# Patient Record
Sex: MALE | Race: WHITE | NOT HISPANIC OR LATINO | Employment: OTHER | URBAN - METROPOLITAN AREA
[De-identification: names, ages, dates, MRNs, and addresses within clinical notes are randomized per-mention and may not be internally consistent; named-entity substitution may affect disease eponyms.]

---

## 2017-12-20 ENCOUNTER — APPOINTMENT (OUTPATIENT)
Dept: PREADMISSION TESTING | Facility: HOSPITAL | Age: 77
End: 2017-12-20

## 2017-12-20 ENCOUNTER — HOSPITAL ENCOUNTER (OUTPATIENT)
Dept: GENERAL RADIOLOGY | Facility: HOSPITAL | Age: 77
Discharge: HOME | End: 2017-12-20
Admitting: ORTHOPAEDIC SURGERY

## 2017-12-20 VITALS
HEIGHT: 75 IN | DIASTOLIC BLOOD PRESSURE: 76 MMHG | BODY MASS INDEX: 32.3 KG/M2 | SYSTOLIC BLOOD PRESSURE: 131 MMHG | OXYGEN SATURATION: 98 % | HEART RATE: 65 BPM | TEMPERATURE: 97.5 F | RESPIRATION RATE: 20 BRPM | WEIGHT: 259.8 LBS

## 2017-12-20 LAB
25(OH)D3 SERPL-MCNC: 26.4 NG/ML (ref 30–100)
ABO GROUP BLD: NORMAL
ALBUMIN SERPL-MCNC: 4.2 G/DL (ref 3.5–5.2)
ALBUMIN/GLOB SERPL: 1.3 G/DL
ALP SERPL-CCNC: 74 U/L (ref 39–117)
ALT SERPL W P-5'-P-CCNC: 30 U/L (ref 1–41)
ANION GAP SERPL CALCULATED.3IONS-SCNC: 10.1 MMOL/L
APTT PPP: 31.2 SECONDS (ref 22.7–35.4)
AST SERPL-CCNC: 27 U/L (ref 1–40)
BASOPHILS # BLD AUTO: 0.03 10*3/MM3 (ref 0–0.2)
BASOPHILS NFR BLD AUTO: 0.4 % (ref 0–1.5)
BILIRUB SERPL-MCNC: 0.6 MG/DL (ref 0.1–1.2)
BILIRUB UR QL STRIP: NEGATIVE
BLD GP AB SCN SERPL QL: NEGATIVE
BUN BLD-MCNC: 17 MG/DL (ref 8–23)
BUN/CREAT SERPL: 12.1 (ref 7–25)
CALCIUM SPEC-SCNC: 9 MG/DL (ref 8.6–10.5)
CHLORIDE SERPL-SCNC: 103 MMOL/L (ref 98–107)
CLARITY UR: CLEAR
CO2 SERPL-SCNC: 27.9 MMOL/L (ref 22–29)
COLOR UR: YELLOW
CREAT BLD-MCNC: 1.41 MG/DL (ref 0.76–1.27)
DEPRECATED RDW RBC AUTO: 43.5 FL (ref 37–54)
EOSINOPHIL # BLD AUTO: 0.34 10*3/MM3 (ref 0–0.7)
EOSINOPHIL NFR BLD AUTO: 4.4 % (ref 0.3–6.2)
ERYTHROCYTE [DISTWIDTH] IN BLOOD BY AUTOMATED COUNT: 13.2 % (ref 11.5–14.5)
GFR SERPL CREATININE-BSD FRML MDRD: 49 ML/MIN/1.73
GLOBULIN UR ELPH-MCNC: 3.2 GM/DL
GLUCOSE BLD-MCNC: 128 MG/DL (ref 65–99)
GLUCOSE UR STRIP-MCNC: NEGATIVE MG/DL
HCT VFR BLD AUTO: 49.4 % (ref 40.4–52.2)
HGB BLD-MCNC: 16.2 G/DL (ref 13.7–17.6)
HGB UR QL STRIP.AUTO: NEGATIVE
IMM GRANULOCYTES # BLD: 0.02 10*3/MM3 (ref 0–0.03)
IMM GRANULOCYTES NFR BLD: 0.3 % (ref 0–0.5)
INR PPP: 1.1 (ref 0.9–1.1)
KETONES UR QL STRIP: NEGATIVE
LEUKOCYTE ESTERASE UR QL STRIP.AUTO: NEGATIVE
LYMPHOCYTES # BLD AUTO: 1.86 10*3/MM3 (ref 0.9–4.8)
LYMPHOCYTES NFR BLD AUTO: 24.2 % (ref 19.6–45.3)
MCH RBC QN AUTO: 29.8 PG (ref 27–32.7)
MCHC RBC AUTO-ENTMCNC: 32.8 G/DL (ref 32.6–36.4)
MCV RBC AUTO: 91 FL (ref 79.8–96.2)
MONOCYTES # BLD AUTO: 0.66 10*3/MM3 (ref 0.2–1.2)
MONOCYTES NFR BLD AUTO: 8.6 % (ref 5–12)
NEUTROPHILS # BLD AUTO: 4.77 10*3/MM3 (ref 1.9–8.1)
NEUTROPHILS NFR BLD AUTO: 62.1 % (ref 42.7–76)
NITRITE UR QL STRIP: NEGATIVE
PH UR STRIP.AUTO: 6 [PH] (ref 5–8)
PLATELET # BLD AUTO: 173 10*3/MM3 (ref 140–500)
PMV BLD AUTO: 10.2 FL (ref 6–12)
POTASSIUM BLD-SCNC: 4.4 MMOL/L (ref 3.5–5.2)
PROT SERPL-MCNC: 7.4 G/DL (ref 6–8.5)
PROT UR QL STRIP: NEGATIVE
PROTHROMBIN TIME: 13.8 SECONDS (ref 11.7–14.2)
RBC # BLD AUTO: 5.43 10*6/MM3 (ref 4.6–6)
RH BLD: POSITIVE
SODIUM BLD-SCNC: 141 MMOL/L (ref 136–145)
SP GR UR STRIP: 1.02 (ref 1–1.03)
UROBILINOGEN UR QL STRIP: NORMAL
WBC NRBC COR # BLD: 7.68 10*3/MM3 (ref 4.5–10.7)

## 2017-12-20 PROCEDURE — 81003 URINALYSIS AUTO W/O SCOPE: CPT | Performed by: ORTHOPAEDIC SURGERY

## 2017-12-20 PROCEDURE — 85025 COMPLETE CBC W/AUTO DIFF WBC: CPT | Performed by: ORTHOPAEDIC SURGERY

## 2017-12-20 PROCEDURE — 80053 COMPREHEN METABOLIC PANEL: CPT | Performed by: ORTHOPAEDIC SURGERY

## 2017-12-20 PROCEDURE — 86850 RBC ANTIBODY SCREEN: CPT | Performed by: ORTHOPAEDIC SURGERY

## 2017-12-20 PROCEDURE — 93010 ELECTROCARDIOGRAM REPORT: CPT | Performed by: INTERNAL MEDICINE

## 2017-12-20 PROCEDURE — 93005 ELECTROCARDIOGRAM TRACING: CPT

## 2017-12-20 PROCEDURE — 85610 PROTHROMBIN TIME: CPT | Performed by: ORTHOPAEDIC SURGERY

## 2017-12-20 PROCEDURE — 86900 BLOOD TYPING SEROLOGIC ABO: CPT | Performed by: ORTHOPAEDIC SURGERY

## 2017-12-20 PROCEDURE — 71020 HC CHEST PA AND LATERAL: CPT

## 2017-12-20 PROCEDURE — 86901 BLOOD TYPING SEROLOGIC RH(D): CPT | Performed by: ORTHOPAEDIC SURGERY

## 2017-12-20 PROCEDURE — 82306 VITAMIN D 25 HYDROXY: CPT | Performed by: ORTHOPAEDIC SURGERY

## 2017-12-20 PROCEDURE — 36415 COLL VENOUS BLD VENIPUNCTURE: CPT

## 2017-12-20 PROCEDURE — 85730 THROMBOPLASTIN TIME PARTIAL: CPT | Performed by: ORTHOPAEDIC SURGERY

## 2017-12-20 RX ORDER — ASPIRIN 81 MG/1
81 TABLET ORAL DAILY
COMMUNITY

## 2017-12-20 RX ORDER — INSULIN GLARGINE 100 [IU]/ML
34 INJECTION, SOLUTION SUBCUTANEOUS EVERY MORNING
COMMUNITY
End: 2022-03-23 | Stop reason: HOSPADM

## 2017-12-20 RX ORDER — DOCUSATE SODIUM 250 MG
250 CAPSULE ORAL 2 TIMES DAILY PRN
COMMUNITY
End: 2022-03-23 | Stop reason: HOSPADM

## 2017-12-20 RX ORDER — INSULIN GLARGINE 100 [IU]/ML
25 INJECTION, SOLUTION SUBCUTANEOUS NIGHTLY
COMMUNITY
End: 2018-01-04 | Stop reason: HOSPADM

## 2017-12-20 RX ORDER — ROPINIROLE 4 MG/1
4 TABLET, FILM COATED ORAL NIGHTLY
COMMUNITY

## 2017-12-20 RX ORDER — GABAPENTIN 300 MG/1
600 CAPSULE ORAL 3 TIMES DAILY
COMMUNITY

## 2017-12-20 RX ORDER — FUROSEMIDE 20 MG/1
20 TABLET ORAL 2 TIMES DAILY
Status: ON HOLD | COMMUNITY
End: 2019-08-17 | Stop reason: SDUPTHER

## 2017-12-20 RX ORDER — ATORVASTATIN CALCIUM 80 MG/1
80 TABLET, FILM COATED ORAL DAILY
COMMUNITY

## 2017-12-20 RX ORDER — HYDROCODONE BITARTRATE AND ACETAMINOPHEN 5; 325 MG/1; MG/1
1 TABLET ORAL NIGHTLY PRN
Status: ON HOLD | COMMUNITY
End: 2018-01-04

## 2017-12-20 RX ORDER — LISINOPRIL 5 MG/1
5 TABLET ORAL DAILY
COMMUNITY
End: 2019-08-17 | Stop reason: HOSPADM

## 2017-12-20 RX ORDER — UBIDECARENONE 75 MG
100 CAPSULE ORAL DAILY
COMMUNITY
End: 2019-06-10

## 2017-12-20 NOTE — DISCHARGE INSTRUCTIONS
Take the following medications the morning of surgery with a small sip of water: METOPROLOL.    BRING IN YOUR LISINOPRIL BOTTLE IN TO THE SURGERY NURSE AND SHE WILL TAKE YOUR BLOOD PRESSURE AGAIN AND DECIDE IF YOU NEED IT THE MORNING OF SURGERY.  STOP PREOPERATIVELY ANY ANTIINFLAMMATORY, ASPIRIN, DICLOFENAC/VOLTAREN GEL  ARRIVE TO THE MAIN SURGERY DESK THE DAY OF YOUR SURGERY BY 9AM OR AS DIRECTED  CALL YOU FAMILY MD TO INQUIRE ABOUT INSULIN DOSING****      General Instructions:  • Do not eat solid food after midnight the night before surgery.  • You may drink clear liquids day of surgery but must stop at least one hour before your hospital arrival time. - LAST SIP BY 8AM IF ARRIVAL BY 9  • It is beneficial for you to have a clear drink that contains carbohydrates the day of surgery.  We suggest a 12 to 20 ounce bottle of Gatorade or Powerade for non-diabetic patients or a 12 to 20 ounce bottle of G2 or Powerade Zero for diabetic patients. (Pediatric patients, are not advised to drink a 12 to 20 ounce carbohydrate drink)    Clear liquids are liquids you can see through.  Nothing red in color.     Plain water                               Sports drinks  Sodas                                   Gelatin (Jell-O)  Fruit juices without pulp such as white grape juice and apple juice  Popsicles that contain no fruit or yogurt  Tea or coffee (no cream or milk added)  Gatorade / Powerade  G2 / Powerade Zero    • Infants may have breast milk up to four hours before surgery.  • Infants drinking formula may drink formula up to six hours before surgery.   • Patients who avoid smoking, chewing tobacco and alcohol for 4 weeks prior to surgery have a reduced risk of post-operative complications.  Quit smoking as many days before surgery as you can.  • Do not smoke, use chewing tobacco or drink alcohol the day of surgery.   • If applicable bring your C-PAP/ BI-PAP machine.  • Bring any papers given to you in the doctor’s  office.  • Wear clean comfortable clothes and socks.  • Do not wear contact lenses or make-up.  Bring a case for your glasses.   • Bring crutches or walker if applicable.  • Remove all piercings.  Leave jewelry and any other valuables at home.  • The Pre-Admission Testing nurse will instruct you to bring medications if unable to obtain an accurate list in Pre-Admission Testing.        If you were given a blood bank ID arm band remember to bring it with you the day of surgery.    Preventing a Surgical Site Infection:  • For 2 to 3 days before surgery, avoid shaving with a razor because the razor can irritate skin and make it easier to develop an infection.  • The night prior to surgery sleep in a clean bed with clean clothing.  Do not allow pets to sleep with you.  • Shower on the morning of surgery using a fresh bar of anti-bacterial soap (such as Dial) and clean washcloth.  Dry with a clean towel and dress in clean clothing.  • Ask your surgeon if you will be receiving antibiotics prior to surgery.  • Make sure you, your family, and all healthcare providers clean their hands with soap and water or an alcohol based hand  before caring for you or your wound.    Day of surgery:  Upon arrival, a Pre-op nurse and Anesthesiologist will review your health history, obtain vital signs, and answer questions you may have.  The only belongings needed at this time will be your home medications and if applicable your C-PAP/BI-PAP machine.  If you are staying overnight your family can leave the rest of your belongings in the car and bring them to your room later.  A Pre-op nurse will start an IV and you may receive medication in preparation for surgery, including something to help you relax.  Your family will be able to see you in the Pre-op area.  While you are in surgery your family should notify the waiting room  if they leave the waiting room area and provide a contact phone number.    Please be aware  that surgery does come with discomfort.  We want to make every effort to control your discomfort so please discuss any uncontrolled symptoms with your nurse.   Your doctor will most likely have prescribed pain medications.      If you are going home after surgery you will receive individualized written care instructions before being discharged.  A responsible adult must drive you to and from the hospital on the day of your surgery and stay with you for 24 hours.    If you are staying overnight following surgery, you will be transported to your hospital room following the recovery period.  [facility] has all private rooms.    If you have any questions please call Pre-Admission Testing at [phone].  Deductibles and co-payments are collected on the day of service. Please be prepared to pay the required co-pay, deductible or deposit on the day of service as defined by your plan.

## 2018-01-03 ENCOUNTER — APPOINTMENT (OUTPATIENT)
Dept: GENERAL RADIOLOGY | Facility: HOSPITAL | Age: 78
End: 2018-01-03

## 2018-01-03 ENCOUNTER — ANESTHESIA (OUTPATIENT)
Dept: PERIOP | Facility: HOSPITAL | Age: 78
End: 2018-01-03

## 2018-01-03 ENCOUNTER — HOSPITAL ENCOUNTER (OUTPATIENT)
Facility: HOSPITAL | Age: 78
Discharge: HOME | End: 2018-01-04
Attending: ORTHOPAEDIC SURGERY | Admitting: ORTHOPAEDIC SURGERY

## 2018-01-03 ENCOUNTER — ANESTHESIA EVENT (OUTPATIENT)
Dept: PERIOP | Facility: HOSPITAL | Age: 78
End: 2018-01-03

## 2018-01-03 PROBLEM — M48.02 CERVICAL SPINAL STENOSIS: Status: ACTIVE | Noted: 2018-01-03

## 2018-01-03 LAB
GLUCOSE BLDC GLUCOMTR-MCNC: 181 MG/DL (ref 70–130)
GLUCOSE BLDC GLUCOMTR-MCNC: 198 MG/DL (ref 70–130)
GLUCOSE BLDC GLUCOMTR-MCNC: 204 MG/DL (ref 70–130)
GLUCOSE BLDC GLUCOMTR-MCNC: 256 MG/DL (ref 70–130)

## 2018-01-03 PROCEDURE — A9270 NON-COVERED ITEM OR SERVICE: HCPCS | Performed by: ORTHOPAEDIC SURGERY

## 2018-01-03 PROCEDURE — 63710000001 FUROSEMIDE 20 MG TABLET: Performed by: ORTHOPAEDIC SURGERY

## 2018-01-03 PROCEDURE — G0378 HOSPITAL OBSERVATION PER HR: HCPCS

## 2018-01-03 PROCEDURE — 25010000002 FENTANYL CITRATE (PF) 100 MCG/2ML SOLUTION: Performed by: ANESTHESIOLOGY

## 2018-01-03 PROCEDURE — 63710000001 METFORMIN 500 MG TABLET: Performed by: ORTHOPAEDIC SURGERY

## 2018-01-03 PROCEDURE — 25010000002 DEXAMETHASONE PER 1 MG: Performed by: ANESTHESIOLOGY

## 2018-01-03 PROCEDURE — 63710000001 ATORVASTATIN 80 MG TABLET: Performed by: ORTHOPAEDIC SURGERY

## 2018-01-03 PROCEDURE — 25010000002 FENTANYL CITRATE (PF) 100 MCG/2ML SOLUTION

## 2018-01-03 PROCEDURE — 72040 X-RAY EXAM NECK SPINE 2-3 VW: CPT

## 2018-01-03 PROCEDURE — 25010000002 MIDAZOLAM PER 1 MG: Performed by: ANESTHESIOLOGY

## 2018-01-03 PROCEDURE — 25010000002 PROPOFOL 10 MG/ML EMULSION: Performed by: ANESTHESIOLOGY

## 2018-01-03 PROCEDURE — 63710000001 GABAPENTIN 300 MG CAPSULE: Performed by: ORTHOPAEDIC SURGERY

## 2018-01-03 PROCEDURE — 25010000002 PHENYLEPHRINE PER 1 ML: Performed by: ANESTHESIOLOGY

## 2018-01-03 PROCEDURE — 63710000001 HYDROCODONE-ACETAMINOPHEN 5-325 MG TABLET: Performed by: ORTHOPAEDIC SURGERY

## 2018-01-03 PROCEDURE — 63710000001 ROPINIROLE 2 MG TABLET: Performed by: ORTHOPAEDIC SURGERY

## 2018-01-03 PROCEDURE — 25010000003 CEFAZOLIN IN DEXTROSE 2-4 GM/100ML-% SOLUTION: Performed by: ORTHOPAEDIC SURGERY

## 2018-01-03 PROCEDURE — 82962 GLUCOSE BLOOD TEST: CPT

## 2018-01-03 PROCEDURE — 25010000002 SUCCINYLCHOLINE PER 20 MG: Performed by: ANESTHESIOLOGY

## 2018-01-03 PROCEDURE — 76000 FLUOROSCOPY <1 HR PHYS/QHP: CPT

## 2018-01-03 PROCEDURE — 25010000002 HYDROMORPHONE PER 4 MG: Performed by: ANESTHESIOLOGY

## 2018-01-03 PROCEDURE — 63710000001 METOPROLOL TARTRATE 25 MG TABLET: Performed by: ORTHOPAEDIC SURGERY

## 2018-01-03 PROCEDURE — C1713 ANCHOR/SCREW BN/BN,TIS/BN: HCPCS | Performed by: ORTHOPAEDIC SURGERY

## 2018-01-03 PROCEDURE — 63710000001 LISINOPRIL 2.5 MG TABLET: Performed by: ORTHOPAEDIC SURGERY

## 2018-01-03 DEVICE — ANTERIOR CERVICAL PLATE ASSEMBLY, 2-LEVEL, 032MM
Type: IMPLANTABLE DEVICE | Site: SPINE CERVICAL | Status: FUNCTIONAL
Brand: TRESTLE LUXE

## 2018-01-03 DEVICE — NOVEL CERVICAL SPACER, 7 MM LARGE, 7DEG LORDOTIC, PEEK
Type: IMPLANTABLE DEVICE | Site: SPINE CERVICAL | Status: FUNCTIONAL
Brand: NOVEL SPINAL SPACER SYSTEM

## 2018-01-03 DEVICE — 4.0MM VARIABLE ANGLE, SELF-DRILLING HEXALOBE SCREW, 14MM
Type: IMPLANTABLE DEVICE | Site: SPINE CERVICAL | Status: FUNCTIONAL
Brand: TRESTLE LUXE

## 2018-01-03 DEVICE — ALLOGRFT BONE VIVIGEN CELLUAR MATRX FZ 1CC: Type: IMPLANTABLE DEVICE | Site: SPINE CERVICAL | Status: FUNCTIONAL

## 2018-01-03 RX ORDER — HYDRALAZINE HYDROCHLORIDE 20 MG/ML
5 INJECTION INTRAMUSCULAR; INTRAVENOUS
Status: DISCONTINUED | OUTPATIENT
Start: 2018-01-03 | End: 2018-01-03 | Stop reason: HOSPADM

## 2018-01-03 RX ORDER — ATORVASTATIN CALCIUM 80 MG/1
80 TABLET, FILM COATED ORAL DAILY
Status: DISCONTINUED | OUTPATIENT
Start: 2018-01-03 | End: 2018-01-04 | Stop reason: HOSPADM

## 2018-01-03 RX ORDER — SODIUM CHLORIDE 0.9 % (FLUSH) 0.9 %
1-10 SYRINGE (ML) INJECTION AS NEEDED
Status: DISCONTINUED | OUTPATIENT
Start: 2018-01-03 | End: 2018-01-03 | Stop reason: HOSPADM

## 2018-01-03 RX ORDER — SODIUM CHLORIDE, SODIUM LACTATE, POTASSIUM CHLORIDE, CALCIUM CHLORIDE 600; 310; 30; 20 MG/100ML; MG/100ML; MG/100ML; MG/100ML
100 INJECTION, SOLUTION INTRAVENOUS CONTINUOUS
Status: DISCONTINUED | OUTPATIENT
Start: 2018-01-03 | End: 2018-01-03

## 2018-01-03 RX ORDER — ROPINIROLE 2 MG/1
4 TABLET, FILM COATED ORAL NIGHTLY
Status: DISCONTINUED | OUTPATIENT
Start: 2018-01-03 | End: 2018-01-04 | Stop reason: HOSPADM

## 2018-01-03 RX ORDER — FENTANYL CITRATE 50 UG/ML
INJECTION, SOLUTION INTRAMUSCULAR; INTRAVENOUS
Status: COMPLETED
Start: 2018-01-03 | End: 2018-01-03

## 2018-01-03 RX ORDER — GABAPENTIN 300 MG/1
600 CAPSULE ORAL 3 TIMES DAILY
Status: DISCONTINUED | OUTPATIENT
Start: 2018-01-03 | End: 2018-01-04 | Stop reason: HOSPADM

## 2018-01-03 RX ORDER — BISACODYL 10 MG
10 SUPPOSITORY, RECTAL RECTAL DAILY PRN
Status: DISCONTINUED | OUTPATIENT
Start: 2018-01-03 | End: 2018-01-04 | Stop reason: HOSPADM

## 2018-01-03 RX ORDER — HYDROMORPHONE HCL 110MG/55ML
PATIENT CONTROLLED ANALGESIA SYRINGE INTRAVENOUS AS NEEDED
Status: DISCONTINUED | OUTPATIENT
Start: 2018-01-03 | End: 2018-01-03 | Stop reason: SURG

## 2018-01-03 RX ORDER — EPHEDRINE SULFATE 50 MG/ML
INJECTION, SOLUTION INTRAVENOUS AS NEEDED
Status: DISCONTINUED | OUTPATIENT
Start: 2018-01-03 | End: 2018-01-03 | Stop reason: SURG

## 2018-01-03 RX ORDER — DIPHENHYDRAMINE HYDROCHLORIDE 50 MG/ML
25 INJECTION INTRAMUSCULAR; INTRAVENOUS EVERY 6 HOURS PRN
Status: DISCONTINUED | OUTPATIENT
Start: 2018-01-03 | End: 2018-01-04 | Stop reason: HOSPADM

## 2018-01-03 RX ORDER — SODIUM CHLORIDE, SODIUM LACTATE, POTASSIUM CHLORIDE, CALCIUM CHLORIDE 600; 310; 30; 20 MG/100ML; MG/100ML; MG/100ML; MG/100ML
9 INJECTION, SOLUTION INTRAVENOUS CONTINUOUS
Status: DISCONTINUED | OUTPATIENT
Start: 2018-01-03 | End: 2018-01-03

## 2018-01-03 RX ORDER — PROMETHAZINE HYDROCHLORIDE 25 MG/ML
12.5 INJECTION, SOLUTION INTRAMUSCULAR; INTRAVENOUS ONCE AS NEEDED
Status: DISCONTINUED | OUTPATIENT
Start: 2018-01-03 | End: 2018-01-03 | Stop reason: HOSPADM

## 2018-01-03 RX ORDER — FUROSEMIDE 20 MG/1
20 TABLET ORAL DAILY
Status: DISCONTINUED | OUTPATIENT
Start: 2018-01-03 | End: 2018-01-04 | Stop reason: HOSPADM

## 2018-01-03 RX ORDER — SUCCINYLCHOLINE CHLORIDE 20 MG/ML
INJECTION INTRAMUSCULAR; INTRAVENOUS AS NEEDED
Status: DISCONTINUED | OUTPATIENT
Start: 2018-01-03 | End: 2018-01-03 | Stop reason: SURG

## 2018-01-03 RX ORDER — NALOXONE HCL 0.4 MG/ML
0.2 VIAL (ML) INJECTION AS NEEDED
Status: DISCONTINUED | OUTPATIENT
Start: 2018-01-03 | End: 2018-01-03 | Stop reason: HOSPADM

## 2018-01-03 RX ORDER — PROPOFOL 10 MG/ML
VIAL (ML) INTRAVENOUS AS NEEDED
Status: DISCONTINUED | OUTPATIENT
Start: 2018-01-03 | End: 2018-01-03 | Stop reason: SURG

## 2018-01-03 RX ORDER — HYDROCODONE BITARTRATE AND ACETAMINOPHEN 7.5; 325 MG/1; MG/1
1 TABLET ORAL ONCE AS NEEDED
Status: DISCONTINUED | OUTPATIENT
Start: 2018-01-03 | End: 2018-01-03 | Stop reason: HOSPADM

## 2018-01-03 RX ORDER — NICOTINE POLACRILEX 4 MG
15 LOZENGE BUCCAL
Status: DISCONTINUED | OUTPATIENT
Start: 2018-01-03 | End: 2018-01-04 | Stop reason: HOSPADM

## 2018-01-03 RX ORDER — ONDANSETRON 2 MG/ML
4 INJECTION INTRAMUSCULAR; INTRAVENOUS EVERY 6 HOURS PRN
Status: DISCONTINUED | OUTPATIENT
Start: 2018-01-03 | End: 2018-01-04 | Stop reason: HOSPADM

## 2018-01-03 RX ORDER — CEFAZOLIN SODIUM 2 G/100ML
2 INJECTION, SOLUTION INTRAVENOUS EVERY 8 HOURS
Status: DISCONTINUED | OUTPATIENT
Start: 2018-01-03 | End: 2018-01-04

## 2018-01-03 RX ORDER — OXYCODONE AND ACETAMINOPHEN 7.5; 325 MG/1; MG/1
1 TABLET ORAL ONCE AS NEEDED
Status: DISCONTINUED | OUTPATIENT
Start: 2018-01-03 | End: 2018-01-03 | Stop reason: HOSPADM

## 2018-01-03 RX ORDER — ROCURONIUM BROMIDE 10 MG/ML
INJECTION, SOLUTION INTRAVENOUS AS NEEDED
Status: DISCONTINUED | OUTPATIENT
Start: 2018-01-03 | End: 2018-01-03 | Stop reason: SURG

## 2018-01-03 RX ORDER — FENTANYL CITRATE 50 UG/ML
50 INJECTION, SOLUTION INTRAMUSCULAR; INTRAVENOUS
Status: DISCONTINUED | OUTPATIENT
Start: 2018-01-03 | End: 2018-01-03 | Stop reason: HOSPADM

## 2018-01-03 RX ORDER — MAGNESIUM HYDROXIDE 1200 MG/15ML
LIQUID ORAL AS NEEDED
Status: DISCONTINUED | OUTPATIENT
Start: 2018-01-03 | End: 2018-01-03 | Stop reason: HOSPADM

## 2018-01-03 RX ORDER — DEXTROSE MONOHYDRATE 25 G/50ML
25 INJECTION, SOLUTION INTRAVENOUS
Status: DISCONTINUED | OUTPATIENT
Start: 2018-01-03 | End: 2018-01-04 | Stop reason: HOSPADM

## 2018-01-03 RX ORDER — MIDAZOLAM HYDROCHLORIDE 1 MG/ML
2 INJECTION INTRAMUSCULAR; INTRAVENOUS
Status: DISCONTINUED | OUTPATIENT
Start: 2018-01-03 | End: 2018-01-03 | Stop reason: HOSPADM

## 2018-01-03 RX ORDER — ONDANSETRON 4 MG/1
4 TABLET, ORALLY DISINTEGRATING ORAL EVERY 6 HOURS PRN
Status: DISCONTINUED | OUTPATIENT
Start: 2018-01-03 | End: 2018-01-04 | Stop reason: HOSPADM

## 2018-01-03 RX ORDER — NALOXONE HCL 0.4 MG/ML
0.1 VIAL (ML) INJECTION
Status: DISCONTINUED | OUTPATIENT
Start: 2018-01-03 | End: 2018-01-04 | Stop reason: HOSPADM

## 2018-01-03 RX ORDER — BISACODYL 5 MG/1
5 TABLET, DELAYED RELEASE ORAL DAILY PRN
Status: DISCONTINUED | OUTPATIENT
Start: 2018-01-03 | End: 2018-01-04 | Stop reason: HOSPADM

## 2018-01-03 RX ORDER — PROMETHAZINE HYDROCHLORIDE 25 MG/1
12.5 TABLET ORAL ONCE AS NEEDED
Status: DISCONTINUED | OUTPATIENT
Start: 2018-01-03 | End: 2018-01-03 | Stop reason: HOSPADM

## 2018-01-03 RX ORDER — SODIUM CHLORIDE 450 MG/100ML
100 INJECTION, SOLUTION INTRAVENOUS CONTINUOUS
Status: DISCONTINUED | OUTPATIENT
Start: 2018-01-03 | End: 2018-01-04 | Stop reason: HOSPADM

## 2018-01-03 RX ORDER — HYDROMORPHONE HCL IN 0.9% NACL 10 MG/50ML
PATIENT CONTROLLED ANALGESIA SYRINGE INTRAVENOUS CONTINUOUS
Status: DISCONTINUED | OUTPATIENT
Start: 2018-01-03 | End: 2018-01-04

## 2018-01-03 RX ORDER — SENNA AND DOCUSATE SODIUM 50; 8.6 MG/1; MG/1
1 TABLET, FILM COATED ORAL NIGHTLY PRN
Status: DISCONTINUED | OUTPATIENT
Start: 2018-01-03 | End: 2018-01-04 | Stop reason: HOSPADM

## 2018-01-03 RX ORDER — ONDANSETRON 2 MG/ML
4 INJECTION INTRAMUSCULAR; INTRAVENOUS ONCE AS NEEDED
Status: DISCONTINUED | OUTPATIENT
Start: 2018-01-03 | End: 2018-01-03 | Stop reason: HOSPADM

## 2018-01-03 RX ORDER — FAMOTIDINE 10 MG/ML
20 INJECTION, SOLUTION INTRAVENOUS ONCE
Status: COMPLETED | OUTPATIENT
Start: 2018-01-03 | End: 2018-01-03

## 2018-01-03 RX ORDER — HYDROCODONE BITARTRATE AND ACETAMINOPHEN 5; 325 MG/1; MG/1
2 TABLET ORAL EVERY 4 HOURS PRN
Status: DISCONTINUED | OUTPATIENT
Start: 2018-01-03 | End: 2018-01-04 | Stop reason: HOSPADM

## 2018-01-03 RX ORDER — DEXAMETHASONE SODIUM PHOSPHATE 10 MG/ML
INJECTION INTRAMUSCULAR; INTRAVENOUS AS NEEDED
Status: DISCONTINUED | OUTPATIENT
Start: 2018-01-03 | End: 2018-01-03 | Stop reason: SURG

## 2018-01-03 RX ORDER — SODIUM CHLORIDE 0.9 % (FLUSH) 0.9 %
1-10 SYRINGE (ML) INJECTION AS NEEDED
Status: DISCONTINUED | OUTPATIENT
Start: 2018-01-03 | End: 2018-01-04 | Stop reason: HOSPADM

## 2018-01-03 RX ORDER — FLUMAZENIL 0.1 MG/ML
0.2 INJECTION INTRAVENOUS AS NEEDED
Status: DISCONTINUED | OUTPATIENT
Start: 2018-01-03 | End: 2018-01-03 | Stop reason: HOSPADM

## 2018-01-03 RX ORDER — LISINOPRIL 2.5 MG/1
2.5 TABLET ORAL DAILY
Status: DISCONTINUED | OUTPATIENT
Start: 2018-01-03 | End: 2018-01-04 | Stop reason: HOSPADM

## 2018-01-03 RX ORDER — HYDROMORPHONE HYDROCHLORIDE 1 MG/ML
0.5 INJECTION, SOLUTION INTRAMUSCULAR; INTRAVENOUS; SUBCUTANEOUS
Status: DISCONTINUED | OUTPATIENT
Start: 2018-01-03 | End: 2018-01-03 | Stop reason: HOSPADM

## 2018-01-03 RX ORDER — PROMETHAZINE HYDROCHLORIDE 25 MG/1
25 TABLET ORAL ONCE AS NEEDED
Status: DISCONTINUED | OUTPATIENT
Start: 2018-01-03 | End: 2018-01-03 | Stop reason: HOSPADM

## 2018-01-03 RX ORDER — PROMETHAZINE HYDROCHLORIDE 25 MG/1
12.5 SUPPOSITORY RECTAL EVERY 6 HOURS PRN
Status: DISCONTINUED | OUTPATIENT
Start: 2018-01-03 | End: 2018-01-04 | Stop reason: HOSPADM

## 2018-01-03 RX ORDER — PROMETHAZINE HYDROCHLORIDE 25 MG/ML
12.5 INJECTION, SOLUTION INTRAMUSCULAR; INTRAVENOUS EVERY 6 HOURS PRN
Status: DISCONTINUED | OUTPATIENT
Start: 2018-01-03 | End: 2018-01-04 | Stop reason: HOSPADM

## 2018-01-03 RX ORDER — EPHEDRINE SULFATE 50 MG/ML
5 INJECTION, SOLUTION INTRAVENOUS ONCE AS NEEDED
Status: DISCONTINUED | OUTPATIENT
Start: 2018-01-03 | End: 2018-01-03 | Stop reason: HOSPADM

## 2018-01-03 RX ORDER — DOCUSATE SODIUM 100 MG/1
100 CAPSULE, LIQUID FILLED ORAL 2 TIMES DAILY PRN
Status: DISCONTINUED | OUTPATIENT
Start: 2018-01-03 | End: 2018-01-04 | Stop reason: HOSPADM

## 2018-01-03 RX ORDER — DIPHENHYDRAMINE HYDROCHLORIDE 50 MG/ML
12.5 INJECTION INTRAMUSCULAR; INTRAVENOUS
Status: DISCONTINUED | OUTPATIENT
Start: 2018-01-03 | End: 2018-01-03 | Stop reason: HOSPADM

## 2018-01-03 RX ORDER — PROMETHAZINE HYDROCHLORIDE 12.5 MG/1
12.5 TABLET ORAL EVERY 6 HOURS PRN
Status: DISCONTINUED | OUTPATIENT
Start: 2018-01-03 | End: 2018-01-04 | Stop reason: HOSPADM

## 2018-01-03 RX ORDER — ONDANSETRON 4 MG/1
4 TABLET, FILM COATED ORAL EVERY 6 HOURS PRN
Status: DISCONTINUED | OUTPATIENT
Start: 2018-01-03 | End: 2018-01-04 | Stop reason: HOSPADM

## 2018-01-03 RX ORDER — LABETALOL HYDROCHLORIDE 5 MG/ML
5 INJECTION, SOLUTION INTRAVENOUS
Status: DISCONTINUED | OUTPATIENT
Start: 2018-01-03 | End: 2018-01-03 | Stop reason: HOSPADM

## 2018-01-03 RX ORDER — CEFAZOLIN SODIUM 2 G/100ML
2 INJECTION, SOLUTION INTRAVENOUS ONCE
Status: COMPLETED | OUTPATIENT
Start: 2018-01-03 | End: 2018-01-03

## 2018-01-03 RX ORDER — PROMETHAZINE HYDROCHLORIDE 25 MG/1
25 SUPPOSITORY RECTAL ONCE AS NEEDED
Status: DISCONTINUED | OUTPATIENT
Start: 2018-01-03 | End: 2018-01-03 | Stop reason: HOSPADM

## 2018-01-03 RX ORDER — MIDAZOLAM HYDROCHLORIDE 1 MG/ML
1 INJECTION INTRAMUSCULAR; INTRAVENOUS
Status: DISCONTINUED | OUTPATIENT
Start: 2018-01-03 | End: 2018-01-03 | Stop reason: HOSPADM

## 2018-01-03 RX ORDER — CYCLOBENZAPRINE HCL 10 MG
10 TABLET ORAL 3 TIMES DAILY PRN
Status: DISCONTINUED | OUTPATIENT
Start: 2018-01-03 | End: 2018-01-04 | Stop reason: HOSPADM

## 2018-01-03 RX ADMIN — FENTANYL CITRATE 50 MCG: 50 INJECTION, SOLUTION INTRAMUSCULAR; INTRAVENOUS at 16:25

## 2018-01-03 RX ADMIN — PHENYLEPHRINE HYDROCHLORIDE 60 MCG: 10 INJECTION INTRAVENOUS at 12:25

## 2018-01-03 RX ADMIN — GABAPENTIN 600 MG: 300 CAPSULE ORAL at 20:20

## 2018-01-03 RX ADMIN — SODIUM CHLORIDE, POTASSIUM CHLORIDE, SODIUM LACTATE AND CALCIUM CHLORIDE 9 ML/HR: 600; 310; 30; 20 INJECTION, SOLUTION INTRAVENOUS at 15:38

## 2018-01-03 RX ADMIN — SUCCINYLCHOLINE CHLORIDE 100 MG: 20 INJECTION, SOLUTION INTRAMUSCULAR; INTRAVENOUS; PARENTERAL at 12:25

## 2018-01-03 RX ADMIN — EPHEDRINE SULFATE 25 MG: 50 INJECTION INTRAMUSCULAR; INTRAVENOUS; SUBCUTANEOUS at 13:35

## 2018-01-03 RX ADMIN — FENTANYL CITRATE 50 MCG: 50 INJECTION, SOLUTION INTRAMUSCULAR; INTRAVENOUS at 16:06

## 2018-01-03 RX ADMIN — FENTANYL CITRATE 50 MCG: 50 INJECTION INTRAMUSCULAR; INTRAVENOUS at 13:56

## 2018-01-03 RX ADMIN — CEFAZOLIN SODIUM 2 G: 2 INJECTION, SOLUTION INTRAVENOUS at 18:45

## 2018-01-03 RX ADMIN — SODIUM CHLORIDE 100 ML/HR: 4.5 INJECTION, SOLUTION INTRAVENOUS at 17:35

## 2018-01-03 RX ADMIN — METFORMIN HYDROCHLORIDE 250 MG: 500 TABLET ORAL at 20:20

## 2018-01-03 RX ADMIN — METOPROLOL TARTRATE 12.5 MG: 25 TABLET ORAL at 20:20

## 2018-01-03 RX ADMIN — FENTANYL CITRATE 50 MCG: 50 INJECTION INTRAMUSCULAR; INTRAVENOUS at 13:05

## 2018-01-03 RX ADMIN — CEFAZOLIN SODIUM 2 G: 2 INJECTION, SOLUTION INTRAVENOUS at 12:17

## 2018-01-03 RX ADMIN — FUROSEMIDE 20 MG: 20 TABLET ORAL at 20:20

## 2018-01-03 RX ADMIN — SODIUM CHLORIDE, POTASSIUM CHLORIDE, SODIUM LACTATE AND CALCIUM CHLORIDE 9 ML/HR: 600; 310; 30; 20 INJECTION, SOLUTION INTRAVENOUS at 08:52

## 2018-01-03 RX ADMIN — ATORVASTATIN CALCIUM 80 MG: 80 TABLET, FILM COATED ORAL at 22:19

## 2018-01-03 RX ADMIN — DEXAMETHASONE SODIUM PHOSPHATE 10 MG: 10 INJECTION INTRAMUSCULAR; INTRAVENOUS at 12:50

## 2018-01-03 RX ADMIN — ROPINIROLE 4 MG: 2 TABLET, FILM COATED ORAL at 20:20

## 2018-01-03 RX ADMIN — Medication 1 MG: at 09:52

## 2018-01-03 RX ADMIN — PROPOFOL 200 MG: 10 INJECTION, EMULSION INTRAVENOUS at 12:25

## 2018-01-03 RX ADMIN — HYDROCODONE BITARTRATE AND ACETAMINOPHEN 2 TABLET: 5; 325 TABLET ORAL at 20:19

## 2018-01-03 RX ADMIN — FAMOTIDINE 20 MG: 10 INJECTION, SOLUTION INTRAVENOUS at 09:52

## 2018-01-03 RX ADMIN — Medication: at 16:11

## 2018-01-03 RX ADMIN — ROCURONIUM BROMIDE 5 MG: 10 INJECTION INTRAVENOUS at 12:25

## 2018-01-03 RX ADMIN — SODIUM CHLORIDE, POTASSIUM CHLORIDE, SODIUM LACTATE AND CALCIUM CHLORIDE: 600; 310; 30; 20 INJECTION, SOLUTION INTRAVENOUS at 12:15

## 2018-01-03 RX ADMIN — LISINOPRIL 2.5 MG: 2.5 TABLET ORAL at 20:21

## 2018-01-03 RX ADMIN — HYDROMORPHONE HYDROCHLORIDE 0.25 MG: 2 INJECTION INTRAMUSCULAR; INTRAVENOUS; SUBCUTANEOUS at 14:00

## 2018-01-03 NOTE — H&P
Patient Care Team:  Demetrio Ko DO as PCP - General (Family Medicine)  Jacky Cristina MD as Consulting Physician (Internal Medicine)    Chief complaint neck and arm pain    Subjective     Patient is a 77 y.o. male presents with neck pain with radiation into the left arm. Onset of symptoms was many years ago.  Symptoms are associated with weakness and balance disturbance.     Review of Systems   Pertinent items are noted in HPI    History  Past Medical History:   Diagnosis Date   • Arthritis     HANDS KNEES   • Cervical pain (neck)     LEFT ARM PARESTHESIA   • Coronary artery disease    • Depression    • Diabetes mellitus     TYPE 2   • Diverticular disease     HX, S/P RESECTION   • Hyperlipemia    • Hypertension    • PTSD (post-traumatic stress disorder)    • RLS (restless legs syndrome)    • Sleep apnea     DIDNT TOLERATE MASK, CLAUSTROPHOBIC   • Structural abnormality of kidney     SAW NEPHROLOGIST FOR SMALLER KIDNEY - 3 YEARS AGO.   • Thyroid cyst     PER WIFE     Past Surgical History:   Procedure Laterality Date   • APPENDECTOMY     • CATARACT EXTRACTION W/ INTRAOCULAR LENS  IMPLANT, BILATERAL     • COLON RESECTION      COLOSTOMY X 6 MOS THEN REVERSED.   • CORONARY ANGIOPLASTY WITH STENT PLACEMENT  02/10/2010    [email or web address]     Family History   Problem Relation Age of Onset   • Malig Hyperthermia Neg Hx      Social History   Substance Use Topics   • Smoking status: Former Smoker     Years: 5.00     Types: Cigars   • Smokeless tobacco: Never Used   • Alcohol use No     Prescriptions Prior to Admission   Medication Sig Dispense Refill Last Dose   • atorvastatin (LIPITOR) 80 MG tablet Take 80 mg by mouth Daily.   1/2/2018 at 0700   • Cholecalciferol 2000 units capsule Take 2,000 Units by mouth Daily.   1/2/2018 at 0700   • furosemide (LASIX) 20 MG tablet Take 20 mg by mouth 2 (Two) Times a Day.   1/2/2018 at 1900   • gabapentin (NEURONTIN) 300 MG capsule Take 600 mg by mouth 3 (Three)  Times a Day.   1/2/2018 at 1900   • insulin glargine (LANTUS) 100 UNIT/ML injection Inject 30 Units under the skin Every Morning.   1/2/2018 at 0700   • insulin glargine (LANTUS) 100 UNIT/ML injection Inject 25 Units under the skin Every Night.   1/2/2018 at 1900   • metoprolol tartrate (LOPRESSOR) 25 MG tablet Take 12.5 mg by mouth Every 12 (Twelve) Hours.   1/3/2018 at 0950   • rOPINIRole (REQUIP) 4 MG tablet Take 4 mg by mouth Every Night.   1/2/2018 at 1900   • vitamin B-12 (CYANOCOBALAMIN) 100 MCG tablet Take 100 mcg by mouth Daily.   1/2/2018 at 0700   • ARTIFICIAL TEAR OP Apply 1 drop to eye As Needed. Both eyes   12/27/2017   • aspirin 81 MG EC tablet Take 81 mg by mouth Daily. STOPPED PREOP, LAST DOSE 12/19/17 12/27/2017   • Chlorhexidine Gluconate 2 % pads Apply 1 application topically Take As Directed. PM before OR and AM of OR   1/3/2018 at 0700   • diclofenac (VOLTAREN) 1 % gel gel Apply 4 g topically 4 (Four) Times a Day As Needed. STOPPED PREOP, LAST DOSE 10/2017   12/3/2017   • docusate sodium (COLACE) 250 MG capsule Take 250 mg by mouth 2 (Two) Times a Day As Needed for Constipation.   1/2/2018 at 0700   • HYDROcodone-acetaminophen (NORCO) 5-325 MG per tablet Take 1 tablet by mouth At Night As Needed.   1/1/2018   • lisinopril (PRINIVIL,ZESTRIL) 5 MG tablet Take 2.5 mg by mouth Daily.   1/2/2018 at 0700   • metFORMIN (GLUCOPHAGE) 500 MG tablet Take 250 mg by mouth 2 (Two) Times a Day With Meals.   1/2/2018 at 1900     Allergies:  Contrast dye    Objective     Vital Signs  Temp:  [97.4 °F (36.3 °C)] 97.4 °F (36.3 °C)  Heart Rate:  [61-70] 62  Resp:  [20] 20  BP: (159)/(87) 159/87    Physical Exam:    General Appearance alert, appears stated age and cooperative  Head normocephalic, without obvious abnormality and atraumatic  Neck suppple and trachea midline  Extremities moves extremities well, no edema, no cyanosis and no redness  Pulses Pulses palpable and equal bilaterally  Skin no bleeding,  bruising or rash  Neurologic Mental Status orientated to person, place, time and situation, Sensory intact, Motor strength is equal in upper and lower extremitits and except for decreased strength with testing of left bicep, tricep and  strength which are 4/5                                                            Results Review:    None    Assessment/Plan     Active Problems:    Cervical spinal stenosis      ACDF C4-C6    I discussed the patients findings and my recommendations with patient and nursing staff.     Vincenzo Soliman DO  01/03/18  11:10 AM

## 2018-01-03 NOTE — PLAN OF CARE
Problem: Patient Care Overview (Adult)  Goal: Plan of Care Review  Outcome: Ongoing (interventions implemented as appropriate)   01/03/18 1840   Coping/Psychosocial Response Interventions   Plan Of Care Reviewed With patient;spouse   Patient Care Overview   Progress progress toward functional goals as expected   Outcome Evaluation   Outcome Summary/Follow up Plan Patient drowsy easily aroused. Family at the bedside. OLGA drain in place. Oriented to room.        Problem: Perioperative Period (Adult)  Goal: Signs and Symptoms of Listed Potential Problems Will be Absent or Manageable (Perioperative Period)  Outcome: Ongoing (interventions implemented as appropriate)   01/03/18 1840   Perioperative Period   Problems Assessed (Perioperative Period) all   Problems Present (Perioperative Period) pain

## 2018-01-03 NOTE — OP NOTE
Pre-op Diagnosis:   1. Cervical stenosis C4 through C6  2.  Left cervical radiculopathy    Post-op Diagnosis:     1. Cervical stenosis C4 through 6  2.  Left cervical radiculopathy    Procedure(s):  1.  Anterior cervical arthrodesis including removal of the disc for decompression of the spinal cord and spinal nerve roots C4-C5  3.  Anterior cervical arthrodesis including removal of the disc for decompression of the spinal cord and spinal nerve roots C5-C6  4.  Anterior cervical instrumentation C4, C5, C6  5.  Application of intervertebral biomechanical device C4-C5  6.  Application of intervertebral biomechanical device C5-C6  7.  Use of local autograft bone   Surgeon(s):  Vincenzo Soliman DO  Assistant: Maureen Soto PA-C Please note I utilized the services of an assistant, specifically, Maureen Soto PA-C.  Maureen participated in crucial portions of the operation.  The use of Maureen greatly reduced overall operative time thereby reducing overall morbidity for the patient.    Anesthesia: General  Estimated Blood Loss: 100 mL  Specimens: * No orders in the log *  Findings: Severe stenosis particularly at C5-C6  Complications: None apparent  Implants: Alphatec spine    Preoperative indications: Patient is a 77 y.o. year-old who is been having left arm pain and weakness due to severe cervical stenosis from C4 C6..  After failure of the usual conservative treatment I recommended ACDF surgery.  The risk of ACDF surgery were described to the patient including but not limited to; bleeding, infection, blood clots, pulmonary embolus, stroke, heart attack, nerve damage or spinal cord injury causing complete or partial paralysis, dural tear causing spinal headache, recurrent laryngeal nerve palsy causing voice disturbance, risk of dysphasia either temporary or permanent, hardware complications, nonunion, lack of guarantee, continued pain, continued numbness, and continued weakness.  He had many questions about these risks which  was answered to the best of my ability in a language the patient could understand.  Informed consent obtained.  Patient presents today for ACDF C4 through 6.    Procedure in detail: Patient was identified in the preoperative holding area.  All labs and consent following order.  SHWETA hose and SCDs were applied for thromboembolic prophylaxis.  Patient's neck was marked with an indelible marker.  Kedignazol was hung for antibiotic prophylaxis.  The patient  was transferred to the operative suite and given the benefit of general anesthesia.  Arms were positioned to the side and the shoulders were taped for optimum viewing of the spine.  The neck was then prepped and draped in the usual fashion.  Timeout was performed and Lin-Alegre incision was performed in the anterior neck.  Bovie dissection was performed down to the platysma which was dissected in a vertical fashion.  Blunt and scissor dissection was performed through the anterior cervical fascia down to the prevertebral fascia.  Cloward retractor was used to retract the trachea esophagus and carotid sheath.  I then incised the prevertebral fascia and placed a marking device.  X-ray confirmed that this was in C4 vertebral body.  I then left the marker in place and dissected the longus coli muscle for placement of the self-retaining retractor.  I then checked my count of vertebra and removed the marker.  I then performed a discectomy at C4-C5 with Kerrisons, curettes, and pituitaries.  I brought the operative microscope for microsurgical technique and dissection.  The high-speed drill was used to thin out the overgrown osteophytes from the uncovertebral joint.  The posterior annulus was reached.  An angled curette was used to develop a plane between the posterior ligament and the vertebral body at C4 and 5.  I then removed additional osteophytes with a Kerrison punch.  I then removed the posterior longitudinal ligament with a Kerrison punch.  Decompression was  performed out to the lateral recess and the exiting foraminal zone for the bilateral C5 nerve roots.  The nerves were checked for freedom with a probe.  Next I irrigated and achieved hemostasis and adjusted my retractor to perform the same procedure at C5-C6.  Anterior osteophytes removed the disc in vertebral osteophytes were removed as well as posterior longitudinal ligament.  Patient has severe lateral recess and central stenosis at this level.  Thorough decompression was performed out the lateral recess and for the exiting C6 nerve roots.  I then trialed and placed spacers at both levels.  The spacers were filled with autograft bone.  Finally a 32 mm Alphatec trestle lux plate was affixed to the vertebra at C4-C5 and C6 with 6 cortical screws.  X-rays were taken showing good appearance of the hardware.  The wound was then irrigated and hemostasis was obtained.  The retractor was removed and layered closure was performed.  The patient was then awakened from general anesthesia transferred to the hospital bed taken recovery room stable condition.    Disposition: The patient will be admitted to a hospital bed.  He will be given pain medicine, antibiotic prophylaxis, DVT prophylaxis.  Anticipate a 1-2 night hospital stay followed by discharge to home.

## 2018-01-03 NOTE — BRIEF OP NOTE
CERVICAL DISCECTOMY ANTERIOR FUSION WITH INSTRUMENTATION  Progress Note    Chester Gama  1/3/2018    Pre-op Diagnosis:   Cervical stenosis C4-6       Post-Op Diagnosis Codes:   same    Procedure/CPT® Codes:      Procedure(s):  ANTERIOR CERVICAL DECOMPRESSION & FUSION C4 6    Surgeon(s):  Vincenzo Soliman DO    Anesthesia: General    Staff:   Circulator: Paola Martins RN; Belkis Ruiz RN  Radiology Technologist: Chary Vargas  Scrub Person: Naomi Lin  Vendor Representative: Lavon Dixon  Assistant: JUDE Freeman  Orientee: Cherelle Dean RN    Estimated Blood Loss: 100 mL    Urine Voided: * No values recorded between 1/3/2018 12:11 PM and 1/3/2018  2:31 PM *    Specimens:                None      Drains:   Drain/Device Site 01/03/18 1400 Right anterior neck collapsible closed device (Active)           Findings: Severe spinal stenosis      Complications: None apparent        Vincenzo Soliman DO     Date: 1/3/2018  Time: 2:33 PM

## 2018-01-03 NOTE — PLAN OF CARE
Problem: Patient Care Overview (Adult)  Goal: Plan of Care Review  Outcome: Ongoing (interventions implemented as appropriate)   01/03/18 0847   Coping/Psychosocial Response Interventions   Plan Of Care Reviewed With patient   Patient Care Overview   Progress no change     Goal: Adult Individualization and Mutuality  Outcome: Ongoing (interventions implemented as appropriate)   01/03/18 0847   Individualization   Patient Specific Preferences Prefers to be called Lester     Goal: Discharge Needs Assessment  Outcome: Ongoing (interventions implemented as appropriate)   01/03/18 0847   Discharge Needs Assessment   Concerns To Be Addressed no discharge needs identified       Problem: Perioperative Period (Adult)  Goal: Signs and Symptoms of Listed Potential Problems Will be Absent or Manageable (Perioperative Period)  Outcome: Ongoing (interventions implemented as appropriate)   01/03/18 0847   Perioperative Period   Problems Assessed (Perioperative Period) all   Problems Present (Perioperative Period) pain;physiologic stress response

## 2018-01-03 NOTE — ANESTHESIA PROCEDURE NOTES
Airway  Urgency: elective    Date/Time: 1/3/2018 12:37 PM  End Time:1/3/2018 12:37 PM    General Information and Staff    Patient location during procedure: OR  Anesthesiologist: CLARICE BRITT    Indications and Patient Condition  Indications for airway management: airway protection    Preoxygenated: yes  MILS maintained throughout  Mask difficulty assessment: 1 - vent by mask    Final Airway Details  Final airway type: endotracheal airway      Successful airway: NIM tube  Cuffed: yes   Successful intubation technique: direct laryngoscopy  Facilitating devices/methods: intubating stylet  Endotracheal tube insertion site: oral  Blade: Egan  Blade size: #2  Cormack-Lehane Classification: grade I - full view of glottis  Placement verified by: chest auscultation and capnometry   Measured from: lips  ETT to lips (cm): 21  Number of attempts at approach: 1

## 2018-01-03 NOTE — ANESTHESIA PREPROCEDURE EVALUATION
Anesthesia Evaluation            Airway   Mallampati: III  Neck ROM: limited  no difficulty expected  Dental    (+) lower dentures and upper dentures    Pulmonary    (+) a smoker Former,   Cardiovascular     ECG reviewed        Neuro/Psych  GI/Hepatic/Renal/Endo    (+)  diabetes mellitus type 2 using insulin,     Musculoskeletal     Abdominal    Substance History      OB/GYN          Other                                                Anesthesia Plan    ASA 3     general   (Cardiac stent placed about 7 years ago per patient with recent cardiac f/u without  current signs or symptoms of coronary disease requiring pre op intervention )  intravenous induction   Anesthetic plan and risks discussed with patient.

## 2018-01-03 NOTE — PERIOPERATIVE NURSING NOTE
"Measured for soft cervical collar.  Neck circumference 18\" around, chin to chest 5\", Procare size XL, reference # [id number].  "

## 2018-01-03 NOTE — ANESTHESIA POSTPROCEDURE EVALUATION
Patient: Chester Gama    Procedure Summary     Date Anesthesia Start Anesthesia Stop Room / Location    01/03/18 [id number]  [facility] / [facility]       Procedure Diagnosis Surgeon Provider    ANTERIOR CERVICAL DECOMPRESSION & FUSION C4 6 (N/A Spine Cervical) No diagnosis on file. DO Jorge Smallwood MD          Anesthesia Type: general  Last vitals  BP   145/81 (01/03/18 1635)   Temp   37.1 °C (98.7 °F) (01/03/18 1622)   Pulse   72 (01/03/18 1635)   Resp   16 (01/03/18 1635)     SpO2   96 % (01/03/18 1635)     Post Anesthesia Care and Evaluation    Patient location during evaluation: PACU  Patient participation: complete - patient participated  Level of consciousness: awake  Pain score: 8  Pain management: adequate  Airway patency: patent  Anesthetic complications: No anesthetic complications  PONV Status: none  Cardiovascular status: acceptable  Respiratory status: acceptable  Hydration status: acceptable

## 2018-01-04 VITALS
BODY MASS INDEX: 32.09 KG/M2 | OXYGEN SATURATION: 91 % | DIASTOLIC BLOOD PRESSURE: 72 MMHG | TEMPERATURE: 97.7 F | HEART RATE: 65 BPM | SYSTOLIC BLOOD PRESSURE: 126 MMHG | WEIGHT: 258.1 LBS | RESPIRATION RATE: 16 BRPM | HEIGHT: 75 IN

## 2018-01-04 PROBLEM — M48.02 CERVICAL SPINAL STENOSIS: Status: RESOLVED | Noted: 2018-01-03 | Resolved: 2018-01-04

## 2018-01-04 LAB
ANION GAP SERPL CALCULATED.3IONS-SCNC: 10.3 MMOL/L
BUN BLD-MCNC: 15 MG/DL (ref 8–23)
BUN/CREAT SERPL: 12.3 (ref 7–25)
CALCIUM SPEC-SCNC: 8.4 MG/DL (ref 8.6–10.5)
CHLORIDE SERPL-SCNC: 100 MMOL/L (ref 98–107)
CO2 SERPL-SCNC: 25.7 MMOL/L (ref 22–29)
CREAT BLD-MCNC: 1.22 MG/DL (ref 0.76–1.27)
GFR SERPL CREATININE-BSD FRML MDRD: 58 ML/MIN/1.73
GLUCOSE BLD-MCNC: 220 MG/DL (ref 65–99)
GLUCOSE BLDC GLUCOMTR-MCNC: 189 MG/DL (ref 70–130)
GLUCOSE BLDC GLUCOMTR-MCNC: 239 MG/DL (ref 70–130)
HCT VFR BLD AUTO: 45.3 % (ref 40.4–52.2)
HGB BLD-MCNC: 14.5 G/DL (ref 13.7–17.6)
POTASSIUM BLD-SCNC: 4.1 MMOL/L (ref 3.5–5.2)
SODIUM BLD-SCNC: 136 MMOL/L (ref 136–145)

## 2018-01-04 PROCEDURE — A9270 NON-COVERED ITEM OR SERVICE: HCPCS | Performed by: ORTHOPAEDIC SURGERY

## 2018-01-04 PROCEDURE — 97110 THERAPEUTIC EXERCISES: CPT

## 2018-01-04 PROCEDURE — 63710000001 HYDROCODONE-ACETAMINOPHEN 5-325 MG TABLET: Performed by: ORTHOPAEDIC SURGERY

## 2018-01-04 PROCEDURE — 63710000001 LISINOPRIL 2.5 MG TABLET: Performed by: ORTHOPAEDIC SURGERY

## 2018-01-04 PROCEDURE — G8979 MOBILITY GOAL STATUS: HCPCS

## 2018-01-04 PROCEDURE — 63710000001 FUROSEMIDE 20 MG TABLET: Performed by: ORTHOPAEDIC SURGERY

## 2018-01-04 PROCEDURE — 25010000003 CEFAZOLIN IN DEXTROSE 2-4 GM/100ML-% SOLUTION: Performed by: ORTHOPAEDIC SURGERY

## 2018-01-04 PROCEDURE — 94799 UNLISTED PULMONARY SVC/PX: CPT

## 2018-01-04 PROCEDURE — 85014 HEMATOCRIT: CPT | Performed by: ORTHOPAEDIC SURGERY

## 2018-01-04 PROCEDURE — 85018 HEMOGLOBIN: CPT | Performed by: ORTHOPAEDIC SURGERY

## 2018-01-04 PROCEDURE — G8980 MOBILITY D/C STATUS: HCPCS

## 2018-01-04 PROCEDURE — 82962 GLUCOSE BLOOD TEST: CPT

## 2018-01-04 PROCEDURE — 97161 PT EVAL LOW COMPLEX 20 MIN: CPT

## 2018-01-04 PROCEDURE — 63710000001 GABAPENTIN 300 MG CAPSULE: Performed by: ORTHOPAEDIC SURGERY

## 2018-01-04 PROCEDURE — G8978 MOBILITY CURRENT STATUS: HCPCS

## 2018-01-04 PROCEDURE — 80048 BASIC METABOLIC PNL TOTAL CA: CPT | Performed by: ORTHOPAEDIC SURGERY

## 2018-01-04 PROCEDURE — 63710000001 INSULIN ASPART PER 5 UNITS: Performed by: ORTHOPAEDIC SURGERY

## 2018-01-04 PROCEDURE — 63710000001 ATORVASTATIN 80 MG TABLET: Performed by: ORTHOPAEDIC SURGERY

## 2018-01-04 PROCEDURE — 63710000001 METFORMIN 500 MG TABLET: Performed by: ORTHOPAEDIC SURGERY

## 2018-01-04 PROCEDURE — 63710000001 METOPROLOL TARTRATE 25 MG TABLET: Performed by: ORTHOPAEDIC SURGERY

## 2018-01-04 RX ORDER — NICOTINE POLACRILEX 4 MG
15 LOZENGE BUCCAL
Status: DISCONTINUED | OUTPATIENT
Start: 2018-01-04 | End: 2018-01-04 | Stop reason: HOSPADM

## 2018-01-04 RX ORDER — INSULIN GLARGINE 100 [IU]/ML
28 INJECTION, SOLUTION SUBCUTANEOUS NIGHTLY
Qty: 1 UNITS | Refills: 0
Start: 2018-01-04 | End: 2022-03-23 | Stop reason: HOSPADM

## 2018-01-04 RX ORDER — INSULIN GLARGINE 100 [IU]/ML
28 INJECTION, SOLUTION SUBCUTANEOUS NIGHTLY
Status: DISCONTINUED | OUTPATIENT
Start: 2018-01-04 | End: 2018-01-04 | Stop reason: HOSPADM

## 2018-01-04 RX ORDER — HYDROCODONE BITARTRATE AND ACETAMINOPHEN 5; 325 MG/1; MG/1
1 TABLET ORAL EVERY 4 HOURS PRN
Qty: 70 TABLET | Refills: 0 | Status: SHIPPED | OUTPATIENT
Start: 2018-01-04 | End: 2018-01-18

## 2018-01-04 RX ORDER — DEXTROSE MONOHYDRATE 25 G/50ML
25 INJECTION, SOLUTION INTRAVENOUS
Status: DISCONTINUED | OUTPATIENT
Start: 2018-01-04 | End: 2018-01-04 | Stop reason: HOSPADM

## 2018-01-04 RX ADMIN — LISINOPRIL 2.5 MG: 2.5 TABLET ORAL at 09:39

## 2018-01-04 RX ADMIN — METOPROLOL TARTRATE 12.5 MG: 25 TABLET ORAL at 09:39

## 2018-01-04 RX ADMIN — GABAPENTIN 600 MG: 300 CAPSULE ORAL at 09:38

## 2018-01-04 RX ADMIN — CEFAZOLIN SODIUM 2 G: 2 INJECTION, SOLUTION INTRAVENOUS at 01:37

## 2018-01-04 RX ADMIN — ATORVASTATIN CALCIUM 80 MG: 80 TABLET, FILM COATED ORAL at 09:39

## 2018-01-04 RX ADMIN — FUROSEMIDE 20 MG: 20 TABLET ORAL at 09:39

## 2018-01-04 RX ADMIN — METFORMIN HYDROCHLORIDE 250 MG: 500 TABLET ORAL at 09:38

## 2018-01-04 RX ADMIN — HYDROCODONE BITARTRATE AND ACETAMINOPHEN 2 TABLET: 5; 325 TABLET ORAL at 13:38

## 2018-01-04 RX ADMIN — HYDROCODONE BITARTRATE AND ACETAMINOPHEN 2 TABLET: 5; 325 TABLET ORAL at 06:08

## 2018-01-04 RX ADMIN — INSULIN ASPART 2 UNITS: 100 INJECTION, SOLUTION INTRAVENOUS; SUBCUTANEOUS at 09:40

## 2018-01-04 RX ADMIN — INSULIN ASPART 4 UNITS: 100 INJECTION, SOLUTION INTRAVENOUS; SUBCUTANEOUS at 13:39

## 2018-01-04 NOTE — THERAPY DISCHARGE NOTE
Acute Care - Physical Therapy Initial Eval/Discharge  [facility]     Patient Name: Chester Gama  : 1940  MRN: [id number]  Today's Date: 2018   Onset of Illness/Injury or Date of Surgery Date:  (POD1 Cervical decompression and C4/6 fusion )     Referring Physician: Janny      Admit Date: 1/3/2018    Visit Dx:  No diagnosis found.  Patient Active Problem List   Diagnosis   (none) - all problems resolved or deleted     Past Medical History:   Diagnosis Date   • Arthritis     HANDS KNEES   • Cervical pain (neck)     LEFT ARM PARESTHESIA   • Coronary artery disease    • Depression    • Diabetes mellitus     TYPE 2   • Diverticular disease     HX, S/P RESECTION   • Hyperlipemia    • Hypertension    • PTSD (post-traumatic stress disorder)    • RLS (restless legs syndrome)    • Sleep apnea     DIDNT TOLERATE MASK, CLAUSTROPHOBIC   • Structural abnormality of kidney     SAW NEPHROLOGIST FOR SMALLER KIDNEY - 3 YEARS AGO.   • Thyroid cyst     PER WIFE     Past Surgical History:   Procedure Laterality Date   • ANTERIOR CERVICAL DISCECTOMY W/ FUSION N/A 1/3/2018    Procedure: ANTERIOR CERVICAL DECOMPRESSION & FUSION C4 6;  Surgeon: Vincenzo Soliman DO;  Location: [facility];  Service:    • APPENDECTOMY     • CATARACT EXTRACTION W/ INTRAOCULAR LENS  IMPLANT, BILATERAL     • COLON RESECTION      COLOSTOMY X 6 MOS THEN REVERSED.   • CORONARY ANGIOPLASTY WITH STENT PLACEMENT  02/10/2010    [email or web address]          PT ASSESSMENT (last 72 hours)      PT Evaluation       18 0910 18 0411    Rehab Evaluation    Document Type discharge evaluation/summary  -MG     Subjective Information agree to therapy;no complaints  -MG     Patient Effort, Rehab Treatment good  -MG     Symptoms Noted During/After Treatment none  -MG     General Information    Patient Profile Review yes  -MG     Onset of Illness/Injury or Date of Surgery Date --   POD1 Cervical decompression and C4/6 fusion   -MG     Referring  Physician Janny  -MG     General Observations supine in bed, no distress, no alarm   -MG     Precautions/Limitations fall precautions   Per RN, no collar needed  -MG     Prior Level of Function independent:  -MG     Equipment Currently Used at Home none  -MG     Plans/Goals Discussed With patient;spouse/S.O.  -MG     Barriers to Rehab none identified  -MG     Living Environment    Living Environment Comment 1 step to enter, wife able to assist   -MG     Clinical Impression    Patient/Family Goals Statement return home   -MG     Criteria for Skilled Therapeutic Interventions Met no;no problems identified which require skilled intervention  -MG     Vital Signs    Pre SpO2 (%) 96  -MG     O2 Delivery Pre Treatment supplemental O2  -MG     Post SpO2 (%) 93  -MG     O2 Delivery Post Treatment room air   RN fernanda'd leaving O2 off at end of session   -MG     Pain Assessment    Pain Assessment No/denies pain  -MG     Cognitive Assessment/Intervention    Current Cognitive/Communication Assessment functional  -MG     Orientation Status oriented x 4  -MG     Follows Commands/Answers Questions 100% of the time  -MG     Personal Safety WNL/WFL  -MG     Personal Safety Interventions fall prevention program maintained;gait belt  -MG     ROM (Range of Motion)    General ROM Detail WFL  -MG     MMT (Manual Muscle Testing)    General MMT Assessment Detail WFL  -MG     Muscle Tone Assessment    Muscle Tone Assessment  Bilateral Upper Extremities  -JG    Bilateral Upper Extremities Muscle Tone Assessment  mildly decreased tone  -JG    Bed Mobility, Assessment/Treatment    Bed Mob, Supine to Sit, [place] supervision required  -MG     Bed Mob, Sit to Supine, [place] not tested  -MG     Bed Mobility, Comment educated on log roll technique   -MG     Transfer Assessment/Treatment    Transfers, Sit-Stand [place] supervision required  -MG     Transfers, Stand-Sit [place] supervision required  -MG     Transfers,  Sit-Stand-Sit, Assist Device --   no AD  -MG     Transfer, Comment no impairments/safety concerns   -MG     Gait Assessment/Treatment    Gait, Eddy Level supervision required  -MG     Gait, Assistive Device --   no AD  -MG     Gait, Distance (Feet) 150  -MG     Gait, Gait Pattern Analysis swing-through gait  -MG     Gait, Gait Deviations zach decreased  -MG     Stairs Assessment/Treatment    Stairs, Comment discussed safest way to complete 1 step into home   -MG     Positioning and Restraints    Pre-Treatment Position in bed  -MG     Post Treatment Position chair  -MG     In Chair reclined;call light within reach;encouraged to call for assist;with family/caregiver  -MG       01/04/18 0016 01/03/18 2019    Muscle Tone Assessment    Muscle Tone Assessment Bilateral Upper Extremities  -JG Bilateral Upper Extremities  -JG    Bilateral Upper Extremities Muscle Tone Assessment mildly decreased tone  -JG mildly decreased tone  -JG      01/03/18 1726 01/03/18 0902    General Information    Equipment Currently Used at Home cane, straight;glucometer  -PH glucometer;cane, straight  -MS    Living Environment    Lives With  spouse  -MS    Living Arrangements  house  -MS    Home Accessibility  stairs to enter home;stairs within home;bed and bath on same level  -MS    Number of Stairs to Enter Home  1  -MS    Transportation Available  car;family or friend will provide  -MS      User Key  (r) = Recorded By, (t) = Taken By, (c) = Cosigned By    Initials Name Provider Type    MS Alexandria Wahl, RN Registered Nurse     Rivka Quigley RN Registered Nurse    OLGA LIDIA Redmond, RN Registered Nurse    MG Megan Gosselin, PT Physical Therapist              PT Recommendation and Plan  Anticipated Discharge Disposition: home with assist  PT Frequency: evaluation only  Plan of Care Review  Outcome Summary/Follow up Plan: Pt POD1 cervical decompression and C4 6 fusion. He was previously independent. Upon eval, pt able to complete  all mobility at a supervision level (assist with IV pole). No need for skilled acute care PT. Will d/c from therapy at this time. Anticipate d/c home when medically able. Pt , wife and RN in agreement with this plan.               Outcome Measures       01/04/18 0900          How much help from another person do you currently need...    Turning from your back to your side while in flat bed without using bedrails? 4  -MG      Moving from lying on back to sitting on the side of a flat bed without bedrails? 4  -MG      Moving to and from a bed to a chair (including a wheelchair)? 4  -MG      Standing up from a chair using your arms (e.g., wheelchair, bedside chair)? 4  -MG      Climbing 3-5 steps with a railing? 3  -MG      To walk in hospital room? 4  -MG      AM-PAC 6 Clicks Score 23  -MG      Functional Assessment    Outcome Measure Options AM-PAC 6 Clicks Basic Mobility (PT)  -MG        User Key  (r) = Recorded By, (t) = Taken By, (c) = Cosigned By    Initials Name Provider Type    MG Megan Gosselin, PT Physical Therapist           Time Calculation:         PT Charges       01/04/18 0951          Time Calculation    Start Time 0910  -MG      Stop Time 0921  -MG      Time Calculation (min) 11 min  -MG      PT Received On 01/04/18  -MG        User Key  (r) = Recorded By, (t) = Taken By, (c) = Cosigned By    Initials Name Provider Type    MG Megan Gosselin, PT Physical Therapist          Therapy Charges for Today     Code Description Service Date Service Provider Modifiers Qty    00318548519 HC PT MOBILITY CURRENT 1/4/2018 Megan Gosselin, PT GP, CI 1    14733081044 HC PT MOBILITY PROJECTED 1/4/2018 Megan Gosselin, PT GP, CI 1    06423915971 HC PT MOBILITY DISCHARGE 1/4/2018 Megan Gosselin, PT GP, CI 1    40884741181 HC PT EVAL LOW COMPLEXITY 1 1/4/2018 Megan Gosselin, PT GP 1    46871126749 HC PT THER PROC EA 15 MIN 1/4/2018 Megan Gosselin, PT GP 1          PT G-Codes  PT Professional Judgement Used?: Yes  Outcome  Measure Options: AM-PAC 6 Clicks Basic Mobility (PT)  Score: 23  Functional Limitation: Mobility: Walking and moving around  Mobility: Walking and Moving Around Current Status (): At least 1 percent but less than 20 percent impaired, limited or restricted  Mobility: Walking and Moving Around Goal Status (): At least 1 percent but less than 20 percent impaired, limited or restricted  Mobility: Walking and Moving Around Discharge Status (): At least 1 percent but less than 20 percent impaired, limited or restricted    PT Discharge Summary  Anticipated Discharge Disposition: home with assist  Reason for Discharge: Independent    Megan Gosselin, PT  1/4/2018

## 2018-01-04 NOTE — PLAN OF CARE
Problem: Patient Care Overview (Adult)  Goal: Plan of Care Review  Outcome: Ongoing (interventions implemented as appropriate)   01/03/18 1840 01/03/18 2019 01/04/18 0323   Coping/Psychosocial Response Interventions   Plan Of Care Reviewed With --  patient;spouse --    Patient Care Overview   Progress progress toward functional goals as expected --  --    Outcome Evaluation   Outcome Summary/Follow up Plan --  --  Pt. remains A&Ox4 throughout shift. OLGA drain in place. Pain controlled with PCA and PRN pain pills. PT and LHA to see in AM. VSS, will continue to monitor.     Goal: Adult Individualization and Mutuality  Outcome: Ongoing (interventions implemented as appropriate)    Goal: Discharge Needs Assessment  Outcome: Ongoing (interventions implemented as appropriate)      Problem: Perioperative Period (Adult)  Goal: Signs and Symptoms of Listed Potential Problems Will be Absent or Manageable (Perioperative Period)  Outcome: Ongoing (interventions implemented as appropriate)

## 2018-01-04 NOTE — PROGRESS NOTES
Orthopedic Spine Progress Note    Subjective     Post-Operative Day: 1 post-spine procedure ANTERIOR CERVICAL DECOMPRESSION & FUSION C4 6  Systemic or Specific Complaints: left arm improved.     Objective     Vital signs in last 24 hours:  Temp:  [97.4 °F (36.3 °C)-98.9 °F (37.2 °C)] 97.5 °F (36.4 °C)  Heart Rate:  [61-97] 62  Resp:  [16-20] 16  BP: (123-160)/(67-87) 123/72    General: alert, appears stated age and cooperative   Neurovascular: stable   Wound: Wound clean and dry no evidence of infection.   Range of Motion: limited   DVT Exam: No evidence of DVT seen on physical exam.     Data Review  CBC:  Results from last 7 days  Lab Units 01/04/18  0519   HEMOGLOBIN g/dL 14.5   HEMATOCRIT % 45.3       Assessment/Plan     Status post-spine procedure: Doing well postoperatively.     Pain Relief: some relief    Discharge today, Return to Clinic: 2 weeks    Activity: out of bed and ambulate    Weight Bearing: FWB     LOS: 0 days     Vincenzo Soliman DO    Date: 1/4/2018

## 2018-01-04 NOTE — PLAN OF CARE
Problem: Patient Care Overview (Adult)  Goal: Plan of Care Review   01/04/18 0949   Outcome Evaluation   Outcome Summary/Follow up Plan Pt POD1 cervical decompression and C4 6 fusion. He was previously independent. Upon eval, pt able to complete all mobility at a supervision level (assist with IV pole). No need for skilled acute care PT. Will d/c from therapy at this time. Anticipate d/c home when medically able. Pt , wife and RN in agreement with this plan.

## 2019-02-18 ENCOUNTER — TRANSCRIBE ORDERS (OUTPATIENT)
Dept: ADMINISTRATIVE | Facility: HOSPITAL | Age: 79
End: 2019-02-18

## 2019-02-18 DIAGNOSIS — M43.22 FUSION OF SPINE OF CERVICAL REGION: Primary | ICD-10-CM

## 2019-02-18 DIAGNOSIS — M54.2 NECK PAIN: ICD-10-CM

## 2019-02-21 ENCOUNTER — APPOINTMENT (OUTPATIENT)
Dept: CT IMAGING | Facility: HOSPITAL | Age: 79
End: 2019-02-21

## 2019-05-08 ENCOUNTER — HOSPITAL ENCOUNTER (OUTPATIENT)
Dept: CT IMAGING | Facility: HOSPITAL | Age: 79
Discharge: HOME | End: 2019-05-08
Admitting: ORTHOPAEDIC SURGERY

## 2019-05-08 DIAGNOSIS — M54.2 NECK PAIN: ICD-10-CM

## 2019-05-08 DIAGNOSIS — M43.22 FUSION OF SPINE OF CERVICAL REGION: ICD-10-CM

## 2019-05-08 PROCEDURE — 72125 CT NECK SPINE W/O DYE: CPT

## 2019-06-10 ENCOUNTER — HOSPITAL ENCOUNTER (OUTPATIENT)
Dept: GENERAL RADIOLOGY | Facility: HOSPITAL | Age: 79
Discharge: HOME | End: 2019-06-10
Admitting: ORTHOPAEDIC SURGERY

## 2019-06-10 ENCOUNTER — APPOINTMENT (OUTPATIENT)
Dept: PREADMISSION TESTING | Facility: HOSPITAL | Age: 79
End: 2019-06-10

## 2019-06-10 VITALS
TEMPERATURE: 96.9 F | WEIGHT: 254 LBS | BODY MASS INDEX: 31.58 KG/M2 | RESPIRATION RATE: 18 BRPM | OXYGEN SATURATION: 96 % | HEIGHT: 75 IN | HEART RATE: 69 BPM | DIASTOLIC BLOOD PRESSURE: 65 MMHG | SYSTOLIC BLOOD PRESSURE: 138 MMHG

## 2019-06-10 LAB
25(OH)D3 SERPL-MCNC: 32.8 NG/ML (ref 30–100)
ABO GROUP BLD: NORMAL
ALBUMIN SERPL-MCNC: 4 G/DL (ref 3.5–5.2)
ALBUMIN/GLOB SERPL: 1.3 G/DL
ALP SERPL-CCNC: 80 U/L (ref 39–117)
ALT SERPL W P-5'-P-CCNC: 31 U/L (ref 1–41)
ANION GAP SERPL CALCULATED.3IONS-SCNC: 12.6 MMOL/L
APTT PPP: 30 SECONDS (ref 22.7–35.4)
AST SERPL-CCNC: 24 U/L (ref 1–40)
BASOPHILS # BLD AUTO: 0.06 10*3/MM3 (ref 0–0.2)
BASOPHILS NFR BLD AUTO: 0.7 % (ref 0–1.5)
BILIRUB SERPL-MCNC: 0.3 MG/DL (ref 0.2–1.2)
BILIRUB UR QL STRIP: NEGATIVE
BLD GP AB SCN SERPL QL: NEGATIVE
BUN BLD-MCNC: 17 MG/DL (ref 8–23)
BUN/CREAT SERPL: 12.4 (ref 7–25)
CALCIUM SPEC-SCNC: 9.4 MG/DL (ref 8.6–10.5)
CHLORIDE SERPL-SCNC: 102 MMOL/L (ref 98–107)
CLARITY UR: CLEAR
CO2 SERPL-SCNC: 26.4 MMOL/L (ref 22–29)
COLOR UR: YELLOW
CREAT BLD-MCNC: 1.37 MG/DL (ref 0.76–1.27)
DEPRECATED RDW RBC AUTO: 42.7 FL (ref 37–54)
EOSINOPHIL # BLD AUTO: 0.39 10*3/MM3 (ref 0–0.4)
EOSINOPHIL NFR BLD AUTO: 4.7 % (ref 0.3–6.2)
ERYTHROCYTE [DISTWIDTH] IN BLOOD BY AUTOMATED COUNT: 13.1 % (ref 12.3–15.4)
GFR SERPL CREATININE-BSD FRML MDRD: 50 ML/MIN/1.73
GLOBULIN UR ELPH-MCNC: 3.1 GM/DL
GLUCOSE BLD-MCNC: 157 MG/DL (ref 65–99)
GLUCOSE UR STRIP-MCNC: ABNORMAL MG/DL
HCT VFR BLD AUTO: 46 % (ref 37.5–51)
HGB BLD-MCNC: 14.8 G/DL (ref 13–17.7)
HGB UR QL STRIP.AUTO: NEGATIVE
IMM GRANULOCYTES # BLD AUTO: 0.02 10*3/MM3 (ref 0–0.05)
IMM GRANULOCYTES NFR BLD AUTO: 0.2 % (ref 0–0.5)
INR PPP: 1.05 (ref 0.9–1.1)
KETONES UR QL STRIP: NEGATIVE
LEUKOCYTE ESTERASE UR QL STRIP.AUTO: NEGATIVE
LYMPHOCYTES # BLD AUTO: 1.8 10*3/MM3 (ref 0.7–3.1)
LYMPHOCYTES NFR BLD AUTO: 21.7 % (ref 19.6–45.3)
MCH RBC QN AUTO: 28.8 PG (ref 26.6–33)
MCHC RBC AUTO-ENTMCNC: 32.2 G/DL (ref 31.5–35.7)
MCV RBC AUTO: 89.5 FL (ref 79–97)
MONOCYTES # BLD AUTO: 0.55 10*3/MM3 (ref 0.1–0.9)
MONOCYTES NFR BLD AUTO: 6.6 % (ref 5–12)
NEUTROPHILS # BLD AUTO: 5.47 10*3/MM3 (ref 1.7–7)
NEUTROPHILS NFR BLD AUTO: 66.1 % (ref 42.7–76)
NITRITE UR QL STRIP: NEGATIVE
NRBC BLD AUTO-RTO: 0 /100 WBC (ref 0–0.2)
PH UR STRIP.AUTO: <=5 [PH] (ref 5–8)
PLATELET # BLD AUTO: 199 10*3/MM3 (ref 140–450)
PMV BLD AUTO: 9.8 FL (ref 6–12)
POTASSIUM BLD-SCNC: 4.5 MMOL/L (ref 3.5–5.2)
PROT SERPL-MCNC: 7.1 G/DL (ref 6–8.5)
PROT UR QL STRIP: NEGATIVE
PROTHROMBIN TIME: 13.4 SECONDS (ref 11.7–14.2)
RBC # BLD AUTO: 5.14 10*6/MM3 (ref 4.14–5.8)
RH BLD: POSITIVE
SODIUM BLD-SCNC: 141 MMOL/L (ref 136–145)
SP GR UR STRIP: 1.02 (ref 1–1.03)
T&S EXPIRATION DATE: NORMAL
UROBILINOGEN UR QL STRIP: ABNORMAL
WBC NRBC COR # BLD: 8.29 10*3/MM3 (ref 3.4–10.8)

## 2019-06-10 PROCEDURE — 86850 RBC ANTIBODY SCREEN: CPT | Performed by: ORTHOPAEDIC SURGERY

## 2019-06-10 PROCEDURE — 85610 PROTHROMBIN TIME: CPT | Performed by: ORTHOPAEDIC SURGERY

## 2019-06-10 PROCEDURE — 85730 THROMBOPLASTIN TIME PARTIAL: CPT | Performed by: ORTHOPAEDIC SURGERY

## 2019-06-10 PROCEDURE — 36415 COLL VENOUS BLD VENIPUNCTURE: CPT

## 2019-06-10 PROCEDURE — 82306 VITAMIN D 25 HYDROXY: CPT | Performed by: ORTHOPAEDIC SURGERY

## 2019-06-10 PROCEDURE — 93005 ELECTROCARDIOGRAM TRACING: CPT

## 2019-06-10 PROCEDURE — 71046 X-RAY EXAM CHEST 2 VIEWS: CPT

## 2019-06-10 PROCEDURE — 86900 BLOOD TYPING SEROLOGIC ABO: CPT | Performed by: ORTHOPAEDIC SURGERY

## 2019-06-10 PROCEDURE — 86901 BLOOD TYPING SEROLOGIC RH(D): CPT | Performed by: ORTHOPAEDIC SURGERY

## 2019-06-10 PROCEDURE — 80053 COMPREHEN METABOLIC PANEL: CPT | Performed by: ORTHOPAEDIC SURGERY

## 2019-06-10 PROCEDURE — 85025 COMPLETE CBC W/AUTO DIFF WBC: CPT | Performed by: ORTHOPAEDIC SURGERY

## 2019-06-10 PROCEDURE — 81003 URINALYSIS AUTO W/O SCOPE: CPT | Performed by: ORTHOPAEDIC SURGERY

## 2019-06-10 PROCEDURE — 93010 ELECTROCARDIOGRAM REPORT: CPT | Performed by: INTERNAL MEDICINE

## 2019-06-10 RX ORDER — UBIDECARENONE 75 MG
100 CAPSULE ORAL DAILY
COMMUNITY

## 2019-06-10 NOTE — DISCHARGE INSTRUCTIONS
Take the following medications the morning of surgery with a small sip of water: METOPROLOL, GABAPENTIN AM OF SX    PLEASE ARRIVE AT THE  HOSPITAL AT: 0530AM     General Instructions:  • Do not eat solid food after midnight the night before surgery.  • You may drink clear liquids day of surgery but must stop at least one hour before your hospital arrival time.  • It is beneficial for you to have a clear drink that contains carbohydrates the day of surgery.  We suggest a 12 to 20 ounce bottle of Gatorade or Powerade for non-diabetic patients or a 12 to 20 ounce bottle of G2 or Powerade Zero for diabetic patients. (Pediatric patients, are not advised to drink a 12 to 20 ounce carbohydrate drink)    Clear liquids are liquids you can see through.  Nothing red in color.     Plain water                               Sports drinks  Sodas                                   Gelatin (Jell-O)  Fruit juices without pulp such as white grape juice and apple juice  Popsicles that contain no fruit or yogurt  Tea or coffee (no cream or milk added)  Gatorade / Powerade  G2 / Powerade Zero    • Infants may have breast milk up to four hours before surgery.  • Infants drinking formula may drink formula up to six hours before surgery.   • Patients who avoid smoking, chewing tobacco and alcohol for 4 weeks prior to surgery have a reduced risk of post-operative complications.  Quit smoking as many days before surgery as you can.  • Do not smoke, use chewing tobacco or drink alcohol the day of surgery.   • If applicable bring your C-PAP/ BI-PAP machine.  • Bring any papers given to you in the doctor’s office.  • Wear clean comfortable clothes and socks.  • Do not wear contact lenses, false eyelashes or make-up.  Bring a case for your glasses.   • Bring crutches or walker if applicable.  • Remove all piercings.  Leave jewelry and any other valuables at home.  • Hair extensions with metal clips must be removed prior to surgery.  • The  Pre-Admission Testing nurse will instruct you to bring medications if unable to obtain an accurate list in Pre-Admission Testing.        If you were given a blood bank ID arm band remember to bring it with you the day of surgery.    Preventing a Surgical Site Infection:  • For 2 to 3 days before surgery, avoid shaving with a razor because the razor can irritate skin and make it easier to develop an infection.    • Any areas of open skin can increase the risk of a post-operative wound infection by allowing bacteria to enter and travel throughout the body.  Notify your surgeon if you have any skin wounds / rashes even if it is not near the expected surgical site.  The area will need assessed to determine if surgery should be delayed until it is healed.  • The night prior to surgery sleep in a clean bed with clean clothing.  Do not allow pets to sleep with you.  • Shower on the morning of surgery using a fresh bar of anti-bacterial soap (such as Dial) and clean washcloth.  Dry with a clean towel and dress in clean clothing.  • Ask your surgeon if you will be receiving antibiotics prior to surgery.  • Make sure you, your family, and all healthcare providers clean their hands with soap and water or an alcohol based hand  before caring for you or your wound.    Day of surgery:  Upon arrival, a Pre-op nurse and Anesthesiologist will review your health history, obtain vital signs, and answer questions you may have.  The only belongings needed at this time will be a list of your home medications and if applicable your C-PAP/BI-PAP machine.  If you are staying overnight your family can leave the rest of your belongings in the car and bring them to your room later.  A Pre-op nurse will start an IV and you may receive medication in preparation for surgery, including something to help you relax.  Your family will be able to see you in the Pre-op area.  While you are in surgery your family should notify the waiting room   if they leave the waiting room area and provide a contact phone number.    Please be aware that surgery does come with discomfort.  We want to make every effort to control your discomfort so please discuss any uncontrolled symptoms with your nurse.   Your doctor will most likely have prescribed pain medications.      If you are going home after surgery you will receive individualized written care instructions before being discharged.  A responsible adult must drive you to and from the hospital on the day of your surgery and stay with you for 24 hours.    If you are staying overnight following surgery, you will be transported to your hospital room following the recovery period.  [facility] has all private rooms.    You have received a list of surgical assistants for your reference.  If you have any questions please call Pre-Admission Testing at [phone].  Deductibles and co-payments are collected on the day of service. Please be prepared to pay the required co-pay, deductible or deposit on the day of service as defined by your plan.

## 2019-06-14 ENCOUNTER — APPOINTMENT (OUTPATIENT)
Dept: GENERAL RADIOLOGY | Facility: HOSPITAL | Age: 79
End: 2019-06-14

## 2019-06-14 ENCOUNTER — ANESTHESIA (OUTPATIENT)
Dept: PERIOP | Facility: HOSPITAL | Age: 79
End: 2019-06-14

## 2019-06-14 ENCOUNTER — HOSPITAL ENCOUNTER (INPATIENT)
Facility: HOSPITAL | Age: 79
LOS: 4 days | Discharge: HOME | End: 2019-06-18
Attending: ORTHOPAEDIC SURGERY | Admitting: ORTHOPAEDIC SURGERY

## 2019-06-14 ENCOUNTER — ANESTHESIA EVENT (OUTPATIENT)
Dept: PERIOP | Facility: HOSPITAL | Age: 79
End: 2019-06-14

## 2019-06-14 DIAGNOSIS — M48.02 CERVICAL SPINAL STENOSIS: Primary | ICD-10-CM

## 2019-06-14 PROBLEM — I10 HYPERTENSION: Status: ACTIVE | Noted: 2019-06-14

## 2019-06-14 PROBLEM — G47.30 SLEEP APNEA: Status: ACTIVE | Noted: 2019-06-14

## 2019-06-14 PROBLEM — G25.81 RLS (RESTLESS LEGS SYNDROME): Status: ACTIVE | Noted: 2019-06-14

## 2019-06-14 PROBLEM — I25.10 CORONARY ARTERY DISEASE: Status: ACTIVE | Noted: 2019-06-14

## 2019-06-14 PROBLEM — E11.9 DIABETES MELLITUS (HCC): Status: ACTIVE | Noted: 2019-06-14

## 2019-06-14 LAB
GLUCOSE BLDC GLUCOMTR-MCNC: 125 MG/DL (ref 70–130)
GLUCOSE BLDC GLUCOMTR-MCNC: 185 MG/DL (ref 70–130)
GLUCOSE BLDC GLUCOMTR-MCNC: 203 MG/DL (ref 70–130)
GLUCOSE BLDC GLUCOMTR-MCNC: 238 MG/DL (ref 70–130)

## 2019-06-14 PROCEDURE — 0RG2071 FUSION OF 2 OR MORE CERVICAL VERTEBRAL JOINTS WITH AUTOLOGOUS TISSUE SUBSTITUTE, POSTERIOR APPROACH, POSTERIOR COLUMN, OPEN APPROACH: ICD-10-PCS | Performed by: ORTHOPAEDIC SURGERY

## 2019-06-14 PROCEDURE — 25010000002 ONDANSETRON PER 1 MG: Performed by: NURSE ANESTHETIST, CERTIFIED REGISTERED

## 2019-06-14 PROCEDURE — 25010000003 CEFAZOLIN IN DEXTROSE 2-4 GM/100ML-% SOLUTION: Performed by: ORTHOPAEDIC SURGERY

## 2019-06-14 PROCEDURE — 76000 FLUOROSCOPY <1 HR PHYS/QHP: CPT

## 2019-06-14 PROCEDURE — 25010000002 VANCOMYCIN 1 G RECONSTITUTED SOLUTION: Performed by: ORTHOPAEDIC SURGERY

## 2019-06-14 PROCEDURE — 25010000002 PROPOFOL 10 MG/ML EMULSION: Performed by: NURSE ANESTHETIST, CERTIFIED REGISTERED

## 2019-06-14 PROCEDURE — 63710000001 INSULIN LISPRO (HUMAN) PER 5 UNITS: Performed by: INTERNAL MEDICINE

## 2019-06-14 PROCEDURE — C1713 ANCHOR/SCREW BN/BN,TIS/BN: HCPCS | Performed by: ORTHOPAEDIC SURGERY

## 2019-06-14 PROCEDURE — C9290 INJ, BUPIVACAINE LIPOSOME: HCPCS | Performed by: ORTHOPAEDIC SURGERY

## 2019-06-14 PROCEDURE — 0RG4071 FUSION OF CERVICOTHORACIC VERTEBRAL JOINT WITH AUTOLOGOUS TISSUE SUBSTITUTE, POSTERIOR APPROACH, POSTERIOR COLUMN, OPEN APPROACH: ICD-10-PCS | Performed by: ORTHOPAEDIC SURGERY

## 2019-06-14 PROCEDURE — 82962 GLUCOSE BLOOD TEST: CPT

## 2019-06-14 PROCEDURE — P9041 ALBUMIN (HUMAN),5%, 50ML: HCPCS | Performed by: NURSE ANESTHETIST, CERTIFIED REGISTERED

## 2019-06-14 PROCEDURE — 25010000002 DEXAMETHASONE PER 1 MG: Performed by: NURSE ANESTHETIST, CERTIFIED REGISTERED

## 2019-06-14 PROCEDURE — 25010000002 SUCCINYLCHOLINE PER 20 MG: Performed by: NURSE ANESTHETIST, CERTIFIED REGISTERED

## 2019-06-14 PROCEDURE — 72070 X-RAY EXAM THORAC SPINE 2VWS: CPT

## 2019-06-14 PROCEDURE — 25010000003 BUPIVACAINE LIPOSOME 1.3 % SUSPENSION 20 ML VIAL: Performed by: ORTHOPAEDIC SURGERY

## 2019-06-14 PROCEDURE — 3E0U0GB INTRODUCTION OF RECOMBINANT BONE MORPHOGENETIC PROTEIN INTO JOINTS, OPEN APPROACH: ICD-10-PCS | Performed by: ORTHOPAEDIC SURGERY

## 2019-06-14 PROCEDURE — 25010000002 VANCOMYCIN 1 G RECONSTITUTED SOLUTION 1 EACH VIAL: Performed by: ORTHOPAEDIC SURGERY

## 2019-06-14 PROCEDURE — 25010000002 ALBUMIN HUMAN 5% PER 50 ML: Performed by: NURSE ANESTHETIST, CERTIFIED REGISTERED

## 2019-06-14 PROCEDURE — 0RG6071 FUSION OF THORACIC VERTEBRAL JOINT WITH AUTOLOGOUS TISSUE SUBSTITUTE, POSTERIOR APPROACH, POSTERIOR COLUMN, OPEN APPROACH: ICD-10-PCS | Performed by: ORTHOPAEDIC SURGERY

## 2019-06-14 PROCEDURE — 63710000001 INSULIN LISPRO (HUMAN) PER 5 UNITS: Performed by: ORTHOPAEDIC SURGERY

## 2019-06-14 PROCEDURE — 25010000002 PHENYLEPHRINE PER 1 ML: Performed by: NURSE ANESTHETIST, CERTIFIED REGISTERED

## 2019-06-14 PROCEDURE — 25010000002 MAGNESIUM SULFATE PER 500 MG OF MAGNESIUM: Performed by: NURSE ANESTHETIST, CERTIFIED REGISTERED

## 2019-06-14 PROCEDURE — 25010000002 HEPARIN (PORCINE) PER 1000 UNITS: Performed by: ORTHOPAEDIC SURGERY

## 2019-06-14 PROCEDURE — 01N10ZZ RELEASE CERVICAL NERVE, OPEN APPROACH: ICD-10-PCS | Performed by: ORTHOPAEDIC SURGERY

## 2019-06-14 PROCEDURE — 25010000002 FENTANYL CITRATE (PF) 100 MCG/2ML SOLUTION: Performed by: NURSE ANESTHETIST, CERTIFIED REGISTERED

## 2019-06-14 DEVICE — BONE CCCS 20/80 15CC 1TO3MM: Type: IMPLANTABLE DEVICE | Site: SPINE CERVICAL | Status: FUNCTIONAL

## 2019-06-14 DEVICE — POLYAXIAL POSTERIOR THORACIC SCREW, Ø3.5MM X 12MM
Type: IMPLANTABLE DEVICE | Site: SPINE CERVICAL | Status: FUNCTIONAL
Brand: SOLANAS

## 2019-06-14 DEVICE — POLYAXIAL POSTERIOR THORACIC SCREW, Ø4.0MM X 22MM
Type: IMPLANTABLE DEVICE | Site: SPINE CERVICAL | Status: FUNCTIONAL
Brand: SOLANAS

## 2019-06-14 DEVICE — HEXALOBE POSTERIOR THORACIC HEXALOBE SET SCREW
Type: IMPLANTABLE DEVICE | Site: SPINE CERVICAL | Status: FUNCTIONAL
Brand: SOLANAS

## 2019-06-14 DEVICE — POSTERIOR CERVICAL ROD, 3.3MM X 120MM
Type: IMPLANTABLE DEVICE | Site: SPINE CERVICAL | Status: FUNCTIONAL
Brand: SOLANAS

## 2019-06-14 DEVICE — WAX BONE HEMO NAT 2.5G: Type: IMPLANTABLE DEVICE | Site: SPINE CERVICAL | Status: FUNCTIONAL

## 2019-06-14 DEVICE — BONE GRAFT KIT 7510400 INFUSE MEDIUM
Type: IMPLANTABLE DEVICE | Site: SPINE CERVICAL | Status: FUNCTIONAL
Brand: INFUSE® BONE GRAFT

## 2019-06-14 RX ORDER — FENTANYL CITRATE 50 UG/ML
50 INJECTION, SOLUTION INTRAMUSCULAR; INTRAVENOUS
Status: DISCONTINUED | OUTPATIENT
Start: 2019-06-14 | End: 2019-06-14 | Stop reason: HOSPADM

## 2019-06-14 RX ORDER — KETAMINE HYDROCHLORIDE 10 MG/ML
INJECTION INTRAMUSCULAR; INTRAVENOUS AS NEEDED
Status: DISCONTINUED | OUTPATIENT
Start: 2019-06-14 | End: 2019-06-14 | Stop reason: SURG

## 2019-06-14 RX ORDER — GLYCOPYRROLATE 0.2 MG/ML
INJECTION INTRAMUSCULAR; INTRAVENOUS AS NEEDED
Status: DISCONTINUED | OUTPATIENT
Start: 2019-06-14 | End: 2019-06-14 | Stop reason: SURG

## 2019-06-14 RX ORDER — DIPHENHYDRAMINE HYDROCHLORIDE 50 MG/ML
25 INJECTION INTRAMUSCULAR; INTRAVENOUS EVERY 6 HOURS PRN
Status: DISCONTINUED | OUTPATIENT
Start: 2019-06-14 | End: 2019-06-18 | Stop reason: HOSPADM

## 2019-06-14 RX ORDER — CYCLOBENZAPRINE HCL 10 MG
10 TABLET ORAL 3 TIMES DAILY PRN
Status: DISCONTINUED | OUTPATIENT
Start: 2019-06-14 | End: 2019-06-18 | Stop reason: HOSPADM

## 2019-06-14 RX ORDER — CEFAZOLIN SODIUM 2 G/100ML
2 INJECTION, SOLUTION INTRAVENOUS EVERY 8 HOURS
Status: COMPLETED | OUTPATIENT
Start: 2019-06-14 | End: 2019-06-15

## 2019-06-14 RX ORDER — OXYCODONE AND ACETAMINOPHEN 7.5; 325 MG/1; MG/1
1 TABLET ORAL ONCE AS NEEDED
Status: DISCONTINUED | OUTPATIENT
Start: 2019-06-14 | End: 2019-06-14 | Stop reason: HOSPADM

## 2019-06-14 RX ORDER — CYCLOBENZAPRINE HCL 10 MG
TABLET ORAL
Status: COMPLETED
Start: 2019-06-14 | End: 2019-06-14

## 2019-06-14 RX ORDER — PROPOFOL 10 MG/ML
VIAL (ML) INTRAVENOUS AS NEEDED
Status: DISCONTINUED | OUTPATIENT
Start: 2019-06-14 | End: 2019-06-14 | Stop reason: SURG

## 2019-06-14 RX ORDER — MAGNESIUM HYDROXIDE 1200 MG/15ML
LIQUID ORAL AS NEEDED
Status: DISCONTINUED | OUTPATIENT
Start: 2019-06-14 | End: 2019-06-14 | Stop reason: HOSPADM

## 2019-06-14 RX ORDER — LABETALOL HYDROCHLORIDE 5 MG/ML
5 INJECTION, SOLUTION INTRAVENOUS
Status: DISCONTINUED | OUTPATIENT
Start: 2019-06-14 | End: 2019-06-14 | Stop reason: HOSPADM

## 2019-06-14 RX ORDER — FENTANYL CITRATE 50 UG/ML
INJECTION, SOLUTION INTRAMUSCULAR; INTRAVENOUS AS NEEDED
Status: DISCONTINUED | OUTPATIENT
Start: 2019-06-14 | End: 2019-06-14 | Stop reason: SURG

## 2019-06-14 RX ORDER — ALBUMIN, HUMAN INJ 5% 5 %
SOLUTION INTRAVENOUS CONTINUOUS PRN
Status: DISCONTINUED | OUTPATIENT
Start: 2019-06-14 | End: 2019-06-14 | Stop reason: SURG

## 2019-06-14 RX ORDER — PROMETHAZINE HYDROCHLORIDE 25 MG/ML
12.5 INJECTION, SOLUTION INTRAMUSCULAR; INTRAVENOUS EVERY 6 HOURS PRN
Status: DISCONTINUED | OUTPATIENT
Start: 2019-06-14 | End: 2019-06-18 | Stop reason: HOSPADM

## 2019-06-14 RX ORDER — NICOTINE POLACRILEX 4 MG
15 LOZENGE BUCCAL
Status: DISCONTINUED | OUTPATIENT
Start: 2019-06-14 | End: 2019-06-18 | Stop reason: HOSPADM

## 2019-06-14 RX ORDER — MAGNESIUM SULFATE HEPTAHYDRATE 500 MG/ML
INJECTION, SOLUTION INTRAMUSCULAR; INTRAVENOUS AS NEEDED
Status: DISCONTINUED | OUTPATIENT
Start: 2019-06-14 | End: 2019-06-14 | Stop reason: SURG

## 2019-06-14 RX ORDER — PROMETHAZINE HYDROCHLORIDE 25 MG/1
25 TABLET ORAL ONCE AS NEEDED
Status: DISCONTINUED | OUTPATIENT
Start: 2019-06-14 | End: 2019-06-14 | Stop reason: HOSPADM

## 2019-06-14 RX ORDER — SENNA AND DOCUSATE SODIUM 50; 8.6 MG/1; MG/1
1 TABLET, FILM COATED ORAL NIGHTLY PRN
Status: DISCONTINUED | OUTPATIENT
Start: 2019-06-14 | End: 2019-06-18 | Stop reason: HOSPADM

## 2019-06-14 RX ORDER — SODIUM CHLORIDE 0.9 % (FLUSH) 0.9 %
3-10 SYRINGE (ML) INJECTION AS NEEDED
Status: DISCONTINUED | OUTPATIENT
Start: 2019-06-14 | End: 2019-06-14 | Stop reason: HOSPADM

## 2019-06-14 RX ORDER — OXYCODONE HCL 10 MG/1
10 TABLET, FILM COATED, EXTENDED RELEASE ORAL ONCE
Status: COMPLETED | OUTPATIENT
Start: 2019-06-14 | End: 2019-06-14

## 2019-06-14 RX ORDER — HYDROCODONE BITARTRATE AND ACETAMINOPHEN 5; 325 MG/1; MG/1
2 TABLET ORAL EVERY 4 HOURS PRN
Status: DISCONTINUED | OUTPATIENT
Start: 2019-06-14 | End: 2019-06-18 | Stop reason: HOSPADM

## 2019-06-14 RX ORDER — INSULIN GLARGINE 100 [IU]/ML
28 INJECTION, SOLUTION SUBCUTANEOUS NIGHTLY
Status: DISCONTINUED | OUTPATIENT
Start: 2019-06-14 | End: 2019-06-14

## 2019-06-14 RX ORDER — ONDANSETRON 2 MG/ML
INJECTION INTRAMUSCULAR; INTRAVENOUS AS NEEDED
Status: DISCONTINUED | OUTPATIENT
Start: 2019-06-14 | End: 2019-06-14 | Stop reason: SURG

## 2019-06-14 RX ORDER — SODIUM CHLORIDE 0.9 % (FLUSH) 0.9 %
1-10 SYRINGE (ML) INJECTION AS NEEDED
Status: DISCONTINUED | OUTPATIENT
Start: 2019-06-14 | End: 2019-06-14 | Stop reason: HOSPADM

## 2019-06-14 RX ORDER — SODIUM CHLORIDE 0.9 % (FLUSH) 0.9 %
3 SYRINGE (ML) INJECTION EVERY 12 HOURS SCHEDULED
Status: DISCONTINUED | OUTPATIENT
Start: 2019-06-14 | End: 2019-06-14 | Stop reason: HOSPADM

## 2019-06-14 RX ORDER — DOCUSATE SODIUM 100 MG/1
100 CAPSULE, LIQUID FILLED ORAL 2 TIMES DAILY PRN
Status: DISCONTINUED | OUTPATIENT
Start: 2019-06-14 | End: 2019-06-18 | Stop reason: HOSPADM

## 2019-06-14 RX ORDER — FLUMAZENIL 0.1 MG/ML
0.2 INJECTION INTRAVENOUS AS NEEDED
Status: DISCONTINUED | OUTPATIENT
Start: 2019-06-14 | End: 2019-06-14 | Stop reason: HOSPADM

## 2019-06-14 RX ORDER — EPHEDRINE SULFATE 50 MG/ML
INJECTION, SOLUTION INTRAVENOUS AS NEEDED
Status: DISCONTINUED | OUTPATIENT
Start: 2019-06-14 | End: 2019-06-14 | Stop reason: SURG

## 2019-06-14 RX ORDER — MIDAZOLAM HYDROCHLORIDE 1 MG/ML
1 INJECTION INTRAMUSCULAR; INTRAVENOUS
Status: DISCONTINUED | OUTPATIENT
Start: 2019-06-14 | End: 2019-06-14 | Stop reason: HOSPADM

## 2019-06-14 RX ORDER — LIDOCAINE HYDROCHLORIDE 20 MG/ML
INJECTION, SOLUTION INFILTRATION; PERINEURAL AS NEEDED
Status: DISCONTINUED | OUTPATIENT
Start: 2019-06-14 | End: 2019-06-14 | Stop reason: SURG

## 2019-06-14 RX ORDER — PROMETHAZINE HYDROCHLORIDE 12.5 MG/1
12.5 TABLET ORAL EVERY 6 HOURS PRN
Status: DISCONTINUED | OUTPATIENT
Start: 2019-06-14 | End: 2019-06-18 | Stop reason: HOSPADM

## 2019-06-14 RX ORDER — EPHEDRINE SULFATE 50 MG/ML
5 INJECTION, SOLUTION INTRAVENOUS ONCE AS NEEDED
Status: DISCONTINUED | OUTPATIENT
Start: 2019-06-14 | End: 2019-06-14 | Stop reason: HOSPADM

## 2019-06-14 RX ORDER — SUCCINYLCHOLINE CHLORIDE 20 MG/ML
INJECTION INTRAMUSCULAR; INTRAVENOUS AS NEEDED
Status: DISCONTINUED | OUTPATIENT
Start: 2019-06-14 | End: 2019-06-14 | Stop reason: SURG

## 2019-06-14 RX ORDER — SODIUM CHLORIDE 450 MG/100ML
100 INJECTION, SOLUTION INTRAVENOUS CONTINUOUS
Status: DISCONTINUED | OUTPATIENT
Start: 2019-06-14 | End: 2019-06-18 | Stop reason: HOSPADM

## 2019-06-14 RX ORDER — DEXTROSE MONOHYDRATE 25 G/50ML
25 INJECTION, SOLUTION INTRAVENOUS
Status: DISCONTINUED | OUTPATIENT
Start: 2019-06-14 | End: 2019-06-18 | Stop reason: HOSPADM

## 2019-06-14 RX ORDER — MIDAZOLAM HYDROCHLORIDE 1 MG/ML
2 INJECTION INTRAMUSCULAR; INTRAVENOUS
Status: DISCONTINUED | OUTPATIENT
Start: 2019-06-14 | End: 2019-06-14 | Stop reason: HOSPADM

## 2019-06-14 RX ORDER — SODIUM CHLORIDE, SODIUM LACTATE, POTASSIUM CHLORIDE, CALCIUM CHLORIDE 600; 310; 30; 20 MG/100ML; MG/100ML; MG/100ML; MG/100ML
9 INJECTION, SOLUTION INTRAVENOUS CONTINUOUS
Status: DISCONTINUED | OUTPATIENT
Start: 2019-06-14 | End: 2019-06-14

## 2019-06-14 RX ORDER — PROMETHAZINE HYDROCHLORIDE 25 MG/ML
12.5 INJECTION, SOLUTION INTRAMUSCULAR; INTRAVENOUS ONCE AS NEEDED
Status: DISCONTINUED | OUTPATIENT
Start: 2019-06-14 | End: 2019-06-14 | Stop reason: HOSPADM

## 2019-06-14 RX ORDER — BISACODYL 10 MG
10 SUPPOSITORY, RECTAL RECTAL DAILY PRN
Status: DISCONTINUED | OUTPATIENT
Start: 2019-06-14 | End: 2019-06-18 | Stop reason: HOSPADM

## 2019-06-14 RX ORDER — NALOXONE HCL 0.4 MG/ML
0.2 VIAL (ML) INJECTION AS NEEDED
Status: DISCONTINUED | OUTPATIENT
Start: 2019-06-14 | End: 2019-06-14 | Stop reason: HOSPADM

## 2019-06-14 RX ORDER — DEXAMETHASONE SODIUM PHOSPHATE 10 MG/ML
INJECTION INTRAMUSCULAR; INTRAVENOUS AS NEEDED
Status: DISCONTINUED | OUTPATIENT
Start: 2019-06-14 | End: 2019-06-14 | Stop reason: SURG

## 2019-06-14 RX ORDER — FAMOTIDINE 10 MG/ML
20 INJECTION, SOLUTION INTRAVENOUS ONCE
Status: COMPLETED | OUTPATIENT
Start: 2019-06-14 | End: 2019-06-14

## 2019-06-14 RX ORDER — HYDRALAZINE HYDROCHLORIDE 20 MG/ML
5 INJECTION INTRAMUSCULAR; INTRAVENOUS
Status: DISCONTINUED | OUTPATIENT
Start: 2019-06-14 | End: 2019-06-14 | Stop reason: HOSPADM

## 2019-06-14 RX ORDER — ATORVASTATIN CALCIUM 80 MG/1
80 TABLET, FILM COATED ORAL DAILY
Status: DISCONTINUED | OUTPATIENT
Start: 2019-06-14 | End: 2019-06-18 | Stop reason: HOSPADM

## 2019-06-14 RX ORDER — SODIUM CHLORIDE 0.9 % (FLUSH) 0.9 %
3 SYRINGE (ML) INJECTION EVERY 12 HOURS SCHEDULED
Status: DISCONTINUED | OUTPATIENT
Start: 2019-06-14 | End: 2019-06-18 | Stop reason: HOSPADM

## 2019-06-14 RX ORDER — CEFAZOLIN SODIUM 2 G/100ML
2 INJECTION, SOLUTION INTRAVENOUS ONCE
Status: COMPLETED | OUTPATIENT
Start: 2019-06-14 | End: 2019-06-14

## 2019-06-14 RX ORDER — PROMETHAZINE HYDROCHLORIDE 25 MG/ML
6.25 INJECTION, SOLUTION INTRAMUSCULAR; INTRAVENOUS
Status: DISCONTINUED | OUTPATIENT
Start: 2019-06-14 | End: 2019-06-14 | Stop reason: HOSPADM

## 2019-06-14 RX ORDER — PROMETHAZINE HYDROCHLORIDE 25 MG/1
12.5 SUPPOSITORY RECTAL EVERY 6 HOURS PRN
Status: DISCONTINUED | OUTPATIENT
Start: 2019-06-14 | End: 2019-06-18 | Stop reason: HOSPADM

## 2019-06-14 RX ORDER — VANCOMYCIN HYDROCHLORIDE 1 G/20ML
INJECTION, POWDER, LYOPHILIZED, FOR SOLUTION INTRAVENOUS AS NEEDED
Status: DISCONTINUED | OUTPATIENT
Start: 2019-06-14 | End: 2019-06-14 | Stop reason: HOSPADM

## 2019-06-14 RX ORDER — INSULIN GLARGINE 100 [IU]/ML
28 INJECTION, SOLUTION SUBCUTANEOUS NIGHTLY
Status: DISCONTINUED | OUTPATIENT
Start: 2019-06-14 | End: 2019-06-16

## 2019-06-14 RX ORDER — INSULIN GLARGINE 100 [IU]/ML
30 INJECTION, SOLUTION SUBCUTANEOUS EVERY MORNING
Status: DISCONTINUED | OUTPATIENT
Start: 2019-06-15 | End: 2019-06-16

## 2019-06-14 RX ORDER — HYDROMORPHONE HCL IN 0.9% NACL 10 MG/50ML
PATIENT CONTROLLED ANALGESIA SYRINGE INTRAVENOUS CONTINUOUS
Status: ACTIVE | OUTPATIENT
Start: 2019-06-14 | End: 2019-06-15

## 2019-06-14 RX ORDER — ONDANSETRON 2 MG/ML
4 INJECTION INTRAMUSCULAR; INTRAVENOUS ONCE AS NEEDED
Status: DISCONTINUED | OUTPATIENT
Start: 2019-06-14 | End: 2019-06-14 | Stop reason: HOSPADM

## 2019-06-14 RX ORDER — ONDANSETRON 2 MG/ML
4 INJECTION INTRAMUSCULAR; INTRAVENOUS EVERY 6 HOURS PRN
Status: DISCONTINUED | OUTPATIENT
Start: 2019-06-14 | End: 2019-06-18 | Stop reason: HOSPADM

## 2019-06-14 RX ORDER — GABAPENTIN 300 MG/1
600 CAPSULE ORAL ONCE
Status: DISCONTINUED | OUTPATIENT
Start: 2019-06-14 | End: 2019-06-14 | Stop reason: HOSPADM

## 2019-06-14 RX ORDER — SODIUM CHLORIDE 9 MG/ML
INJECTION, SOLUTION INTRAVENOUS AS NEEDED
Status: DISCONTINUED | OUTPATIENT
Start: 2019-06-14 | End: 2019-06-14 | Stop reason: HOSPADM

## 2019-06-14 RX ORDER — UBIDECARENONE 75 MG
100 CAPSULE ORAL DAILY
Status: DISCONTINUED | OUTPATIENT
Start: 2019-06-14 | End: 2019-06-18 | Stop reason: HOSPADM

## 2019-06-14 RX ORDER — BISACODYL 5 MG/1
5 TABLET, DELAYED RELEASE ORAL DAILY PRN
Status: DISCONTINUED | OUTPATIENT
Start: 2019-06-14 | End: 2019-06-18 | Stop reason: HOSPADM

## 2019-06-14 RX ORDER — HYDROMORPHONE HYDROCHLORIDE 1 MG/ML
0.5 INJECTION, SOLUTION INTRAMUSCULAR; INTRAVENOUS; SUBCUTANEOUS
Status: DISCONTINUED | OUTPATIENT
Start: 2019-06-14 | End: 2019-06-14 | Stop reason: HOSPADM

## 2019-06-14 RX ORDER — ROPINIROLE 2 MG/1
4 TABLET, FILM COATED ORAL NIGHTLY
Status: DISCONTINUED | OUTPATIENT
Start: 2019-06-14 | End: 2019-06-18 | Stop reason: HOSPADM

## 2019-06-14 RX ORDER — SODIUM CHLORIDE 0.9 % (FLUSH) 0.9 %
3-10 SYRINGE (ML) INJECTION AS NEEDED
Status: DISCONTINUED | OUTPATIENT
Start: 2019-06-14 | End: 2019-06-18 | Stop reason: HOSPADM

## 2019-06-14 RX ORDER — SODIUM CHLORIDE 9 MG/ML
INJECTION, SOLUTION INTRAVENOUS CONTINUOUS PRN
Status: DISCONTINUED | OUTPATIENT
Start: 2019-06-14 | End: 2019-06-14 | Stop reason: SURG

## 2019-06-14 RX ORDER — HYDROCODONE BITARTRATE AND ACETAMINOPHEN 7.5; 325 MG/1; MG/1
1 TABLET ORAL ONCE AS NEEDED
Status: COMPLETED | OUTPATIENT
Start: 2019-06-14 | End: 2019-06-14

## 2019-06-14 RX ORDER — LISINOPRIL 5 MG/1
5 TABLET ORAL DAILY
Status: DISCONTINUED | OUTPATIENT
Start: 2019-06-14 | End: 2019-06-18 | Stop reason: HOSPADM

## 2019-06-14 RX ORDER — ONDANSETRON 4 MG/1
4 TABLET, FILM COATED ORAL EVERY 6 HOURS PRN
Status: DISCONTINUED | OUTPATIENT
Start: 2019-06-14 | End: 2019-06-18 | Stop reason: HOSPADM

## 2019-06-14 RX ORDER — GABAPENTIN 300 MG/1
600 CAPSULE ORAL 3 TIMES DAILY
Status: DISCONTINUED | OUTPATIENT
Start: 2019-06-14 | End: 2019-06-18 | Stop reason: HOSPADM

## 2019-06-14 RX ORDER — NALOXONE HCL 0.4 MG/ML
0.1 VIAL (ML) INJECTION
Status: DISCONTINUED | OUTPATIENT
Start: 2019-06-14 | End: 2019-06-18 | Stop reason: HOSPADM

## 2019-06-14 RX ORDER — PROMETHAZINE HYDROCHLORIDE 25 MG/1
25 SUPPOSITORY RECTAL ONCE AS NEEDED
Status: DISCONTINUED | OUTPATIENT
Start: 2019-06-14 | End: 2019-06-14 | Stop reason: HOSPADM

## 2019-06-14 RX ORDER — FUROSEMIDE 20 MG/1
20 TABLET ORAL
Status: DISCONTINUED | OUTPATIENT
Start: 2019-06-14 | End: 2019-06-18 | Stop reason: HOSPADM

## 2019-06-14 RX ORDER — ACETAMINOPHEN 325 MG/1
650 TABLET ORAL ONCE AS NEEDED
Status: DISCONTINUED | OUTPATIENT
Start: 2019-06-14 | End: 2019-06-14 | Stop reason: HOSPADM

## 2019-06-14 RX ORDER — LIDOCAINE HYDROCHLORIDE 10 MG/ML
0.5 INJECTION, SOLUTION EPIDURAL; INFILTRATION; INTRACAUDAL; PERINEURAL ONCE AS NEEDED
Status: DISCONTINUED | OUTPATIENT
Start: 2019-06-14 | End: 2019-06-14 | Stop reason: HOSPADM

## 2019-06-14 RX ADMIN — OXYCODONE HYDROCHLORIDE 10 MG: 10 TABLET, FILM COATED, EXTENDED RELEASE ORAL at 06:15

## 2019-06-14 RX ADMIN — INSULIN GLARGINE 28 UNITS: 100 INJECTION, SOLUTION SUBCUTANEOUS at 21:21

## 2019-06-14 RX ADMIN — SODIUM CHLORIDE, POTASSIUM CHLORIDE, SODIUM LACTATE AND CALCIUM CHLORIDE: 600; 310; 30; 20 INJECTION, SOLUTION INTRAVENOUS at 07:03

## 2019-06-14 RX ADMIN — SUCCINYLCHOLINE CHLORIDE 140 MG: 20 INJECTION, SOLUTION INTRAMUSCULAR; INTRAVENOUS; PARENTERAL at 07:11

## 2019-06-14 RX ADMIN — CEFAZOLIN SODIUM 2 G: 2 INJECTION, SOLUTION INTRAVENOUS at 07:16

## 2019-06-14 RX ADMIN — FENTANYL CITRATE 100 MCG: 50 INJECTION INTRAMUSCULAR; INTRAVENOUS at 07:35

## 2019-06-14 RX ADMIN — KETAMINE HYDROCHLORIDE 10 MG: 10 INJECTION INTRAMUSCULAR; INTRAVENOUS at 10:28

## 2019-06-14 RX ADMIN — SODIUM CHLORIDE: 9 INJECTION, SOLUTION INTRAVENOUS at 07:45

## 2019-06-14 RX ADMIN — HYDROCODONE BITARTRATE AND ACETAMINOPHEN 2 TABLET: 5; 325 TABLET ORAL at 17:50

## 2019-06-14 RX ADMIN — PHENYLEPHRINE HYDROCHLORIDE 50 MCG: 10 INJECTION INTRAVENOUS at 11:14

## 2019-06-14 RX ADMIN — SODIUM CHLORIDE, POTASSIUM CHLORIDE, SODIUM LACTATE AND CALCIUM CHLORIDE 9 ML/HR: 600; 310; 30; 20 INJECTION, SOLUTION INTRAVENOUS at 06:24

## 2019-06-14 RX ADMIN — PHENYLEPHRINE HYDROCHLORIDE 50 MCG: 10 INJECTION INTRAVENOUS at 10:38

## 2019-06-14 RX ADMIN — KETAMINE HYDROCHLORIDE 10 MG: 10 INJECTION INTRAMUSCULAR; INTRAVENOUS at 11:22

## 2019-06-14 RX ADMIN — ROPINIROLE 4 MG: 2 TABLET, FILM COATED ORAL at 21:06

## 2019-06-14 RX ADMIN — METFORMIN HYDROCHLORIDE 500 MG: 500 TABLET ORAL at 17:43

## 2019-06-14 RX ADMIN — METOPROLOL TARTRATE 25 MG: 25 TABLET ORAL at 21:06

## 2019-06-14 RX ADMIN — FENTANYL CITRATE 50 MCG: 50 INJECTION INTRAMUSCULAR; INTRAVENOUS at 07:06

## 2019-06-14 RX ADMIN — MAGNESIUM SULFATE HEPTAHYDRATE 1 G: 500 INJECTION, SOLUTION INTRAMUSCULAR; INTRAVENOUS at 08:03

## 2019-06-14 RX ADMIN — SODIUM CHLORIDE: 9 INJECTION, SOLUTION INTRAVENOUS at 10:35

## 2019-06-14 RX ADMIN — SODIUM CHLORIDE, PRESERVATIVE FREE 3 ML: 5 INJECTION INTRAVENOUS at 16:05

## 2019-06-14 RX ADMIN — PROPOFOL 150 MG: 10 INJECTION, EMULSION INTRAVENOUS at 07:10

## 2019-06-14 RX ADMIN — CYCLOBENZAPRINE 10 MG: 10 TABLET, FILM COATED ORAL at 14:46

## 2019-06-14 RX ADMIN — PROPOFOL 250 MG: 10 INJECTION, EMULSION INTRAVENOUS at 07:35

## 2019-06-14 RX ADMIN — FAMOTIDINE 20 MG: 10 INJECTION INTRAVENOUS at 06:24

## 2019-06-14 RX ADMIN — ATORVASTATIN CALCIUM 80 MG: 80 TABLET, FILM COATED ORAL at 21:06

## 2019-06-14 RX ADMIN — CEFAZOLIN SODIUM 2 G: 2 INJECTION, SOLUTION INTRAVENOUS at 17:39

## 2019-06-14 RX ADMIN — SODIUM CHLORIDE, POTASSIUM CHLORIDE, SODIUM LACTATE AND CALCIUM CHLORIDE: 600; 310; 30; 20 INJECTION, SOLUTION INTRAVENOUS at 09:05

## 2019-06-14 RX ADMIN — ONDANSETRON 4 MG: 2 INJECTION INTRAMUSCULAR; INTRAVENOUS at 10:54

## 2019-06-14 RX ADMIN — PHENYLEPHRINE HYDROCHLORIDE 50 MCG: 10 INJECTION INTRAVENOUS at 10:17

## 2019-06-14 RX ADMIN — ALBUMIN (HUMAN): 0.05 SOLUTION INTRAVENOUS at 08:41

## 2019-06-14 RX ADMIN — PHENYLEPHRINE HYDROCHLORIDE 50 MCG: 10 INJECTION INTRAVENOUS at 11:01

## 2019-06-14 RX ADMIN — EPHEDRINE SULFATE 10 MG: 50 INJECTION INTRAMUSCULAR; INTRAVENOUS; SUBCUTANEOUS at 07:58

## 2019-06-14 RX ADMIN — PHENYLEPHRINE HYDROCHLORIDE 50 MCG: 10 INJECTION INTRAVENOUS at 10:50

## 2019-06-14 RX ADMIN — KETAMINE HYDROCHLORIDE 10 MG: 10 INJECTION INTRAMUSCULAR; INTRAVENOUS at 09:29

## 2019-06-14 RX ADMIN — LIDOCAINE HYDROCHLORIDE 100 MG: 20 INJECTION, SOLUTION INFILTRATION; PERINEURAL at 07:10

## 2019-06-14 RX ADMIN — EPHEDRINE SULFATE 5 MG: 50 INJECTION INTRAMUSCULAR; INTRAVENOUS; SUBCUTANEOUS at 08:39

## 2019-06-14 RX ADMIN — KETAMINE HYDROCHLORIDE 40 MG: 10 INJECTION INTRAMUSCULAR; INTRAVENOUS at 07:30

## 2019-06-14 RX ADMIN — DEXAMETHASONE SODIUM PHOSPHATE 8 MG: 10 INJECTION INTRAMUSCULAR; INTRAVENOUS at 07:18

## 2019-06-14 RX ADMIN — KETAMINE HYDROCHLORIDE 10 MG: 10 INJECTION INTRAMUSCULAR; INTRAVENOUS at 08:29

## 2019-06-14 RX ADMIN — SODIUM CHLORIDE 100 ML/HR: 4.5 INJECTION, SOLUTION INTRAVENOUS at 21:07

## 2019-06-14 RX ADMIN — FENTANYL CITRATE 50 MCG: 50 INJECTION INTRAMUSCULAR; INTRAVENOUS at 07:13

## 2019-06-14 RX ADMIN — EPHEDRINE SULFATE 10 MG: 50 INJECTION INTRAMUSCULAR; INTRAVENOUS; SUBCUTANEOUS at 07:24

## 2019-06-14 RX ADMIN — GABAPENTIN 600 MG: 300 CAPSULE ORAL at 21:11

## 2019-06-14 RX ADMIN — INSULIN LISPRO 4 UNITS: 100 INJECTION, SOLUTION INTRAVENOUS; SUBCUTANEOUS at 21:20

## 2019-06-14 RX ADMIN — INSULIN LISPRO 3 UNITS: 100 INJECTION, SOLUTION INTRAVENOUS; SUBCUTANEOUS at 17:49

## 2019-06-14 RX ADMIN — SODIUM CHLORIDE 100 ML/HR: 4.5 INJECTION, SOLUTION INTRAVENOUS at 19:51

## 2019-06-14 RX ADMIN — ALBUMIN (HUMAN): 0.05 SOLUTION INTRAVENOUS at 10:15

## 2019-06-14 RX ADMIN — SODIUM CHLORIDE 1 MCG/KG/HR: 900 INJECTION, SOLUTION INTRAVENOUS at 07:16

## 2019-06-14 RX ADMIN — PHENYLEPHRINE HYDROCHLORIDE 50 MCG: 10 INJECTION INTRAVENOUS at 11:20

## 2019-06-14 RX ADMIN — EPHEDRINE SULFATE 5 MG: 50 INJECTION INTRAMUSCULAR; INTRAVENOUS; SUBCUTANEOUS at 08:22

## 2019-06-14 RX ADMIN — HYDROCODONE BITARTRATE AND ACETAMINOPHEN 1 TABLET: 7.5; 325 TABLET ORAL at 13:43

## 2019-06-14 RX ADMIN — MAGNESIUM SULFATE HEPTAHYDRATE 1 G: 500 INJECTION, SOLUTION INTRAMUSCULAR; INTRAVENOUS at 07:29

## 2019-06-14 RX ADMIN — HYDROMORPHONE HYDROCHLORIDE: 10 INJECTION, SOLUTION INTRAMUSCULAR; INTRAVENOUS; SUBCUTANEOUS at 12:48

## 2019-06-14 RX ADMIN — PHENYLEPHRINE HYDROCHLORIDE 50 MCG: 10 INJECTION INTRAVENOUS at 09:46

## 2019-06-14 RX ADMIN — FUROSEMIDE 20 MG: 20 TABLET ORAL at 17:43

## 2019-06-14 RX ADMIN — PHENYLEPHRINE HYDROCHLORIDE 50 MCG: 10 INJECTION INTRAVENOUS at 08:39

## 2019-06-14 RX ADMIN — GLYCOPYRROLATE 0.2 MG: 0.2 INJECTION INTRAMUSCULAR; INTRAVENOUS at 07:04

## 2019-06-14 NOTE — CONSULTS
CONSULT NOTE    INTERNAL MEDICINE   [facility]       Patient Identification:  Name: Chester Gama  Age: 79 y.o.  Sex: male  :  1940  MRN: [id number]             Date of Consultation: 2019      Primary Care Physician: Demetrio Ko DO                               Requesting Physician:   Reason for Consultation: Management of medical issues    Chief Complaint: Brought in today for posterior fusion from C4-T2 after successful anterior cervical disc fusion 18 months ago.    History of Present Illness:   Patient is a 76-year-old male with past medical history as noted below has been dealing with cervical spine stenosis and radiculopathy for quite some time.  He had anterior cervical disc fusion 18 months ago with C4-C5 non-reunion and eventually was brought in today for elective posterior fusion and instrumentation.  Except for ongoing issues with his neck pain and pain going into his left arm and shoulder he did not have any physical symptoms of chest pain shortness of breath nausea vomiting or diarrhea.  Since surgery he is otherwise doing okay.  Patient does have history of diabetes and hypertension and claims that his blood sugar has been running reasonably well.      Past Medical History:  Past Medical History:   Diagnosis Date   • Arthritis     HANDS KNEES   • Cervical pain (neck)     LEFT ARM PARESTHESIA   • Coronary artery disease    • Depression    • Diabetes mellitus (CMS/HCC)     TYPE 2   • Diverticular disease     HX, S/P RESECTION   • Hx of heart artery stent    • Hyperlipemia    • Hypertension    • PTSD (post-traumatic stress disorder)    • RLS (restless legs syndrome)    • Sleep apnea     DIDNT TOLERATE MASK, CLAUSTROPHOBIC   • Structural abnormality of kidney     SAW NEPHROLOGIST FOR SMALLER KIDNEY - 3 YEARS AGO.   • Thyroid cyst     PER WIFE     Past Surgical History:  Past Surgical History:   Procedure Laterality Date   • ANTERIOR CERVICAL DISCECTOMY W/  FUSION N/A 1/3/2018    Procedure: ANTERIOR CERVICAL DECOMPRESSION & FUSION C4 6;  Surgeon: Vincenzo Soliman DO;  Location: [facility] OR;  Service:    • APPENDECTOMY     • CATARACT EXTRACTION W/ INTRAOCULAR LENS  IMPLANT, BILATERAL     • COLON RESECTION      COLOSTOMY X 6 MOS THEN REVERSED.   • CORONARY ANGIOPLASTY WITH STENT PLACEMENT  02/10/2010    [email or web address] Meds:  Medications Prior to Admission   Medication Sig Dispense Refill Last Dose   • ARTIFICIAL TEAR OP Apply 1 drop to eye As Needed. Both eyes   Past Month at Unknown time   • atorvastatin (LIPITOR) 80 MG tablet Take 80 mg by mouth Daily.   6/13/2019 at 0800   • docusate sodium (COLACE) 250 MG capsule Take 250 mg by mouth 2 (Two) Times a Day As Needed for Constipation.   6/12/2019 at 0800   • furosemide (LASIX) 20 MG tablet Take 20 mg by mouth 2 (Two) Times a Day.   6/13/2019 at 2100   • gabapentin (NEURONTIN) 300 MG capsule Take 600 mg by mouth 3 (Three) Times a Day.   6/14/2019 at 0400   • insulin glargine (LANTUS) 100 UNIT/ML injection Inject 30 Units under the skin Every Morning.   6/13/2019 at 21894   • insulin glargine (LANTUS) 100 UNIT/ML injection Inject 28 Units under the skin Every Night. (Patient taking differently: Inject 30 Units under the skin into the appropriate area as directed Every Night.) 1 Units 0 6/13/2019 at 2000   • lisinopril (PRINIVIL,ZESTRIL) 5 MG tablet Take 5 mg by mouth Daily. 1/2 TAB EACH DOSE   6/13/2019 at 0800   • metFORMIN (GLUCOPHAGE) 500 MG tablet Take 500 mg by mouth 2 (Two) Times a Day With Meals.   6/13/2019 at 1800   • metoprolol tartrate (LOPRESSOR) 25 MG tablet Take 25 mg by mouth Every 12 (Twelve) Hours. 1/2 TAB EACH DOSE   6/14/2019 at 0400   • rOPINIRole (REQUIP) 4 MG tablet Take 4 mg by mouth Every Night.   6/13/2019 at 2100   • vitamin B-12 (CYANOCOBALAMIN) 100 MCG tablet Take 100 mcg by mouth Daily.   6/13/2019 at 0800   • aspirin 81 MG EC tablet Take 81 mg by mouth Daily. STOPPED  6/7/19 6/7/2019   • diclofenac (VOLTAREN) 1 % gel gel Apply 4 g topically to the appropriate area as directed 4 (Four) Times a Day As Needed (ON HOLS FOR SX). STOPPED PREOP, LAST DOSE 10/2017    12/3/2017     Current Meds:     Current Facility-Administered Medications:   •  atorvastatin (LIPITOR) tablet 80 mg, 80 mg, Oral, Daily, Janny, Vincenzo, DO  •  bisacodyl (DULCOLAX) EC tablet 5 mg, 5 mg, Oral, Daily PRN, Janny, Vicnenzo, DO  •  bisacodyl (DULCOLAX) suppository 10 mg, 10 mg, Rectal, Daily PRN, Janny, Vincenzo, DO  •  ceFAZolin in dextrose (ANCEF) IVPB solution 2 g, 2 g, Intravenous, Q8H, Janny, Vincenzo, DO, 2 g at 06/14/19 1739  •  cyclobenzaprine (FLEXERIL) tablet 10 mg, 10 mg, Oral, TID PRN, Janny, Vincenzo, DO, 10 mg at 06/14/19 1446  •  dextrose (D50W) 25 g/ 50mL Intravenous Solution 25 g, 25 g, Intravenous, Q15 Min PRN, Janny, Vincenzo, DO  •  dextrose (GLUTOSE) oral gel 15 g, 15 g, Oral, Q15 Min PRN, Janny, Vincenzo, DO  •  diphenhydrAMINE (BENADRYL) injection 25 mg, 25 mg, Intravenous, Q6H PRN, Janny, Vincenzo, DO  •  docusate sodium (COLACE) capsule 100 mg, 100 mg, Oral, BID PRN, Janny, Vincenzo, DO  •  furosemide (LASIX) tablet 20 mg, 20 mg, Oral, BID, Janny, Vincenzo, DO, 20 mg at 06/14/19 1743  •  gabapentin (NEURONTIN) capsule 600 mg, 600 mg, Oral, TID, Janny, Vincenzo, DO  •  glucagon (human recombinant) (GLUCAGEN DIAGNOSTIC) injection 1 mg, 1 mg, Subcutaneous, PRN, Janny, Vincenzo, DO  •  HYDROcodone-acetaminophen (NORCO) 5-325 MG per tablet 2 tablet, 2 tablet, Oral, Q4H PRN, Janny, Vincenzo, DO, 2 tablet at 06/14/19 1750  •  HYDROmorphone (DILAUDID) PCA 0.2 mg/ml 50 mL syringe, , Intravenous, Continuous, Vincenzo Soliman, DO  •  insulin glargine (LANTUS) injection 28 Units, 28 Units, Subcutaneous, Nightly, Vincenzo Soliman, DO  •  [START ON 6/15/2019] insulin glargine (LANTUS) injection 30 Units, 30 Units, Subcutaneous, QAM, Vincenzo Soliman, DO  •  insulin lispro (humaLOG) injection 0-14 Units, 0-14 Units, Subcutaneous, 4x Daily With Meals  & Nightly, Janny, Vincenzo, DO, 3 Units at 19 1749  •  lisinopril (PRINIVIL,ZESTRIL) tablet 5 mg, 5 mg, Oral, Daily, Janny, Vincenzo, DO  •  magnesium hydroxide (MILK OF MAGNESIA) suspension 2400 mg/10mL 10 mL, 10 mL, Oral, Daily PRN, Janny, Vincenzo, DO  •  metFORMIN (GLUCOPHAGE) tablet 500 mg, 500 mg, Oral, BID With Meals, Janny, Vincenzo, DO, 500 mg at 19 1743  •  metoprolol tartrate (LOPRESSOR) tablet 25 mg, 25 mg, Oral, Q12H, Janny, Vincenzo, DO  •  naloxone (NARCAN) injection 0.1 mg, 0.1 mg, Intravenous, Q5 Min PRN, Janny, Vincenzo, DO  •  ondansetron (ZOFRAN) tablet 4 mg, 4 mg, Oral, Q6H PRN **OR** ondansetron (ZOFRAN) injection 4 mg, 4 mg, Intravenous, Q6H PRN, Janny, Vincenzo, DO  •  polyethylene glycol 3350 powder (packet), 17 g, Oral, Daily PRN, Janny, Vincenzo, DO  •  promethazine (PHENERGAN) tablet 12.5 mg, 12.5 mg, Oral, Q6H PRN **OR** promethazine (PHENERGAN) injection 12.5 mg, 12.5 mg, Intramuscular, Q6H PRN **OR** promethazine (PHENERGAN) suppository 12.5 mg, 12.5 mg, Rectal, Q6H PRN, Janny, Vincenzo, DO  •  rOPINIRole (REQUIP) tablet 4 mg, 4 mg, Oral, Nightly, Janny, Vincenzo, DO  •  sennosides-docusate sodium (SENOKOT-S) 8.6-50 MG tablet 1 tablet, 1 tablet, Oral, Nightly PRN, Janny, Vincenzo, DO  •  sodium chloride 0.45 % infusion, 100 mL/hr, Intravenous, Continuous, Janny, Vincenzo, DO  •  sodium chloride 0.9 % flush 3 mL, 3 mL, Intravenous, Q12H, Janny, Vincenzo, DO, 3 mL at 19 1605  •  sodium chloride 0.9 % flush 3-10 mL, 3-10 mL, Intravenous, PRN, Janny, Vincenzo, DO  •  vitamin B-12 (CYANOCOBALAMIN) tablet 100 mcg, 100 mcg, Oral, Daily, Janny, Vincenzo, DO  Allergies:  Allergies   Allergen Reactions   • Contrast Dye Hives     IVP DYE     Social History:   Social History     Tobacco Use   • Smoking status: Former Smoker     Years: 5.00     Types: Cigars     Last attempt to quit: 6/10/1966     Years since quittin.0   • Smokeless tobacco: Never Used   Substance Use Topics   • Alcohol use: No      Family  "History:  Family History   Problem Relation Age of Onset   • Malig Hyperthermia Neg Hx           Review of Systems  See history of present illness and past medical history.   Constitutional: Remarkable for no fever or chills  Cardiovascular: Remarkable for no chest pain or shortness of breath  GI: Remarkable for no nausea vomiting or diarrhea  : Remarkable for urinary retention requiring catheter  Musculoskeletal: Remarkable for neck pain and shoulder pain is described in history of presenting illness  Neurological: Remarkable for no loss of consciousness or continence.      Vitals:   /64   Pulse 87   Temp 97.9 °F (36.6 °C) (Oral)   Resp 12   Ht 190.5 cm (75\")   Wt 113 kg (249 lb 12.5 oz)   SpO2 94%   BMI 31.22 kg/m²   I/O:     Intake/Output Summary (Last 24 hours) at 6/14/2019 1839  Last data filed at 6/14/2019 1750  Gross per 24 hour   Intake 4465.2 ml   Output 1780 ml   Net 2685.2 ml     Exam:  General Appearance:   Awake cooperative male who appears to be in discomfort because of neck pain.   Head:    Normocephalic, without obvious abnormality, atraumatic   Eyes:    PERRL, conjunctiva/corneas clear, EOM's intact, both eyes   Ears:    Normal external ear canals, both ears   Nose:   Nares normal, septum midline, mucosa normal, no drainage    or sinus tenderness   Throat:   Lips, tongue, gums normal; oral mucosa pink and moist   Neck:  Surgical site dressed   Back:     Symmetric, no curvature, ROM normal, no CVA tenderness   Lungs:     Clear to auscultation bilaterally, respirations unlabored   Chest Wall:    No tenderness or deformity    Heart:    Regular rate and rhythm, S1 and S2 normal, no murmur, rub   or gallop   Abdomen:     Soft, non-tender, bowel sounds active all four quadrants,     no masses, no hepatomegaly, no splenomegaly   Extremities:   Extremities normal, atraumatic, no cyanosis or edema   Pulses:   Pulses palpable in all extremities; symmetric all extremities   Skin:   Skin color " normal, Skin is warm and dry,  no rashes or palpable lesions   Neurologic:  Grossly nonfocal       Data Review:      I reviewed the patient's new clinical results.  Results from last 7 days   Lab Units 06/10/19  1320   WBC 10*3/mm3 8.29   HEMOGLOBIN g/dL 14.8   PLATELETS 10*3/mm3 199     Results from last 7 days   Lab Units 06/10/19  1319   SODIUM mmol/L 141   POTASSIUM mmol/L 4.5   CHLORIDE mmol/L 102   CO2 mmol/L 26.4   BUN mg/dL 17   CREATININE mg/dL 1.37*   CALCIUM mg/dL 9.4   GLUCOSE mg/dL 157*       Assessment:  Active Hospital Problems    Diagnosis POA   • **Cervical spinal stenosis [M48.02] Yes   • Hypertension [I10] Unknown   • Sleep apnea [G47.30] Unknown     DIDNT TOLERATE MASK, CLAUSTROPHOBIC     • Diabetes mellitus (CMS/HCC) [E11.9] Unknown     TYPE 2     • Coronary artery disease [I25.10] Unknown   • RLS (restless legs syndrome) [G25.81] Unknown       Medical decision making:  Cervical spine stenosis-patient is status post posterior fusion and instrumentation-management of pain, activity guidelines DVT prophylaxis and management of urinary retention and discharge disposition per primary orthopedic/spine surgery service.  Hypertension-continue his antihypertensive regimen.  Diabetes mellitus-continue his home insulin regimen and provide him with Accu-Cheks and sliding scale coverage and check hemoglobin A1c.  Chronic kidney disease-monitor his creatinine avoid nephrotoxic agent and hypotensive episode.  History of sleep apnea patient does not tolerate mask and since currently on pain medication needs to be monitored closely with continuous pulse ox and reassessment of his mental status and low threshold to check ABG to look for hypercapnic state if he become somnolent and drowsy.      Marcos Khan MD   6/14/2019  6:39 PM  Much of this encounter note is an electronic transcription/translation of spoken language to printed text. The electronic translation of spoken language may permit erroneous, or at  times, nonsensical words or phrases to be inadvertently transcribed; Although I have reviewed the note for such errors, some may still exist

## 2019-06-14 NOTE — ANESTHESIA PROCEDURE NOTES
Airway  Urgency: elective    Airway not difficult    General Information and Staff    Patient location during procedure: OR  Anesthesiologist: Eduardo Riggins MD  CRNA: Sindi Miranda CRNA    Indications and Patient Condition  Indications for airway management: airway protection    Preoxygenated: yes  Mask difficulty assessment: 1 - vent by mask    Final Airway Details  Final airway type: endotracheal airway      Successful airway: ETT  Cuffed: yes   Successful intubation technique: direct laryngoscopy  Facilitating devices/methods: intubating stylet  Endotracheal tube insertion site: oral  Blade: Tommy  Blade size: 4  ETT size (mm): 7.5  Cormack-Lehane Classification: grade I - full view of glottis  Placement verified by: chest auscultation and capnometry   Cuff volume (mL): 5  Measured from: teeth  ETT to teeth (cm): 21  Number of attempts at approach: 1    Additional Comments  Atraumatic.

## 2019-06-14 NOTE — ANESTHESIA PROCEDURE NOTES
Arterial Line    Pre-sedation assessment completed: 6/14/2019 6:40 AM    Patient reassessed immediately prior to procedure    Patient location during procedure: holding area  Start time: 6/14/2019 6:48 AM  Stop Time:6/14/2019 6:48 AM       Line placed for hemodynamic monitoring.  Performed By   Anesthesiologist: Eduardo Riggins MD  Preanesthetic Checklist  Completed: patient identified, site marked, surgical consent, pre-op evaluation, timeout performed, IV checked, risks and benefits discussed and monitors and equipment checked  Arterial Line Prep   Sterile Tech: gloves and sterile barriers  Prep: ChloraPrep  Patient monitoring: blood pressure monitoring, continuous pulse oximetry and EKG  Arterial Line Procedure   Laterality:left  Location:  radial artery  Catheter size: 20 G   Guidance: ultrasound guided  PROCEDURE NOTE/ULTRASOUND INTERPRETATION.  Using ultrasound guidance the potential vascular sites for insertion of the catheter were visualized to determine the patency of the vessel to be used for vascular access.  After selecting the appropriate site for insertion, the needle was visualized under ultrasound being inserted into the radial artery, followed by ultrasound confirmation of wire and catheter placement. There were no abnormalities seen on ultrasound; an image was taken; and the patient tolerated the procedure with no complications.   Number of attempts: 1  Successful placement: yes          Post Assessment   Dressing Type: occlusive dressing applied, secured with tape and wrist guard applied.   Complications no  Circ/Move/Sens Assessment: unchanged.   Patient Tolerance: patient tolerated the procedure well with no apparent complications

## 2019-06-14 NOTE — BRIEF OP NOTE
CERVICAL DISCECTOMY POSTERIOR FUSION WITH INSTRUMENTATION 1-2 LEVELS  Progress Note    Chester Gama  6/14/2019    Pre-op Diagnosis:   Cervical nonunion C5-C6  Cervical stenosis C4-T2.         Post-Op Diagnosis Codes:  Same    Procedure/CPT® Codes:      Procedure(s):  C4-T2 POSTERIOR DECOMPRESSION AND FUSION BONE MORPHOGENIC PROTEIN    Surgeon(s):  Vincenzo Soliman DO    Anesthesia: General    Staff:   Cell Saver : Alexandria Carroll; Duc Hernandez  Circulator: Breanne Teran RN; Paola Martins RN  Radiology Technologist: Shital Huynh RRT  Scrub Person: Naomi Lin  Assistant: Maureen Soto PA    Estimated Blood Loss: 800 mL    Urine Voided: 800 mL    Specimens:                None          Drains:   Closed/Suction Drain 1 Posterior Neck Accordion 10 Fr. (Active)   Site Description Bleeding 6/14/2019  3:09 PM   Dressing Status Clean;Dry;Intact 6/14/2019  3:09 PM   Drainage Appearance Bloody 6/14/2019  3:09 PM   Status To bulb suction 6/14/2019  3:09 PM   Output (mL) 100 mL 6/14/2019  2:58 PM       Urethral Catheter Silicone 16 Fr. (Active)   Daily Indications < 24 hr post op 6/14/2019  3:09 PM   Site Assessment Clean;Skin intact 6/14/2019  3:09 PM   Collection Container Standard drainage bag 6/14/2019  3:09 PM   Securement Method Securing device 6/14/2019  3:09 PM       Findings: Severe stenosis    Complications: None apparent      Vincenzo Soliman DO     Date: 6/14/2019  Time: 4:44 PM

## 2019-06-14 NOTE — ANESTHESIA PREPROCEDURE EVALUATION
Anesthesia Evaluation     Patient summary reviewed and Nursing notes reviewed   NPO Solid Status: > 8 hours  NPO Liquid Status: > 2 hours           Airway   Mallampati: III  Neck ROM: limited  no difficulty expected  Dental    (+) lower dentures and upper dentures    Pulmonary    (+) a smoker Former, sleep apnea,   Cardiovascular     ECG reviewed    (+) hypertension, CAD, hyperlipidemia,       Neuro/Psych  (+) psychiatric history Depression,     GI/Hepatic/Renal/Endo    (+)   diabetes mellitus type 2 using insulin,     Musculoskeletal     (+) neck pain, radiculopathy  Abdominal    Substance History      OB/GYN          Other   (+) arthritis                       Anesthesia Plan    ASA 3     general   (Cardiac stent placed about 7 years ago per patient with recent cardiac f/u without  current signs or symptoms of coronary disease requiring pre op intervention )  Anesthetic plan, all risks, benefits, and alternatives have been provided, discussed and informed consent has been obtained with: patient.

## 2019-06-14 NOTE — ANESTHESIA POSTPROCEDURE EVALUATION
Patient: Chester Gama    Procedure Summary     Date:  06/14/19 Room / Location:  [facility] OR [address] [place] [address] MAIN OR    Anesthesia Start:  0703 Anesthesia Stop:  1225    Procedure:  C4-T2 POSTERIOR DECOMPRESSION AND FUSION BONE MORPHOGENIC PROTEIN (N/A Spine Cervical) Diagnosis:      Surgeon:  Vincenzo Soliman DO Provider:  Eduardo Riggins MD    Anesthesia Type:  general ASA Status:  3          Anesthesia Type: general  Last vitals  BP   125/71 (06/14/19 1245)   Temp   36.9 °C (98.5 °F) (06/14/19 1222)   Pulse   75 (06/14/19 1245)   Resp   16 (06/14/19 1245)     SpO2   93 % (06/14/19 1245)     Post Anesthesia Care and Evaluation    Patient location during evaluation: bedside  Patient participation: complete - patient participated  Level of consciousness: awake and alert  Pain score: 1  Pain management: adequate  Airway patency: patent  Anesthetic complications: No anesthetic complications  PONV Status: none  Cardiovascular status: blood pressure returned to baseline, acceptable and hemodynamically stable  Respiratory status: acceptable and nasal cannula  Hydration status: acceptable  Post Neuraxial Block status: Motor and sensory function returned to baseline

## 2019-06-15 LAB
ANION GAP SERPL CALCULATED.3IONS-SCNC: 8.7 MMOL/L
BUN BLD-MCNC: 19 MG/DL (ref 8–23)
BUN/CREAT SERPL: 14.7 (ref 7–25)
CALCIUM SPEC-SCNC: 8 MG/DL (ref 8.6–10.5)
CHLORIDE SERPL-SCNC: 105 MMOL/L (ref 98–107)
CO2 SERPL-SCNC: 26.3 MMOL/L (ref 22–29)
CREAT BLD-MCNC: 1.29 MG/DL (ref 0.76–1.27)
GFR SERPL CREATININE-BSD FRML MDRD: 54 ML/MIN/1.73
GLUCOSE BLD-MCNC: 195 MG/DL (ref 65–99)
GLUCOSE BLDC GLUCOMTR-MCNC: 160 MG/DL (ref 70–130)
GLUCOSE BLDC GLUCOMTR-MCNC: 163 MG/DL (ref 70–130)
GLUCOSE BLDC GLUCOMTR-MCNC: 199 MG/DL (ref 70–130)
GLUCOSE BLDC GLUCOMTR-MCNC: 234 MG/DL (ref 70–130)
HCT VFR BLD AUTO: 37.2 % (ref 37.5–51)
HGB BLD-MCNC: 11.8 G/DL (ref 13–17.7)
POTASSIUM BLD-SCNC: 4.1 MMOL/L (ref 3.5–5.2)
SODIUM BLD-SCNC: 140 MMOL/L (ref 136–145)

## 2019-06-15 PROCEDURE — 25010000003 CEFAZOLIN IN DEXTROSE 2-4 GM/100ML-% SOLUTION: Performed by: ORTHOPAEDIC SURGERY

## 2019-06-15 PROCEDURE — 97110 THERAPEUTIC EXERCISES: CPT | Performed by: PHYSICAL THERAPIST

## 2019-06-15 PROCEDURE — 82962 GLUCOSE BLOOD TEST: CPT

## 2019-06-15 PROCEDURE — 97162 PT EVAL MOD COMPLEX 30 MIN: CPT | Performed by: PHYSICAL THERAPIST

## 2019-06-15 PROCEDURE — 85014 HEMATOCRIT: CPT | Performed by: ORTHOPAEDIC SURGERY

## 2019-06-15 PROCEDURE — 85018 HEMOGLOBIN: CPT | Performed by: ORTHOPAEDIC SURGERY

## 2019-06-15 PROCEDURE — 63710000001 INSULIN LISPRO (HUMAN) PER 5 UNITS: Performed by: INTERNAL MEDICINE

## 2019-06-15 PROCEDURE — 25010000003 CEFAZOLIN IN DEXTROSE 2-4 GM/100ML-% SOLUTION: Performed by: PHYSICIAN ASSISTANT

## 2019-06-15 PROCEDURE — 80048 BASIC METABOLIC PNL TOTAL CA: CPT | Performed by: ORTHOPAEDIC SURGERY

## 2019-06-15 RX ORDER — CEFAZOLIN SODIUM 2 G/100ML
2 INJECTION, SOLUTION INTRAVENOUS EVERY 8 HOURS
Status: COMPLETED | OUTPATIENT
Start: 2019-06-15 | End: 2019-06-16

## 2019-06-15 RX ADMIN — SODIUM CHLORIDE, PRESERVATIVE FREE 3 ML: 5 INJECTION INTRAVENOUS at 08:35

## 2019-06-15 RX ADMIN — CEFAZOLIN SODIUM 2 G: 2 INJECTION, SOLUTION INTRAVENOUS at 19:33

## 2019-06-15 RX ADMIN — METOPROLOL TARTRATE 25 MG: 25 TABLET ORAL at 23:09

## 2019-06-15 RX ADMIN — CYCLOBENZAPRINE 10 MG: 10 TABLET, FILM COATED ORAL at 19:36

## 2019-06-15 RX ADMIN — INSULIN GLARGINE 28 UNITS: 100 INJECTION, SOLUTION SUBCUTANEOUS at 23:16

## 2019-06-15 RX ADMIN — LISINOPRIL 5 MG: 5 TABLET ORAL at 08:33

## 2019-06-15 RX ADMIN — CEFAZOLIN SODIUM 2 G: 2 INJECTION, SOLUTION INTRAVENOUS at 01:18

## 2019-06-15 RX ADMIN — INSULIN LISPRO 2 UNITS: 100 INJECTION, SOLUTION INTRAVENOUS; SUBCUTANEOUS at 08:34

## 2019-06-15 RX ADMIN — CYCLOBENZAPRINE 10 MG: 10 TABLET, FILM COATED ORAL at 01:20

## 2019-06-15 RX ADMIN — METFORMIN HYDROCHLORIDE 500 MG: 500 TABLET ORAL at 18:55

## 2019-06-15 RX ADMIN — ATORVASTATIN CALCIUM 80 MG: 80 TABLET, FILM COATED ORAL at 08:34

## 2019-06-15 RX ADMIN — INSULIN GLARGINE 30 UNITS: 100 INJECTION, SOLUTION SUBCUTANEOUS at 08:45

## 2019-06-15 RX ADMIN — METOPROLOL TARTRATE 25 MG: 25 TABLET ORAL at 08:33

## 2019-06-15 RX ADMIN — Medication 100 MCG: at 08:33

## 2019-06-15 RX ADMIN — INSULIN LISPRO 4 UNITS: 100 INJECTION, SOLUTION INTRAVENOUS; SUBCUTANEOUS at 14:04

## 2019-06-15 RX ADMIN — HYDROCODONE BITARTRATE AND ACETAMINOPHEN 2 TABLET: 5; 325 TABLET ORAL at 19:36

## 2019-06-15 RX ADMIN — GABAPENTIN 600 MG: 300 CAPSULE ORAL at 08:34

## 2019-06-15 RX ADMIN — ROPINIROLE 4 MG: 2 TABLET, FILM COATED ORAL at 23:08

## 2019-06-15 RX ADMIN — HYDROCODONE BITARTRATE AND ACETAMINOPHEN 2 TABLET: 5; 325 TABLET ORAL at 01:21

## 2019-06-15 RX ADMIN — INSULIN LISPRO 2 UNITS: 100 INJECTION, SOLUTION INTRAVENOUS; SUBCUTANEOUS at 18:55

## 2019-06-15 RX ADMIN — INSULIN LISPRO 2 UNITS: 100 INJECTION, SOLUTION INTRAVENOUS; SUBCUTANEOUS at 23:11

## 2019-06-15 RX ADMIN — METFORMIN HYDROCHLORIDE 500 MG: 500 TABLET ORAL at 08:34

## 2019-06-15 RX ADMIN — CEFAZOLIN SODIUM 2 G: 2 INJECTION, SOLUTION INTRAVENOUS at 08:34

## 2019-06-15 RX ADMIN — GABAPENTIN 600 MG: 300 CAPSULE ORAL at 16:21

## 2019-06-15 RX ADMIN — FUROSEMIDE 20 MG: 20 TABLET ORAL at 18:55

## 2019-06-15 RX ADMIN — GABAPENTIN 600 MG: 300 CAPSULE ORAL at 23:08

## 2019-06-15 RX ADMIN — FUROSEMIDE 20 MG: 20 TABLET ORAL at 08:33

## 2019-06-15 RX ADMIN — SODIUM CHLORIDE 100 ML/HR: 4.5 INJECTION, SOLUTION INTRAVENOUS at 14:04

## 2019-06-15 NOTE — PROGRESS NOTES
Orthopedic Spine Progress Note    Subjective     Post-Operative Day: 1 post-spine procedure C4-T2 POSTERIOR DECOMPRESSION AND FUSION BONE MORPHOGENIC PROTEIN  Systemic or Specific Complaints: Pain Control. C/o expected parascapular pain. Some left upper arm pain as well which is improving.    Objective     Vital signs in last 24 hours:  Temp:  [97.9 °F (36.6 °C)-98.5 °F (36.9 °C)] 98.3 °F (36.8 °C)  Heart Rate:  [63-94] 63  Resp:  [12-16] 16  BP: (109-149)/(52-71) 130/63  Arterial Line BP: ()/(19-59) 108/52    General: alert, appears stated age and cooperative   Neurovascular: stable   Wound: Wound clean and dry no evidence of infection.   Range of Motion: limited   DVT Exam: No evidence of DVT seen on physical exam.     Data Review  CBC:  Results from last 7 days   Lab Units 06/15/19  0452 06/10/19  1320   WBC 10*3/mm3  --  8.29   RBC 10*6/mm3  --  5.14   HEMOGLOBIN g/dL 11.8* 14.8   HEMATOCRIT % 37.2* 46.0   PLATELETS 10*3/mm3  --  199     Lab Results   Component Value Date    GLUCOSE 195 (H) 06/15/2019    BUN 19 06/15/2019    CREATININE 1.29 (H) 06/15/2019    EGFRIFNONA 54 (L) 06/15/2019    BCR 14.7 06/15/2019    K 4.1 06/15/2019    CO2 26.3 06/15/2019    CALCIUM 8.0 (L) 06/15/2019    ALBUMIN 4.00 06/10/2019    AST 24 06/10/2019    ALT 31 06/10/2019     Drain output 430 since surgery    Assessment/Plan     Status post-spine procedure:   Pain Relief: some relief    Continues current post-op course  Up with PT  D/c lopez today  Continue drain and antibiotics  Does not need cervical collar except for comfort    Activity: out of bed and ambulate    Weight Bearing: FWB     LOS: 1 day     Vincenzo Soliman DO    Date: 6/15/2019

## 2019-06-15 NOTE — PLAN OF CARE
Problem: Patient Care Overview  Goal: Plan of Care Review   06/15/19 1108   OTHER   Outcome Summary Patient s/p fusion C4 to T2 and presents to PT with decreased functional mobility. He requires min/mod A x 2 for supine<->sit, min/CGA STS, and ambulated a total of ~18-20' in room min/CGA with B HHA. He will benefit from skilled PT in acute setting to improve functional mobility/gait prior to D/C from facility.

## 2019-06-15 NOTE — OP NOTE
Pre-op Diagnosis:   Cervical nonunion C5-6  Cervical stenosis C4-T2    Post-Op Diagnosis Codes:  Same    Procedure:  1.  Cervical arthrodesis posterior C4-C5 72292  2.  Cervical arthrodesis posterior C5-C6 28226  3.  Cervical arthrodesis posterior C6-C7 82089  4.  Cervical arthrodesis posterior C7-T1 89457  5.  Thoracic arthrodesis posterior T1-T2 13487  6.  Cervical laminectomy, partial facetectomy, foraminotomy for decompression of the left C5 nerve root 72748  7.  Cervical laminectomy, partial facetectomy, foraminotomy for decompression of the left C6 nerve root 90875  8.  Cervical laminectomy, partial facetectomy, foraminotomy for decompression of the left C7 nerve root 77035   9.  Cervical laminectomy, partial facetectomy, foraminotomy for decompression of the left C8 nerve root 15519  10.  Thoracic laminectomy, partial facetectomy, foraminotomy for decompression of the left T1 nerve root 58183  11.  Posterior cervical segmental instrumentation C4-T2 73504  12.  Use of local autograft bone 39752  Surgeon: Vincenzo Soliman DO    Assistant: Maureen Soto PA-C Please note I utilized the services of an assistant, specifically, Maureen Soto PA-C.  Maureen participated in crucial portions of the operation.  The use of Maureen greatly reduced overall operative time thereby reducing overall morbidity for the patient.  Specifically, she provided meticulous hemostasis for safe decompression of the spinal cord and cervical nerve roots.  She also assisted in the instrumentation of the spine for fusion.  Anesthesia:General  Estimated Blood Loss: 800 mL  Specimens:   Order Name Source Comment Collection Info Order Time   HEMOGLOBIN AND HEMATOCRIT, BLOOD   Collected By: Lotus Holcomb 6/15/2019 12:02 AM   BASIC METABOLIC PANEL   Collected By: Lotus Holcomb 6/15/2019 12:02 AM     Complications: None apparent  Implants:Alphatec spine, Solanas  Disposition: Patient to recovery room in stable condition.    Preoperative indications:  Patient is a 79-year-old who has a history of previous ACDF C5-7.    Procedure in detail:  The patient was identified in the preoperative holding area.  All labs and consent were found in order.  The operative site was marked with an indelible marker.  SCDs were applied for thromboembolic prophylaxis.  The patient was transferred to the operative suite.  Neuro monitoring leads were placed as well as Wilder catheter.  Mayers head gilliland was called and prone on the operative table with his head secured.  His neck was then prepped and draped in the usual sterile fashion.  A timeout was performed.  Standard midline incision was performed with the 10 blade followed by subperiosteal Bovie dissection to the lateral mass of the cervical spine.  A marking x-ray was taken for level determination.  Lateral mass screws were placed at C4, C5, and C6.  EMG monitoring revealed acceptable thresholds.  Pedicle screws were placed using fluoroscopy at T1 and T2.  Next I performed laminectomy and left-sided foraminotomies including partial facetectomy for the left C5, C6, C7, C8, and T1 nerve roots.  Rods were placed bilaterally with top loading set screws and final tightening.  Final x-rays of the instrumentation were obtained.  The wound was irrigated throughout the case with copious amounts of normal saline.  The bone was decorticated with a high-speed drill.  Exposed nerve roots were protected with Gelfoam.  Infuse sponges were placed over the decorticated bone followed by autograft bone which was harvested during the surgery followed by cancellus allograft bone chips.  Vancomycin powder was sprinkled over the wound.  A deep drain was placed followed by layer closure of the neck.  Sterile dressings were applied.  Patient was awakened from general anesthesia the Mayers was removed and he was transferred to the hospital bed and taken to recovery in stable condition..    Disposition: The patient will be admitted for overnight stay.   The patient will be given pain medicine, antibiotic prophylaxis, DVT prophylaxis.  I anticipate a 2-3 night hospital stay followed by discharge to home.

## 2019-06-15 NOTE — THERAPY EVALUATION
Acute Care - Physical Therapy Initial Evaluation  [facility]     Patient Name: Chester Gama  : 1940  MRN: [id number]  Today's Date: 6/15/2019      Date of Referral to PT: 19         Admit Date: 2019    Visit Dx: No diagnosis found.  Patient Active Problem List   Diagnosis   • Cervical spinal stenosis   • Hypertension   • Sleep apnea   • Diabetes mellitus (CMS/HCC)   • Coronary artery disease   • RLS (restless legs syndrome)     Past Medical History:   Diagnosis Date   • Arthritis     HANDS KNEES   • Cervical pain (neck)     LEFT ARM PARESTHESIA   • Coronary artery disease    • Depression    • Diabetes mellitus (CMS/HCC)     TYPE 2   • Diverticular disease     HX, S/P RESECTION   • Hx of heart artery stent    • Hyperlipemia    • Hypertension    • PTSD (post-traumatic stress disorder)    • RLS (restless legs syndrome)    • Sleep apnea     DIDNT TOLERATE MASK, CLAUSTROPHOBIC   • Structural abnormality of kidney     SAW NEPHROLOGIST FOR SMALLER KIDNEY - 3 YEARS AGO.   • Thyroid cyst     PER WIFE     Past Surgical History:   Procedure Laterality Date   • ANTERIOR CERVICAL DISCECTOMY W/ FUSION N/A 1/3/2018    Procedure: ANTERIOR CERVICAL DECOMPRESSION & FUSION C4 6;  Surgeon: Vincenzo Soliman DO;  [facility];  Service:    • APPENDECTOMY     • CATARACT EXTRACTION W/ INTRAOCULAR LENS  IMPLANT, BILATERAL     • COLON RESECTION      COLOSTOMY X 6 MOS THEN REVERSED.   • CORONARY ANGIOPLASTY WITH STENT PLACEMENT  02/10/2010    [email or web address]        PT ASSESSMENT (last 12 hours)      Physical Therapy Evaluation     Row Name 06/15/19 1100          PT Evaluation Time/Intention    Subjective Information  complains of;pain  -RA     Document Type  evaluation  -RA     Mode of Treatment  individual therapy;physical therapy  -RA     Patient Effort  good  -RA     Symptoms Noted During/After Treatment  increased pain  -RA     Row Name 06/15/19 1100          General Information    Patient  Profile Reviewed?  yes  -RA     Patient Observations  agree to therapy  -RA     Patient/Family Observations  spouse at bedside  -RA     General Observations of Patient  supine in bed, IV, FC, O2  -RA     Prior Level of Function  independent:  -RA     Equipment Currently Used at Home  cane, straight;wheelchair, motorized;walker, rolling;shower chair has but states no AD with community mobility  -RA     Pertinent History of Current Functional Problem  s/p C4 to T2 fusion  -RA     Row Name 06/15/19 1100          Relationship/Environment    Primary Source of Support/Comfort  spouse  -RA     Lives With  spouse  -RA     Family Caregiver if Needed  child(maryjane), adult;spouse  -RA     Primary Roles/Responsibilities  retired  -RA     Row Name 06/15/19 1100          Resource/Environmental Concerns    Current Living Arrangements  home/apartment/condo  -RA     Row Name 06/15/19 1100          Cognitive Assessment/Intervention- PT/OT    Orientation Status (Cognition)  oriented x 3  -RA     Follows Commands (Cognition)  WFL  -RA     Row Name 06/15/19 1100          Safety Issues, Functional Mobility    Impairments Affecting Function (Mobility)  pain  -RA     Row Name 06/15/19 1100          Bed Mobility Assessment/Treatment    Bed Mobility Assessment/Treatment  supine-sit-supine  -RA     Supine-Sit-Supine [place] (Bed Mobility)  moderate assist (50% patient effort);2 person assist  -RA     Bed Mobility, Safety Issues  decreased use of arms for pushing/pulling  -RA     Assistive Device (Bed Mobility)  bed rails  -RA     Row Name 06/15/19 1100          Transfer Assessment/Treatment    Transfer Assessment/Treatment  sit-stand transfer;stand-sit transfer  -RA     Sit-Stand [place] (Transfers)  minimum assist (75% patient effort);2 person assist elevated surface  -RA     Stand-Sit [place] (Transfers)  minimum assist (75% patient effort);2 person assist elevated surface  -RA     Row Name 06/15/19 1100          Sit-Stand  Transfer    Assistive Device (Sit-Stand Transfers)  -- HHA  -RA     Row Name 06/15/19 1100          Stand-Sit Transfer    Assistive Device (Stand-Sit Transfers)  -- A  -RA     Row Name 06/15/19 1100          Gait/Stairs Assessment/Training    [place] Level (Gait)  minimum assist (75% patient effort);contact guard;2 person assist  -RA     Assistive Device (Gait)  -- HHA x 2  -RA     Distance in Feet (Gait)  18-20' (in room 3-4' F/B x 5 ea).    -RA     Deviations/Abnormal Patterns (Gait)  gait speed decreased;zach decreased  -RA     Bilateral Gait Deviations  heel strike decreased  -RA     Row Name 06/15/19 1100          General ROM    GENERAL ROM COMMENTS  B UE/LE grossly WFL for age  -RA     Row Name 06/15/19 1100          MMT (Manual Muscle Testing)    General MMT Comments  B UE/LE functional strength grossly 4/5  -RA     Row Name 06/15/19 1100          Pain Assessment    Additional Documentation  Pain Scale: Numbers Pre/Post-Treatment (Group)  -RA     Row Name 06/15/19 1100          Pain Scale: Numbers Pre/Post-Treatment    Pain Scale: Numbers, Pretreatment  8/10  -RA     Pain Scale: Numbers, Post-Treatment  8/10  -RA     Pain Location - Orientation  posterior  -RA     Pain Location  neck  -RA     Pain Intervention(s)  Repositioned  -RA     Row Name             Wound 06/14/19 0830 midline;posterior neck surgical;incision    Wound - Properties Group Date first assessed: 06/14/19  -EP Time first assessed: 0830  -EP Orientation: midline;posterior  -EP Location: neck  -EP Type: surgical;incision  -EP    Row Name 06/15/19 1100          Physical Therapy Clinical Impression    Date of Referral to PT  06/14/19  -RA     PT Diagnosis (PT Clinical Impression)  impaired mobility/gait  -RA     Criteria for Skilled Interventions Met (PT Clinical Impression)  yes;treatment indicated  -RA     Pathology/Pathophysiology Noted (Describe Specifically for Each System)  musculoskeletal  -RA     Impairments Found (describe  specific impairments)  posture;ROM;gait, locomotion, and balance;ergonomics and body mechanics  -RA     Rehab Potential (PT Clinical Summary)  good, to achieve stated therapy goals  -RA     Row Name 06/15/19 1100          Physical Therapy Goals    Bed Mobility Goal Selection (PT)  bed mobility, PT goal 1  -RA     Transfer Goal Selection (PT)  transfer, PT goal 1  -RA     Gait Training Goal Selection (PT)  gait training, PT goal 1  -RA     Row Name 06/15/19 1100          Bed Mobility Goal 1 (PT)    Activity/Assistive Device (Bed Mobility Goal 1, PT)  sit to supine/supine to sit  -RA     [place] Level/Cues Needed (Bed Mobility Goal 1, PT)  contact guard assist;supervision required  -RA     Time Frame (Bed Mobility Goal 1, PT)  3 days  -RA     Row Name 06/15/19 1100          Transfer Goal 1 (PT)    Activity/Assistive Device (Transfer Goal 1, PT)  sit-to-stand/stand-to-sit;bed-to-chair/chair-to-bed  -RA     [place] Level/Cues Needed (Transfer Goal 1, PT)  contact guard assist;supervision required  -RA     Time Frame (Transfer Goal 1, PT)  3 days  -RA     Row Name 06/15/19 1100          Gait Training Goal 1 (PT)    Activity/Assistive Device (Gait Training Goal 1, PT)  gait (walking locomotion);increase endurance/gait distance  -RA     [place] Level (Gait Training Goal 1, PT)  standby assist;supervision required  -RA     Distance (Gait Goal 1, PT)  200'  -RA     Time Frame (Gait Training Goal 1, PT)  3 days  -RA     Row Name 06/15/19 1100          Patient Education Goal (PT)    Activity (Patient Education Goal, PT)  verbalize/demonstrate knowledge of pot op precautions/restrictions  -RA     [place]/Cues/Accuracy (Memory Goal 2, PT)  demonstrates adequately;independent;verbalizes understanding  -RA     Time Frame (Patient Education Goal, PT)  3 days  -RA     Row Name 06/15/19 1100          Positioning and Restraints    Pre-Treatment Position  in bed  -RA     Post Treatment Position  bed  -RA     In Bed   supine;call light within reach;encouraged to call for assist;with family/caregiver;SCD pump applied  -RA     Row Name 06/15/19 1100          Living Environment    Home Accessibility  stairs to enter home reports single step to enter  -RA       User Key  (r) = Recorded By, (t) = Taken By, (c) = Cosigned By    Initials Name Provider Type    Yessy Caruso, PT Physical Therapist    Constance Thapa RN Registered Nurse        Physical Therapy Education     Title: PT OT SLP Therapies (In Progress)     Topic: Physical Therapy (In Progress)     Point: Mobility training (Done)     Learning Progress Summary           Patient Acceptance, E,TB,D, VU by RA at 6/15/2019 11:15 AM   Significant Other Acceptance, E,TB,D, VU by RA at 6/15/2019 11:15 AM                   Point: Body mechanics (Done)     Learning Progress Summary           Patient Acceptance, E,TB,D, VU by RA at 6/15/2019 11:15 AM   Significant Other Acceptance, E,TB,D, VU by RA at 6/15/2019 11:15 AM                   Point: Precautions (Done)     Learning Progress Summary           Patient Acceptance, E,TB,D, VU by RA at 6/15/2019 11:15 AM   Significant Other Acceptance, E,TB,D, VU by RA at 6/15/2019 11:15 AM                               User Key     Initials Effective Dates Name Provider Type Discipline     04/06/17 -  Yessy Escalona, PT Physical Therapist PT              PT Recommendation and Plan  Anticipated Discharge Disposition (PT): home with assist  Therapy Frequency (PT Clinical Impression): daily  Outcome Summary/Treatment Plan (PT)  Anticipated Discharge Disposition (PT): home with assist  Outcome Summary: Patient s/p fusion C4 to T2 and presents to PT with decreased functional mobility.  He requires min/mod A x 2 for supine<->sit, min/CGA STS, and ambulated a total of ~18-20' in room min/CGA with B HHA.  He will benefit from skilled PT in acute setting to improve functional mobility/gait prior to D/C from facility.    Outcome Measures     Row  Name 06/15/19 1100             How much help from another person do you currently need...    Turning from your back to your side while in flat bed without using bedrails?  2  -RA      Moving from lying on back to sitting on the side of a flat bed without bedrails?  2  -RA      Moving to and from a bed to a chair (including a wheelchair)?  3  -RA      Standing up from a chair using your arms (e.g., wheelchair, bedside chair)?  3  -RA      Climbing 3-5 steps with a railing?  3  -RA      To walk in hospital room?  3  -RA      AM-PAC 6 Clicks Score  16  -RA         Functional Assessment    Outcome Measure Options  AM-PAC 6 Clicks Basic Mobility (PT)  -RA        User Key  (r) = Recorded By, (t) = Taken By, (c) = Cosigned By    Initials Name Provider Type    Yessy Caruso, PT Physical Therapist         Time Calculation:   PT Charges     Row Name 06/15/19 1113             Time Calculation    Start Time  1043  -RA      Stop Time  1107  -RA      Time Calculation (min)  24 min  -RA      PT Received On  06/15/19  -RA      PT - Next Appointment  06/16/19  -RA      PT Goal Re-Cert Due Date  06/30/19  -         Time Calculation- PT    Total Timed Code Minutes- PT  14 minute(s)  -RA        User Key  (r) = Recorded By, (t) = Taken By, (c) = Cosigned By    Initials Name Provider Type    Yessy Caruso PT Physical Therapist        Therapy Charges for Today     Code Description Service Date Service Provider Modifiers Qty    30559352932 HC PT EVAL MOD COMPLEXITY 1 6/15/2019 Yessy Escalona, PT GP 1    06975047506 HC PT THER PROC EA 15 MIN 6/15/2019 Yessy Escalona, PT GP 1    98537068597 HC PT THER SUPP EA 15 MIN 6/15/2019 Yessy Escalona, PT GP 1          PT G-Codes  Outcome Measure Options: AM-PAC 6 Clicks Basic Mobility (PT)  AM-PAC 6 Clicks Score: 16      Yessy Escalona PT  6/15/2019

## 2019-06-15 NOTE — PLAN OF CARE
Problem: Patient Care Overview  Goal: Plan of Care Review  Outcome: Ongoing (interventions implemented as appropriate)   06/15/19 4630   Coping/Psychosocial   Plan of Care Reviewed With patient   Plan of Care Review   Progress improving   OTHER   Outcome Summary VSS. A&O x4. CN II-XII grossly intact. Dressing with small amount strikethrough. Unable to fit patiet to soft collar. Patient maintained in bed until clarification obtained on need for collar. Denies NT. Pain managed with oral and IV analgesics. Wilder cath in place.

## 2019-06-16 LAB
D-LACTATE SERPL-SCNC: 2.8 MMOL/L (ref 0.5–2)
DEPRECATED RDW RBC AUTO: 46.5 FL (ref 37–54)
ERYTHROCYTE [DISTWIDTH] IN BLOOD BY AUTOMATED COUNT: 13.7 % (ref 12.3–15.4)
GLUCOSE BLDC GLUCOMTR-MCNC: 182 MG/DL (ref 70–130)
GLUCOSE BLDC GLUCOMTR-MCNC: 188 MG/DL (ref 70–130)
GLUCOSE BLDC GLUCOMTR-MCNC: 199 MG/DL (ref 70–130)
GLUCOSE BLDC GLUCOMTR-MCNC: 218 MG/DL (ref 70–130)
GLUCOSE BLDC GLUCOMTR-MCNC: 244 MG/DL (ref 70–130)
HCT VFR BLD AUTO: 38 % (ref 37.5–51)
HGB BLD-MCNC: 11.9 G/DL (ref 13–17.7)
MCH RBC QN AUTO: 29 PG (ref 26.6–33)
MCHC RBC AUTO-ENTMCNC: 31.3 G/DL (ref 31.5–35.7)
MCV RBC AUTO: 92.7 FL (ref 79–97)
PLATELET # BLD AUTO: 159 10*3/MM3 (ref 140–450)
PMV BLD AUTO: 9.9 FL (ref 6–12)
RBC # BLD AUTO: 4.1 10*6/MM3 (ref 4.14–5.8)
WBC NRBC COR # BLD: 12.1 10*3/MM3 (ref 3.4–10.8)

## 2019-06-16 PROCEDURE — 85027 COMPLETE CBC AUTOMATED: CPT | Performed by: ORTHOPAEDIC SURGERY

## 2019-06-16 PROCEDURE — 63710000001 INSULIN GLARGINE PER 5 UNITS: Performed by: HOSPITALIST

## 2019-06-16 PROCEDURE — 97110 THERAPEUTIC EXERCISES: CPT

## 2019-06-16 PROCEDURE — 63710000001 INSULIN LISPRO (HUMAN) PER 5 UNITS: Performed by: INTERNAL MEDICINE

## 2019-06-16 PROCEDURE — 82962 GLUCOSE BLOOD TEST: CPT

## 2019-06-16 PROCEDURE — 83605 ASSAY OF LACTIC ACID: CPT | Performed by: ORTHOPAEDIC SURGERY

## 2019-06-16 PROCEDURE — 25010000003 CEFAZOLIN IN DEXTROSE 2-4 GM/100ML-% SOLUTION: Performed by: PHYSICIAN ASSISTANT

## 2019-06-16 PROCEDURE — 25010000003 CEFAZOLIN IN DEXTROSE 2-4 GM/100ML-% SOLUTION: Performed by: ORTHOPAEDIC SURGERY

## 2019-06-16 PROCEDURE — 25010000002 LORAZEPAM PER 2 MG: Performed by: INTERNAL MEDICINE

## 2019-06-16 RX ORDER — LORAZEPAM 2 MG/ML
1 INJECTION INTRAMUSCULAR
Status: DISCONTINUED | OUTPATIENT
Start: 2019-06-16 | End: 2019-06-18 | Stop reason: HOSPADM

## 2019-06-16 RX ORDER — CEFAZOLIN SODIUM 2 G/100ML
2 INJECTION, SOLUTION INTRAVENOUS EVERY 8 HOURS
Status: COMPLETED | OUTPATIENT
Start: 2019-06-16 | End: 2019-06-17

## 2019-06-16 RX ORDER — INSULIN GLARGINE 100 [IU]/ML
32 INJECTION, SOLUTION SUBCUTANEOUS EVERY MORNING
Status: DISCONTINUED | OUTPATIENT
Start: 2019-06-17 | End: 2019-06-18 | Stop reason: HOSPADM

## 2019-06-16 RX ORDER — INSULIN GLARGINE 100 [IU]/ML
30 INJECTION, SOLUTION SUBCUTANEOUS NIGHTLY
Status: DISCONTINUED | OUTPATIENT
Start: 2019-06-16 | End: 2019-06-18 | Stop reason: HOSPADM

## 2019-06-16 RX ADMIN — CEFAZOLIN SODIUM 2 G: 2 INJECTION, SOLUTION INTRAVENOUS at 18:20

## 2019-06-16 RX ADMIN — INSULIN LISPRO 2 UNITS: 100 INJECTION, SOLUTION INTRAVENOUS; SUBCUTANEOUS at 22:06

## 2019-06-16 RX ADMIN — INSULIN LISPRO 4 UNITS: 100 INJECTION, SOLUTION INTRAVENOUS; SUBCUTANEOUS at 11:30

## 2019-06-16 RX ADMIN — METFORMIN HYDROCHLORIDE 500 MG: 500 TABLET ORAL at 08:42

## 2019-06-16 RX ADMIN — INSULIN GLARGINE 30 UNITS: 100 INJECTION, SOLUTION SUBCUTANEOUS at 08:43

## 2019-06-16 RX ADMIN — CEFAZOLIN SODIUM 2 G: 2 INJECTION, SOLUTION INTRAVENOUS at 11:30

## 2019-06-16 RX ADMIN — METFORMIN HYDROCHLORIDE 500 MG: 500 TABLET ORAL at 18:20

## 2019-06-16 RX ADMIN — ATORVASTATIN CALCIUM 80 MG: 80 TABLET, FILM COATED ORAL at 08:42

## 2019-06-16 RX ADMIN — SODIUM CHLORIDE, PRESERVATIVE FREE 3 ML: 5 INJECTION INTRAVENOUS at 08:57

## 2019-06-16 RX ADMIN — GABAPENTIN 600 MG: 300 CAPSULE ORAL at 08:40

## 2019-06-16 RX ADMIN — HYDROCODONE BITARTRATE AND ACETAMINOPHEN 2 TABLET: 5; 325 TABLET ORAL at 19:57

## 2019-06-16 RX ADMIN — CYCLOBENZAPRINE 10 MG: 10 TABLET, FILM COATED ORAL at 22:06

## 2019-06-16 RX ADMIN — METOPROLOL TARTRATE 25 MG: 25 TABLET ORAL at 08:41

## 2019-06-16 RX ADMIN — HYDROCODONE BITARTRATE AND ACETAMINOPHEN 2 TABLET: 5; 325 TABLET ORAL at 14:00

## 2019-06-16 RX ADMIN — ROPINIROLE 4 MG: 2 TABLET, FILM COATED ORAL at 20:52

## 2019-06-16 RX ADMIN — GABAPENTIN 600 MG: 300 CAPSULE ORAL at 16:28

## 2019-06-16 RX ADMIN — CEFAZOLIN SODIUM 2 G: 2 INJECTION, SOLUTION INTRAVENOUS at 08:40

## 2019-06-16 RX ADMIN — SODIUM CHLORIDE 100 ML/HR: 4.5 INJECTION, SOLUTION INTRAVENOUS at 20:52

## 2019-06-16 RX ADMIN — LISINOPRIL 5 MG: 5 TABLET ORAL at 08:41

## 2019-06-16 RX ADMIN — INSULIN GLARGINE 30 UNITS: 100 INJECTION, SOLUTION SUBCUTANEOUS at 22:07

## 2019-06-16 RX ADMIN — LORAZEPAM 1 MG: 2 INJECTION INTRAMUSCULAR; INTRAVENOUS at 19:57

## 2019-06-16 RX ADMIN — Medication 100 MCG: at 08:41

## 2019-06-16 RX ADMIN — FUROSEMIDE 20 MG: 20 TABLET ORAL at 08:41

## 2019-06-16 RX ADMIN — INSULIN LISPRO 2 UNITS: 100 INJECTION, SOLUTION INTRAVENOUS; SUBCUTANEOUS at 18:19

## 2019-06-16 RX ADMIN — SODIUM CHLORIDE 500 ML: 9 INJECTION, SOLUTION INTRAVENOUS at 21:15

## 2019-06-16 RX ADMIN — FUROSEMIDE 20 MG: 20 TABLET ORAL at 18:20

## 2019-06-16 RX ADMIN — METOPROLOL TARTRATE 25 MG: 25 TABLET ORAL at 20:52

## 2019-06-16 RX ADMIN — SODIUM CHLORIDE 100 ML/HR: 4.5 INJECTION, SOLUTION INTRAVENOUS at 00:48

## 2019-06-16 RX ADMIN — CEFAZOLIN SODIUM 2 G: 2 INJECTION, SOLUTION INTRAVENOUS at 00:44

## 2019-06-16 RX ADMIN — INSULIN LISPRO 2 UNITS: 100 INJECTION, SOLUTION INTRAVENOUS; SUBCUTANEOUS at 08:40

## 2019-06-16 RX ADMIN — GABAPENTIN 600 MG: 300 CAPSULE ORAL at 20:52

## 2019-06-16 NOTE — PLAN OF CARE
Problem: Patient Care Overview  Goal: Plan of Care Review  Outcome: Ongoing (interventions implemented as appropriate)   06/16/19 4860   Coping/Psychosocial   Plan of Care Reviewed With patient   Plan of Care Review   Progress improving   OTHER   Outcome Summary Pt tolerated treatment well this date. Increased gait distance to 100ft w/ Rw and CGA, extra person for safety and to manage equipment. Pt slightly unsteady initially, though improved. PT will continue to address functional mobility deficits as tolerated.

## 2019-06-16 NOTE — PROGRESS NOTES
Orthopedic Progress Note      Patient: Chester Gama    Date of Admission: 6/14/2019  5:19 AM    YOB: 1940    Medical Record Number: [id number]    Attending Physician: Vincenzo Soliman DO    Post Operative Day Number: 2    Status Post: C4-T2 POSTERIOR DECOMPRESSION AND FUSION BONE MORPHOGENIC PROTEIN  Subjective: Pain level adequately controlled but still having pain in the right scapular area.  He did sleep a little better last night      Current Medications:  Scheduled Meds:  atorvastatin 80 mg Oral Daily   ceFAZolin 2 g Intravenous Q8H   furosemide 20 mg Oral BID   gabapentin 600 mg Oral TID   insulin glargine 28 Units Subcutaneous Nightly   insulin glargine 30 Units Subcutaneous QAM   insulin lispro 0-9 Units Subcutaneous 4x Daily With Meals & Nightly   lisinopril 5 mg Oral Daily   metFORMIN 500 mg Oral BID With Meals   metoprolol tartrate 25 mg Oral Q12H   rOPINIRole 4 mg Oral Nightly   sodium chloride 3 mL Intravenous Q12H   vitamin B-12 100 mcg Oral Daily     Continuous Infusions:  sodium chloride 100 mL/hr Last Rate: 100 mL/hr (06/16/19 0048)     PRN Meds:.bisacodyl  •  bisacodyl  •  cyclobenzaprine  •  dextrose  •  dextrose  •  dextrose  •  dextrose  •  diphenhydrAMINE  •  docusate sodium  •  glucagon (human recombinant)  •  glucagon (human recombinant)  •  HYDROcodone-acetaminophen  •  magnesium hydroxide  •  naloxone  •  ondansetron **OR** ondansetron  •  polyethylene glycol  •  promethazine **OR** promethazine **OR** promethazine  •  sennosides-docusate sodium  •  sodium chloride      Physical Exam: 79 y.o. male  General Appearance:    Alert, cooperative, in no acute distress                 Vitals:    06/15/19 1716 06/15/19 2100 06/15/19 2308 06/16/19 0410   BP: 140/67 149/70 130/62 134/66   BP Location: Right arm Left arm  Right arm   Patient Position: Lying Lying  Lying   Pulse: 74 83 90 75   Resp: 18 17  18   Temp: 98.3 °F (36.8 °C) 98.3 °F (36.8 °C)     TempSrc: Oral Oral     SpO2:  98% 96%  96%   Weight:       Height:            Extremities:   Dressing Dry and Intact    Incision intact without signs or symptoms of infections   Pulses:   Pulses palpable and equal bilaterally   Skin:   No bleeding, bruising or rash       Diagnostic Tests:    Admission on 06/14/2019   Component Date Value Ref Range Status   • Glucose 06/14/2019 125  70 - 130 mg/dL Final   • Glucose 06/14/2019 203* 70 - 130 mg/dL Final   • Glucose 06/14/2019 185* 70 - 130 mg/dL Final   • Glucose 06/14/2019 238* 70 - 130 mg/dL Final   • Hemoglobin 06/15/2019 11.8* 13.0 - 17.7 g/dL Final   • Hematocrit 06/15/2019 37.2* 37.5 - 51.0 % Final   • Glucose 06/15/2019 195* 65 - 99 mg/dL Final   • BUN 06/15/2019 19  8 - 23 mg/dL Final   • Creatinine 06/15/2019 1.29* 0.76 - 1.27 mg/dL Final   • Sodium 06/15/2019 140  136 - 145 mmol/L Final   • Potassium 06/15/2019 4.1  3.5 - 5.2 mmol/L Final   • Chloride 06/15/2019 105  98 - 107 mmol/L Final   • CO2 06/15/2019 26.3  22.0 - 29.0 mmol/L Final   • Calcium 06/15/2019 8.0* 8.6 - 10.5 mg/dL Final   • eGFR Non African Amer 06/15/2019 54* >60 mL/min/1.73 Final   • BUN/Creatinine Ratio 06/15/2019 14.7  7.0 - 25.0 Final   • Anion Gap 06/15/2019 8.7  mmol/L Final   • Glucose 06/15/2019 163* 70 - 130 mg/dL Final   • Glucose 06/15/2019 234* 70 - 130 mg/dL Final   • Glucose 06/15/2019 199* 70 - 130 mg/dL Final   • Glucose 06/15/2019 160* 70 - 130 mg/dL Final   • Glucose 06/16/2019 182* 70 - 130 mg/dL Final       No results found.    Assessment:  Patient Active Problem List   Diagnosis   • Cervical spinal stenosis   • Hypertension   • Sleep apnea   • Diabetes mellitus (CMS/HCC)   • Coronary artery disease   • RLS (restless legs syndrome)           Plan:    Continue Pain management efforts  Continue mobilization efforts  Continue incisional care      Discharge Plan: Will follow patient for progress  For discharge home  Date: 6/16/2019    Romero Green MD

## 2019-06-16 NOTE — CODE DOCUMENTATION
RR called d/t pt having involuntary shaking of his whole body. Pt is alert and oriented. Vitals stable. CBC and lactic drawn. Will administer a dose of Ativan and pain medication. Pt is c/o neck pain, rating it a 8-9.

## 2019-06-16 NOTE — PLAN OF CARE
Problem: Patient Care Overview  Goal: Plan of Care Review  Outcome: Ongoing (interventions implemented as appropriate)   06/16/19 9958   Coping/Psychosocial   Plan of Care Reviewed With patient   Plan of Care Review   Progress improving   OTHER   Outcome Summary VSS. Neuros intact and stable. Dressing with small amount of stable strikethrough. PO medication managing pain. Ice pack effective for breakthrough pain.,

## 2019-06-16 NOTE — THERAPY TREATMENT NOTE
Acute Care - Physical Therapy Treatment Note  [facility]     Patient Name: Chester Gama  : 1940  MRN: [id number]  Today's Date: 2019     Date of Referral to PT: 19       Admit Date: 2019    Visit Dx:  No diagnosis found.  Patient Active Problem List   Diagnosis   • Cervical spinal stenosis   • Hypertension   • Sleep apnea   • Diabetes mellitus (CMS/HCC)   • Coronary artery disease   • RLS (restless legs syndrome)       Therapy Treatment    Rehabilitation Treatment Summary     Row Name 19 1345             Treatment Time/Intention    Discipline  physical therapy assistant  -SM      Document Type  therapy note (daily note)  -SM      Subjective Information  complains of;pain  -SM      Mode of Treatment  physical therapy  -SM      Patient Effort  good  -SM      Existing Precautions/Restrictions  fall cervical spine, though no collar  -SM      Recorded by [] Lizeth Egan PTA 19 1407      Row Name 19 1342             Cognitive Assessment/Intervention    Additional Documentation  Cognitive Assessment/Intervention (Group)  -SM      Recorded by [] Lizeth Egan PTA 19 1407      Row Name 19 1340             Cognitive Assessment/Intervention- PT/OT    Orientation Status (Cognition)  oriented x 4  -SM      Follows Commands (Cognition)  WNL  -SM      Personal Safety Interventions  fall prevention program maintained;gait belt;nonskid shoes/slippers when out of bed  -SM      Recorded by [SM] Lizeth Egan PTA 19 1407      Row Name 19 1345             Bed Mobility Assessment/Treatment    Bed Mobility Assessment/Treatment  supine-sit  -SM      Supine-Sit [place] (Bed Mobility)  minimum assist (75% patient effort)  -SM      Assistive Device (Bed Mobility)  bed rails  -SM      Comment (Bed Mobility)  log roll  -SM      Recorded by [SM] Lizeth Egan PTA 19 140      Row Name 19 1349             Transfer  Assessment/Treatment    Transfer Assessment/Treatment  sit-stand transfer;stand-sit transfer  -SM      Recorded by [SM] Lizeth Egan PTA 06/16/19 1407      Row Name 06/16/19 1344             Sit-Stand Transfer    Sit-Stand [place] (Transfers)  minimum assist (75% patient effort)  -      Assistive Device (Sit-Stand Transfers)  walker, front-wheeled  -SM      Recorded by [SM] Lizeth Egan PTA 06/16/19 1407      Row Name 06/16/19 1342             Stand-Sit Transfer    Stand-Sit [place] (Transfers)  contact guard  -      Assistive Device (Stand-Sit Transfers)  walker, front-wheeled  -SM      Recorded by [SM] Lizeth Egan PTA 06/16/19 1407      Row Name 06/16/19 134             Gait/Stairs Assessment/Training    [place] Level (Gait)  contact guard;1 person to manage equipment  -      Assistive Device (Gait)  walker, front-wheeled  -      Distance in Feet (Gait)  100  -SM      Pattern (Gait)  step-through  -      Deviations/Abnormal Patterns (Gait)  zach decreased;stride length decreased  -SM      Comment (Gait/Stairs)  slightly unsteady initially, though improved  -SM      Recorded by [SM] Lizeth Egan PTA 06/16/19 1407      Row Name 06/16/19 1344             Positioning and Restraints    Pre-Treatment Position  in bed  -SM      Post Treatment Position  bed  -SM      In Bed  sitting EOB;call light within reach;encouraged to call for assist;with family/caregiver;notified nsg  -SM      Recorded by [SM] Lizeth Egan PTA 06/16/19 1407      Row Name 06/16/19 1343             Pain Scale: Numbers Pre/Post-Treatment    Pain Scale: Numbers, Pretreatment  6/10  -SM      Pain Scale: Numbers, Post-Treatment  6/10  -SM      Pain Location - Orientation  posterior  -SM      Pain Location  neck  -      Pain Intervention(s)  Repositioned;Ambulation/increased activity;Rest  -SM      Recorded by [SM] Lizeth Egan PTA 06/16/19 1407      Row Name                 Wound 06/14/19 0830 midline;posterior neck surgical;incision    Wound - Properties Group Date first assessed: 06/14/19 [EP] Time first assessed: 0830 [EP] Orientation: midline;posterior [EP] Location: neck [EP] Type: surgical;incision [EP] Recorded by:  [EP] Constance Mendez RN 06/14/19 1306      User Key  (r) = Recorded By, (t) = Taken By, (c) = Cosigned By    Initials Name Effective Dates Discipline    EP Constance Mendez RN 06/16/16 -  Nurse    Lizeth Garcia PTA 03/07/18 -  PT          Wound 06/14/19 0830 midline;posterior neck surgical;incision (Active)   Dressing Appearance dry;intact 6/16/2019  8:30 AM   Closure LACY 6/16/2019  8:30 AM   Base dressing in place, unable to visualize 6/15/2019  8:10 PM   Drainage Amount scant 6/15/2019  8:10 PM           Physical Therapy Education     Title: PT OT SLP Therapies (Done)     Topic: Physical Therapy (Done)     Point: Mobility training (Done)     Learning Progress Summary           Patient Acceptance, E,TB,D, VU by  at 6/16/2019  2:07 PM    Acceptance, E,TB,D, VU by BASILIA at 6/15/2019 11:15 AM   Significant Other Acceptance, E,TB,D, VU by BASILIA at 6/15/2019 11:15 AM                   Point: Home exercise program (Done)     Learning Progress Summary           Patient Acceptance, E,TB,D, VU by  at 6/16/2019  2:07 PM                   Point: Body mechanics (Done)     Learning Progress Summary           Patient Acceptance, E,TB,D, VU by BHAVANI at 6/16/2019  2:07 PM    Acceptance, E,TB,D, VU by BASILIA at 6/15/2019 11:15 AM   Significant Other Acceptance, E,TB,D, VU by BASILIA at 6/15/2019 11:15 AM                   Point: Precautions (Done)     Learning Progress Summary           Patient Acceptance, E,TB,D, VU by  at 6/16/2019  2:07 PM    Acceptance, E,TB,D, VU by BASILIA at 6/15/2019 11:15 AM   Significant Other Acceptance, E,TB,D, VU by BASILIA at 6/15/2019 11:15 AM                               User Key     Initials Effective Dates Name Provider Type Discipline    BASILIA 04/06/17 -   Yessy Escalona, PT Physical Therapist PT     03/07/18 -  Lizeth Egan PTA Physical Therapy Assistant PT                PT Recommendation and Plan  Anticipated Discharge Disposition (PT): home with assist  Outcome Summary/Treatment Plan (PT)  Anticipated Discharge Disposition (PT): home with assist  Plan of Care Reviewed With: patient  Progress: improving  Outcome Summary: Pt tolerated treatment well this date. Increased gait distance to 100ft w/ Rw and CGA, extra person for safety and to manage equipment. Pt slightly unsteady initially, though improved. PT will continue to address functional mobility deficits as tolerated.  Outcome Measures     Row Name 06/16/19 1400 06/15/19 1100          How much help from another person do you currently need...    Turning from your back to your side while in flat bed without using bedrails?  3  -SM  2  -RA     Moving from lying on back to sitting on the side of a flat bed without bedrails?  3  -SM  2  -RA     Moving to and from a bed to a chair (including a wheelchair)?  3  -SM  3  -RA     Standing up from a chair using your arms (e.g., wheelchair, bedside chair)?  3  -SM  3  -RA     Climbing 3-5 steps with a railing?  2  -SM  3  -RA     To walk in hospital room?  3  -SM  3  -RA     AM-PAC 6 Clicks Score  17  -  16  -RA        Functional Assessment    Outcome Measure Options  AM-PAC 6 Clicks Basic Mobility (PT)  -  AM-PAC 6 Clicks Basic Mobility (PT)  -       User Key  (r) = Recorded By, (t) = Taken By, (c) = Cosigned By    Initials Name Provider Type    RA Yessy Escalona, PT Physical Therapist     Lizeth Egan PTA Physical Therapy Assistant         Time Calculation:   PT Charges     Row Name 06/16/19 1409             Time Calculation    Start Time  1345  -      Stop Time  1402  -      Time Calculation (min)  17 min  -      PT Received On  06/16/19  -      PT - Next Appointment  06/17/19  -        User Key  (r) = Recorded By, (t) = Taken  By, (c) = Cosigned By    Initials Name Provider Type    Lizeth Garcia, CHAGO Physical Therapy Assistant        Therapy Charges for Today     Code Description Service Date Service Provider Modifiers Qty    99850620086 HC PT THER PROC EA 15 MIN 6/16/2019 Lizeth Egan, CHAGO GP 1    50173446908 HC PT THER SUPP EA 15 MIN 6/16/2019 Lizeth Egan PTA GP 1          PT G-Codes  Outcome Measure Options: AM-PAC 6 Clicks Basic Mobility (PT)  AM-PAC 6 Clicks Score: 17    Lizeth Egan PTA  6/16/2019

## 2019-06-16 NOTE — PLAN OF CARE
Problem: Patient Care Overview  Goal: Plan of Care Review   06/16/19 1810   Coping/Psychosocial   Plan of Care Reviewed With patient;family   Plan of Care Review   Progress improving   OTHER   Outcome Summary VSS. Norco given once for pain. Up with PT and around room. BRP. RA.  Home med [place] located in front refrigirator. IVPB ABX continued. Drain still present. Family present all day.        Problem: Fall Risk (Adult)  Goal: Absence of Fall   06/16/19 1810   Fall Risk (Adult)   Absence of Fall making progress toward outcome

## 2019-06-16 NOTE — PROGRESS NOTES
LOS: 2 days     Name: Chester Gama  Age/Sex: 79 y.o. male  :  1940        PCP: Demetrio Ko DO  No chief complaint on file.     Subjective   Feeling ok denies symptoms of low or high blood sugars    General: No Fever or Chills, Cardiac: No Chest Pain or Palpitations, Resp: No Cough or SOA, GI: No Nausea, Vomiting, or Diarrhea and Other: No bleeding      atorvastatin 80 mg Oral Daily   ceFAZolin 2 g Intravenous Q8H   furosemide 20 mg Oral BID   gabapentin 600 mg Oral TID   insulin glargine 30 Units Subcutaneous Nightly   [START ON 2019] insulin glargine 32 Units Subcutaneous QAM   insulin lispro 0-9 Units Subcutaneous 4x Daily With Meals & Nightly   lisinopril 5 mg Oral Daily   metFORMIN 500 mg Oral BID With Meals   metoprolol tartrate 25 mg Oral Q12H   rOPINIRole 4 mg Oral Nightly   sodium chloride 3 mL Intravenous Q12H   vitamin B-12 100 mcg Oral Daily       sodium chloride 100 mL/hr Last Rate: 100 mL/hr (19 0048)       Objective   Vital Signs  Temp:  [97.8 °F (36.6 °C)-98.3 °F (36.8 °C)] 97.8 °F (36.6 °C)  Heart Rate:  [73-90] 73  Resp:  [16-18] 18  BP: (130-149)/(62-70) 146/70  Body mass index is 31.22 kg/m².    Intake/Output Summary (Last 24 hours) at 2019 1710  Last data filed at 2019 0700  Gross per 24 hour   Intake --   Output 2660 ml   Net -2660 ml       Physical Exam   Constitutional: He is oriented to person, place, and time. He appears well-developed and well-nourished.   HENT:   Head: Normocephalic and atraumatic.   Cardiovascular: Normal rate, regular rhythm and normal heart sounds.   Pulmonary/Chest: Effort normal and breath sounds normal.   Abdominal: Soft. Bowel sounds are normal. He exhibits no distension.   Neurological: He is alert and oriented to person, place, and time.   Skin: Skin is warm and dry.   Psychiatric: He has a normal mood and affect. His behavior is normal.   Nursing note and vitals reviewed.        Results Review:       I reviewed the  patient's new clinical results.  Results from last 7 days   Lab Units 06/15/19  0452 06/10/19  1320   WBC 10*3/mm3  --  8.29   HEMOGLOBIN g/dL 11.8* 14.8   PLATELETS 10*3/mm3  --  199     Results from last 7 days   Lab Units 06/15/19  0452 06/10/19  1319   SODIUM mmol/L 140 141   POTASSIUM mmol/L 4.1 4.5   CHLORIDE mmol/L 105 102   CO2 mmol/L 26.3 26.4   BUN mg/dL 19 17   CREATININE mg/dL 1.29* 1.37*   CALCIUM mg/dL 8.0* 9.4   Estimated Creatinine Clearance: 65 mL/min (A) (by C-G formula based on SCr of 1.29 mg/dL (H)).  No results found for: HGBA1C  Glucose   Date/Time Value Ref Range Status   06/16/2019 1120 244 (H) 70 - 130 mg/dL Final   06/16/2019 0729 182 (H) 70 - 130 mg/dL Final   06/15/2019 1949 160 (H) 70 - 130 mg/dL Final   06/15/2019 1717 199 (H) 70 - 130 mg/dL Final   06/15/2019 1130 234 (H) 70 - 130 mg/dL Final   06/15/2019 0745 163 (H) 70 - 130 mg/dL Final   06/14/2019 2113 238 (H) 70 - 130 mg/dL Final   06/14/2019 1602 185 (H) 70 - 130 mg/dL Final         Assessment/Plan     Cervical spinal stenosis    Hypertension    Sleep apnea    Diabetes mellitus (CMS/HCC)    Coronary artery disease    RLS (restless legs syndrome)      PLAN  -Blood sugars running high increase basal insulin today (high risk medication)  -Defer postoperative care to the primary team he is postop day 2 from a decompression and fusion of the cervical and thoracic spine.  -Hemoglobin down around 3 g since admission to the hospital consistent with postoperative acute blood loss anemia expected following the procedure.    Disposition  Per primary      Hardy Sandy MD  [facility]  06/16/19  5:10 PM

## 2019-06-17 LAB
ALBUMIN SERPL-MCNC: 3.2 G/DL (ref 3.5–5.2)
ANION GAP SERPL CALCULATED.3IONS-SCNC: 8.8 MMOL/L
BUN BLD-MCNC: 16 MG/DL (ref 8–23)
BUN/CREAT SERPL: 14.8 (ref 7–25)
CALCIUM SPEC-SCNC: 7.9 MG/DL (ref 8.6–10.5)
CHLORIDE SERPL-SCNC: 102 MMOL/L (ref 98–107)
CO2 SERPL-SCNC: 26.2 MMOL/L (ref 22–29)
CREAT BLD-MCNC: 1.08 MG/DL (ref 0.76–1.27)
D-LACTATE SERPL-SCNC: 0.9 MMOL/L (ref 0.5–2)
DEPRECATED RDW RBC AUTO: 45.6 FL (ref 37–54)
ERYTHROCYTE [DISTWIDTH] IN BLOOD BY AUTOMATED COUNT: 13.5 % (ref 12.3–15.4)
GFR SERPL CREATININE-BSD FRML MDRD: 66 ML/MIN/1.73
GLUCOSE BLD-MCNC: 181 MG/DL (ref 65–99)
GLUCOSE BLDC GLUCOMTR-MCNC: 141 MG/DL (ref 70–130)
GLUCOSE BLDC GLUCOMTR-MCNC: 167 MG/DL (ref 70–130)
GLUCOSE BLDC GLUCOMTR-MCNC: 176 MG/DL (ref 70–130)
GLUCOSE BLDC GLUCOMTR-MCNC: 185 MG/DL (ref 70–130)
HCT VFR BLD AUTO: 35.2 % (ref 37.5–51)
HGB BLD-MCNC: 11.1 G/DL (ref 13–17.7)
HOLD SPECIMEN: NORMAL
MAGNESIUM SERPL-MCNC: 2.3 MG/DL (ref 1.6–2.4)
MCH RBC QN AUTO: 28.8 PG (ref 26.6–33)
MCHC RBC AUTO-ENTMCNC: 31.5 G/DL (ref 31.5–35.7)
MCV RBC AUTO: 91.4 FL (ref 79–97)
PHOSPHATE SERPL-MCNC: 2.2 MG/DL (ref 2.5–4.5)
PLATELET # BLD AUTO: 144 10*3/MM3 (ref 140–450)
PMV BLD AUTO: 9.7 FL (ref 6–12)
POTASSIUM BLD-SCNC: 4 MMOL/L (ref 3.5–5.2)
RBC # BLD AUTO: 3.85 10*6/MM3 (ref 4.14–5.8)
SODIUM BLD-SCNC: 137 MMOL/L (ref 136–145)
WBC NRBC COR # BLD: 10.5 10*3/MM3 (ref 3.4–10.8)

## 2019-06-17 PROCEDURE — 25010000003 CEFAZOLIN IN DEXTROSE 2-4 GM/100ML-% SOLUTION: Performed by: ORTHOPAEDIC SURGERY

## 2019-06-17 PROCEDURE — 63710000001 INSULIN LISPRO (HUMAN) PER 5 UNITS: Performed by: INTERNAL MEDICINE

## 2019-06-17 PROCEDURE — 83605 ASSAY OF LACTIC ACID: CPT | Performed by: ORTHOPAEDIC SURGERY

## 2019-06-17 PROCEDURE — 63710000001 INSULIN GLARGINE PER 5 UNITS: Performed by: HOSPITALIST

## 2019-06-17 PROCEDURE — 83735 ASSAY OF MAGNESIUM: CPT | Performed by: HOSPITALIST

## 2019-06-17 PROCEDURE — 80069 RENAL FUNCTION PANEL: CPT | Performed by: HOSPITALIST

## 2019-06-17 PROCEDURE — 85027 COMPLETE CBC AUTOMATED: CPT | Performed by: HOSPITALIST

## 2019-06-17 PROCEDURE — 82962 GLUCOSE BLOOD TEST: CPT

## 2019-06-17 PROCEDURE — 99222 1ST HOSP IP/OBS MODERATE 55: CPT | Performed by: PSYCHIATRY & NEUROLOGY

## 2019-06-17 PROCEDURE — 97110 THERAPEUTIC EXERCISES: CPT

## 2019-06-17 RX ADMIN — CEFAZOLIN SODIUM 2 G: 2 INJECTION, SOLUTION INTRAVENOUS at 02:18

## 2019-06-17 RX ADMIN — ROPINIROLE 4 MG: 2 TABLET, FILM COATED ORAL at 20:11

## 2019-06-17 RX ADMIN — FUROSEMIDE 20 MG: 20 TABLET ORAL at 17:39

## 2019-06-17 RX ADMIN — METOPROLOL TARTRATE 25 MG: 25 TABLET ORAL at 20:11

## 2019-06-17 RX ADMIN — INSULIN LISPRO 2 UNITS: 100 INJECTION, SOLUTION INTRAVENOUS; SUBCUTANEOUS at 17:39

## 2019-06-17 RX ADMIN — METFORMIN HYDROCHLORIDE 500 MG: 500 TABLET ORAL at 17:39

## 2019-06-17 RX ADMIN — ATORVASTATIN CALCIUM 80 MG: 80 TABLET, FILM COATED ORAL at 08:10

## 2019-06-17 RX ADMIN — HYDROCODONE BITARTRATE AND ACETAMINOPHEN 2 TABLET: 5; 325 TABLET ORAL at 20:11

## 2019-06-17 RX ADMIN — INSULIN LISPRO 2 UNITS: 100 INJECTION, SOLUTION INTRAVENOUS; SUBCUTANEOUS at 22:27

## 2019-06-17 RX ADMIN — SODIUM CHLORIDE, PRESERVATIVE FREE 3 ML: 5 INJECTION INTRAVENOUS at 20:11

## 2019-06-17 RX ADMIN — INSULIN GLARGINE 32 UNITS: 100 INJECTION, SOLUTION SUBCUTANEOUS at 08:11

## 2019-06-17 RX ADMIN — HYDROCODONE BITARTRATE AND ACETAMINOPHEN 2 TABLET: 5; 325 TABLET ORAL at 03:58

## 2019-06-17 RX ADMIN — INSULIN LISPRO 2 UNITS: 100 INJECTION, SOLUTION INTRAVENOUS; SUBCUTANEOUS at 12:04

## 2019-06-17 RX ADMIN — FUROSEMIDE 20 MG: 20 TABLET ORAL at 08:10

## 2019-06-17 RX ADMIN — GABAPENTIN 600 MG: 300 CAPSULE ORAL at 08:09

## 2019-06-17 RX ADMIN — Medication 100 MCG: at 08:10

## 2019-06-17 RX ADMIN — SODIUM CHLORIDE, PRESERVATIVE FREE 3 ML: 5 INJECTION INTRAVENOUS at 08:10

## 2019-06-17 RX ADMIN — GABAPENTIN 600 MG: 300 CAPSULE ORAL at 20:11

## 2019-06-17 RX ADMIN — INSULIN GLARGINE 30 UNITS: 100 INJECTION, SOLUTION SUBCUTANEOUS at 22:27

## 2019-06-17 RX ADMIN — HYDROCODONE BITARTRATE AND ACETAMINOPHEN 2 TABLET: 5; 325 TABLET ORAL at 08:09

## 2019-06-17 RX ADMIN — GABAPENTIN 600 MG: 300 CAPSULE ORAL at 15:42

## 2019-06-17 RX ADMIN — METOPROLOL TARTRATE 25 MG: 25 TABLET ORAL at 08:10

## 2019-06-17 RX ADMIN — HYDROCODONE BITARTRATE AND ACETAMINOPHEN 2 TABLET: 5; 325 TABLET ORAL at 15:42

## 2019-06-17 RX ADMIN — METFORMIN HYDROCHLORIDE 500 MG: 500 TABLET ORAL at 08:10

## 2019-06-17 RX ADMIN — LISINOPRIL 5 MG: 5 TABLET ORAL at 08:10

## 2019-06-17 NOTE — PROGRESS NOTES
Continued Stay Note  [facility]     Patient Name: Chester Gama  MRN: [id number]  Today's Date: 6/17/2019    Admit Date: 6/14/2019    Discharge Plan     Row Name 06/17/19 1123       Plan    Plan Comments  Called Brad with [facility] and obtained clinic information: [facility] ([phone]).  Clinic will need to arrange for any DME through this office.  CCP awaiting DME order.      Row Name 06/17/19 1102       Plan    Plan  Home with spouse- pt needs walker for home use/ need script.            Expected Discharge Date and Time     Expected Discharge Date Expected Discharge Time    Jun 18, 2019             Missy Peterson RN

## 2019-06-17 NOTE — NURSING NOTE
At 1934 the Pt's wife alerted the nurse that the Pt was shaking.  Upon entering the room the Pt was shaking his BUE. The first episode lasted 5-minutes.  A rapid response was called.  Pt continued to have 2 more similar episodes that lasted 3-4 minutes each.  Dr. Smiley was notified and orders for Ativan and a Neuro consult was given.  Vitals are stable, BG# 218, A&Ox3.  1mg Ativan given and Pt is now stable.  Wife is present in room.

## 2019-06-17 NOTE — NURSING NOTE
Critical Lactic acid called from Ayad in lab.  Lactic 2.8.  Primary RN notified, who then paged MD on call

## 2019-06-17 NOTE — PROGRESS NOTES
LOS: 3 days     Name: Chester Gama  Age/Sex: 79 y.o. male  :  1940        PCP: Demetrio Ko DO  No chief complaint on file.     Subjective   Feeling ok denies symptoms of low or high blood sugars    General: No Fever or Chills, Cardiac: No Chest Pain or Palpitations, Resp: No Cough or SOA, GI: No Nausea, Vomiting, or Diarrhea and Other: No bleeding      atorvastatin 80 mg Oral Daily   furosemide 20 mg Oral BID   gabapentin 600 mg Oral TID   insulin glargine 30 Units Subcutaneous Nightly   insulin glargine 32 Units Subcutaneous QAM   insulin lispro 0-9 Units Subcutaneous 4x Daily With Meals & Nightly   lisinopril 5 mg Oral Daily   metFORMIN 500 mg Oral BID With Meals   metoprolol tartrate 25 mg Oral Q12H   rOPINIRole 4 mg Oral Nightly   sodium chloride 3 mL Intravenous Q12H   vitamin B-12 100 mcg Oral Daily       sodium chloride 100 mL/hr Last Rate: 100 mL/hr (19)       Objective   Vital Signs  Temp:  [97.4 °F (36.3 °C)-99.4 °F (37.4 °C)] 97.7 °F (36.5 °C)  Heart Rate:  [70-89] 80  Resp:  [16-20] 18  BP: (129-163)/(65-80) 146/72  Body mass index is 31.22 kg/m².    Intake/Output Summary (Last 24 hours) at 2019 1536  Last data filed at 2019 0600  Gross per 24 hour   Intake --   Output 940 ml   Net -940 ml       Physical Exam   Constitutional: He is oriented to person, place, and time. He appears well-developed and well-nourished.   HENT:   Head: Normocephalic and atraumatic.   Cardiovascular: Normal rate, regular rhythm and normal heart sounds.   Pulmonary/Chest: Effort normal and breath sounds normal.   Abdominal: Soft. Bowel sounds are normal. He exhibits no distension.   Neurological: He is alert and oriented to person, place, and time.   Skin: Skin is warm and dry.   Psychiatric: He has a normal mood and affect. His behavior is normal.   Nursing note and vitals reviewed.        Results Review:       I reviewed the patient's new clinical results.  Results from last 7 days    Lab Units 06/17/19  0507 06/16/19  1956 06/15/19  0452   WBC 10*3/mm3 10.50 12.10*  --    HEMOGLOBIN g/dL 11.1* 11.9* 11.8*   PLATELETS 10*3/mm3 144 159  --      Results from last 7 days   Lab Units 06/17/19  0507 06/15/19  0452   SODIUM mmol/L 137 140   POTASSIUM mmol/L 4.0 4.1   CHLORIDE mmol/L 102 105   CO2 mmol/L 26.2 26.3   BUN mg/dL 16 19   CREATININE mg/dL 1.08 1.29*   CALCIUM mg/dL 7.9* 8.0*   MAGNESIUM mg/dL 2.3  --    PHOSPHORUS mg/dL 2.2*  --    Estimated Creatinine Clearance: 77.7 mL/min (by C-G formula based on SCr of 1.08 mg/dL).  No results found for: HGBA1C  Glucose   Date/Time Value Ref Range Status   06/17/2019 1117 167 (H) 70 - 130 mg/dL Final   06/17/2019 0732 141 (H) 70 - 130 mg/dL Final   06/16/2019 2138 188 (H) 70 - 130 mg/dL Final   06/16/2019 1935 218 (H) 70 - 130 mg/dL Final   06/16/2019 1716 199 (H) 70 - 130 mg/dL Final   06/16/2019 1120 244 (H) 70 - 130 mg/dL Final   06/16/2019 0729 182 (H) 70 - 130 mg/dL Final   06/15/2019 1949 160 (H) 70 - 130 mg/dL Final         Assessment/Plan     Cervical spinal stenosis    Hypertension    Sleep apnea    Diabetes mellitus (CMS/HCC)    Coronary artery disease    RLS (restless legs syndrome)      PLAN  -Blood sugars improved with adjustment to his basal insulin  -Episode overnight not apparently related to hypo-or hyperglycemia based on Accu-Cheks, question related to metabolic issues from anesthesia continue to monitor neurology's been consulted  -Defer postoperative care to the primary team he is postop day 2 from a decompression and fusion of the cervical and thoracic spine.  -Hemoglobin down around 3 g since admission to the hospital consistent with postoperative acute blood loss anemia expected following the procedure hemoglobin has stabilized.    Disposition  Per primary      Hardy Sandy MD  [facility]  06/17/19  3:36 PM

## 2019-06-17 NOTE — PLAN OF CARE
Problem: Patient Care Overview  Goal: Plan of Care Review  Outcome: Ongoing (interventions implemented as appropriate)   06/17/19 9162   Coping/Psychosocial   Plan of Care Reviewed With patient   Plan of Care Review   Progress improving   OTHER   Outcome Summary Pt tolerated treatment well this date. increased gait distance to 140ft w/ Rw and CGA. Extra person for safety and to manage equipment. Instructed pt on performing LE exercises while up in chair. PT will continue to address functional mobility deficits as tolerated.

## 2019-06-17 NOTE — PLAN OF CARE
Problem: Patient Care Overview  Goal: Plan of Care Review  Outcome: Outcome(s) achieved Date Met: 06/17/19 06/17/19 9773   Coping/Psychosocial   Plan of Care Reviewed With patient   Plan of Care Review   Progress improving   OTHER   Outcome Summary VSS; Pt is alert and oriented; at beginning of shift RRT was called due to pt shaking in the upper half of his body; pt answered all questions; Latic was elevated 2.8; MD called and orders received; pt received po pain med x2 and flexeril x1; wife is at beside; will continue to monitor       Problem: Fall Risk (Adult)  Goal: Absence of Fall  Outcome: Ongoing (interventions implemented as appropriate)      Problem: Pain, Chronic (Adult)  Goal: Acceptable Pain/Comfort Level and Functional Ability  Outcome: Ongoing (interventions implemented as appropriate)

## 2019-06-17 NOTE — PROGRESS NOTES
Continued Stay Note  [facility]     Patient Name: Chester Gama  MRN: [id number]  Today's Date: 6/17/2019    Admit Date: 6/14/2019    Discharge Plan     Row Name 06/17/19 1632       Plan    Plan Comments  Faxed facesheet, H&P and PT notes to [facility] to fax# [phone].  Will need to fax d/c summary upon d/c.      Row Name 06/17/19 161       Plan    Plan Comments  Placed call to [facility] @ [phone] to obtain fax# to send orders for DME; shawn.  Walker order was signed by Dr Soliman.                 Expected Discharge Date and Time     Expected Discharge Date Expected Discharge Time    Jun 18, 2019             Missy Peterson RN

## 2019-06-17 NOTE — PROGRESS NOTES
Discharge Planning Assessment  [facility]     Patient Name: Chester Gama  MRN: [id number]  Today's Date: 6/17/2019    Admit Date: 6/14/2019    Discharge Needs Assessment     Row Name 06/17/19 1101       Living Environment    Lives With  spouse    Name(s) of Who Lives With Patient  Sadie Gama    Current Living Arrangements  home/apartment/condo    Primary Care Provided by  self    Provides Primary Care For  no one    Family Caregiver if Needed  spouse;child(maryjane), adult    Quality of Family Relationships  supportive;involved;helpful    Able to Return to Prior Arrangements  no       Resource/Environmental Concerns    Resource/Environmental Concerns  none    Transportation Concerns  car, none       Transition Planning    Patient/Family Anticipates Transition to  home with family;home with help/services    Patient/Family Anticipated Services at Transition  none    Transportation Anticipated  family or friend will provide       Discharge Needs Assessment    Concerns to be Addressed  denies needs/concerns at this time;home safety    Equipment Currently Used at Home  glucometer;shower chair;cane, straight    Equipment Needed After Discharge  walker, rolling    Offered/Gave Vendor List  yes    Current Discharge Risk  physical impairment        Discharge Plan     Row Name 06/17/19 1102       Plan    Plan  Home with spouse- pt needs walker for home use/ need script.      Patient/Family in Agreement with Plan  yes    Plan Comments  Met in room with pt/ spouse.  Patient just returned to bed and was asleep.  CCP s/w spouse- Sadie.  Facesheet verified.  Plan is home with spouse- wants to get RWx through [facility] and will need a script to pick one up.          Destination      No service coordination in this encounter.      Durable Medical Equipment      No service coordination in this encounter.      Dialysis/Infusion      No service coordination in this encounter.      Home Medical Care      No service coordination in  this encounter.      Therapy      No service coordination in this encounter.      Community Resources      No service coordination in this encounter.        Expected Discharge Date and Time     Expected Discharge Date Expected Discharge Time    Jun 18, 2019         Demographic Summary     Row Name 06/17/19 1101       General Information    Admission Type  inpatient    Arrived From  home    Referral Source  hospital clinician/department    Reason for Consult  discharge planning;other (see comments) DME    Preferred Language  English     Used During This Interaction  no       Contact Information    Permission Granted to Share Info With  ;family/designee    Contact Information Obtained for          Functional Status     Row Name 06/17/19 1101       Functional Status    Usual Activity Tolerance  good    Current Activity Tolerance  moderate       Functional Status, IADL    Medications  independent    Meal Preparation  independent    Housekeeping  independent    Laundry  independent    Shopping  independent       Mental Status    General Appearance WDL  WDL       Mental Status Summary    Recent Changes in Mental Status/Cognitive Functioning  no changes;unable to assess       Employment/    Employment Status  retired                 Missy Peterson RN

## 2019-06-17 NOTE — PROGRESS NOTES
"Orthopedic Spine Progress Note    Subjective     Post-Operative Day: 3 post-spine procedure C4-T2 POSTERIOR DECOMPRESSION AND FUSION BONE MORPHOGENIC PROTEIN  Systemic or Specific Complaints: Pain Control .rapid response called last night for repeated episodic upper body \"shaking.\"   Objective     Vital signs in last 24 hours:  Temp:  [97.8 °F (36.6 °C)-99.4 °F (37.4 °C)] 98.2 °F (36.8 °C)  Heart Rate:  [70-89] 70  Resp:  [16-20] 20  BP: (135-163)/(68-80) 135/72    General: alert, appears stated age and cooperative   Neurovascular: Bilateral deltoid weakness   Wound: Wound clean and dry no evidence of infection.   Range of Motion: limited   DVT Exam: No evidence of DVT seen on physical exam.     Data Review  CBC:  Results from last 7 days   Lab Units 06/17/19  0507   WBC 10*3/mm3 10.50   RBC 10*6/mm3 3.85*   HEMOGLOBIN g/dL 11.1*   HEMATOCRIT % 35.2*   PLATELETS 10*3/mm3 144     Lab Results   Component Value Date    GLUCOSE 181 (H) 06/17/2019    BUN 16 06/17/2019    CREATININE 1.08 06/17/2019    EGFRIFNONA 66 06/17/2019    BCR 14.8 06/17/2019    K 4.0 06/17/2019    CO2 26.2 06/17/2019    CALCIUM 7.9 (L) 06/17/2019    ALBUMIN 3.20 (L) 06/17/2019    AST 24 06/10/2019    ALT 31 06/10/2019   lactate 2.8 overnight. Repeat draw 0.9    Drain output 50 cc overnight    Assessment/Plan     Status post-spine procedure:   Pain Relief: some relief  \"shaking\" resolved. Neurology consulted.  Continues current post-op course  Up with PT  D/c drain and antibiotics  Does not need cervical collar except for comfort  OK to d/c to home once medically stable    Activity: out of bed and ambulate    Weight Bearing: FWB     LOS: 3 days     Vincenzo Soliman DO    Date: 6/17/2019    "

## 2019-06-17 NOTE — CONSULTS
Patient Identification:  NAME:  Chester Gama  Age:  79 y.o.   Sex:  male   :  1940   MRN:  [id number]       Chief complaint: He does not have one/ reason for consult tremor in upper extremities    History of present illness: This patient is 79-year-old right-handed white male with history of hypertension hyperlipidemia lipidemia diabetes depression who comes to the hospital for a cervical spine operation he is done very well no complications earlier today he had some severe shaking in the upper extremity superimposed on all movements context patient is on multiple different medications at this time has gone through anesthesia as well and surgery without complications location was both upper extremities associated symptoms he was not unconscious he was able to talk during this quality as described duration is noted modifying factors it just went away his wife notes he is awake and alert today there was no loss of consciousness with any of this and he feels fine now      Past medical history:  Past Medical History:   Diagnosis Date   • Arthritis     HANDS KNEES   • Cervical pain (neck)     LEFT ARM PARESTHESIA   • Coronary artery disease    • Depression    • Diabetes mellitus (CMS/HCC)     TYPE 2   • Diverticular disease     HX, S/P RESECTION   • Hx of heart artery stent    • Hyperlipemia    • Hypertension    • PTSD (post-traumatic stress disorder)    • RLS (restless legs syndrome)    • Sleep apnea     DIDNT TOLERATE MASK, CLAUSTROPHOBIC   • Structural abnormality of kidney     SAW NEPHROLOGIST FOR SMALLER KIDNEY - 3 YEARS AGO.   • Thyroid cyst     PER WIFE       Past surgical history:  Past Surgical History:   Procedure Laterality Date   • ANTERIOR CERVICAL DISCECTOMY W/ FUSION N/A 1/3/2018    Procedure: ANTERIOR CERVICAL DECOMPRESSION & FUSION C4 6;  Surgeon: Vincenzo Soliman DO;  Location: [facility];  Service:    • APPENDECTOMY     • CATARACT EXTRACTION W/ INTRAOCULAR LENS  IMPLANT, BILATERAL     •  COLON RESECTION      COLOSTOMY X 6 MOS THEN REVERSED.   • CORONARY ANGIOPLASTY WITH STENT PLACEMENT  02/10/2010    [email or web address]       Allergies:  Contrast dye    Home medications:  Medications Prior to Admission   Medication Sig Dispense Refill Last Dose   • ARTIFICIAL TEAR OP Apply 1 drop to eye As Needed. Both eyes   Past Month at Unknown time   • atorvastatin (LIPITOR) 80 MG tablet Take 80 mg by mouth Daily.   6/13/2019 at 0800   • docusate sodium (COLACE) 250 MG capsule Take 250 mg by mouth 2 (Two) Times a Day As Needed for Constipation.   6/12/2019 at 0800   • furosemide (LASIX) 20 MG tablet Take 20 mg by mouth 2 (Two) Times a Day.   6/13/2019 at 2100   • gabapentin (NEURONTIN) 300 MG capsule Take 600 mg by mouth 3 (Three) Times a Day.   6/14/2019 at 0400   • insulin glargine (LANTUS) 100 UNIT/ML injection Inject 30 Units under the skin Every Morning.   6/13/2019 at [id number]   • insulin glargine (LANTUS) 100 UNIT/ML injection Inject 28 Units under the skin Every Night. (Patient taking differently: Inject 30 Units under the skin into the appropriate area as directed Every Night.) 1 Units 0 6/13/2019 at 2000   • lisinopril (PRINIVIL,ZESTRIL) 5 MG tablet Take 5 mg by mouth Daily. 1/2 TAB EACH DOSE   6/13/2019 at 0800   • metFORMIN (GLUCOPHAGE) 500 MG tablet Take 500 mg by mouth 2 (Two) Times a Day With Meals.   6/13/2019 at 1800   • metoprolol tartrate (LOPRESSOR) 25 MG tablet Take 25 mg by mouth Every 12 (Twelve) Hours. 1/2 TAB EACH DOSE   6/14/2019 at 0400   • rOPINIRole (REQUIP) 4 MG tablet Take 4 mg by mouth Every Night.   6/13/2019 at 2100   • vitamin B-12 (CYANOCOBALAMIN) 100 MCG tablet Take 100 mcg by mouth Daily.   6/13/2019 at 0800   • aspirin 81 MG EC tablet Take 81 mg by mouth Daily. STOPPED 6/7/19 6/7/2019   • diclofenac (VOLTAREN) 1 % gel gel Apply 4 g topically to the appropriate area as directed 4 (Four) Times a Day As Needed (ON HOLS FOR SX). STOPPED PREOP, LAST DOSE 10/2017     12/3/2017        Hospital medications:    atorvastatin 80 mg Oral Daily   furosemide 20 mg Oral BID   gabapentin 600 mg Oral TID   insulin glargine 30 Units Subcutaneous Nightly   insulin glargine 32 Units Subcutaneous QAM   insulin lispro 0-9 Units Subcutaneous 4x Daily With Meals & Nightly   lisinopril 5 mg Oral Daily   metFORMIN 500 mg Oral BID With Meals   metoprolol tartrate 25 mg Oral Q12H   rOPINIRole 4 mg Oral Nightly   sodium chloride 3 mL Intravenous Q12H   vitamin B-12 100 mcg Oral Daily       sodium chloride 100 mL/hr Last Rate: 100 mL/hr (19)     bisacodyl  •  bisacodyl  •  cyclobenzaprine  •  dextrose  •  dextrose  •  dextrose  •  dextrose  •  diphenhydrAMINE  •  docusate sodium  •  glucagon (human recombinant)  •  glucagon (human recombinant)  •  HYDROcodone-acetaminophen  •  LORazepam  •  magnesium hydroxide  •  naloxone  •  ondansetron **OR** ondansetron  •  polyethylene glycol  •  promethazine **OR** promethazine **OR** promethazine  •  sennosides-docusate sodium  •  sodium chloride    Family history:  Family History   Problem Relation Age of Onset   • Malig Hyperthermia Neg Hx        Social history:  Social History     Tobacco Use   • Smoking status: Former Smoker     Years: 5.00     Types: Cigars     Last attempt to quit: 6/10/1966     Years since quittin.0   • Smokeless tobacco: Never Used   Substance Use Topics   • Alcohol use: No   • Drug use: No       Review of systems:    He denies hair eyes ears nose throat skin bone joint  lymphatic hematological oncologic complaints no chest pain abdominal pain bowel bladder incontinence fever chills or rash she does have some neck pain    Objective:  Vitals Ranges:   Temp:  [97.4 °F (36.3 °C)-99.4 °F (37.4 °C)] 97.7 °F (36.5 °C)  Heart Rate:  [70-89] 80  Resp:  [16-20] 18  BP: (129-163)/(65-80) 146/72      Physical Exam:  Awake alert flat affect fund of knowledge fair attention span concentration good recent remote memory fair language  function normal well-developed well-nourished in no distress oriented x3 fund of knowledge poor pupils one half constricting to 1 eyes are conjugate face symmetrical tongue is midline visual fields are full extraocular movements full horizontally there is some loss of upgaze he has bradykinesia but no real rigidity no resting tremor reflexes absent throughout toes downgoing bilaterally overall strength 5- out of 5 x4 he is a little apraxic and does not give a good effort normal light touch face both arms both legs coordination normal in the upper extremity Station gait I was afraid to check for fear would let him fall or pass out heart regular without murmur neck supple without bruits extremities no clubbing cyanosis edema he does not appear to have myoclonus at this time no asterixis no pronator drift    Results review:   I reviewed the patient's new clinical results.    Data review:  Lab Results (last 24 hours)     Procedure Component Value Units Date/Time    POC Glucose Once [[id number]]  (Abnormal) Collected:  06/17/19 1117    Specimen:  Blood Updated:  06/17/19 1119     Glucose 167 mg/dL     POC Glucose Once [[id number]]  (Abnormal) Collected:  06/17/19 0732    Specimen:  Blood Updated:  06/17/19 0735     Glucose 141 mg/dL     Magnesium [[id number]]  (Normal) Collected:  06/17/19 0507    Specimen:  Blood Updated:  06/17/19 0552     Magnesium 2.3 mg/dL     Renal Function Panel [[id number]]  (Abnormal) Collected:  06/17/19 0507    Specimen:  Blood Updated:  06/17/19 0552     Glucose 181 mg/dL      BUN 16 mg/dL      Creatinine 1.08 mg/dL      Sodium 137 mmol/L      Potassium 4.0 mmol/L      Chloride 102 mmol/L      CO2 26.2 mmol/L      Calcium 7.9 mg/dL      Albumin 3.20 g/dL      Phosphorus 2.2 mg/dL      Anion Gap 8.8 mmol/L      BUN/Creatinine Ratio 14.8     eGFR Non African Amer 66 mL/min/1.73     Narrative:       GFR Normal >60  Chronic Kidney Disease <60  Kidney Failure <15    Lactic Acid, Reflex [[id number]]   (Normal) Collected:  06/17/19 0507    Specimen:  Blood Updated:  06/17/19 0536     Lactate 0.9 mmol/L     CBC (No Diff) [[id number]]  (Abnormal) Collected:  06/17/19 0507    Specimen:  Blood Updated:  06/17/19 0527     WBC 10.50 10*3/mm3      RBC 3.85 10*6/mm3      Hemoglobin 11.1 g/dL      Hematocrit 35.2 %      MCV 91.4 fL      MCH 28.8 pg      MCHC 31.5 g/dL      RDW 13.5 %      RDW-SD 45.6 fl      MPV 9.7 fL      Platelets 144 10*3/mm3     Lactic Acid, Reflex Timer (This will reflex a repeat order 3-3:15 hours after ordered.) [[id number]] Collected:  06/16/19 1956    Specimen:  Blood Updated:  06/17/19 0000     Extra Tube Hold for add-ons.     Comment: Auto resulted.       POC Glucose Once [[id number]]  (Abnormal) Collected:  06/16/19 2138    Specimen:  Blood Updated:  06/16/19 2139     Glucose 188 mg/dL     Lactic Acid, Plasma [[id number]]  (Abnormal) Collected:  06/16/19 1956    Specimen:  Blood Updated:  06/16/19 2100     Lactate 2.8 mmol/L     CBC (No Diff) [[id number]]  (Abnormal) Collected:  06/16/19 1956    Specimen:  Blood Updated:  06/16/19 2018     WBC 12.10 10*3/mm3      RBC 4.10 10*6/mm3      Hemoglobin 11.9 g/dL      Hematocrit 38.0 %      MCV 92.7 fL      MCH 29.0 pg      MCHC 31.3 g/dL      RDW 13.7 %      RDW-SD 46.5 fl      MPV 9.9 fL      Platelets 159 10*3/mm3     POC Glucose Once [[id number]]  (Abnormal) Collected:  06/16/19 1935    Specimen:  Blood Updated:  06/16/19 1945     Glucose 218 mg/dL     POC Glucose Once [[id number]]  (Abnormal) Collected:  06/16/19 1716    Specimen:  Blood Updated:  06/16/19 1718     Glucose 199 mg/dL            Imaging:  Imaging Results (last 24 hours)     ** No results found for the last 24 hours. **             Assessment and Plan:       Cervical spinal stenosis    Hypertension    Sleep apnea    Diabetes mellitus (CMS/HCC)    Coronary artery disease    RLS (restless legs syndrome)    Metabolic encephalopathy with drug drug interaction making his mild clonus and  asterixis very prominent at times.  Nonetheless this is not an acute stroke TIA or seizure he is awake during all of this at this point he has no further abnormal movement disorder and I will not recommend any further testing or neurologic follow-up please recall me if he has any further symptoms thanks      Duarte Rich MD  06/17/19  2:19 PM

## 2019-06-17 NOTE — THERAPY TREATMENT NOTE
Acute Care - Physical Therapy Treatment Note  [facility]     Patient Name: Chester Gama  : 1940  MRN: [id number]  Today's Date: 2019     Date of Referral to PT: 19       Admit Date: 2019    Visit Dx:  No diagnosis found.  Patient Active Problem List   Diagnosis   • Cervical spinal stenosis   • Hypertension   • Sleep apnea   • Diabetes mellitus (CMS/HCC)   • Coronary artery disease   • RLS (restless legs syndrome)       Therapy Treatment    Rehabilitation Treatment Summary     Row Name 19 0833             Treatment Time/Intention    Discipline  physical therapy assistant  -      Document Type  therapy note (daily note)  -SM      Subjective Information  complains of;pain  -SM      Mode of Treatment  physical therapy  -SM      Patient Effort  good  -SM      Existing Precautions/Restrictions  fall cervical spine, though no collar  -SM      Recorded by [SM] Lizeth Egan PTA 19 0857      Row Name 1933             Cognitive Assessment/Intervention- PT/OT    Orientation Status (Cognition)  oriented x 4  -SM      Follows Commands (Cognition)  WNL  -SM      Personal Safety Interventions  fall prevention program maintained;gait belt;nonskid shoes/slippers when out of bed  -SM      Recorded by [SM] Lizeth Egan PTA 19 0857      Row Name 19 0833             Bed Mobility Assessment/Treatment    Bed Mobility Assessment/Treatment  supine-sit  -SM      Supine-Sit [place] (Bed Mobility)  minimum assist (75% patient effort)  -SM      Bed Mobility, Safety Issues  decreased use of arms for pushing/pulling  -SM      Assistive Device (Bed Mobility)  bed rails  -SM      Comment (Bed Mobility)  log roll  -SM      Recorded by [SM] Lizeth Egan PTA 19 0857      Row Name 19 0833             Transfer Assessment/Treatment    Transfer Assessment/Treatment  sit-stand transfer;stand-sit transfer  -SM      Recorded by [SM] Lizeth Egan,  hospitals 06/17/19 0857      Row Name 06/17/19 0833             Sit-Stand Transfer    Sit-Stand [place] (Transfers)  contact guard;minimum assist (75% patient effort)  -      Assistive Device (Sit-Stand Transfers)  walker, front-wheeled  -      Recorded by [SM] Simon Eganah Anna, hospitals 06/17/19 0857      Row Name 06/17/19 0833             Stand-Sit Transfer    Stand-Sit Mathias (Transfers)  contact guard  -      Assistive Device (Stand-Sit Transfers)  walker, front-wheeled  -      Recorded by [SM] Lizeth Egan, hospitals 06/17/19 0857      Row Name 06/17/19 0833             Gait/Stairs Assessment/Training    Mathias Level (Gait)  contact guard;1 person to manage equipment  -      Assistive Device (Gait)  walker, front-wheeled  -      Distance in Feet (Gait)  140  -SM2      Pattern (Gait)  step-through  -      Deviations/Abnormal Patterns (Gait)  zach decreased;stride length decreased  -      Comment (Gait/Stairs)  decreased step lengths in room, though increased in hallway  -SM2      Recorded by [SM] Lizeth Egan, hospitals 06/17/19 0857  [SM2] Lizeth Egan, hospitals 06/17/19 0859      Row Name 06/17/19 0833             Motor Skills Assessment/Interventions    Additional Documentation  Therapeutic Exercise (Group)  -      Recorded by [SM] Lizeth Egan, hospitals 06/17/19 0859      Row Name 06/17/19 0833             Positioning and Restraints    Pre-Treatment Position  in bed  -      Post Treatment Position  chair  -      In Chair  sitting;call light within reach;encouraged to call for assist;with family/caregiver  -SM      Recorded by [SM] Lizeth Egan, hospitals 06/17/19 0859      Row Name 06/17/19 0833             Pain Scale: Numbers Pre/Post-Treatment    Pain Scale: Numbers, Pretreatment  4/10  -SM      Pain Scale: Numbers, Post-Treatment  4/10  -SM      Pain Location - Orientation  posterior  -SM2      Pain Location  neck  -SM2      Pain Intervention(s)   Repositioned;Ambulation/increased activity;Rest  -SM      Recorded by [SM] Lizeth Egan Anna, PTA 06/17/19 0859  [SM2] Lizeth Egan Anna, PTA 06/17/19 0857      Row Name                Wound 06/14/19 0830 midline;posterior neck surgical;incision    Wound - Properties Group Date first assessed: 06/14/19 [EP] Time first assessed: 0830 [EP] Orientation: midline;posterior [EP] Location: neck [EP] Type: surgical;incision [EP] Recorded by:  [EP] Constance Mendez RN 06/14/19 1306      User Key  (r) = Recorded By, (t) = Taken By, (c) = Cosigned By    Initials Name Effective Dates Discipline    EP Constance Mendez RN 06/16/16 -  Nurse    SM Lizeth Egan Anna, PTA 03/07/18 -  PT          Wound 06/14/19 0830 midline;posterior neck surgical;incision (Active)   Dressing Appearance dry;intact 6/17/2019  3:58 AM   Closure LACY 6/17/2019  3:58 AM   Base dressing in place, unable to visualize 6/17/2019  3:58 AM   Drainage Amount scant 6/17/2019  3:58 AM           Physical Therapy Education     Title: PT OT SLP Therapies (Done)     Topic: Physical Therapy (Done)     Point: Mobility training (Done)     Learning Progress Summary           Patient Acceptance, E,TB,D, VU by  at 6/17/2019  8:59 AM    Acceptance, E,TB,D, VU by BHAVANI at 6/16/2019  2:07 PM    Acceptance, E,TB,D, VU by BASILIA at 6/15/2019 11:15 AM   Significant Other Acceptance, E,TB,D, VU by BASILIA at 6/15/2019 11:15 AM                   Point: Home exercise program (Done)     Learning Progress Summary           Patient Acceptance, E,TB,D, VU by  at 6/17/2019  8:59 AM    Acceptance, E,TB,D, VU by  at 6/16/2019  2:07 PM                   Point: Body mechanics (Done)     Learning Progress Summary           Patient Acceptance, E,TB,D, VU by  at 6/17/2019  8:59 AM    Acceptance, E,TB,D, VU by BHAVANI at 6/16/2019  2:07 PM    Acceptance, E,TB,D, VU by RA at 6/15/2019 11:15 AM   Significant Other Acceptance, E,TB,D, VU by RA at 6/15/2019 11:15 AM                   Point: Precautions  (Done)     Learning Progress Summary           Patient Acceptance, E,TB,D, VU by  at 6/17/2019  8:59 AM    Acceptance, E,TB,D, VU by  at 6/16/2019  2:07 PM    Acceptance, E,TB,D, VU by  at 6/15/2019 11:15 AM   Significant Other Acceptance, E,TB,D, VU by  at 6/15/2019 11:15 AM                               User Key     Initials Effective Dates Name Provider Type Discipline     04/06/17 -  Yessy Escalona, PT Physical Therapist PT     03/07/18 -  Lizeth Egan, CHAGO Physical Therapy Assistant PT                PT Recommendation and Plan  Anticipated Discharge Disposition (PT): home with assist  Outcome Summary/Treatment Plan (PT)  Anticipated Discharge Disposition (PT): home with assist  Plan of Care Reviewed With: patient  Progress: improving  Outcome Summary: Pt tolerated treatment well this date. increased gait distance to 140ft w/ Rw and CGA. Extra person for safety and to manage equipment. Instructed pt on performing LE exercises while up in chair. PT will continue to address functional mobility deficits as tolerated.  Outcome Measures     Row Name 06/17/19 0900 06/16/19 1400 06/15/19 1100       How much help from another person do you currently need...    Turning from your back to your side while in flat bed without using bedrails?  3  -  3  -SM  2  -RA    Moving from lying on back to sitting on the side of a flat bed without bedrails?  3  -  3  -SM  2  -RA    Moving to and from a bed to a chair (including a wheelchair)?  3  -  3  -SM  3  -RA    Standing up from a chair using your arms (e.g., wheelchair, bedside chair)?  3  -  3  -SM  3  -RA    Climbing 3-5 steps with a railing?  2  -SM  2  -SM  3  -RA    To walk in hospital room?  3  -SM  3  -SM  3  -RA    AM-PAC 6 Clicks Score  17  -  17  -  16  -RA       Functional Assessment    Outcome Measure Options  AM-PAC 6 Clicks Basic Mobility (PT)  -  AM-PAC 6 Clicks Basic Mobility (PT)  -  AM-PAC 6 Clicks Basic Mobility (PT)  -       User Key  (r) = Recorded By, (t) = Taken By, (c) = Cosigned By    Initials Name Provider Type    Yessy Caruso, PT Physical Therapist     Lizeth Egan, CHAGO Physical Therapy Assistant         Time Calculation:   PT Charges     Row Name 06/17/19 0901             Time Calculation    Start Time  0833  -      Stop Time  0852  -      Time Calculation (min)  19 min  -      PT Received On  06/17/19  -      PT - Next Appointment  06/18/19  -        User Key  (r) = Recorded By, (t) = Taken By, (c) = Cosigned By    Initials Name Provider Type    Lizeth Garcia, [place] Physical Therapy Assistant        Therapy Charges for Today     Code Description Service Date Service Provider Modifiers Qty    34358405106 HC PT THER PROC EA 15 MIN 6/16/2019 Lizeth Egan Anna, PTA GP 1    63034794877 HC PT THER SUPP EA 15 MIN 6/16/2019 Egan Lizeth Anna, PTA GP 1    10790982458 HC PT THER PROC EA 15 MIN 6/17/2019 Lizeth Egan Anna, PTA GP 1    94642349256 HC PT THER SUPP EA 15 MIN 6/17/2019 Egan Lizeth Anna, PTA GP 1          PT G-Codes  Outcome Measure Options: AM-PAC 6 Clicks Basic Mobility (PT)  AM-PAC 6 Clicks Score: 17    Lizeth Egan PTA  6/17/2019

## 2019-06-17 NOTE — PAYOR COMM NOTE
"Chester Garcia (79 y.o. Male)     Date of Birth Social Security Number Address Home Phone MRN    1940  [address] [place] [address] [phone] [id number]    Jew Marital Status          Mandaen        Admission Date Admission Type Admitting Provider Attending Provider Department, Room/Bed    6/14/19 Elective Vincenzo Soliman DO Vemuri, Venu, DO [address], P597/1    Discharge Date Discharge Disposition Discharge Destination                       Attending Provider:  Vincenzo Soliman DO    Allergies:  Contrast Dye    Isolation:  None   Infection:  None   Code Status:  CPR    Ht:  190.5 cm (75\")   Wt:  113 kg (249 lb 12.5 oz)    Admission Cmt:  None   Principal Problem:  Cervical spinal stenosis [M48.02]                 Active Insurance as of 6/14/2019     Primary Coverage     Payor Plan Insurance Group Employer/Plan Group    [employer] [facility]      Payor Plan Address Payor Plan Phone Number Payor Plan Fax Number Effective Dates    [address] [phone]  5/1/2019 - None Entered    [facility] [id number]       Subscriber Name Subscriber Birth Date Member ID       CHESTER GARCIA 1940 [id number]                 Emergency Contacts      (Rel.) Home Phone Work Phone Mobile Phone    GoodmanSadie (Spouse) [phone] -- [phone]    YonathanNamJeanine (Daughter) -- -- [phone]    Goodman Duc (Son) -- -- [phone]               History & Physical      Vincenzo Soliman DO at 6/14/2019  6:51 AM        H&P reviewed. The patient was examined and there are no changes to the H&P.    Electronically signed by Vincenzo Soliman DO at 6/14/2019  6:51 AM   Source Note         Scan on 6/14/2019: LOC- 6/3/19 (below)            Electronically signed by Interface, Scans Incoming at 6/13/2019  3:15 PM             H&P filed by [employer], [place] at 6/13/2019  3:15 PM     Scan on 6/14/2019: LOC- 6/3/19 (below)            Electronically signed by " Interface, Scans Incoming at 6/13/2019  3:15 PM          Operative/Procedure Notes       Vincenzo Soliman DO at 6/14/2019  8:09 AM  Version 1 of 1         CERVICAL DISCECTOMY POSTERIOR FUSION WITH INSTRUMENTATION 1-2 LEVELS  Progress Note    Chester Gama  6/14/2019    Pre-op Diagnosis:   Cervical nonunion C5-C6  Cervical stenosis C4-T2.         Post-Op Diagnosis Codes:  Same    Procedure/CPT® Codes:      Procedure(s):  C4-T2 POSTERIOR DECOMPRESSION AND FUSION BONE MORPHOGENIC PROTEIN    Surgeon(s):  Vincenzo Soliman DO    Anesthesia: General    Staff:   Cell Saver : Alexandria Carroll; Duc Hernandez  Circulator: Breanne Teran RN; Paola Martins RN  Radiology Technologist: Shital Huynh RRT  Scrub Person: Naomi Lin  Assistant: Maureen Soto PA    Estimated Blood Loss: 800 mL    Urine Voided: 800 mL    Specimens:                None          Drains:   Closed/Suction Drain 1 Posterior Neck Accordion 10 Fr. (Active)   Site Description Bleeding 6/14/2019  3:09 PM   Dressing Status Clean;Dry;Intact 6/14/2019  3:09 PM   Drainage Appearance Bloody 6/14/2019  3:09 PM   Status To bulb suction 6/14/2019  3:09 PM   Output (mL) 100 mL 6/14/2019  2:58 PM       Urethral Catheter Silicone 16 Fr. (Active)   Daily Indications < 24 hr post op 6/14/2019  3:09 PM   Site Assessment Clean;Skin intact 6/14/2019  3:09 PM   Collection Container Standard drainage bag 6/14/2019  3:09 PM   Securement Method Securing device 6/14/2019  3:09 PM       Findings: Severe stenosis    Complications: None apparent      Vincenzo Soliman DO     Date: 6/14/2019  Time: 4:44 PM        Electronically signed by Vincenzo Soliman DO at 6/14/2019  4:44 PM     Vincenzo Soliman DO at 6/15/2019  8:41 AM  Version 1 of 1         Pre-op Diagnosis:   Cervical nonunion C5-6  Cervical stenosis C4-T2    Post-Op Diagnosis Codes:  Same    Procedure:  1.  Cervical arthrodesis posterior C4-C5 49912  2.  Cervical arthrodesis posterior C5-C6 74334  3.   Cervical arthrodesis posterior C6-C7 21657  4.  Cervical arthrodesis posterior C7-T1 01337  5.  Thoracic arthrodesis posterior T1-T2 15432  6.  Cervical laminectomy, partial facetectomy, foraminotomy for decompression of the left C5 nerve root 26429  7.  Cervical laminectomy, partial facetectomy, foraminotomy for decompression of the left C6 nerve root 02386  8.  Cervical laminectomy, partial facetectomy, foraminotomy for decompression of the left C7 nerve root 93508   9.  Cervical laminectomy, partial facetectomy, foraminotomy for decompression of the left C8 nerve root 07613  10.  Thoracic laminectomy, partial facetectomy, foraminotomy for decompression of the left T1 nerve root 15680  11.  Posterior cervical segmental instrumentation C4-T2 15014  12.  Use of local autograft bone 83416  Surgeon: Vincenzo Soliman DO    Assistant: Maureen Soto PA-C Please note I utilized the services of an assistant, specifically, Maureen Soto PA-C.  Maureen participated in crucial portions of the operation.  The use of Maureen greatly reduced overall operative time thereby reducing overall morbidity for the patient.  Specifically, she provided meticulous hemostasis for safe decompression of the spinal cord and cervical nerve roots.  She also assisted in the instrumentation of the spine for fusion.  Anesthesia:General  Estimated Blood Loss: 800 mL  Specimens:   Order Name Source Comment Collection Info Order Time   HEMOGLOBIN AND HEMATOCRIT, BLOOD   Collected By: Lotus Holcomb 6/15/2019 12:02 AM   BASIC METABOLIC PANEL   Collected By: Lotus Holcomb 6/15/2019 12:02 AM     Complications: None apparent  Implants:Alphatec spine, [facility]  Disposition: Patient to recovery room in stable condition.    Preoperative indications: Patient is a 79-year-old who has a history of previous ACDF C5-7.    Procedure in detail:  The patient was identified in the preoperative holding area.  All labs and consent were found in order.  The operative site  was marked with an indelible marker.  SCDs were applied for thromboembolic prophylaxis.  The patient was transferred to the operative suite.  Neuro monitoring leads were placed as well as Wilder catheter.  Mayers head gilliland was called and prone on the operative table with his head secured.  His neck was then prepped and draped in the usual sterile fashion.  A timeout was performed.  Standard midline incision was performed with the 10 blade followed by subperiosteal Bovie dissection to the lateral mass of the cervical spine.  A marking x-ray was taken for level determination.  Lateral mass screws were placed at C4, C5, and C6.  EMG monitoring revealed acceptable thresholds.  Pedicle screws were placed using fluoroscopy at T1 and T2.  Next I performed laminectomy and left-sided foraminotomies including partial facetectomy for the left C5, C6, C7, C8, and T1 nerve roots.  Rods were placed bilaterally with top loading set screws and final tightening.  Final x-rays of the instrumentation were obtained.  The wound was irrigated throughout the case with copious amounts of normal saline.  The bone was decorticated with a high-speed drill.  Exposed nerve roots were protected with Gelfoam.  Infuse sponges were placed over the decorticated bone followed by autograft bone which was harvested during the surgery followed by cancellus allograft bone chips.  Vancomycin powder was sprinkled over the wound.  A deep drain was placed followed by layer closure of the neck.  Sterile dressings were applied.  Patient was awakened from general anesthesia the Mayers was removed and he was transferred to the hospital bed and taken to recovery in stable condition..    Disposition: The patient will be admitted for overnight stay.  The patient will be given pain medicine, antibiotic prophylaxis, DVT prophylaxis.  I anticipate a 2-3 night hospital stay followed by discharge to home.      Electronically signed by Vincenzo Soliman DO at 6/16/2019   "9:36 AM          Physician Progress Notes       Maureen Soto PA at 6/17/2019  7:54 AM          Orthopedic Spine Progress Note    Subjective     Post-Operative Day: 3 post-spine procedure C4-T2 POSTERIOR DECOMPRESSION AND FUSION BONE MORPHOGENIC PROTEIN  Systemic or Specific Complaints: Pain Control .rapid response called last night for repeated episodic upper body \"shaking.\"   Objective     Vital signs in last 24 hours:  Temp:  [97.8 °F (36.6 °C)-99.4 °F (37.4 °C)] 98.2 °F (36.8 °C)  Heart Rate:  [70-89] 70  Resp:  [16-20] 20  BP: (135-163)/(68-80) 135/72    General: alert, appears stated age and cooperative   Neurovascular: Bilateral deltoid weakness   Wound: Wound clean and dry no evidence of infection.   Range of Motion: limited   DVT Exam: No evidence of DVT seen on physical exam.     Data Review  CBC:  Results from last 7 days   Lab Units 06/17/19  0507   WBC 10*3/mm3 10.50   RBC 10*6/mm3 3.85*   HEMOGLOBIN g/dL 11.1*   HEMATOCRIT % 35.2*   PLATELETS 10*3/mm3 144     Lab Results   Component Value Date    GLUCOSE 181 (H) 06/17/2019    BUN 16 06/17/2019    CREATININE 1.08 06/17/2019    EGFRIFNONA 66 06/17/2019    BCR 14.8 06/17/2019    K 4.0 06/17/2019    CO2 26.2 06/17/2019    CALCIUM 7.9 (L) 06/17/2019    ALBUMIN 3.20 (L) 06/17/2019    AST 24 06/10/2019    ALT 31 06/10/2019   lactate 2.8 overnight. Repeat draw 0.9    Drain output 50 cc overnight    Assessment/Plan     Status post-spine procedure:   Pain Relief: some relief  \"shaking\" resolved. Neurology consulted.  Continues current post-op course  Up with PT  D/c drain and antibiotics  Does not need cervical collar except for comfort  OK to d/c to home once medically stable    Activity: out of bed and ambulate    Weight Bearing: FWB     LOS: 3 days     Vincenzo Soliman DO    Date: 6/17/2019      Electronically signed by Maureen Soto PA at 6/17/2019  9:33 AM     Hardy Sandy MD at 6/16/2019  5:10 PM               LOS: 2 days     Name: " Chester Gama  Age/Sex: 79 y.o. male  :  1940        PCP: Demetrio Ko DO  No chief complaint on file.     Subjective   Feeling ok denies symptoms of low or high blood sugars    General: No Fever or Chills, Cardiac: No Chest Pain or Palpitations, Resp: No Cough or SOA, GI: No Nausea, Vomiting, or Diarrhea and Other: No bleeding      atorvastatin 80 mg Oral Daily   ceFAZolin 2 g Intravenous Q8H   furosemide 20 mg Oral BID   gabapentin 600 mg Oral TID   insulin glargine 30 Units Subcutaneous Nightly   [START ON 2019] insulin glargine 32 Units Subcutaneous QAM   insulin lispro 0-9 Units Subcutaneous 4x Daily With Meals & Nightly   lisinopril 5 mg Oral Daily   metFORMIN 500 mg Oral BID With Meals   metoprolol tartrate 25 mg Oral Q12H   rOPINIRole 4 mg Oral Nightly   sodium chloride 3 mL Intravenous Q12H   vitamin B-12 100 mcg Oral Daily       sodium chloride 100 mL/hr Last Rate: 100 mL/hr (19 0048)       Objective   Vital Signs  Temp:  [97.8 °F (36.6 °C)-98.3 °F (36.8 °C)] 97.8 °F (36.6 °C)  Heart Rate:  [73-90] 73  Resp:  [16-18] 18  BP: (130-149)/(62-70) 146/70  Body mass index is 31.22 kg/m².    Intake/Output Summary (Last 24 hours) at 2019 1710  Last data filed at 2019 0700  Gross per 24 hour   Intake --   Output 2660 ml   Net -2660 ml       Physical Exam   Constitutional: He is oriented to person, place, and time. He appears well-developed and well-nourished.   HENT:   Head: Normocephalic and atraumatic.   Cardiovascular: Normal rate, regular rhythm and normal heart sounds.   Pulmonary/Chest: Effort normal and breath sounds normal.   Abdominal: Soft. Bowel sounds are normal. He exhibits no distension.   Neurological: He is alert and oriented to person, place, and time.   Skin: Skin is warm and dry.   Psychiatric: He has a normal mood and affect. His behavior is normal.   Nursing note and vitals reviewed.        Results Review:       I reviewed the patient's new clinical  results.  Results from last 7 days   Lab Units 06/15/19  0452 06/10/19  1320   WBC 10*3/mm3  --  8.29   HEMOGLOBIN g/dL 11.8* 14.8   PLATELETS 10*3/mm3  --  199     Results from last 7 days   Lab Units 06/15/19  0452 06/10/19  1319   SODIUM mmol/L 140 141   POTASSIUM mmol/L 4.1 4.5   CHLORIDE mmol/L 105 102   CO2 mmol/L 26.3 26.4   BUN mg/dL 19 17   CREATININE mg/dL 1.29* 1.37*   CALCIUM mg/dL 8.0* 9.4   Estimated Creatinine Clearance: 65 mL/min (A) (by C-G formula based on SCr of 1.29 mg/dL (H)).  No results found for: HGBA1C  Glucose   Date/Time Value Ref Range Status   06/16/2019 1120 244 (H) 70 - 130 mg/dL Final   06/16/2019 0729 182 (H) 70 - 130 mg/dL Final   06/15/2019 1949 160 (H) 70 - 130 mg/dL Final   06/15/2019 1717 199 (H) 70 - 130 mg/dL Final   06/15/2019 1130 234 (H) 70 - 130 mg/dL Final   06/15/2019 0745 163 (H) 70 - 130 mg/dL Final   06/14/2019 2113 238 (H) 70 - 130 mg/dL Final   06/14/2019 1602 185 (H) 70 - 130 mg/dL Final         Assessment/Plan     Cervical spinal stenosis    Hypertension    Sleep apnea    Diabetes mellitus (CMS/HCC)    Coronary artery disease    RLS (restless legs syndrome)      PLAN  -Blood sugars running high increase basal insulin today (high risk medication)  -Defer postoperative care to the primary team he is postop day 2 from a decompression and fusion of the cervical and thoracic spine.  -Hemoglobin down around 3 g since admission to the hospital consistent with postoperative acute blood loss anemia expected following the procedure.    Disposition  Per primary      Hardy Sandy MD  [facility]  06/16/19  5:10 PM            Electronically signed by Hardy Sandy MD at 6/16/2019  5:25 PM     Romero Green MD at 6/16/2019  8:56 AM          Orthopedic Progress Note      Patient: Chester Gama    Date of Admission: 6/14/2019  5:19 AM    YOB: 1940    Medical Record Number: [id number]    Attending Physician: Vincenzo Soliman,  DO    Post Operative Day Number: 2    Status Post: C4-T2 POSTERIOR DECOMPRESSION AND FUSION BONE MORPHOGENIC PROTEIN  Subjective: Pain level adequately controlled but still having pain in the right scapular area.  He did sleep a little better last night      Current Medications:  Scheduled Meds:  atorvastatin 80 mg Oral Daily   ceFAZolin 2 g Intravenous Q8H   furosemide 20 mg Oral BID   gabapentin 600 mg Oral TID   insulin glargine 28 Units Subcutaneous Nightly   insulin glargine 30 Units Subcutaneous QAM   insulin lispro 0-9 Units Subcutaneous 4x Daily With Meals & Nightly   lisinopril 5 mg Oral Daily   metFORMIN 500 mg Oral BID With Meals   metoprolol tartrate 25 mg Oral Q12H   rOPINIRole 4 mg Oral Nightly   sodium chloride 3 mL Intravenous Q12H   vitamin B-12 100 mcg Oral Daily     Continuous Infusions:  sodium chloride 100 mL/hr Last Rate: 100 mL/hr (06/16/19 0048)     PRN Meds:.bisacodyl  •  bisacodyl  •  cyclobenzaprine  •  dextrose  •  dextrose  •  dextrose  •  dextrose  •  diphenhydrAMINE  •  docusate sodium  •  glucagon (human recombinant)  •  glucagon (human recombinant)  •  HYDROcodone-acetaminophen  •  magnesium hydroxide  •  naloxone  •  ondansetron **OR** ondansetron  •  polyethylene glycol  •  promethazine **OR** promethazine **OR** promethazine  •  sennosides-docusate sodium  •  sodium chloride      Physical Exam: 79 y.o. male  General Appearance:    Alert, cooperative, in no acute distress                 Vitals:    06/15/19 1716 06/15/19 2100 06/15/19 2308 06/16/19 0410   BP: 140/67 149/70 130/62 134/66   BP Location: Right arm Left arm  Right arm   Patient Position: Lying Lying  Lying   Pulse: 74 83 90 75   Resp: 18 17  18   Temp: 98.3 °F (36.8 °C) 98.3 °F (36.8 °C)     TempSrc: Oral Oral     SpO2: 98% 96%  96%   Weight:       Height:            Extremities:   Dressing Dry and Intact    Incision intact without signs or symptoms of infections   Pulses:   Pulses palpable and equal bilaterally    Skin:   No bleeding, bruising or rash       Diagnostic Tests:    Admission on 06/14/2019   Component Date Value Ref Range Status   • Glucose 06/14/2019 125  70 - 130 mg/dL Final   • Glucose 06/14/2019 203* 70 - 130 mg/dL Final   • Glucose 06/14/2019 185* 70 - 130 mg/dL Final   • Glucose 06/14/2019 238* 70 - 130 mg/dL Final   • Hemoglobin 06/15/2019 11.8* 13.0 - 17.7 g/dL Final   • Hematocrit 06/15/2019 37.2* 37.5 - 51.0 % Final   • Glucose 06/15/2019 195* 65 - 99 mg/dL Final   • BUN 06/15/2019 19  8 - 23 mg/dL Final   • Creatinine 06/15/2019 1.29* 0.76 - 1.27 mg/dL Final   • Sodium 06/15/2019 140  136 - 145 mmol/L Final   • Potassium 06/15/2019 4.1  3.5 - 5.2 mmol/L Final   • Chloride 06/15/2019 105  98 - 107 mmol/L Final   • CO2 06/15/2019 26.3  22.0 - 29.0 mmol/L Final   • Calcium 06/15/2019 8.0* 8.6 - 10.5 mg/dL Final   • eGFR Non African Amer 06/15/2019 54* >60 mL/min/1.73 Final   • BUN/Creatinine Ratio 06/15/2019 14.7  7.0 - 25.0 Final   • Anion Gap 06/15/2019 8.7  mmol/L Final   • Glucose 06/15/2019 163* 70 - 130 mg/dL Final   • Glucose 06/15/2019 234* 70 - 130 mg/dL Final   • Glucose 06/15/2019 199* 70 - 130 mg/dL Final   • Glucose 06/15/2019 160* 70 - 130 mg/dL Final   • Glucose 06/16/2019 182* 70 - 130 mg/dL Final       No results found.    Assessment:  Patient Active Problem List   Diagnosis   • Cervical spinal stenosis   • Hypertension   • Sleep apnea   • Diabetes mellitus (CMS/HCC)   • Coronary artery disease   • RLS (restless legs syndrome)           Plan:    Continue Pain management efforts  Continue mobilization efforts  Continue incisional care      Discharge Plan: Will follow patient for progress  For discharge home  Date: 6/16/2019    Romero Green MD      Electronically signed by Romero Green MD at 6/16/2019  8:58 AM     Maureen Soto PA at 6/15/2019  6:56 AM          Orthopedic Spine Progress Note    Subjective     Post-Operative Day: 1 post-spine procedure C4-T2 POSTERIOR  DECOMPRESSION AND FUSION BONE MORPHOGENIC PROTEIN  Systemic or Specific Complaints: Pain Control. C/o expected parascapular pain. Some left upper arm pain as well which is improving.    Objective     Vital signs in last 24 hours:  Temp:  [97.9 °F (36.6 °C)-98.5 °F (36.9 °C)] 98.3 °F (36.8 °C)  Heart Rate:  [63-94] 63  Resp:  [12-16] 16  BP: (109-149)/(52-71) 130/63  Arterial Line BP: ()/(19-59) 108/52    General: alert, appears stated age and cooperative   Neurovascular: stable   Wound: Wound clean and dry no evidence of infection.   Range of Motion: limited   DVT Exam: No evidence of DVT seen on physical exam.     Data Review  CBC:  Results from last 7 days   Lab Units 06/15/19  0452 06/10/19  1320   WBC 10*3/mm3  --  8.29   RBC 10*6/mm3  --  5.14   HEMOGLOBIN g/dL 11.8* 14.8   HEMATOCRIT % 37.2* 46.0   PLATELETS 10*3/mm3  --  199     Lab Results   Component Value Date    GLUCOSE 195 (H) 06/15/2019    BUN 19 06/15/2019    CREATININE 1.29 (H) 06/15/2019    EGFRIFNONA 54 (L) 06/15/2019    BCR 14.7 06/15/2019    K 4.1 06/15/2019    CO2 26.3 06/15/2019    CALCIUM 8.0 (L) 06/15/2019    ALBUMIN 4.00 06/10/2019    AST 24 06/10/2019    ALT 31 06/10/2019     Drain output 430 since surgery    Assessment/Plan     Status post-spine procedure:   Pain Relief: some relief    Continues current post-op course  Up with PT  D/c jessica today  Continue drain and antibiotics  Does not need cervical collar except for comfort    Activity: out of bed and ambulate    Weight Bearing: FWB     LOS: 1 day     Vincenzo Soliman DO    Date: 6/15/2019      Electronically signed by Maureen Soto PA at 6/15/2019  7:20 AM          Consult Notes       Marcos Khan MD at 6/14/2019  6:39 PM      Consult Orders    1. Inpatient Consult to Hospitalist [[id number]] ordered by Vincenzo Soliman DO at 06/14/19 1230                CONSULT NOTE    INTERNAL MEDICINE   [facility]       Patient Identification:  Name: Chester Gama  Age:  79 y.o.  Sex: male  :  1940  MRN: [id number]             Date of Consultation: 2019      Primary Care Physician: Demetrio Ko DO                               Requesting Physician:   Reason for Consultation: Management of medical issues    Chief Complaint: Brought in today for posterior fusion from C4-T2 after successful anterior cervical disc fusion 18 months ago.    History of Present Illness:   Patient is a 76-year-old male with past medical history as noted below has been dealing with cervical spine stenosis and radiculopathy for quite some time.  He had anterior cervical disc fusion 18 months ago with C4-C5 non-reunion and eventually was brought in today for elective posterior fusion and instrumentation.  Except for ongoing issues with his neck pain and pain going into his left arm and shoulder he did not have any physical symptoms of chest pain shortness of breath nausea vomiting or diarrhea.  Since surgery he is otherwise doing okay.  Patient does have history of diabetes and hypertension and claims that his blood sugar has been running reasonably well.      Past Medical History:  Past Medical History:   Diagnosis Date   • Arthritis     HANDS KNEES   • Cervical pain (neck)     LEFT ARM PARESTHESIA   • Coronary artery disease    • Depression    • Diabetes mellitus (CMS/HCC)     TYPE 2   • Diverticular disease     HX, S/P RESECTION   • Hx of heart artery stent    • Hyperlipemia    • Hypertension    • PTSD (post-traumatic stress disorder)    • RLS (restless legs syndrome)    • Sleep apnea     DIDNT TOLERATE MASK, CLAUSTROPHOBIC   • Structural abnormality of kidney     SAW NEPHROLOGIST FOR SMALLER KIDNEY - 3 YEARS AGO.   • Thyroid cyst     PER WIFE     Past Surgical History:  Past Surgical History:   Procedure Laterality Date   • ANTERIOR CERVICAL DISCECTOMY W/ FUSION N/A 1/3/2018    Procedure: ANTERIOR CERVICAL DECOMPRESSION & FUSION C4 6;  Surgeon: Vincenzo Soilman DO;  Location: [facility]  OR;  Service:    • APPENDECTOMY     • CATARACT EXTRACTION W/ INTRAOCULAR LENS  IMPLANT, BILATERAL     • COLON RESECTION      COLOSTOMY X 6 MOS THEN REVERSED.   • CORONARY ANGIOPLASTY WITH STENT PLACEMENT  02/10/2010    KEVIN [email or web address] Meds:  Medications Prior to Admission   Medication Sig Dispense Refill Last Dose   • ARTIFICIAL TEAR OP Apply 1 drop to eye As Needed. Both eyes   Past Month at Unknown time   • atorvastatin (LIPITOR) 80 MG tablet Take 80 mg by mouth Daily.   6/13/2019 at 0800   • docusate sodium (COLACE) 250 MG capsule Take 250 mg by mouth 2 (Two) Times a Day As Needed for Constipation.   6/12/2019 at 0800   • furosemide (LASIX) 20 MG tablet Take 20 mg by mouth 2 (Two) Times a Day.   6/13/2019 at 2100   • gabapentin (NEURONTIN) 300 MG capsule Take 600 mg by mouth 3 (Three) Times a Day.   6/14/2019 at 0400   • insulin glargine (LANTUS) 100 UNIT/ML injection Inject 30 Units under the skin Every Morning.   6/13/2019 at 84865   • insulin glargine (LANTUS) 100 UNIT/ML injection Inject 28 Units under the skin Every Night. (Patient taking differently: Inject 30 Units under the skin into the appropriate area as directed Every Night.) 1 Units 0 6/13/2019 at 2000   • lisinopril (PRINIVIL,ZESTRIL) 5 MG tablet Take 5 mg by mouth Daily. 1/2 TAB EACH DOSE   6/13/2019 at 0800   • metFORMIN (GLUCOPHAGE) 500 MG tablet Take 500 mg by mouth 2 (Two) Times a Day With Meals.   6/13/2019 at 1800   • metoprolol tartrate (LOPRESSOR) 25 MG tablet Take 25 mg by mouth Every 12 (Twelve) Hours. 1/2 TAB EACH DOSE   6/14/2019 at 0400   • rOPINIRole (REQUIP) 4 MG tablet Take 4 mg by mouth Every Night.   6/13/2019 at 2100   • vitamin B-12 (CYANOCOBALAMIN) 100 MCG tablet Take 100 mcg by mouth Daily.   6/13/2019 at 0800   • aspirin 81 MG EC tablet Take 81 mg by mouth Daily. STOPPED 6/7/19 6/7/2019   • diclofenac (VOLTAREN) 1 % gel gel Apply 4 g topically to the appropriate area as directed 4 (Four) Times a Day  As Needed (ON HOLS FOR SX). STOPPED PREOP, LAST DOSE 10/2017    12/3/2017     Current Meds:     Current Facility-Administered Medications:   •  atorvastatin (LIPITOR) tablet 80 mg, 80 mg, Oral, Daily, Janny, Vincenzo, DO  •  bisacodyl (DULCOLAX) EC tablet 5 mg, 5 mg, Oral, Daily PRN, Janny, Vincenzo, DO  •  bisacodyl (DULCOLAX) suppository 10 mg, 10 mg, Rectal, Daily PRN, Janny, Vincenzo, DO  •  ceFAZolin in dextrose (ANCEF) IVPB solution 2 g, 2 g, Intravenous, Q8H, Janny, Vincenzo, DO, 2 g at 06/14/19 1739  •  cyclobenzaprine (FLEXERIL) tablet 10 mg, 10 mg, Oral, TID PRN, Janny, Vincenzo, DO, 10 mg at 06/14/19 1446  •  dextrose (D50W) 25 g/ 50mL Intravenous Solution 25 g, 25 g, Intravenous, Q15 Min PRN, Janny, Vincenzo, DO  •  dextrose (GLUTOSE) oral gel 15 g, 15 g, Oral, Q15 Min PRN, Janny, Vincenzo, DO  •  diphenhydrAMINE (BENADRYL) injection 25 mg, 25 mg, Intravenous, Q6H PRN, Janny, Vincenzo, DO  •  docusate sodium (COLACE) capsule 100 mg, 100 mg, Oral, BID PRN, Janny, Vincenzo, DO  •  furosemide (LASIX) tablet 20 mg, 20 mg, Oral, BID, Janny, Vincenzo, DO, 20 mg at 06/14/19 1743  •  gabapentin (NEURONTIN) capsule 600 mg, 600 mg, Oral, TID, Janny, Vincenzo, DO  •  glucagon (human recombinant) (GLUCAGEN DIAGNOSTIC) injection 1 mg, 1 mg, Subcutaneous, PRN, Janny, Vincenzo, DO  •  HYDROcodone-acetaminophen (NORCO) 5-325 MG per tablet 2 tablet, 2 tablet, Oral, Q4H PRN, Janny, Vincenzo, DO, 2 tablet at 06/14/19 1750  •  HYDROmorphone (DILAUDID) PCA 0.2 mg/ml 50 mL syringe, , Intravenous, Continuous, Janny, Vincenzo, DO  •  insulin glargine (LANTUS) injection 28 Units, 28 Units, Subcutaneous, Nightly, Vincenzo Soliman DO  •  [START ON 6/15/2019] insulin glargine (LANTUS) injection 30 Units, 30 Units, Subcutaneous, QAM, Vincenzo Soliman,   •  insulin lispro (humaLOG) injection 0-14 Units, 0-14 Units, Subcutaneous, 4x Daily With Meals & Nightly, Vincenzo Soliman DO, 3 Units at 06/14/19 1749  •  lisinopril (PRINIVIL,ZESTRIL) tablet 5 mg, 5 mg, Oral, Daily, Vincenzo Soliman,  DO  •  magnesium hydroxide (MILK OF MAGNESIA) suspension 2400 mg/10mL 10 mL, 10 mL, Oral, Daily PRN, Janny, Vincenzo, DO  •  metFORMIN (GLUCOPHAGE) tablet 500 mg, 500 mg, Oral, BID With Meals, Janny, Vincenzo, DO, 500 mg at 19 1743  •  metoprolol tartrate (LOPRESSOR) tablet 25 mg, 25 mg, Oral, Q12H, Janny, Vincenzo, DO  •  naloxone (NARCAN) injection 0.1 mg, 0.1 mg, Intravenous, Q5 Min PRN, Janny, Vincenzo, DO  •  ondansetron (ZOFRAN) tablet 4 mg, 4 mg, Oral, Q6H PRN **OR** ondansetron (ZOFRAN) injection 4 mg, 4 mg, Intravenous, Q6H PRN, Janny, Vincenzo, DO  •  polyethylene glycol 3350 powder (packet), 17 g, Oral, Daily PRN, Janny, Vincenzo, DO  •  promethazine (PHENERGAN) tablet 12.5 mg, 12.5 mg, Oral, Q6H PRN **OR** promethazine (PHENERGAN) injection 12.5 mg, 12.5 mg, Intramuscular, Q6H PRN **OR** promethazine (PHENERGAN) suppository 12.5 mg, 12.5 mg, Rectal, Q6H PRN, Janny, Vincenzo, DO  •  rOPINIRole (REQUIP) tablet 4 mg, 4 mg, Oral, Nightly, Janny, Vincenzo, DO  •  sennosides-docusate sodium (SENOKOT-S) 8.6-50 MG tablet 1 tablet, 1 tablet, Oral, Nightly PRN, Janny, Vincenzo, DO  •  sodium chloride 0.45 % infusion, 100 mL/hr, Intravenous, Continuous, Janny, Vincenzo, DO  •  sodium chloride 0.9 % flush 3 mL, 3 mL, Intravenous, Q12H, Janny, Vincenzo, DO, 3 mL at 19 1605  •  sodium chloride 0.9 % flush 3-10 mL, 3-10 mL, Intravenous, PRN, Janny, Vincenzo, DO  •  vitamin B-12 (CYANOCOBALAMIN) tablet 100 mcg, 100 mcg, Oral, Daily, Janny, Vincenzo, DO  Allergies:  Allergies   Allergen Reactions   • Contrast Dye Hives     IVP DYE     Social History:   Social History     Tobacco Use   • Smoking status: Former Smoker     Years: 5.00     Types: Cigars     Last attempt to quit: 6/10/1966     Years since quittin.0   • Smokeless tobacco: Never Used   Substance Use Topics   • Alcohol use: No      Family History:  Family History   Problem Relation Age of Onset   • Malig Hyperthermia Neg Hx           Review of Systems  See history of present illness  "and past medical history.   Constitutional: Remarkable for no fever or chills  Cardiovascular: Remarkable for no chest pain or shortness of breath  GI: Remarkable for no nausea vomiting or diarrhea  : Remarkable for urinary retention requiring catheter  Musculoskeletal: Remarkable for neck pain and shoulder pain is described in history of presenting illness  Neurological: Remarkable for no loss of consciousness or continence.      Vitals:   /64   Pulse 87   Temp 97.9 °F (36.6 °C) (Oral)   Resp 12   Ht 190.5 cm (75\")   Wt 113 kg (249 lb 12.5 oz)   SpO2 94%   BMI 31.22 kg/m²    I/O:     Intake/Output Summary (Last 24 hours) at 6/14/2019 1839  Last data filed at 6/14/2019 1750  Gross per 24 hour   Intake 4465.2 ml   Output 1780 ml   Net 2685.2 ml     Exam:  General Appearance:   Awake cooperative male who appears to be in discomfort because of neck pain.   Head:    Normocephalic, without obvious abnormality, atraumatic   Eyes:    PERRL, conjunctiva/corneas clear, EOM's intact, both eyes   Ears:    Normal external ear canals, both ears   Nose:   Nares normal, septum midline, mucosa normal, no drainage    or sinus tenderness   Throat:   Lips, tongue, gums normal; oral mucosa pink and moist   Neck:  Surgical site dressed   Back:     Symmetric, no curvature, ROM normal, no CVA tenderness   Lungs:     Clear to auscultation bilaterally, respirations unlabored   Chest Wall:    No tenderness or deformity    Heart:    Regular rate and rhythm, S1 and S2 normal, no murmur, rub   or gallop   Abdomen:     Soft, non-tender, bowel sounds active all four quadrants,     no masses, no hepatomegaly, no splenomegaly   Extremities:   Extremities normal, atraumatic, no cyanosis or edema   Pulses:   Pulses palpable in all extremities; symmetric all extremities   Skin:   Skin color normal, Skin is warm and dry,  no rashes or palpable lesions   Neurologic:  Grossly nonfocal       Data Review:      I reviewed the patient's new " clinical results.  Results from last 7 days   Lab Units 06/10/19  1320   WBC 10*3/mm3 8.29   HEMOGLOBIN g/dL 14.8   PLATELETS 10*3/mm3 199     Results from last 7 days   Lab Units 06/10/19  1319   SODIUM mmol/L 141   POTASSIUM mmol/L 4.5   CHLORIDE mmol/L 102   CO2 mmol/L 26.4   BUN mg/dL 17   CREATININE mg/dL 1.37*   CALCIUM mg/dL 9.4   GLUCOSE mg/dL 157*       Assessment:  Active Hospital Problems    Diagnosis POA   • **Cervical spinal stenosis [M48.02] Yes   • Hypertension [I10] Unknown   • Sleep apnea [G47.30] Unknown     DIDNT TOLERATE MASK, CLAUSTROPHOBIC     • Diabetes mellitus (CMS/HCC) [E11.9] Unknown     TYPE 2     • Coronary artery disease [I25.10] Unknown   • RLS (restless legs syndrome) [G25.81] Unknown       Medical decision making:  Cervical spine stenosis-patient is status post posterior fusion and instrumentation-management of pain, activity guidelines DVT prophylaxis and management of urinary retention and discharge disposition per primary orthopedic/spine surgery service.  Hypertension-continue his antihypertensive regimen.  Diabetes mellitus-continue his home insulin regimen and provide him with Accu-Cheks and sliding scale coverage and check hemoglobin A1c.  Chronic kidney disease-monitor his creatinine avoid nephrotoxic agent and hypotensive episode.  History of sleep apnea patient does not tolerate mask and since currently on pain medication needs to be monitored closely with continuous pulse ox and reassessment of his mental status and low threshold to check ABG to look for hypercapnic state if he become somnolent and drowsy.      Marcos Khan MD   6/14/2019  6:39 PM  Much of this encounter note is an electronic transcription/translation of spoken language to printed text. The electronic translation of spoken language may permit erroneous, or at times, nonsensical words or phrases to be inadvertently transcribed; Although I have reviewed the note for such errors, some may still  exist      Electronically signed by Marcos Khan MD at 6/15/2019  5:47 AM

## 2019-06-18 VITALS
BODY MASS INDEX: 31.06 KG/M2 | DIASTOLIC BLOOD PRESSURE: 68 MMHG | HEIGHT: 75 IN | HEART RATE: 76 BPM | TEMPERATURE: 98 F | WEIGHT: 249.78 LBS | SYSTOLIC BLOOD PRESSURE: 148 MMHG | RESPIRATION RATE: 16 BRPM | OXYGEN SATURATION: 95 %

## 2019-06-18 PROBLEM — M48.02 CERVICAL SPINAL STENOSIS: Status: RESOLVED | Noted: 2019-06-14 | Resolved: 2019-06-18

## 2019-06-18 PROBLEM — I10 HYPERTENSION: Status: RESOLVED | Noted: 2019-06-14 | Resolved: 2019-06-18

## 2019-06-18 LAB
GLUCOSE BLDC GLUCOMTR-MCNC: 128 MG/DL (ref 70–130)
GLUCOSE BLDC GLUCOMTR-MCNC: 168 MG/DL (ref 70–130)

## 2019-06-18 PROCEDURE — 63710000001 INSULIN GLARGINE PER 5 UNITS: Performed by: HOSPITALIST

## 2019-06-18 PROCEDURE — 63710000001 INSULIN LISPRO (HUMAN) PER 5 UNITS: Performed by: INTERNAL MEDICINE

## 2019-06-18 PROCEDURE — 82962 GLUCOSE BLOOD TEST: CPT

## 2019-06-18 RX ORDER — CYCLOBENZAPRINE HCL 10 MG
10 TABLET ORAL 3 TIMES DAILY PRN
Qty: 50 TABLET | Refills: 0 | Status: SHIPPED | OUTPATIENT
Start: 2019-06-18 | End: 2019-08-17 | Stop reason: HOSPADM

## 2019-06-18 RX ORDER — HYDROCODONE BITARTRATE AND ACETAMINOPHEN 5; 325 MG/1; MG/1
1 TABLET ORAL EVERY 4 HOURS PRN
Qty: 50 TABLET | Refills: 0 | Status: SHIPPED | OUTPATIENT
Start: 2019-06-18 | End: 2019-06-27

## 2019-06-18 RX ADMIN — INSULIN GLARGINE 32 UNITS: 100 INJECTION, SOLUTION SUBCUTANEOUS at 08:11

## 2019-06-18 RX ADMIN — ATORVASTATIN CALCIUM 80 MG: 80 TABLET, FILM COATED ORAL at 08:20

## 2019-06-18 RX ADMIN — SODIUM CHLORIDE, PRESERVATIVE FREE 3 ML: 5 INJECTION INTRAVENOUS at 08:16

## 2019-06-18 RX ADMIN — LISINOPRIL 5 MG: 5 TABLET ORAL at 08:19

## 2019-06-18 RX ADMIN — GABAPENTIN 600 MG: 300 CAPSULE ORAL at 08:12

## 2019-06-18 RX ADMIN — FUROSEMIDE 20 MG: 20 TABLET ORAL at 08:20

## 2019-06-18 RX ADMIN — Medication 100 MCG: at 08:16

## 2019-06-18 RX ADMIN — CYCLOBENZAPRINE 10 MG: 10 TABLET, FILM COATED ORAL at 06:50

## 2019-06-18 RX ADMIN — METOPROLOL TARTRATE 25 MG: 25 TABLET ORAL at 08:19

## 2019-06-18 RX ADMIN — METFORMIN HYDROCHLORIDE 500 MG: 500 TABLET ORAL at 08:12

## 2019-06-18 RX ADMIN — HYDROCODONE BITARTRATE AND ACETAMINOPHEN 2 TABLET: 5; 325 TABLET ORAL at 08:26

## 2019-06-18 RX ADMIN — HYDROCODONE BITARTRATE AND ACETAMINOPHEN 2 TABLET: 5; 325 TABLET ORAL at 02:58

## 2019-06-18 RX ADMIN — INSULIN LISPRO 2 UNITS: 100 INJECTION, SOLUTION INTRAVENOUS; SUBCUTANEOUS at 12:16

## 2019-06-18 NOTE — PAYOR COMM NOTE
"DISCHARGED        Chester Garcia (79 y.o. Male)     Date of Birth Social Security Number Address Home Phone MRN    [id number]  [address] [place] [address] [phone] [id number]    Buddhism Marital Status          Orthodoxy        Admission Date Admission Type Admitting Provider Attending Provider Department, Room/Bed    6/14/19 Elective Vincenzo Soliman DO  [address], P597/1    Discharge Date Discharge Disposition Discharge Destination        6/18/2019 Home or Self Care              Attending Provider:  (none)   Allergies:  Contrast Dye    Isolation:  None   Infection:  None   Code Status:  CPR    Ht:  190.5 cm (75\")   Wt:  113 kg (249 lb 12.5 oz)    Admission Cmt:  None   Principal Problem:  Cervical spinal stenosis [M48.02]                 Active Insurance as of 6/14/2019     Primary Coverage     Payor Plan Insurance Group Employer/Plan Group    [employer]      Payor Plan Address Payor Plan Phone Number Payor Plan Fax Number Effective Dates     [address] [phone]  5/1/2019 - None Entered    [facility] [id number]       Subscriber Name Subscriber Birth Date Member ID       CHESTER GARCIA 1940 [id number]                 Emergency Contacts      (Rel.) Home Phone Work Phone Mobile Phone    Sadie Garcia (Spouse) [phone] -- [phone]    YonathanJeanine (Daughter) -- -- [phone]    Duc (Son) -- -- [phone]               Physician Progress Notes      Vincenzo Soliman DO at 6/18/2019  2:00 PM        Patient is seen ambulating the halls in good spirits.  He would like to go home today.  Plan on discharge with follow-up in 2 weeks.    Electronically signed by Vincenzo Soliman DO at 6/18/2019  2:00 PM          Discharge Summary      Vincenzo Soliman DO at 6/18/2019  2:04 PM          Date of Admission: 6/14/2019  5:19 AM  Date of Discharge:  6/18/2019    Discharge Diagnosis: s/p Procedure(s):  C4-T2 POSTERIOR " DECOMPRESSION AND FUSION BONE MORPHOGENIC PROTEIN,      Presenting Problem/History of Present Illness  Cervical spinal stenosis [M48.02]     Attending Physician: Vincenzo Soliman DO    Consults:   Consults     Date and Time Order Name Status Description    6/16/2019 1952 Inpatient Neurology Consult General Completed     6/14/2019 1230 Inpatient Consult to Hospitalist Completed           Procedures Performed  Procedure(s):  C4-T2 POSTERIOR DECOMPRESSION AND FUSION BONE MORPHOGENIC PROTEIN       Hospital Course  Patient is a 79 y.o. 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  The patient presented to the operating room for surgery.  DVT and antibiotic prophylaxis were given.  The patient was transferred to [facility].   Physical therapy was consulted to assist with ambulation and transfers.  Wilder catheter was removed.    Condition on Discharge:  The patient's pain was adequately controlled and the patient was thought to be hemodynamically stable for discharge.    Discharge Disposition  Home or Self Care    Discharge Medications     Discharge Medications      New Medications      Instructions Start Date   cyclobenzaprine 10 MG tablet  Commonly known as:  FLEXERIL   10 mg, Oral, 3 Times Daily PRN      HYDROcodone-acetaminophen 5-325 MG per tablet  Commonly known as:  NORCO   1 tablet, Oral, Every 4 Hours PRN         Changes to Medications      Instructions Start Date   insulin glargine 100 UNIT/ML injection  Commonly known as:  LANTUS  What changed:  Another medication with the same name was changed. Make sure you understand how and when to take each.   30 Units, Subcutaneous, Every Morning      insulin glargine 100 UNIT/ML injection  Commonly known as:  LANTUS  What changed:  how much to take   28 Units, Subcutaneous, Nightly         Continue These Medications      Instructions Start Date   ARTIFICIAL TEAR OP   1 drop, Ophthalmic, As Needed, Both eyes      aspirin  81 MG EC tablet   81 mg, Oral, Daily, STOPPED 6/7/19      atorvastatin 80 MG tablet  Commonly known as:  LIPITOR   80 mg, Oral, Daily      diclofenac 1 % gel gel  Commonly known as:  VOLTAREN   4 g, Topical, 4 Times Daily PRN, STOPPED PREOP, LAST DOSE 10/2017      docusate sodium 250 MG capsule  Commonly known as:  COLACE   250 mg, Oral, 2 Times Daily PRN      gabapentin 300 MG capsule  Commonly known as:  NEURONTIN   600 mg, Oral, 3 Times Daily      LASIX 20 MG tablet  Generic drug:  furosemide   20 mg, Oral, 2 Times Daily      lisinopril 5 MG tablet  Commonly known as:  PRINIVIL,ZESTRIL   5 mg, Oral, Daily, 1/2 TAB EACH DOSE      metFORMIN 500 MG tablet  Commonly known as:  GLUCOPHAGE   500 mg, Oral, 2 Times Daily With Meals      metoprolol tartrate 25 MG tablet  Commonly known as:  LOPRESSOR   25 mg, Oral, Every 12 Hours Scheduled, 1/2 TAB EACH DOSE      rOPINIRole 4 MG tablet  Commonly known as:  REQUIP   4 mg, Oral, Nightly      vitamin B-12 100 MCG tablet  Commonly known as:  CYANOCOBALAMIN   100 mcg, Oral, Daily             Discharge Diet:   Diet Instructions     Diet:      Diet Texture / Consistency:  Regular          Activity at Discharge:   Activity Instructions     Other Restrictions (Specify)      Post-op Instructions  cervical fusion  dr. mike carroll       What are my limitations after surgery?  No lifting over 5 lbs, pushing, pulling, or twisting for the first 6 weeks. In general, do not lift anything heavier than a gallon of milk. It is ok to put on your socks and shoes.   Walking after surgery helps speed recovery and also helps to prevent post-operative blood clots.  You are encouraged to walk daily and increase distance as tolerated.  Activity will be gradually increased after 6 weeks. This will be discussed in detail at your follow up appointment.    You may find it more comfortable to sleep in a recliner or with your head elevated and arms propped on pillows to take the strain off of your  neck.  If your surgery was done through the front of your neck, you may notice some difficulty swallowing or hoarseness after surgery.  This usually improves gradually over the course of weeks. Start with soft food and advance your diet as tolerated.  You may drive after 2 weeks. Do not drive while on narcotic pain medication.       Wound care  You may have a drain placed to help with swelling. Empty this as needed. You will need to come to the office the day after surgery to have the drain removed.  You may remove your dressing after 48 hours. If there is drainage from the incision after 48 hours, apply a clean dressing as needed. Keep the incision site clean and dry.  Your incision is closed with medical glue and internal sutures.  Unless you are told otherwise, there are no stitches to be removed.  Do not scrub the incision site or pick at the glue.  The glue helps to hold your incision closed as your body heals. It also helps to prevent infection.  Showering is permitted after the first 48 hours as long as the wound is covered and kept dry. Do not get the incision site wet until it is completely scabbed over and no longer draining.   Do not apply any lotions or ointments to the wound until it is completely healed.  Do not submerge the incision site in a bathtub or pool until it is completely healed and free of scab.  It is normal to have numbness around the incision.    Pain Control, Medication and Refills  You will be given prescriptions for pain medication at the time of discharge.   You may reduce pain and swelling by applying ice to your neck for 15 minutes at a time. You can do this several times a day for the first few days after surgery.   It is normal to have some numbness around the incision site.  Call the office for prescription refills. PLEASE GIVE 48 HOURS NOTICE FOR ALL REFILL REQUESTS. ALSO NOTE WE DO NOT TAKE PRESCRIPTION CALLS ON WEEKENDS OR HOLIDAYS.  Narcotics and inactivity can cause  constipation. Drink plenty of fluids and eat a high fiber diet. You may need to take an over-the-counter stool softener or laxative.  As the pain improves, you may try taking over the counter acetaminophen (Tylenol) for pain instead of narcotics. Most narcotics also contain Tylenol, so be careful if you are combining these drugs with other Tylenol containing products. Do not exceed the recommended daily dose of Tylenol.  Some studies have implied that anti-inflammatory medicine (NSAIDs) can delay bone healing.  While I recommend avoiding prescription anti-inflammatory medicines, it is probably OK to take over the counter NSAIDs. However, you may want to minimize their use if possible during the first three months after surgery. Examples of prescription NSAIDs include meloxicam (Mobic), Celebrex, Diclofenac, Voltaren, Indocin and Toradol. OTC NSAIDs include ibuprofen (Motrin, Advil), naproxen (Aleve).        Who do I call if I have problems after surgery?  What should I look for?  Please call our office at [phone] if you develop any of the following:   Fever (over 101.5°), chills, or sweats  New weakness that began after you left the hospital  New bowel or bladder difficulty  Excessive swelling around your incision, excessive drainage or pus coming from the incision site  Difficulty breathing or swallowing  New pain or swelling in the calves  Chest pain or shortness of breath  Regular Office hours: Monday - Friday 8:00 a.m. - 4:30 p.m.  After hours emergencies: you may call the answering service at the same number or go to the emergency room    When is my next office appointment?  You should have your first post-op visit in 2 weeks.   Pay attention to the location of your post-operative visit.  It will be at one of our two locations:    [facility]                   [address]                    [address]                    [place] [address]    [facility]                    [address]                    [address]                   [place] [address]          Follow-up Appointments  2 weeks                   Electronically signed by Vincenzo Soliman DO at 6/18/2019  2:04 PM

## 2019-06-18 NOTE — PLAN OF CARE
Problem: Patient Care Overview  Goal: Plan of Care Review  Outcome: Ongoing (interventions implemented as appropriate)   06/18/19 0400   Coping/Psychosocial   Plan of Care Reviewed With patient   Plan of Care Review   Progress improving   OTHER   Outcome Summary VSS: pt received PO pain med x2; dressing to upper back is C/D/I; Possible discharge soon; will conitnue to monitor       Problem: Fall Risk (Adult)  Goal: Absence of Fall  Outcome: Ongoing (interventions implemented as appropriate)      Problem: Pain, Chronic (Adult)  Goal: Acceptable Pain/Comfort Level and Functional Ability  Outcome: Ongoing (interventions implemented as appropriate)

## 2019-06-18 NOTE — PROGRESS NOTES
Patient is seen ambulating the halls in good spirits.  He would like to go home today.  Plan on discharge with follow-up in 2 weeks.

## 2019-06-18 NOTE — NURSING NOTE
S/W Wale and she has [facility] and [facility] are working on.  [facility] will need to follow [facility] with [facility] for any issues re: walker. Shira Vines RN

## 2019-06-18 NOTE — PROGRESS NOTES
Case Management Discharge Note    Final Note: Home    Destination      No service has been selected for the patient.      Durable Medical Equipment      No service has been selected for the patient.      Dialysis/Infusion      No service has been selected for the patient.      Home Medical Care      No service has been selected for the patient.      Therapy      No service has been selected for the patient.      Community Resources      No service has been selected for the patient.        Transportation Services  Private: Car

## 2019-06-18 NOTE — PAYOR COMM NOTE
"Chester Garcia (79 y.o. Male)     Date of Birth Social Security Number Address Home Phone MRN    1940  [address] [place] [address] [phone] [id number]    Holiness Marital Status          Presybeterian        Admission Date Admission Type Admitting Provider Attending Provider Department, Room/Bed    19 Elective Vincenzo Soliman, Vincenzo Lopez,  [address], P597/1    Discharge Date Discharge Disposition Discharge Destination                       Attending Provider:  Vincenzo Soliman DO    Allergies:  Contrast Dye    Isolation:  None   Infection:  None   Code Status:  CPR    Ht:  190.5 cm (75\")   Wt:  113 kg (249 lb 12.5 oz)    Admission Cmt:  None   Principal Problem:  Cervical spinal stenosis [M48.02]                 Active Insurance as of 2019     Primary Coverage     Payor Plan Insurance Group Employer/Plan Group    [employer] [facility]      Payor Plan Address Payor Plan Phone Number Payor Plan Fax Number Effective Dates    [address] [phone]  2019 - None Entered    [facility] [id number]       Subscriber Name Subscriber Birth Date Member ID       CHESTER GARCIA 1940 [id number]                 Emergency Contacts      (Rel.) Home Phone Work Phone Mobile Phone    Sadie Garcia (Spouse) [phone] -- [phone]    YonathanJeanine (Daughter) -- -- [phone]    GarciaDuc underwood (Son) -- -- [phone]               Physician Progress Notes       Hardy Sandy MD at 2019  3:36 PM               LOS: 3 days     Name: Chester Garcia  Age/Sex: 79 y.o. male  :  1940        PCP: Demetrio Ko DO  No chief complaint on file.     Subjective   Feeling ok denies symptoms of low or high blood sugars    General: No Fever or Chills, Cardiac: No Chest Pain or Palpitations, Resp: No Cough or SOA, GI: No Nausea, Vomiting, or Diarrhea and Other: No bleeding      atorvastatin 80 mg Oral Daily "   furosemide 20 mg Oral BID   gabapentin 600 mg Oral TID   insulin glargine 30 Units Subcutaneous Nightly   insulin glargine 32 Units Subcutaneous QAM   insulin lispro 0-9 Units Subcutaneous 4x Daily With Meals & Nightly   lisinopril 5 mg Oral Daily   metFORMIN 500 mg Oral BID With Meals   metoprolol tartrate 25 mg Oral Q12H   rOPINIRole 4 mg Oral Nightly   sodium chloride 3 mL Intravenous Q12H   vitamin B-12 100 mcg Oral Daily       sodium chloride 100 mL/hr Last Rate: 100 mL/hr (06/16/19 2052)       Objective   Vital Signs  Temp:  [97.4 °F (36.3 °C)-99.4 °F (37.4 °C)] 97.7 °F (36.5 °C)  Heart Rate:  [70-89] 80  Resp:  [16-20] 18  BP: (129-163)/(65-80) 146/72  Body mass index is 31.22 kg/m².    Intake/Output Summary (Last 24 hours) at 6/17/2019 1536  Last data filed at 6/17/2019 0600  Gross per 24 hour   Intake --   Output 940 ml   Net -940 ml       Physical Exam   Constitutional: He is oriented to person, place, and time. He appears well-developed and well-nourished.   HENT:   Head: Normocephalic and atraumatic.   Cardiovascular: Normal rate, regular rhythm and normal heart sounds.   Pulmonary/Chest: Effort normal and breath sounds normal.   Abdominal: Soft. Bowel sounds are normal. He exhibits no distension.   Neurological: He is alert and oriented to person, place, and time.   Skin: Skin is warm and dry.   Psychiatric: He has a normal mood and affect. His behavior is normal.   Nursing note and vitals reviewed.        Results Review:       I reviewed the patient's new clinical results.  Results from last 7 days   Lab Units 06/17/19  0507 06/16/19  1956 06/15/19  0452   WBC 10*3/mm3 10.50 12.10*  --    HEMOGLOBIN g/dL 11.1* 11.9* 11.8*   PLATELETS 10*3/mm3 144 159  --      Results from last 7 days   Lab Units 06/17/19  0507 06/15/19  0452   SODIUM mmol/L 137 140   POTASSIUM mmol/L 4.0 4.1   CHLORIDE mmol/L 102 105   CO2 mmol/L 26.2 26.3   BUN mg/dL 16 19   CREATININE mg/dL 1.08 1.29*   CALCIUM mg/dL 7.9* 8.0*    MAGNESIUM mg/dL 2.3  --    PHOSPHORUS mg/dL 2.2*  --    Estimated Creatinine Clearance: 77.7 mL/min (by C-G formula based on SCr of 1.08 mg/dL).  No results found for: HGBA1C  Glucose   Date/Time Value Ref Range Status   2019 1117 167 (H) 70 - 130 mg/dL Final   2019 0732 141 (H) 70 - 130 mg/dL Final   2019 2138 188 (H) 70 - 130 mg/dL Final   2019 1935 218 (H) 70 - 130 mg/dL Final   2019 1716 199 (H) 70 - 130 mg/dL Final   2019 1120 244 (H) 70 - 130 mg/dL Final   2019 0729 182 (H) 70 - 130 mg/dL Final   06/15/2019 1949 160 (H) 70 - 130 mg/dL Final         Assessment/Plan     Cervical spinal stenosis    Hypertension    Sleep apnea    Diabetes mellitus (CMS/HCC)    Coronary artery disease    RLS (restless legs syndrome)      PLAN  -Blood sugars improved with adjustment to his basal insulin  -Episode overnight not apparently related to hypo-or hyperglycemia based on Accu-Cheks, question related to metabolic issues from anesthesia continue to monitor neurology's been consulted  -Defer postoperative care to the primary team he is postop day 2 from a decompression and fusion of the cervical and thoracic spine.  -Hemoglobin down around 3 g since admission to the hospital consistent with postoperative acute blood loss anemia expected following the procedure hemoglobin has stabilized.    Disposition  Per primary      Hardy Sandy MD  [facility]  19  3:36 PM            Electronically signed by Hardy Sandy MD at 2019  3:40 PM          Consult Notes      Duarte Rich MD at 2019  2:19 PM      Consult Orders    1. Inpatient Neurology Consult General [[id number]] ordered by Fabrice Smiley MD at 19                  Patient Identification:  NAME:  Chester Gama  Age:  79 y.o.   Sex:  male   :  1940   MRN:  [id number]       Chief complaint: He does not have one/ reason for consult tremor in upper  extremities    History of present illness: This patient is 79-year-old right-handed white male with history of hypertension hyperlipidemia lipidemia diabetes depression who comes to the hospital for a cervical spine operation he is done very well no complications earlier today he had some severe shaking in the upper extremity superimposed on all movements context patient is on multiple different medications at this time has gone through anesthesia as well and surgery without complications location was both upper extremities associated symptoms he was not unconscious he was able to talk during this quality as described duration is noted modifying factors it just went away his wife notes he is awake and alert today there was no loss of consciousness with any of this and he feels fine now      Past medical history:  Past Medical History:   Diagnosis Date   • Arthritis     HANDS KNEES   • Cervical pain (neck)     LEFT ARM PARESTHESIA   • Coronary artery disease    • Depression    • Diabetes mellitus (CMS/HCC)     TYPE 2   • Diverticular disease     HX, S/P RESECTION   • Hx of heart artery stent    • Hyperlipemia    • Hypertension    • PTSD (post-traumatic stress disorder)    • RLS (restless legs syndrome)    • Sleep apnea     DIDNT TOLERATE MASK, CLAUSTROPHOBIC   • Structural abnormality of kidney     SAW NEPHROLOGIST FOR SMALLER KIDNEY - 3 YEARS AGO.   • Thyroid cyst     PER WIFE       Past surgical history:  Past Surgical History:   Procedure Laterality Date   • ANTERIOR CERVICAL DISCECTOMY W/ FUSION N/A 1/3/2018    Procedure: ANTERIOR CERVICAL DECOMPRESSION & FUSION C4 6;  Surgeon: Vincenzo Soliman DO;  Location: [facility];  Service:    • APPENDECTOMY     • CATARACT EXTRACTION W/ INTRAOCULAR LENS  IMPLANT, BILATERAL     • COLON RESECTION      COLOSTOMY X 6 MOS THEN REVERSED.   • CORONARY ANGIOPLASTY WITH STENT PLACEMENT  02/10/2010    [email or web address]       Allergies:  Contrast dye    Home  medications:  Medications Prior to Admission   Medication Sig Dispense Refill Last Dose   • ARTIFICIAL TEAR OP Apply 1 drop to eye As Needed. Both eyes   Past Month at Unknown time   • atorvastatin (LIPITOR) 80 MG tablet Take 80 mg by mouth Daily.   6/13/2019 at 0800   • docusate sodium (COLACE) 250 MG capsule Take 250 mg by mouth 2 (Two) Times a Day As Needed for Constipation.   6/12/2019 at 0800   • furosemide (LASIX) 20 MG tablet Take 20 mg by mouth 2 (Two) Times a Day.   6/13/2019 at 2100   • gabapentin (NEURONTIN) 300 MG capsule Take 600 mg by mouth 3 (Three) Times a Day.   6/14/2019 at 0400   • insulin glargine (LANTUS) 100 UNIT/ML injection Inject 30 Units under the skin Every Morning.   6/13/2019 at 20400   • insulin glargine (LANTUS) 100 UNIT/ML injection Inject 28 Units under the skin Every Night. (Patient taking differently: Inject 30 Units under the skin into the appropriate area as directed Every Night.) 1 Units 0 6/13/2019 at 2000   • lisinopril (PRINIVIL,ZESTRIL) 5 MG tablet Take 5 mg by mouth Daily. 1/2 TAB EACH DOSE   6/13/2019 at 0800   • metFORMIN (GLUCOPHAGE) 500 MG tablet Take 500 mg by mouth 2 (Two) Times a Day With Meals.   6/13/2019 at 1800   • metoprolol tartrate (LOPRESSOR) 25 MG tablet Take 25 mg by mouth Every 12 (Twelve) Hours. 1/2 TAB EACH DOSE   6/14/2019 at 0400   • rOPINIRole (REQUIP) 4 MG tablet Take 4 mg by mouth Every Night.   6/13/2019 at 2100   • vitamin B-12 (CYANOCOBALAMIN) 100 MCG tablet Take 100 mcg by mouth Daily.   6/13/2019 at 0800   • aspirin 81 MG EC tablet Take 81 mg by mouth Daily. STOPPED 6/7/19 6/7/2019   • diclofenac (VOLTAREN) 1 % gel gel Apply 4 g topically to the appropriate area as directed 4 (Four) Times a Day As Needed (ON HOLS FOR SX). STOPPED PREOP, LAST DOSE 10/2017    12/3/2017        Hospital medications:    atorvastatin 80 mg Oral Daily   furosemide 20 mg Oral BID   gabapentin 600 mg Oral TID   insulin glargine 30 Units Subcutaneous Nightly   insulin  glargine 32 Units Subcutaneous QAM   insulin lispro 0-9 Units Subcutaneous 4x Daily With Meals & Nightly   lisinopril 5 mg Oral Daily   metFORMIN 500 mg Oral BID With Meals   metoprolol tartrate 25 mg Oral Q12H   rOPINIRole 4 mg Oral Nightly   sodium chloride 3 mL Intravenous Q12H   vitamin B-12 100 mcg Oral Daily       sodium chloride 100 mL/hr Last Rate: 100 mL/hr (19)     bisacodyl  •  bisacodyl  •  cyclobenzaprine  •  dextrose  •  dextrose  •  dextrose  •  dextrose  •  diphenhydrAMINE  •  docusate sodium  •  glucagon (human recombinant)  •  glucagon (human recombinant)  •  HYDROcodone-acetaminophen  •  LORazepam  •  magnesium hydroxide  •  naloxone  •  ondansetron **OR** ondansetron  •  polyethylene glycol  •  promethazine **OR** promethazine **OR** promethazine  •  sennosides-docusate sodium  •  sodium chloride    Family history:  Family History   Problem Relation Age of Onset   • Malig Hyperthermia Neg Hx        Social history:  Social History     Tobacco Use   • Smoking status: Former Smoker     Years: 5.00     Types: Cigars     Last attempt to quit: 6/10/1966     Years since quittin.0   • Smokeless tobacco: Never Used   Substance Use Topics   • Alcohol use: No   • Drug use: No       Review of systems:    He denies hair eyes ears nose throat skin bone joint  lymphatic hematological oncologic complaints no chest pain abdominal pain bowel bladder incontinence fever chills or rash she does have some neck pain    Objective:  Vitals Ranges:   Temp:  [97.4 °F (36.3 °C)-99.4 °F (37.4 °C)] 97.7 °F (36.5 °C)  Heart Rate:  [70-89] 80  Resp:  [16-20] 18  BP: (129-163)/(65-80) 146/72      Physical Exam:  Awake alert flat affect fund of knowledge fair attention span concentration good recent remote memory fair language function normal well-developed well-nourished in no distress oriented x3 fund of knowledge poor pupils one half constricting to 1 eyes are conjugate face symmetrical tongue is midline  visual fields are full extraocular movements full horizontally there is some loss of upgaze he has bradykinesia but no real rigidity no resting tremor reflexes absent throughout toes downgoing bilaterally overall strength 5- out of 5 x4 he is a little apraxic and does not give a good effort normal light touch face both arms both legs coordination normal in the upper extremity Station gait I was afraid to check for fear would let him fall or pass out heart regular without murmur neck supple without bruits extremities no clubbing cyanosis edema he does not appear to have myoclonus at this time no asterixis no pronator drift    Results review:   I reviewed the patient's new clinical results.    Data review:  Lab Results (last 24 hours)     Procedure Component Value Units Date/Time    POC Glucose Once [[id number]]  (Abnormal) Collected:  06/17/19 1117    Specimen:  Blood Updated:  06/17/19 1119     Glucose 167 mg/dL     POC Glucose Once [[id number]]  (Abnormal) Collected:  06/17/19 0732    Specimen:  Blood Updated:  06/17/19 0735     Glucose 141 mg/dL     Magnesium [[id number]]  (Normal) Collected:  06/17/19 0507    Specimen:  Blood Updated:  06/17/19 0552     Magnesium 2.3 mg/dL     Renal Function Panel [[id number]]  (Abnormal) Collected:  06/17/19 0507    Specimen:  Blood Updated:  06/17/19 0552     Glucose 181 mg/dL      BUN 16 mg/dL      Creatinine 1.08 mg/dL      Sodium 137 mmol/L      Potassium 4.0 mmol/L      Chloride 102 mmol/L      CO2 26.2 mmol/L      Calcium 7.9 mg/dL      Albumin 3.20 g/dL      Phosphorus 2.2 mg/dL      Anion Gap 8.8 mmol/L      BUN/Creatinine Ratio 14.8     eGFR Non African Amer 66 mL/min/1.73     Narrative:       GFR Normal >60  Chronic Kidney Disease <60  Kidney Failure <15    Lactic Acid, Reflex [[id number]]  (Normal) Collected:  06/17/19 0507    Specimen:  Blood Updated:  06/17/19 0536     Lactate 0.9 mmol/L     CBC (No Diff) [[id number]]  (Abnormal) Collected:  06/17/19 0507    Specimen:   Blood Updated:  06/17/19 0527     WBC 10.50 10*3/mm3      RBC 3.85 10*6/mm3      Hemoglobin 11.1 g/dL      Hematocrit 35.2 %      MCV 91.4 fL      MCH 28.8 pg      MCHC 31.5 g/dL      RDW 13.5 %      RDW-SD 45.6 fl      MPV 9.7 fL      Platelets 144 10*3/mm3     Lactic Acid, Reflex Timer (This will reflex a repeat order 3-3:15 hours after ordered.) [[id number]] Collected:  06/16/19 1956    Specimen:  Blood Updated:  06/17/19 0000     Extra Tube Hold for add-ons.     Comment: Auto resulted.       POC Glucose Once [[id number]]  (Abnormal) Collected:  06/16/19 2138    Specimen:  Blood Updated:  06/16/19 2139     Glucose 188 mg/dL     Lactic Acid, Plasma [[id number]]  (Abnormal) Collected:  06/16/19 1956    Specimen:  Blood Updated:  06/16/19 2100     Lactate 2.8 mmol/L     CBC (No Diff) [[id number]]  (Abnormal) Collected:  06/16/19 1956    Specimen:  Blood Updated:  06/16/19 2018     WBC 12.10 10*3/mm3      RBC 4.10 10*6/mm3      Hemoglobin 11.9 g/dL      Hematocrit 38.0 %      MCV 92.7 fL      MCH 29.0 pg      MCHC 31.3 g/dL      RDW 13.7 %      RDW-SD 46.5 fl      MPV 9.9 fL      Platelets 159 10*3/mm3     POC Glucose Once [[id number]]  (Abnormal) Collected:  06/16/19 1935    Specimen:  Blood Updated:  06/16/19 1945     Glucose 218 mg/dL     POC Glucose Once [[id number]]  (Abnormal) Collected:  06/16/19 1716    Specimen:  Blood Updated:  06/16/19 1718     Glucose 199 mg/dL            Imaging:  Imaging Results (last 24 hours)     ** No results found for the last 24 hours. **             Assessment and Plan:       Cervical spinal stenosis    Hypertension    Sleep apnea    Diabetes mellitus (CMS/HCC)    Coronary artery disease    RLS (restless legs syndrome)    Metabolic encephalopathy with drug drug interaction making his mild clonus and asterixis very prominent at times.  Nonetheless this is not an acute stroke TIA or seizure he is awake during all of this at this point he has no further abnormal movement disorder and I  will not recommend any further testing or neurologic follow-up please recall me if he has any further symptoms thanks      Duarte Rich MD  06/17/19  2:19 PM    Electronically signed by Duarte Rich MD at 6/17/2019  2:22 PM

## 2019-06-18 NOTE — DISCHARGE INSTR - APPOINTMENTS
[facility]; they are working on getting this for you (started 6/17/19).

## 2019-06-18 NOTE — DISCHARGE SUMMARY
Date of Admission: 6/14/2019  5:19 AM  Date of Discharge:  6/18/2019    Discharge Diagnosis: s/p Procedure(s):  C4-T2 POSTERIOR DECOMPRESSION AND FUSION BONE MORPHOGENIC PROTEIN,      Presenting Problem/History of Present Illness  Cervical spinal stenosis [M48.02]     Attending Physician: Vincenzo Soliman DO    Consults:   Consults     Date and Time Order Name Status Description    6/16/2019 1952 Inpatient Neurology Consult General Completed     6/14/2019 1230 Inpatient Consult to Hospitalist Completed           Procedures Performed  Procedure(s):  C4-T2 POSTERIOR DECOMPRESSION AND FUSION BONE MORPHOGENIC PROTEIN       Hospital Course  Patient is a 79 y.o. 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  The patient presented to the operating room for surgery.  DVT and antibiotic prophylaxis were given.  The patient was transferred to [facility].   Physical therapy was consulted to assist with ambulation and transfers.  Wilder catheter was removed.    Condition on Discharge:  The patient's pain was adequately controlled and the patient was thought to be hemodynamically stable for discharge.    Discharge Disposition  Home or Self Care    Discharge Medications     Discharge Medications      New Medications      Instructions Start Date   cyclobenzaprine 10 MG tablet  Commonly known as:  FLEXERIL   10 mg, Oral, 3 Times Daily PRN      HYDROcodone-acetaminophen 5-325 MG per tablet  Commonly known as:  NORCO   1 tablet, Oral, Every 4 Hours PRN         Changes to Medications      Instructions Start Date   insulin glargine 100 UNIT/ML injection  Commonly known as:  LANTUS  What changed:  Another medication with the same name was changed. Make sure you understand how and when to take each.   30 Units, Subcutaneous, Every Morning      insulin glargine 100 UNIT/ML injection  Commonly known as:  LANTUS  What changed:  how much to take   28 Units, Subcutaneous, Nightly          Continue These Medications      Instructions Start Date   ARTIFICIAL TEAR OP   1 drop, Ophthalmic, As Needed, Both eyes      aspirin 81 MG EC tablet   81 mg, Oral, Daily, STOPPED 6/7/19      atorvastatin 80 MG tablet  Commonly known as:  LIPITOR   80 mg, Oral, Daily      diclofenac 1 % gel gel  Commonly known as:  VOLTAREN   4 g, Topical, 4 Times Daily PRN, STOPPED PREOP, LAST DOSE 10/2017      docusate sodium 250 MG capsule  Commonly known as:  COLACE   250 mg, Oral, 2 Times Daily PRN      gabapentin 300 MG capsule  Commonly known as:  NEURONTIN   600 mg, Oral, 3 Times Daily      LASIX 20 MG tablet  Generic drug:  furosemide   20 mg, Oral, 2 Times Daily      lisinopril 5 MG tablet  Commonly known as:  PRINIVIL,ZESTRIL   5 mg, Oral, Daily, 1/2 TAB EACH DOSE      metFORMIN 500 MG tablet  Commonly known as:  GLUCOPHAGE   500 mg, Oral, 2 Times Daily With Meals      metoprolol tartrate 25 MG tablet  Commonly known as:  LOPRESSOR   25 mg, Oral, Every 12 Hours Scheduled, 1/2 TAB EACH DOSE      rOPINIRole 4 MG tablet  Commonly known as:  REQUIP   4 mg, Oral, Nightly      vitamin B-12 100 MCG tablet  Commonly known as:  CYANOCOBALAMIN   100 mcg, Oral, Daily             Discharge Diet:   Diet Instructions     Diet:      Diet Texture / Consistency:  Regular          Activity at Discharge:   Activity Instructions     Other Restrictions (Specify)      Post-op Instructions  cervical fusion  dr. mike carroll       What are my limitations after surgery?  No lifting over 5 lbs, pushing, pulling, or twisting for the first 6 weeks. In general, do not lift anything heavier than a gallon of milk. It is ok to put on your socks and shoes.   Walking after surgery helps speed recovery and also helps to prevent post-operative blood clots.  You are encouraged to walk daily and increase distance as tolerated.  Activity will be gradually increased after 6 weeks. This will be discussed in detail at your follow up appointment.    You may find  it more comfortable to sleep in a recliner or with your head elevated and arms propped on pillows to take the strain off of your neck.  If your surgery was done through the front of your neck, you may notice some difficulty swallowing or hoarseness after surgery.  This usually improves gradually over the course of weeks. Start with soft food and advance your diet as tolerated.  You may drive after 2 weeks. Do not drive while on narcotic pain medication.       Wound care  You may have a drain placed to help with swelling. Empty this as needed. You will need to come to the office the day after surgery to have the drain removed.  You may remove your dressing after 48 hours. If there is drainage from the incision after 48 hours, apply a clean dressing as needed. Keep the incision site clean and dry.  Your incision is closed with medical glue and internal sutures.  Unless you are told otherwise, there are no stitches to be removed.  Do not scrub the incision site or pick at the glue.  The glue helps to hold your incision closed as your body heals. It also helps to prevent infection.  Showering is permitted after the first 48 hours as long as the wound is covered and kept dry. Do not get the incision site wet until it is completely scabbed over and no longer draining.   Do not apply any lotions or ointments to the wound until it is completely healed.  Do not submerge the incision site in a bathtub or pool until it is completely healed and free of scab.  It is normal to have numbness around the incision.    Pain Control, Medication and Refills  You will be given prescriptions for pain medication at the time of discharge.   You may reduce pain and swelling by applying ice to your neck for 15 minutes at a time. You can do this several times a day for the first few days after surgery.   It is normal to have some numbness around the incision site.  Call the office for prescription refills. PLEASE GIVE 48 HOURS NOTICE FOR ALL  REFILL REQUESTS. ALSO NOTE WE DO NOT TAKE PRESCRIPTION CALLS ON WEEKENDS OR HOLIDAYS.  Narcotics and inactivity can cause constipation. Drink plenty of fluids and eat a high fiber diet. You may need to take an over-the-counter stool softener or laxative.  As the pain improves, you may try taking over the counter acetaminophen (Tylenol) for pain instead of narcotics. Most narcotics also contain Tylenol, so be careful if you are combining these drugs with other Tylenol containing products. Do not exceed the recommended daily dose of Tylenol.  Some studies have implied that anti-inflammatory medicine (NSAIDs) can delay bone healing.  While I recommend avoiding prescription anti-inflammatory medicines, it is probably OK to take over the counter NSAIDs. However, you may want to minimize their use if possible during the first three months after surgery. Examples of prescription NSAIDs include meloxicam (Mobic), Celebrex, Diclofenac, Voltaren, Indocin and Toradol. OTC NSAIDs include ibuprofen (Motrin, Advil), naproxen (Aleve).        Who do I call if I have problems after surgery?  What should I look for?  Please call our office at [phone] if you develop any of the following:   Fever (over 101.5°), chills, or sweats  New weakness that began after you left the hospital  New bowel or bladder difficulty  Excessive swelling around your incision, excessive drainage or pus coming from the incision site  Difficulty breathing or swallowing  New pain or swelling in the calves  Chest pain or shortness of breath  Regular Office hours: Monday - Friday 8:00 a.m. - 4:30 p.m.  After hours emergencies: you may call the answering service at the same number or go to the emergency room    When is my next office appointment?  You should have your first post-op visit in 2 weeks.   Pay attention to the location of your post-operative visit.  It will be at one of our two locations:    [facility]                   [address]                    [address]                    [place] [address]    [facility]                   [address]                    [address]                   [place] [address]          Follow-up Appointments  2 weeks

## 2019-06-19 ENCOUNTER — READMISSION MANAGEMENT (OUTPATIENT)
Dept: CALL CENTER | Facility: HOSPITAL | Age: 79
End: 2019-06-19

## 2019-06-20 ENCOUNTER — READMISSION MANAGEMENT (OUTPATIENT)
Dept: CALL CENTER | Facility: HOSPITAL | Age: 79
End: 2019-06-20

## 2019-06-20 NOTE — OUTREACH NOTE
General Surgery Week 1 Survey      Responses   Facility patient discharged from?  [place]   Does the patient have one of the following disease processes/diagnoses(primary or secondary)?  General Surgery   Is there a successful TCM telephone encounter documented?  No   Week 1 attempt successful?  Yes   Call start time  1430   Call end time  1434   Is patient permission given to speak with other caregiver?  Yes   List who call center can speak with  Sadie   Medication alerts for this patient  reviewed the medicines with the patient   Meds reviewed with patient/caregiver?  Yes   Is the patient having any side effects they believe may be caused by any medication additions or changes?  No   Does the patient have all medications related to this admission filled (includes all antibiotics, pain medications, etc.)  Yes   Is the patient taking all medications as directed (includes completed medication regime)?  Yes   Does the patient have a follow up appointment scheduled with their surgeon?  Yes   Has the patient kept scheduled appointments due by today?  N/A   Comments  has appointments scheduled   Has home health visited the patient within 72 hours of discharge?  N/A [no home health]   What DME was ordered?  [facility], walker, and out pt PT   Did the patient receive a copy of their discharge instructions?  Yes   Nursing interventions  Reviewed instructions with patient   What is the patient's perception of their health status since discharge?  Improving   Is the patient /caregiver able to teach back basic post-op care?  Drive as instructed by MD in discharge instructions, Take showers only when approved by MD-sponge bathe until then, No tub bath, swimming, or hot tub until instructed by MD, Keep incision areas clean,dry and protected, Lifting as instructed by MD in discharge instructions   Is the patient/caregiver able to teach back signs and symptoms of incisional infection?  Increased redness, swelling or  pain at the incisonal site, Increased drainage or bleeding, Incisional warmth, Pus or odor from incision, Fever   Is the patient/caregiver able to teach back steps to recovery at home?  Rest and rebuild strength, gradually increase activity, Eat a well-balance diet   If the patient is a current smoker, are they able to teach back resources for cessation?  -- [non smoker]   Is the patient/caregiver able to teach back the hierarchy of who to call/visit for symptoms/problems? PCP, Specialist, Home health nurse, Urgent Care, ED, 911  Yes   Week 1 call completed?  Yes   Wrap up additional comments  wife feels he is improving and they have had a walk          Yudith Villa RN

## 2019-06-20 NOTE — OUTREACH NOTE
Prep Survey      Responses   Facility patient discharged from?  [place]   Is patient eligible?  Yes   Discharge diagnosis  C4-T2 POSTERIOR DECOMPRESSION AND FUSION BONE MORPHOGENIC PROTEIN   Does the patient have one of the following disease processes/diagnoses(primary or secondary)?  General Surgery   Is there a DME ordered?  Yes   What DME was ordered?  [facility] clinic, walker, and out pt PT   Prep survey completed?  Yes          Thea Livingston RN

## 2019-06-28 ENCOUNTER — READMISSION MANAGEMENT (OUTPATIENT)
Dept: CALL CENTER | Facility: HOSPITAL | Age: 79
End: 2019-06-28

## 2019-06-28 NOTE — OUTREACH NOTE
General Surgery Week 2 Survey      Responses   Facility patient discharged from?  [place]   Does the patient have one of the following disease processes/diagnoses(primary or secondary)?  General Surgery   Week 2 attempt successful?  Yes   Call start time  1349   Call end time  1402   Discharge diagnosis  C4-T2 POSTERIOR DECOMPRESSION AND FUSION BONE MORPHOGENIC PROTEIN   Is patient permission given to speak with other caregiver?  Yes   Person spoke with today (if not patient) and relationship  Sadie Gama Spouse    Meds reviewed with patient/caregiver?  Yes   Is the patient having any side effects they believe may be caused by any medication additions or changes?  No   Does the patient have all medications related to this admission filled (includes all antibiotics, pain medications, etc.)  Yes   Is the patient taking all medications as directed (includes completed medication regime)?  Yes   Does the patient have a follow up appointment scheduled with their surgeon?  Yes   Has the patient kept scheduled appointments due by today?  Yes   Comments  Will see Surgeon soon.   Has home health visited the patient within 72 hours of discharge?  N/A   What DME was ordered?  [facility], walker, and out pt PT   Has all DME been delivered?  Yes   Psychosocial issues?  No   Did the patient receive a copy of their discharge instructions?  Yes   Nursing interventions  Reviewed instructions with patient   What is the patient's perception of their health status since discharge?  Improving   Nursing interventions  Nurse provided patient education   Is the patient /caregiver able to teach back basic post-op care?  Drive as instructed by MD in discharge instructions, Take showers only when approved by MD-sponge bathe until then, No tub bath, swimming, or hot tub until instructed by MD, Keep incision areas clean,dry and protected, Lifting as instructed by MD in discharge instructions   Is the patient/caregiver able to teach  back signs and symptoms of incisional infection?  Increased redness, swelling or pain at the incisonal site, Increased drainage or bleeding, Incisional warmth, Pus or odor from incision, Fever   Is the patient/caregiver able to teach back steps to recovery at home?  Rest and rebuild strength, gradually increase activity, Eat a well-balance diet, Set small, achievable goals for return to baseline health   Is the patient/caregiver able to teach back the hierarchy of who to call/visit for symptoms/problems? PCP, Specialist, Home health nurse, Urgent Care, ED, 911  Yes   Week 2 call completed?  Yes          Jorge Boyle RN

## 2019-07-30 ENCOUNTER — APPOINTMENT (OUTPATIENT)
Dept: GENERAL RADIOLOGY | Facility: HOSPITAL | Age: 79
End: 2019-07-30

## 2019-07-30 ENCOUNTER — APPOINTMENT (OUTPATIENT)
Dept: MRI IMAGING | Facility: HOSPITAL | Age: 79
End: 2019-07-30

## 2019-07-30 ENCOUNTER — HOSPITAL ENCOUNTER (INPATIENT)
Facility: HOSPITAL | Age: 79
LOS: 18 days | Discharge: HOME | End: 2019-08-17
Attending: HOSPITALIST | Admitting: HOSPITALIST

## 2019-07-30 ENCOUNTER — APPOINTMENT (OUTPATIENT)
Dept: CT IMAGING | Facility: HOSPITAL | Age: 79
End: 2019-07-30

## 2019-07-30 DIAGNOSIS — M54.2 NECK PAIN: Primary | ICD-10-CM

## 2019-07-30 PROBLEM — R07.9 CHEST PAIN: Status: ACTIVE | Noted: 2019-07-30

## 2019-07-30 PROBLEM — T81.49XA POSTOPERATIVE WOUND INFECTION: Status: ACTIVE | Noted: 2019-07-30

## 2019-07-30 PROBLEM — G06.1 ABSCESS IN EPIDURAL SPACE OF CERVICAL SPINE: Status: ACTIVE | Noted: 2019-07-30

## 2019-07-30 LAB
ANION GAP SERPL CALCULATED.3IONS-SCNC: 15.1 MMOL/L (ref 5–15)
BUN BLD-MCNC: 24 MG/DL (ref 8–23)
BUN/CREAT SERPL: 19.4 (ref 7–25)
CALCIUM SPEC-SCNC: 9.2 MG/DL (ref 8.6–10.5)
CHLORIDE SERPL-SCNC: 102 MMOL/L (ref 98–107)
CO2 SERPL-SCNC: 21.9 MMOL/L (ref 22–29)
CREAT BLD-MCNC: 1.24 MG/DL (ref 0.76–1.27)
CRP SERPL-MCNC: 0.06 MG/DL (ref 0–0.5)
D-LACTATE SERPL-SCNC: 1 MMOL/L (ref 0.5–2)
D-LACTATE SERPL-SCNC: 2.5 MMOL/L (ref 0.5–2)
DEPRECATED RDW RBC AUTO: 42.8 FL (ref 37–54)
ERYTHROCYTE [DISTWIDTH] IN BLOOD BY AUTOMATED COUNT: 13.5 % (ref 12.3–15.4)
ERYTHROCYTE [SEDIMENTATION RATE] IN BLOOD: 6 MM/HR (ref 0–20)
GFR SERPL CREATININE-BSD FRML MDRD: 57 ML/MIN/1.73
GLUCOSE BLD-MCNC: 182 MG/DL (ref 65–99)
GLUCOSE BLDC GLUCOMTR-MCNC: 121 MG/DL (ref 70–130)
GLUCOSE BLDC GLUCOMTR-MCNC: 132 MG/DL (ref 70–130)
GLUCOSE BLDC GLUCOMTR-MCNC: 142 MG/DL (ref 70–130)
GLUCOSE BLDC GLUCOMTR-MCNC: 200 MG/DL (ref 70–130)
HBA1C MFR BLD: 7.5 % (ref 4.8–5.6)
HCT VFR BLD AUTO: 41.6 % (ref 37.5–51)
HGB BLD-MCNC: 13.7 G/DL (ref 13–17.7)
HOLD SPECIMEN: NORMAL
MCH RBC QN AUTO: 28.7 PG (ref 26.6–33)
MCHC RBC AUTO-ENTMCNC: 32.9 G/DL (ref 31.5–35.7)
MCV RBC AUTO: 87.2 FL (ref 79–97)
PLATELET # BLD AUTO: 214 10*3/MM3 (ref 140–450)
PMV BLD AUTO: 9.9 FL (ref 6–12)
POTASSIUM BLD-SCNC: 3.7 MMOL/L (ref 3.5–5.2)
PROCALCITONIN SERPL-MCNC: 0.04 NG/ML (ref 0.1–0.25)
RBC # BLD AUTO: 4.77 10*6/MM3 (ref 4.14–5.8)
SODIUM BLD-SCNC: 139 MMOL/L (ref 136–145)
TROPONIN T SERPL-MCNC: <0.01 NG/ML (ref 0–0.03)
WBC NRBC COR # BLD: 10.89 10*3/MM3 (ref 3.4–10.8)

## 2019-07-30 PROCEDURE — 99223 1ST HOSP IP/OBS HIGH 75: CPT | Performed by: INTERNAL MEDICINE

## 2019-07-30 PROCEDURE — 0 GADOBENATE DIMEGLUMINE 529 MG/ML SOLUTION: Performed by: HOSPITALIST

## 2019-07-30 PROCEDURE — 72125 CT NECK SPINE W/O DYE: CPT

## 2019-07-30 PROCEDURE — 25010000002 PIPERACILLIN SOD-TAZOBACTAM PER 1 G: Performed by: NURSE PRACTITIONER

## 2019-07-30 PROCEDURE — 25010000002 VANCOMYCIN PER 500 MG: Performed by: NURSE PRACTITIONER

## 2019-07-30 PROCEDURE — 85652 RBC SED RATE AUTOMATED: CPT | Performed by: ORTHOPAEDIC SURGERY

## 2019-07-30 PROCEDURE — 84145 PROCALCITONIN (PCT): CPT | Performed by: NURSE PRACTITIONER

## 2019-07-30 PROCEDURE — 99222 1ST HOSP IP/OBS MODERATE 55: CPT | Performed by: ORTHOPAEDIC SURGERY

## 2019-07-30 PROCEDURE — 83605 ASSAY OF LACTIC ACID: CPT | Performed by: NURSE PRACTITIONER

## 2019-07-30 PROCEDURE — 87040 BLOOD CULTURE FOR BACTERIA: CPT | Performed by: NURSE PRACTITIONER

## 2019-07-30 PROCEDURE — 72050 X-RAY EXAM NECK SPINE 4/5VWS: CPT

## 2019-07-30 PROCEDURE — 63710000001 INSULIN GLARGINE PER 5 UNITS: Performed by: HOSPITALIST

## 2019-07-30 PROCEDURE — A9577 INJ MULTIHANCE: HCPCS | Performed by: HOSPITALIST

## 2019-07-30 PROCEDURE — 80048 BASIC METABOLIC PNL TOTAL CA: CPT | Performed by: NURSE PRACTITIONER

## 2019-07-30 PROCEDURE — 72156 MRI NECK SPINE W/O & W/DYE: CPT

## 2019-07-30 PROCEDURE — 86140 C-REACTIVE PROTEIN: CPT | Performed by: ORTHOPAEDIC SURGERY

## 2019-07-30 PROCEDURE — 63710000001 INSULIN LISPRO (HUMAN) PER 5 UNITS: Performed by: HOSPITALIST

## 2019-07-30 PROCEDURE — 84484 ASSAY OF TROPONIN QUANT: CPT | Performed by: HOSPITALIST

## 2019-07-30 PROCEDURE — 25010000002 DEXAMETHASONE PER 1 MG: Performed by: PHYSICIAN ASSISTANT

## 2019-07-30 PROCEDURE — 72070 X-RAY EXAM THORAC SPINE 2VWS: CPT

## 2019-07-30 PROCEDURE — 25010000002 HYDROMORPHONE PER 4 MG: Performed by: HOSPITALIST

## 2019-07-30 PROCEDURE — 83036 HEMOGLOBIN GLYCOSYLATED A1C: CPT | Performed by: NURSE PRACTITIONER

## 2019-07-30 PROCEDURE — 85027 COMPLETE CBC AUTOMATED: CPT | Performed by: NURSE PRACTITIONER

## 2019-07-30 PROCEDURE — 82962 GLUCOSE BLOOD TEST: CPT

## 2019-07-30 RX ORDER — DEXAMETHASONE SODIUM PHOSPHATE 4 MG/ML
4 INJECTION, SOLUTION INTRA-ARTICULAR; INTRALESIONAL; INTRAMUSCULAR; INTRAVENOUS; SOFT TISSUE EVERY 6 HOURS
Status: DISCONTINUED | OUTPATIENT
Start: 2019-07-30 | End: 2019-08-03

## 2019-07-30 RX ORDER — HYDROMORPHONE HYDROCHLORIDE 1 MG/ML
0.5 INJECTION, SOLUTION INTRAMUSCULAR; INTRAVENOUS; SUBCUTANEOUS
Status: DISCONTINUED | OUTPATIENT
Start: 2019-07-30 | End: 2019-07-30

## 2019-07-30 RX ORDER — NITROGLYCERIN 0.4 MG/1
0.4 TABLET SUBLINGUAL
Status: DISCONTINUED | OUTPATIENT
Start: 2019-07-30 | End: 2019-08-17 | Stop reason: HOSPADM

## 2019-07-30 RX ORDER — DEXTROSE MONOHYDRATE 25 G/50ML
25 INJECTION, SOLUTION INTRAVENOUS
Status: DISCONTINUED | OUTPATIENT
Start: 2019-07-30 | End: 2019-08-17 | Stop reason: SDUPTHER

## 2019-07-30 RX ORDER — NICOTINE POLACRILEX 4 MG
15 LOZENGE BUCCAL
Status: DISCONTINUED | OUTPATIENT
Start: 2019-07-30 | End: 2019-08-17 | Stop reason: SDUPTHER

## 2019-07-30 RX ORDER — GABAPENTIN 300 MG/1
300 CAPSULE ORAL 3 TIMES DAILY
Status: DISCONTINUED | OUTPATIENT
Start: 2019-07-30 | End: 2019-08-13

## 2019-07-30 RX ORDER — INSULIN GLARGINE 100 [IU]/ML
25 INJECTION, SOLUTION SUBCUTANEOUS EVERY MORNING
Status: DISCONTINUED | OUTPATIENT
Start: 2019-07-31 | End: 2019-08-01

## 2019-07-30 RX ORDER — OXYCODONE HYDROCHLORIDE AND ACETAMINOPHEN 5; 325 MG/1; MG/1
2 TABLET ORAL EVERY 4 HOURS PRN
Status: DISCONTINUED | OUTPATIENT
Start: 2019-07-30 | End: 2019-08-04

## 2019-07-30 RX ORDER — VANCOMYCIN HYDROCHLORIDE 1 G/200ML
1000 INJECTION, SOLUTION INTRAVENOUS EVERY 12 HOURS
Status: COMPLETED | OUTPATIENT
Start: 2019-07-30 | End: 2019-07-31

## 2019-07-30 RX ORDER — BISACODYL 5 MG/1
5 TABLET, DELAYED RELEASE ORAL DAILY PRN
Status: DISCONTINUED | OUTPATIENT
Start: 2019-07-30 | End: 2019-08-17 | Stop reason: HOSPADM

## 2019-07-30 RX ORDER — NICOTINE POLACRILEX 4 MG
15 LOZENGE BUCCAL
Status: DISCONTINUED | OUTPATIENT
Start: 2019-07-30 | End: 2019-08-17 | Stop reason: HOSPADM

## 2019-07-30 RX ORDER — NALOXONE HCL 0.4 MG/ML
0.1 VIAL (ML) INJECTION
Status: DISCONTINUED | OUTPATIENT
Start: 2019-07-30 | End: 2019-08-17 | Stop reason: HOSPADM

## 2019-07-30 RX ORDER — ROPINIROLE 2 MG/1
4 TABLET, FILM COATED ORAL NIGHTLY
Status: DISCONTINUED | OUTPATIENT
Start: 2019-07-30 | End: 2019-08-17 | Stop reason: HOSPADM

## 2019-07-30 RX ORDER — ACETAMINOPHEN 325 MG/1
650 TABLET ORAL EVERY 4 HOURS PRN
Status: DISCONTINUED | OUTPATIENT
Start: 2019-07-30 | End: 2019-08-17 | Stop reason: HOSPADM

## 2019-07-30 RX ORDER — FUROSEMIDE 20 MG/1
20 TABLET ORAL
Status: DISCONTINUED | OUTPATIENT
Start: 2019-07-30 | End: 2019-07-30

## 2019-07-30 RX ORDER — INSULIN GLARGINE 100 [IU]/ML
30 INJECTION, SOLUTION SUBCUTANEOUS EVERY MORNING
Status: DISCONTINUED | OUTPATIENT
Start: 2019-07-30 | End: 2019-07-30

## 2019-07-30 RX ORDER — SODIUM CHLORIDE 9 MG/ML
50 INJECTION, SOLUTION INTRAVENOUS CONTINUOUS
Status: DISCONTINUED | OUTPATIENT
Start: 2019-07-30 | End: 2019-08-09

## 2019-07-30 RX ORDER — ONDANSETRON 4 MG/1
4 TABLET, FILM COATED ORAL EVERY 6 HOURS PRN
Status: DISCONTINUED | OUTPATIENT
Start: 2019-07-30 | End: 2019-08-17 | Stop reason: HOSPADM

## 2019-07-30 RX ORDER — SODIUM CHLORIDE 0.9 % (FLUSH) 0.9 %
3 SYRINGE (ML) INJECTION EVERY 12 HOURS SCHEDULED
Status: DISCONTINUED | OUTPATIENT
Start: 2019-07-30 | End: 2019-08-17

## 2019-07-30 RX ORDER — INSULIN GLARGINE 100 [IU]/ML
28 INJECTION, SOLUTION SUBCUTANEOUS NIGHTLY
Status: DISCONTINUED | OUTPATIENT
Start: 2019-07-30 | End: 2019-07-30

## 2019-07-30 RX ORDER — SODIUM CHLORIDE 0.9 % (FLUSH) 0.9 %
3-10 SYRINGE (ML) INJECTION AS NEEDED
Status: DISCONTINUED | OUTPATIENT
Start: 2019-07-30 | End: 2019-08-17 | Stop reason: HOSPADM

## 2019-07-30 RX ORDER — HYDRALAZINE HYDROCHLORIDE 20 MG/ML
10 INJECTION INTRAMUSCULAR; INTRAVENOUS EVERY 4 HOURS PRN
Status: DISCONTINUED | OUTPATIENT
Start: 2019-07-30 | End: 2019-08-17 | Stop reason: HOSPADM

## 2019-07-30 RX ORDER — HYDROMORPHONE HCL IN 0.9% NACL 10 MG/50ML
PATIENT CONTROLLED ANALGESIA SYRINGE INTRAVENOUS CONTINUOUS
Status: DISCONTINUED | OUTPATIENT
Start: 2019-07-30 | End: 2019-08-04

## 2019-07-30 RX ORDER — CYCLOBENZAPRINE HCL 10 MG
10 TABLET ORAL 3 TIMES DAILY PRN
Status: DISCONTINUED | OUTPATIENT
Start: 2019-07-30 | End: 2019-08-11

## 2019-07-30 RX ORDER — CALCIUM CARBONATE 200(500)MG
2 TABLET,CHEWABLE ORAL 2 TIMES DAILY PRN
Status: DISCONTINUED | OUTPATIENT
Start: 2019-07-30 | End: 2019-08-17 | Stop reason: HOSPADM

## 2019-07-30 RX ORDER — DEXTROSE MONOHYDRATE 25 G/50ML
25 INJECTION, SOLUTION INTRAVENOUS
Status: DISCONTINUED | OUTPATIENT
Start: 2019-07-30 | End: 2019-08-17 | Stop reason: HOSPADM

## 2019-07-30 RX ORDER — INSULIN GLARGINE 100 [IU]/ML
20 INJECTION, SOLUTION SUBCUTANEOUS NIGHTLY
Status: DISCONTINUED | OUTPATIENT
Start: 2019-07-30 | End: 2019-07-31

## 2019-07-30 RX ORDER — ATORVASTATIN CALCIUM 80 MG/1
80 TABLET, FILM COATED ORAL DAILY
Status: DISCONTINUED | OUTPATIENT
Start: 2019-07-30 | End: 2019-08-17 | Stop reason: HOSPADM

## 2019-07-30 RX ORDER — ONDANSETRON 2 MG/ML
4 INJECTION INTRAMUSCULAR; INTRAVENOUS EVERY 6 HOURS PRN
Status: DISCONTINUED | OUTPATIENT
Start: 2019-07-30 | End: 2019-08-17 | Stop reason: HOSPADM

## 2019-07-30 RX ADMIN — SODIUM CHLORIDE, PRESERVATIVE FREE 3 ML: 5 INJECTION INTRAVENOUS at 22:03

## 2019-07-30 RX ADMIN — GABAPENTIN 300 MG: 300 CAPSULE ORAL at 22:04

## 2019-07-30 RX ADMIN — DEXAMETHASONE SODIUM PHOSPHATE 4 MG: 4 INJECTION, SOLUTION INTRAMUSCULAR; INTRAVENOUS at 22:03

## 2019-07-30 RX ADMIN — OXYCODONE HYDROCHLORIDE AND ACETAMINOPHEN 2 TABLET: 5; 325 TABLET ORAL at 19:55

## 2019-07-30 RX ADMIN — VANCOMYCIN HYDROCHLORIDE 1000 MG: 1 INJECTION, SOLUTION INTRAVENOUS at 22:02

## 2019-07-30 RX ADMIN — INSULIN GLARGINE 20 UNITS: 100 INJECTION, SOLUTION SUBCUTANEOUS at 22:22

## 2019-07-30 RX ADMIN — SODIUM CHLORIDE 500 ML: 9 INJECTION, SOLUTION INTRAVENOUS at 10:18

## 2019-07-30 RX ADMIN — ROPINIROLE 4 MG: 2 TABLET, FILM COATED ORAL at 20:02

## 2019-07-30 RX ADMIN — GADOBENATE DIMEGLUMINE 20 ML: 529 INJECTION, SOLUTION INTRAVENOUS at 14:37

## 2019-07-30 RX ADMIN — OXYCODONE HYDROCHLORIDE AND ACETAMINOPHEN 2 TABLET: 5; 325 TABLET ORAL at 14:52

## 2019-07-30 RX ADMIN — HYDROMORPHONE HYDROCHLORIDE: 10 INJECTION INTRAMUSCULAR; INTRAVENOUS; SUBCUTANEOUS at 10:01

## 2019-07-30 RX ADMIN — SODIUM CHLORIDE 100 ML/HR: 9 INJECTION, SOLUTION INTRAVENOUS at 04:25

## 2019-07-30 RX ADMIN — SODIUM CHLORIDE 125 ML/HR: 9 INJECTION, SOLUTION INTRAVENOUS at 13:16

## 2019-07-30 RX ADMIN — SODIUM CHLORIDE, PRESERVATIVE FREE 3 ML: 5 INJECTION INTRAVENOUS at 08:27

## 2019-07-30 RX ADMIN — METOPROLOL TARTRATE 12.5 MG: 25 TABLET ORAL at 08:25

## 2019-07-30 RX ADMIN — FUROSEMIDE 20 MG: 20 TABLET ORAL at 08:25

## 2019-07-30 RX ADMIN — OXYCODONE HYDROCHLORIDE AND ACETAMINOPHEN 2 TABLET: 5; 325 TABLET ORAL at 07:36

## 2019-07-30 RX ADMIN — SODIUM CHLORIDE 125 ML/HR: 9 INJECTION, SOLUTION INTRAVENOUS at 22:02

## 2019-07-30 RX ADMIN — HYDROMORPHONE HYDROCHLORIDE 0.5 MG: 1 INJECTION, SOLUTION INTRAMUSCULAR; INTRAVENOUS; SUBCUTANEOUS at 04:01

## 2019-07-30 RX ADMIN — TAZOBACTAM SODIUM AND PIPERACILLIN SODIUM 3.38 G: 375; 3 INJECTION, SOLUTION INTRAVENOUS at 10:23

## 2019-07-30 RX ADMIN — DEXAMETHASONE SODIUM PHOSPHATE 4 MG: 4 INJECTION, SOLUTION INTRAMUSCULAR; INTRAVENOUS at 14:54

## 2019-07-30 RX ADMIN — ATORVASTATIN CALCIUM 80 MG: 80 TABLET, FILM COATED ORAL at 08:25

## 2019-07-30 RX ADMIN — VANCOMYCIN HYDROCHLORIDE 1000 MG: 1 INJECTION, SOLUTION INTRAVENOUS at 10:45

## 2019-07-30 RX ADMIN — METOPROLOL TARTRATE 12.5 MG: 25 TABLET ORAL at 22:03

## 2019-07-30 RX ADMIN — GABAPENTIN 300 MG: 300 CAPSULE ORAL at 18:13

## 2019-07-30 RX ADMIN — INSULIN LISPRO 4 UNITS: 100 INJECTION, SOLUTION INTRAVENOUS; SUBCUTANEOUS at 22:03

## 2019-07-31 LAB
ANION GAP SERPL CALCULATED.3IONS-SCNC: 12.3 MMOL/L (ref 5–15)
BUN BLD-MCNC: 16 MG/DL (ref 8–23)
BUN/CREAT SERPL: 13.8 (ref 7–25)
CALCIUM SPEC-SCNC: 9 MG/DL (ref 8.6–10.5)
CHLORIDE SERPL-SCNC: 105 MMOL/L (ref 98–107)
CO2 SERPL-SCNC: 21.7 MMOL/L (ref 22–29)
CREAT BLD-MCNC: 1.16 MG/DL (ref 0.76–1.27)
DEPRECATED RDW RBC AUTO: 44.4 FL (ref 37–54)
ERYTHROCYTE [DISTWIDTH] IN BLOOD BY AUTOMATED COUNT: 13.6 % (ref 12.3–15.4)
GFR SERPL CREATININE-BSD FRML MDRD: 61 ML/MIN/1.73
GLUCOSE BLD-MCNC: 229 MG/DL (ref 65–99)
GLUCOSE BLDC GLUCOMTR-MCNC: 184 MG/DL (ref 70–130)
GLUCOSE BLDC GLUCOMTR-MCNC: 201 MG/DL (ref 70–130)
GLUCOSE BLDC GLUCOMTR-MCNC: 272 MG/DL (ref 70–130)
GLUCOSE BLDC GLUCOMTR-MCNC: 317 MG/DL (ref 70–130)
GLUCOSE BLDC GLUCOMTR-MCNC: 324 MG/DL (ref 70–130)
HCT VFR BLD AUTO: 41.4 % (ref 37.5–51)
HGB BLD-MCNC: 13.4 G/DL (ref 13–17.7)
MCH RBC QN AUTO: 28.6 PG (ref 26.6–33)
MCHC RBC AUTO-ENTMCNC: 32.4 G/DL (ref 31.5–35.7)
MCV RBC AUTO: 88.5 FL (ref 79–97)
PLATELET # BLD AUTO: 207 10*3/MM3 (ref 140–450)
PMV BLD AUTO: 9.6 FL (ref 6–12)
POTASSIUM BLD-SCNC: 4.8 MMOL/L (ref 3.5–5.2)
RBC # BLD AUTO: 4.68 10*6/MM3 (ref 4.14–5.8)
SODIUM BLD-SCNC: 139 MMOL/L (ref 136–145)
WBC NRBC COR # BLD: 9.58 10*3/MM3 (ref 3.4–10.8)

## 2019-07-31 PROCEDURE — 85027 COMPLETE CBC AUTOMATED: CPT | Performed by: INTERNAL MEDICINE

## 2019-07-31 PROCEDURE — 63710000001 INSULIN LISPRO (HUMAN) PER 5 UNITS: Performed by: HOSPITALIST

## 2019-07-31 PROCEDURE — 25010000002 DEXAMETHASONE PER 1 MG: Performed by: PHYSICIAN ASSISTANT

## 2019-07-31 PROCEDURE — 63710000001 INSULIN GLARGINE PER 5 UNITS: Performed by: HOSPITALIST

## 2019-07-31 PROCEDURE — 99024 POSTOP FOLLOW-UP VISIT: CPT | Performed by: ORTHOPAEDIC SURGERY

## 2019-07-31 PROCEDURE — 80048 BASIC METABOLIC PNL TOTAL CA: CPT | Performed by: HOSPITALIST

## 2019-07-31 PROCEDURE — 82962 GLUCOSE BLOOD TEST: CPT

## 2019-07-31 PROCEDURE — 99232 SBSQ HOSP IP/OBS MODERATE 35: CPT | Performed by: INTERNAL MEDICINE

## 2019-07-31 PROCEDURE — 25010000002 VANCOMYCIN PER 500 MG: Performed by: INTERNAL MEDICINE

## 2019-07-31 PROCEDURE — 25010000002 VANCOMYCIN PER 500 MG: Performed by: NURSE PRACTITIONER

## 2019-07-31 RX ORDER — INSULIN GLARGINE 100 [IU]/ML
25 INJECTION, SOLUTION SUBCUTANEOUS NIGHTLY
Status: DISCONTINUED | OUTPATIENT
Start: 2019-07-31 | End: 2019-08-01

## 2019-07-31 RX ADMIN — GABAPENTIN 300 MG: 300 CAPSULE ORAL at 21:41

## 2019-07-31 RX ADMIN — ATORVASTATIN CALCIUM 80 MG: 80 TABLET, FILM COATED ORAL at 09:24

## 2019-07-31 RX ADMIN — DEXAMETHASONE SODIUM PHOSPHATE 4 MG: 4 INJECTION, SOLUTION INTRAMUSCULAR; INTRAVENOUS at 03:10

## 2019-07-31 RX ADMIN — INSULIN GLARGINE 25 UNITS: 100 INJECTION, SOLUTION SUBCUTANEOUS at 09:23

## 2019-07-31 RX ADMIN — OXYCODONE HYDROCHLORIDE AND ACETAMINOPHEN 2 TABLET: 5; 325 TABLET ORAL at 12:20

## 2019-07-31 RX ADMIN — DEXAMETHASONE SODIUM PHOSPHATE 4 MG: 4 INJECTION, SOLUTION INTRAMUSCULAR; INTRAVENOUS at 21:41

## 2019-07-31 RX ADMIN — INSULIN LISPRO 6 UNITS: 100 INJECTION, SOLUTION INTRAVENOUS; SUBCUTANEOUS at 12:20

## 2019-07-31 RX ADMIN — ROPINIROLE 4 MG: 2 TABLET, FILM COATED ORAL at 21:41

## 2019-07-31 RX ADMIN — DEXAMETHASONE SODIUM PHOSPHATE 4 MG: 4 INJECTION, SOLUTION INTRAMUSCULAR; INTRAVENOUS at 14:02

## 2019-07-31 RX ADMIN — INSULIN LISPRO 7 UNITS: 100 INJECTION, SOLUTION INTRAVENOUS; SUBCUTANEOUS at 18:05

## 2019-07-31 RX ADMIN — GABAPENTIN 300 MG: 300 CAPSULE ORAL at 09:25

## 2019-07-31 RX ADMIN — VANCOMYCIN HYDROCHLORIDE 1000 MG: 1 INJECTION, SOLUTION INTRAVENOUS at 09:36

## 2019-07-31 RX ADMIN — INSULIN LISPRO 4 UNITS: 100 INJECTION, SOLUTION INTRAVENOUS; SUBCUTANEOUS at 09:25

## 2019-07-31 RX ADMIN — OXYCODONE HYDROCHLORIDE AND ACETAMINOPHEN 2 TABLET: 5; 325 TABLET ORAL at 03:10

## 2019-07-31 RX ADMIN — SODIUM CHLORIDE 125 ML/HR: 9 INJECTION, SOLUTION INTRAVENOUS at 06:12

## 2019-07-31 RX ADMIN — HYDROMORPHONE HYDROCHLORIDE: 10 INJECTION INTRAMUSCULAR; INTRAVENOUS; SUBCUTANEOUS at 18:14

## 2019-07-31 RX ADMIN — METOPROLOL TARTRATE 12.5 MG: 25 TABLET ORAL at 21:41

## 2019-07-31 RX ADMIN — GABAPENTIN 300 MG: 300 CAPSULE ORAL at 15:56

## 2019-07-31 RX ADMIN — VANCOMYCIN HYDROCHLORIDE 1000 MG: 1 INJECTION, SOLUTION INTRAVENOUS at 21:41

## 2019-07-31 RX ADMIN — INSULIN LISPRO 7 UNITS: 100 INJECTION, SOLUTION INTRAVENOUS; SUBCUTANEOUS at 21:44

## 2019-07-31 RX ADMIN — METOPROLOL TARTRATE 12.5 MG: 25 TABLET ORAL at 09:24

## 2019-07-31 RX ADMIN — DOCUSATE SODIUM 250 MG: 50 CAPSULE, LIQUID FILLED ORAL at 22:23

## 2019-07-31 RX ADMIN — SODIUM CHLORIDE 125 ML/HR: 9 INJECTION, SOLUTION INTRAVENOUS at 14:07

## 2019-07-31 RX ADMIN — INSULIN GLARGINE 25 UNITS: 100 INJECTION, SOLUTION SUBCUTANEOUS at 22:27

## 2019-07-31 RX ADMIN — DEXAMETHASONE SODIUM PHOSPHATE 4 MG: 4 INJECTION, SOLUTION INTRAMUSCULAR; INTRAVENOUS at 09:25

## 2019-07-31 RX ADMIN — SODIUM CHLORIDE, PRESERVATIVE FREE 3 ML: 5 INJECTION INTRAVENOUS at 21:45

## 2019-07-31 RX ADMIN — SODIUM CHLORIDE, PRESERVATIVE FREE 3 ML: 5 INJECTION INTRAVENOUS at 09:25

## 2019-08-01 ENCOUNTER — APPOINTMENT (OUTPATIENT)
Dept: GENERAL RADIOLOGY | Facility: HOSPITAL | Age: 79
End: 2019-08-01

## 2019-08-01 ENCOUNTER — APPOINTMENT (OUTPATIENT)
Dept: CT IMAGING | Facility: HOSPITAL | Age: 79
End: 2019-08-01

## 2019-08-01 PROBLEM — K59.00 CONSTIPATION: Status: ACTIVE | Noted: 2019-08-01

## 2019-08-01 LAB
ANION GAP SERPL CALCULATED.3IONS-SCNC: 10.7 MMOL/L (ref 5–15)
BASOPHILS # BLD AUTO: 0.01 10*3/MM3 (ref 0–0.2)
BASOPHILS NFR BLD AUTO: 0.1 % (ref 0–1.5)
BUN BLD-MCNC: 19 MG/DL (ref 8–23)
BUN/CREAT SERPL: 15.8 (ref 7–25)
CALCIUM SPEC-SCNC: 9 MG/DL (ref 8.6–10.5)
CHLORIDE SERPL-SCNC: 105 MMOL/L (ref 98–107)
CO2 SERPL-SCNC: 22.3 MMOL/L (ref 22–29)
CREAT BLD-MCNC: 1.2 MG/DL (ref 0.76–1.27)
DEPRECATED RDW RBC AUTO: 43.8 FL (ref 37–54)
EOSINOPHIL # BLD AUTO: 0 10*3/MM3 (ref 0–0.4)
EOSINOPHIL NFR BLD AUTO: 0 % (ref 0.3–6.2)
ERYTHROCYTE [DISTWIDTH] IN BLOOD BY AUTOMATED COUNT: 13.2 % (ref 12.3–15.4)
GFR SERPL CREATININE-BSD FRML MDRD: 59 ML/MIN/1.73
GLUCOSE BLD-MCNC: 231 MG/DL (ref 65–99)
GLUCOSE BLDC GLUCOMTR-MCNC: 224 MG/DL (ref 70–130)
GLUCOSE BLDC GLUCOMTR-MCNC: 309 MG/DL (ref 70–130)
GLUCOSE BLDC GLUCOMTR-MCNC: 319 MG/DL (ref 70–130)
GLUCOSE BLDC GLUCOMTR-MCNC: 343 MG/DL (ref 70–130)
HCT VFR BLD AUTO: 40.7 % (ref 37.5–51)
HGB BLD-MCNC: 12.8 G/DL (ref 13–17.7)
IMM GRANULOCYTES # BLD AUTO: 0.1 10*3/MM3 (ref 0–0.05)
IMM GRANULOCYTES NFR BLD AUTO: 0.8 % (ref 0–0.5)
LYMPHOCYTES # BLD AUTO: 0.96 10*3/MM3 (ref 0.7–3.1)
LYMPHOCYTES NFR BLD AUTO: 7.3 % (ref 19.6–45.3)
MCH RBC QN AUTO: 28.5 PG (ref 26.6–33)
MCHC RBC AUTO-ENTMCNC: 31.4 G/DL (ref 31.5–35.7)
MCV RBC AUTO: 90.6 FL (ref 79–97)
MONOCYTES # BLD AUTO: 0.54 10*3/MM3 (ref 0.1–0.9)
MONOCYTES NFR BLD AUTO: 4.1 % (ref 5–12)
NEUTROPHILS # BLD AUTO: 11.61 10*3/MM3 (ref 1.7–7)
NEUTROPHILS NFR BLD AUTO: 87.7 % (ref 42.7–76)
NRBC BLD AUTO-RTO: 0 /100 WBC (ref 0–0.2)
PLATELET # BLD AUTO: 212 10*3/MM3 (ref 140–450)
PMV BLD AUTO: 10.1 FL (ref 6–12)
POTASSIUM BLD-SCNC: 4.6 MMOL/L (ref 3.5–5.2)
RBC # BLD AUTO: 4.49 10*6/MM3 (ref 4.14–5.8)
SODIUM BLD-SCNC: 138 MMOL/L (ref 136–145)
WBC NRBC COR # BLD: 13.22 10*3/MM3 (ref 3.4–10.8)

## 2019-08-01 PROCEDURE — 25010000002 DEXAMETHASONE PER 1 MG: Performed by: PHYSICIAN ASSISTANT

## 2019-08-01 PROCEDURE — 80048 BASIC METABOLIC PNL TOTAL CA: CPT | Performed by: HOSPITALIST

## 2019-08-01 PROCEDURE — 72126 CT NECK SPINE W/DYE: CPT

## 2019-08-01 PROCEDURE — 25010000003 LIDOCAINE 1 % SOLUTION: Performed by: RADIOLOGY

## 2019-08-01 PROCEDURE — 82962 GLUCOSE BLOOD TEST: CPT

## 2019-08-01 PROCEDURE — 63710000001 INSULIN GLARGINE PER 5 UNITS: Performed by: HOSPITALIST

## 2019-08-01 PROCEDURE — 25010000002 IOPAMIDOL 61 % SOLUTION: Performed by: RADIOLOGY

## 2019-08-01 PROCEDURE — 72240 MYELOGRAPHY NECK SPINE: CPT

## 2019-08-01 PROCEDURE — 99024 POSTOP FOLLOW-UP VISIT: CPT | Performed by: ORTHOPAEDIC SURGERY

## 2019-08-01 PROCEDURE — 99232 SBSQ HOSP IP/OBS MODERATE 35: CPT | Performed by: INTERNAL MEDICINE

## 2019-08-01 PROCEDURE — 63710000001 DIPHENHYDRAMINE PER 50 MG: Performed by: HOSPITALIST

## 2019-08-01 PROCEDURE — 63710000001 INSULIN GLARGINE PER 5 UNITS: Performed by: NURSE PRACTITIONER

## 2019-08-01 PROCEDURE — 63710000001 INSULIN LISPRO (HUMAN) PER 5 UNITS: Performed by: HOSPITALIST

## 2019-08-01 PROCEDURE — 62302 MYELOGRAPHY LUMBAR INJECTION: CPT

## 2019-08-01 PROCEDURE — 85025 COMPLETE CBC W/AUTO DIFF WBC: CPT | Performed by: HOSPITALIST

## 2019-08-01 RX ORDER — INSULIN GLARGINE 100 [IU]/ML
30 INJECTION, SOLUTION SUBCUTANEOUS EVERY MORNING
Status: DISCONTINUED | OUTPATIENT
Start: 2019-08-02 | End: 2019-08-17 | Stop reason: HOSPADM

## 2019-08-01 RX ORDER — LIDOCAINE HYDROCHLORIDE 10 MG/ML
10 INJECTION, SOLUTION INFILTRATION; PERINEURAL ONCE
Status: COMPLETED | OUTPATIENT
Start: 2019-08-01 | End: 2019-08-01

## 2019-08-01 RX ORDER — DOCUSATE SODIUM 100 MG/1
100 CAPSULE, LIQUID FILLED ORAL 2 TIMES DAILY
Status: DISCONTINUED | OUTPATIENT
Start: 2019-08-01 | End: 2019-08-17 | Stop reason: HOSPADM

## 2019-08-01 RX ORDER — DIPHENHYDRAMINE HCL 25 MG
25 CAPSULE ORAL ONCE
Status: COMPLETED | OUTPATIENT
Start: 2019-08-01 | End: 2019-08-01

## 2019-08-01 RX ORDER — DIPHENHYDRAMINE HCL 25 MG
25 CAPSULE ORAL EVERY 6 HOURS PRN
Status: DISCONTINUED | OUTPATIENT
Start: 2019-08-01 | End: 2019-08-01

## 2019-08-01 RX ORDER — INSULIN GLARGINE 100 [IU]/ML
30 INJECTION, SOLUTION SUBCUTANEOUS NIGHTLY
Status: DISCONTINUED | OUTPATIENT
Start: 2019-08-01 | End: 2019-08-17 | Stop reason: HOSPADM

## 2019-08-01 RX ADMIN — SODIUM CHLORIDE 50 ML/HR: 9 INJECTION, SOLUTION INTRAVENOUS at 04:19

## 2019-08-01 RX ADMIN — GABAPENTIN 300 MG: 300 CAPSULE ORAL at 08:34

## 2019-08-01 RX ADMIN — INSULIN LISPRO 7 UNITS: 100 INJECTION, SOLUTION INTRAVENOUS; SUBCUTANEOUS at 13:45

## 2019-08-01 RX ADMIN — INSULIN LISPRO 4 UNITS: 100 INJECTION, SOLUTION INTRAVENOUS; SUBCUTANEOUS at 08:35

## 2019-08-01 RX ADMIN — IOPAMIDOL 15 ML: 612 INJECTION, SOLUTION INTRATHECAL at 12:29

## 2019-08-01 RX ADMIN — SODIUM CHLORIDE, PRESERVATIVE FREE 3 ML: 5 INJECTION INTRAVENOUS at 20:48

## 2019-08-01 RX ADMIN — INSULIN LISPRO 7 UNITS: 100 INJECTION, SOLUTION INTRAVENOUS; SUBCUTANEOUS at 21:08

## 2019-08-01 RX ADMIN — GABAPENTIN 300 MG: 300 CAPSULE ORAL at 20:47

## 2019-08-01 RX ADMIN — GABAPENTIN 300 MG: 300 CAPSULE ORAL at 15:51

## 2019-08-01 RX ADMIN — ATORVASTATIN CALCIUM 80 MG: 80 TABLET, FILM COATED ORAL at 08:35

## 2019-08-01 RX ADMIN — DEXAMETHASONE SODIUM PHOSPHATE 4 MG: 4 INJECTION, SOLUTION INTRAMUSCULAR; INTRAVENOUS at 15:51

## 2019-08-01 RX ADMIN — ROPINIROLE 4 MG: 2 TABLET, FILM COATED ORAL at 20:47

## 2019-08-01 RX ADMIN — INSULIN GLARGINE 25 UNITS: 100 INJECTION, SOLUTION SUBCUTANEOUS at 08:42

## 2019-08-01 RX ADMIN — INSULIN GLARGINE 30 UNITS: 100 INJECTION, SOLUTION SUBCUTANEOUS at 20:47

## 2019-08-01 RX ADMIN — DOCUSATE SODIUM 100 MG: 100 CAPSULE, LIQUID FILLED ORAL at 20:47

## 2019-08-01 RX ADMIN — OXYCODONE HYDROCHLORIDE AND ACETAMINOPHEN 2 TABLET: 5; 325 TABLET ORAL at 13:44

## 2019-08-01 RX ADMIN — DIPHENHYDRAMINE HYDROCHLORIDE 25 MG: 25 CAPSULE ORAL at 11:17

## 2019-08-01 RX ADMIN — DEXAMETHASONE SODIUM PHOSPHATE 4 MG: 4 INJECTION, SOLUTION INTRAMUSCULAR; INTRAVENOUS at 04:19

## 2019-08-01 RX ADMIN — METOPROLOL TARTRATE 12.5 MG: 25 TABLET ORAL at 08:35

## 2019-08-01 RX ADMIN — INSULIN LISPRO 7 UNITS: 100 INJECTION, SOLUTION INTRAVENOUS; SUBCUTANEOUS at 17:25

## 2019-08-01 RX ADMIN — SODIUM CHLORIDE, PRESERVATIVE FREE 3 ML: 5 INJECTION INTRAVENOUS at 08:35

## 2019-08-01 RX ADMIN — METOPROLOL TARTRATE 12.5 MG: 25 TABLET ORAL at 20:46

## 2019-08-01 RX ADMIN — DEXAMETHASONE SODIUM PHOSPHATE 4 MG: 4 INJECTION, SOLUTION INTRAMUSCULAR; INTRAVENOUS at 08:34

## 2019-08-01 RX ADMIN — POLYETHYLENE GLYCOL 3350 17 G: 17 POWDER, FOR SOLUTION ORAL at 15:51

## 2019-08-01 RX ADMIN — DEXAMETHASONE SODIUM PHOSPHATE 4 MG: 4 INJECTION, SOLUTION INTRAMUSCULAR; INTRAVENOUS at 20:47

## 2019-08-01 RX ADMIN — LIDOCAINE HYDROCHLORIDE 7 ML: 10 INJECTION, SOLUTION INFILTRATION; PERINEURAL at 12:28

## 2019-08-02 LAB
ANION GAP SERPL CALCULATED.3IONS-SCNC: 9.7 MMOL/L (ref 5–15)
BUN BLD-MCNC: 22 MG/DL (ref 8–23)
BUN/CREAT SERPL: 18.2 (ref 7–25)
CALCIUM SPEC-SCNC: 8.9 MG/DL (ref 8.6–10.5)
CHLORIDE SERPL-SCNC: 105 MMOL/L (ref 98–107)
CO2 SERPL-SCNC: 25.3 MMOL/L (ref 22–29)
CREAT BLD-MCNC: 1.21 MG/DL (ref 0.76–1.27)
DEPRECATED RDW RBC AUTO: 44 FL (ref 37–54)
ERYTHROCYTE [DISTWIDTH] IN BLOOD BY AUTOMATED COUNT: 13.3 % (ref 12.3–15.4)
GFR SERPL CREATININE-BSD FRML MDRD: 58 ML/MIN/1.73
GLUCOSE BLD-MCNC: 257 MG/DL (ref 65–99)
GLUCOSE BLDC GLUCOMTR-MCNC: 208 MG/DL (ref 70–130)
GLUCOSE BLDC GLUCOMTR-MCNC: 229 MG/DL (ref 70–130)
GLUCOSE BLDC GLUCOMTR-MCNC: 280 MG/DL (ref 70–130)
GLUCOSE BLDC GLUCOMTR-MCNC: 280 MG/DL (ref 70–130)
HCT VFR BLD AUTO: 38.3 % (ref 37.5–51)
HGB BLD-MCNC: 12.1 G/DL (ref 13–17.7)
MCH RBC QN AUTO: 28.5 PG (ref 26.6–33)
MCHC RBC AUTO-ENTMCNC: 31.6 G/DL (ref 31.5–35.7)
MCV RBC AUTO: 90.3 FL (ref 79–97)
PLATELET # BLD AUTO: 206 10*3/MM3 (ref 140–450)
PMV BLD AUTO: 10.2 FL (ref 6–12)
POTASSIUM BLD-SCNC: 4.2 MMOL/L (ref 3.5–5.2)
RBC # BLD AUTO: 4.24 10*6/MM3 (ref 4.14–5.8)
SODIUM BLD-SCNC: 140 MMOL/L (ref 136–145)
WBC NRBC COR # BLD: 10.54 10*3/MM3 (ref 3.4–10.8)

## 2019-08-02 PROCEDURE — 25010000002 DEXAMETHASONE PER 1 MG: Performed by: PHYSICIAN ASSISTANT

## 2019-08-02 PROCEDURE — 63710000001 INSULIN LISPRO (HUMAN) PER 5 UNITS: Performed by: HOSPITALIST

## 2019-08-02 PROCEDURE — 99024 POSTOP FOLLOW-UP VISIT: CPT | Performed by: ORTHOPAEDIC SURGERY

## 2019-08-02 PROCEDURE — 80048 BASIC METABOLIC PNL TOTAL CA: CPT | Performed by: NURSE PRACTITIONER

## 2019-08-02 PROCEDURE — 85027 COMPLETE CBC AUTOMATED: CPT | Performed by: NURSE PRACTITIONER

## 2019-08-02 PROCEDURE — 82962 GLUCOSE BLOOD TEST: CPT

## 2019-08-02 PROCEDURE — 63710000001 INSULIN GLARGINE PER 5 UNITS: Performed by: NURSE PRACTITIONER

## 2019-08-02 RX ORDER — FUROSEMIDE 20 MG/1
20 TABLET ORAL
Status: DISCONTINUED | OUTPATIENT
Start: 2019-08-02 | End: 2019-08-08

## 2019-08-02 RX ADMIN — INSULIN LISPRO 3 UNITS: 100 INJECTION, SOLUTION INTRAVENOUS; SUBCUTANEOUS at 21:37

## 2019-08-02 RX ADMIN — INSULIN LISPRO 4 UNITS: 100 INJECTION, SOLUTION INTRAVENOUS; SUBCUTANEOUS at 08:13

## 2019-08-02 RX ADMIN — METOPROLOL TARTRATE 12.5 MG: 25 TABLET ORAL at 21:38

## 2019-08-02 RX ADMIN — ATORVASTATIN CALCIUM 80 MG: 80 TABLET, FILM COATED ORAL at 08:12

## 2019-08-02 RX ADMIN — INSULIN GLARGINE 30 UNITS: 100 INJECTION, SOLUTION SUBCUTANEOUS at 06:09

## 2019-08-02 RX ADMIN — INSULIN GLARGINE 30 UNITS: 100 INJECTION, SOLUTION SUBCUTANEOUS at 21:37

## 2019-08-02 RX ADMIN — METOPROLOL TARTRATE 12.5 MG: 25 TABLET ORAL at 08:12

## 2019-08-02 RX ADMIN — GABAPENTIN 300 MG: 300 CAPSULE ORAL at 16:10

## 2019-08-02 RX ADMIN — INSULIN LISPRO 6 UNITS: 100 INJECTION, SOLUTION INTRAVENOUS; SUBCUTANEOUS at 12:31

## 2019-08-02 RX ADMIN — DEXAMETHASONE SODIUM PHOSPHATE 4 MG: 4 INJECTION, SOLUTION INTRAMUSCULAR; INTRAVENOUS at 03:32

## 2019-08-02 RX ADMIN — GABAPENTIN 300 MG: 300 CAPSULE ORAL at 08:12

## 2019-08-02 RX ADMIN — BISACODYL 5 MG: 5 TABLET ORAL at 12:38

## 2019-08-02 RX ADMIN — HYDROMORPHONE HYDROCHLORIDE: 10 INJECTION INTRAMUSCULAR; INTRAVENOUS; SUBCUTANEOUS at 16:03

## 2019-08-02 RX ADMIN — DOCUSATE SODIUM 100 MG: 100 CAPSULE, LIQUID FILLED ORAL at 08:12

## 2019-08-02 RX ADMIN — DEXAMETHASONE SODIUM PHOSPHATE 4 MG: 4 INJECTION, SOLUTION INTRAMUSCULAR; INTRAVENOUS at 14:33

## 2019-08-02 RX ADMIN — SODIUM CHLORIDE, PRESERVATIVE FREE 3 ML: 5 INJECTION INTRAVENOUS at 21:39

## 2019-08-02 RX ADMIN — INSULIN LISPRO 6 UNITS: 100 INJECTION, SOLUTION INTRAVENOUS; SUBCUTANEOUS at 18:37

## 2019-08-02 RX ADMIN — GABAPENTIN 300 MG: 300 CAPSULE ORAL at 21:38

## 2019-08-02 RX ADMIN — DOCUSATE SODIUM 100 MG: 100 CAPSULE, LIQUID FILLED ORAL at 21:38

## 2019-08-02 RX ADMIN — DEXAMETHASONE SODIUM PHOSPHATE 4 MG: 4 INJECTION, SOLUTION INTRAMUSCULAR; INTRAVENOUS at 08:12

## 2019-08-02 RX ADMIN — ROPINIROLE 4 MG: 2 TABLET, FILM COATED ORAL at 21:38

## 2019-08-02 RX ADMIN — SODIUM CHLORIDE 50 ML/HR: 9 INJECTION, SOLUTION INTRAVENOUS at 00:25

## 2019-08-02 RX ADMIN — FUROSEMIDE 20 MG: 20 TABLET ORAL at 19:54

## 2019-08-02 RX ADMIN — DEXAMETHASONE SODIUM PHOSPHATE 4 MG: 4 INJECTION, SOLUTION INTRAMUSCULAR; INTRAVENOUS at 21:38

## 2019-08-02 RX ADMIN — SODIUM CHLORIDE, PRESERVATIVE FREE 3 ML: 5 INJECTION INTRAVENOUS at 08:14

## 2019-08-03 LAB
ANION GAP SERPL CALCULATED.3IONS-SCNC: 8.2 MMOL/L (ref 5–15)
BUN BLD-MCNC: 20 MG/DL (ref 8–23)
BUN/CREAT SERPL: 20.8 (ref 7–25)
CALCIUM SPEC-SCNC: 8.7 MG/DL (ref 8.6–10.5)
CHLORIDE SERPL-SCNC: 103 MMOL/L (ref 98–107)
CO2 SERPL-SCNC: 25.8 MMOL/L (ref 22–29)
CREAT BLD-MCNC: 0.96 MG/DL (ref 0.76–1.27)
GFR SERPL CREATININE-BSD FRML MDRD: 76 ML/MIN/1.73
GLUCOSE BLD-MCNC: 195 MG/DL (ref 65–99)
GLUCOSE BLDC GLUCOMTR-MCNC: 185 MG/DL (ref 70–130)
GLUCOSE BLDC GLUCOMTR-MCNC: 195 MG/DL (ref 70–130)
GLUCOSE BLDC GLUCOMTR-MCNC: 211 MG/DL (ref 70–130)
GLUCOSE BLDC GLUCOMTR-MCNC: 308 MG/DL (ref 70–130)
POTASSIUM BLD-SCNC: 4 MMOL/L (ref 3.5–5.2)
SODIUM BLD-SCNC: 137 MMOL/L (ref 136–145)
TROPONIN T SERPL-MCNC: <0.01 NG/ML (ref 0–0.03)

## 2019-08-03 PROCEDURE — 25010000002 DEXAMETHASONE PER 1 MG: Performed by: PHYSICIAN ASSISTANT

## 2019-08-03 PROCEDURE — 84484 ASSAY OF TROPONIN QUANT: CPT | Performed by: HOSPITALIST

## 2019-08-03 PROCEDURE — 63710000001 INSULIN LISPRO (HUMAN) PER 5 UNITS: Performed by: HOSPITALIST

## 2019-08-03 PROCEDURE — 82962 GLUCOSE BLOOD TEST: CPT

## 2019-08-03 PROCEDURE — 63710000001 INSULIN GLARGINE PER 5 UNITS: Performed by: NURSE PRACTITIONER

## 2019-08-03 PROCEDURE — 80048 BASIC METABOLIC PNL TOTAL CA: CPT | Performed by: HOSPITALIST

## 2019-08-03 RX ORDER — DEXAMETHASONE SODIUM PHOSPHATE 4 MG/ML
2 INJECTION, SOLUTION INTRA-ARTICULAR; INTRALESIONAL; INTRAMUSCULAR; INTRAVENOUS; SOFT TISSUE EVERY 6 HOURS
Status: DISCONTINUED | OUTPATIENT
Start: 2019-08-03 | End: 2019-08-05

## 2019-08-03 RX ADMIN — GABAPENTIN 300 MG: 300 CAPSULE ORAL at 08:18

## 2019-08-03 RX ADMIN — POLYETHYLENE GLYCOL 3350 17 G: 17 POWDER, FOR SOLUTION ORAL at 08:19

## 2019-08-03 RX ADMIN — INSULIN GLARGINE 30 UNITS: 100 INJECTION, SOLUTION SUBCUTANEOUS at 21:02

## 2019-08-03 RX ADMIN — INSULIN LISPRO 4 UNITS: 100 INJECTION, SOLUTION INTRAVENOUS; SUBCUTANEOUS at 11:52

## 2019-08-03 RX ADMIN — DEXAMETHASONE SODIUM PHOSPHATE 2 MG: 4 INJECTION, SOLUTION INTRAMUSCULAR; INTRAVENOUS at 21:01

## 2019-08-03 RX ADMIN — INSULIN LISPRO 2 UNITS: 100 INJECTION, SOLUTION INTRAVENOUS; SUBCUTANEOUS at 17:18

## 2019-08-03 RX ADMIN — METOPROLOL TARTRATE 12.5 MG: 25 TABLET ORAL at 08:21

## 2019-08-03 RX ADMIN — DEXAMETHASONE SODIUM PHOSPHATE 2 MG: 4 INJECTION, SOLUTION INTRAMUSCULAR; INTRAVENOUS at 14:48

## 2019-08-03 RX ADMIN — DEXAMETHASONE SODIUM PHOSPHATE 4 MG: 4 INJECTION, SOLUTION INTRAMUSCULAR; INTRAVENOUS at 02:00

## 2019-08-03 RX ADMIN — ATORVASTATIN CALCIUM 80 MG: 80 TABLET, FILM COATED ORAL at 08:21

## 2019-08-03 RX ADMIN — INSULIN LISPRO 7 UNITS: 100 INJECTION, SOLUTION INTRAVENOUS; SUBCUTANEOUS at 21:02

## 2019-08-03 RX ADMIN — GABAPENTIN 300 MG: 300 CAPSULE ORAL at 21:03

## 2019-08-03 RX ADMIN — ROPINIROLE 4 MG: 2 TABLET, FILM COATED ORAL at 21:03

## 2019-08-03 RX ADMIN — INSULIN GLARGINE 30 UNITS: 100 INJECTION, SOLUTION SUBCUTANEOUS at 06:57

## 2019-08-03 RX ADMIN — DOCUSATE SODIUM 100 MG: 100 CAPSULE, LIQUID FILLED ORAL at 08:18

## 2019-08-03 RX ADMIN — DOCUSATE SODIUM 100 MG: 100 CAPSULE, LIQUID FILLED ORAL at 21:03

## 2019-08-03 RX ADMIN — SODIUM CHLORIDE, PRESERVATIVE FREE 3 ML: 5 INJECTION INTRAVENOUS at 08:19

## 2019-08-03 RX ADMIN — METOPROLOL TARTRATE 12.5 MG: 25 TABLET ORAL at 21:03

## 2019-08-03 RX ADMIN — FUROSEMIDE 20 MG: 20 TABLET ORAL at 17:18

## 2019-08-03 RX ADMIN — SODIUM CHLORIDE, PRESERVATIVE FREE 3 ML: 5 INJECTION INTRAVENOUS at 21:03

## 2019-08-03 RX ADMIN — FUROSEMIDE 20 MG: 20 TABLET ORAL at 08:18

## 2019-08-03 RX ADMIN — DEXAMETHASONE SODIUM PHOSPHATE 2 MG: 4 INJECTION, SOLUTION INTRAMUSCULAR; INTRAVENOUS at 08:19

## 2019-08-03 RX ADMIN — INSULIN LISPRO 2 UNITS: 100 INJECTION, SOLUTION INTRAVENOUS; SUBCUTANEOUS at 08:19

## 2019-08-03 RX ADMIN — GABAPENTIN 300 MG: 300 CAPSULE ORAL at 16:12

## 2019-08-04 LAB
BACTERIA SPEC AEROBE CULT: NORMAL
GLUCOSE BLDC GLUCOMTR-MCNC: 213 MG/DL (ref 70–130)
GLUCOSE BLDC GLUCOMTR-MCNC: 257 MG/DL (ref 70–130)
GLUCOSE BLDC GLUCOMTR-MCNC: 289 MG/DL (ref 70–130)
GLUCOSE BLDC GLUCOMTR-MCNC: 298 MG/DL (ref 70–130)

## 2019-08-04 PROCEDURE — 63710000001 INSULIN LISPRO (HUMAN) PER 5 UNITS: Performed by: HOSPITALIST

## 2019-08-04 PROCEDURE — 82962 GLUCOSE BLOOD TEST: CPT

## 2019-08-04 PROCEDURE — 25010000002 HYDROMORPHONE 1 MG/ML SOLUTION: Performed by: ORTHOPAEDIC SURGERY

## 2019-08-04 PROCEDURE — 25010000002 DEXAMETHASONE PER 1 MG: Performed by: PHYSICIAN ASSISTANT

## 2019-08-04 PROCEDURE — 25010000002 HYDROMORPHONE PER 4 MG: Performed by: HOSPITALIST

## 2019-08-04 PROCEDURE — 63710000001 INSULIN GLARGINE PER 5 UNITS: Performed by: NURSE PRACTITIONER

## 2019-08-04 RX ORDER — OXYCODONE AND ACETAMINOPHEN 10; 325 MG/1; MG/1
1 TABLET ORAL EVERY 4 HOURS PRN
Status: DISCONTINUED | OUTPATIENT
Start: 2019-08-04 | End: 2019-08-07

## 2019-08-04 RX ORDER — HYDROMORPHONE HYDROCHLORIDE 1 MG/ML
0.5 INJECTION, SOLUTION INTRAMUSCULAR; INTRAVENOUS; SUBCUTANEOUS
Status: DISCONTINUED | OUTPATIENT
Start: 2019-08-04 | End: 2019-08-09

## 2019-08-04 RX ADMIN — SODIUM CHLORIDE, PRESERVATIVE FREE 3 ML: 5 INJECTION INTRAVENOUS at 20:39

## 2019-08-04 RX ADMIN — DEXAMETHASONE SODIUM PHOSPHATE 2 MG: 4 INJECTION, SOLUTION INTRAMUSCULAR; INTRAVENOUS at 10:01

## 2019-08-04 RX ADMIN — ROPINIROLE 4 MG: 2 TABLET, FILM COATED ORAL at 20:38

## 2019-08-04 RX ADMIN — GABAPENTIN 300 MG: 300 CAPSULE ORAL at 20:38

## 2019-08-04 RX ADMIN — GABAPENTIN 300 MG: 300 CAPSULE ORAL at 10:01

## 2019-08-04 RX ADMIN — HYDROMORPHONE HYDROCHLORIDE 0.5 MG: 1 INJECTION, SOLUTION INTRAMUSCULAR; INTRAVENOUS; SUBCUTANEOUS at 20:39

## 2019-08-04 RX ADMIN — POLYETHYLENE GLYCOL 3350 17 G: 17 POWDER, FOR SOLUTION ORAL at 10:02

## 2019-08-04 RX ADMIN — FUROSEMIDE 20 MG: 20 TABLET ORAL at 10:01

## 2019-08-04 RX ADMIN — OXYCODONE HYDROCHLORIDE AND ACETAMINOPHEN 1 TABLET: 10; 325 TABLET ORAL at 22:51

## 2019-08-04 RX ADMIN — OXYCODONE HYDROCHLORIDE AND ACETAMINOPHEN 1 TABLET: 10; 325 TABLET ORAL at 18:05

## 2019-08-04 RX ADMIN — INSULIN LISPRO 6 UNITS: 100 INJECTION, SOLUTION INTRAVENOUS; SUBCUTANEOUS at 22:51

## 2019-08-04 RX ADMIN — DEXAMETHASONE SODIUM PHOSPHATE 2 MG: 4 INJECTION, SOLUTION INTRAMUSCULAR; INTRAVENOUS at 16:11

## 2019-08-04 RX ADMIN — GABAPENTIN 300 MG: 300 CAPSULE ORAL at 16:11

## 2019-08-04 RX ADMIN — METFORMIN HYDROCHLORIDE 500 MG: 500 TABLET ORAL at 17:20

## 2019-08-04 RX ADMIN — INSULIN GLARGINE 30 UNITS: 100 INJECTION, SOLUTION SUBCUTANEOUS at 22:51

## 2019-08-04 RX ADMIN — INSULIN LISPRO 4 UNITS: 100 INJECTION, SOLUTION INTRAVENOUS; SUBCUTANEOUS at 10:01

## 2019-08-04 RX ADMIN — FUROSEMIDE 20 MG: 20 TABLET ORAL at 17:20

## 2019-08-04 RX ADMIN — DEXAMETHASONE SODIUM PHOSPHATE 2 MG: 4 INJECTION, SOLUTION INTRAMUSCULAR; INTRAVENOUS at 20:38

## 2019-08-04 RX ADMIN — DEXAMETHASONE SODIUM PHOSPHATE 2 MG: 4 INJECTION, SOLUTION INTRAMUSCULAR; INTRAVENOUS at 03:40

## 2019-08-04 RX ADMIN — OXYCODONE HYDROCHLORIDE AND ACETAMINOPHEN 1 TABLET: 10; 325 TABLET ORAL at 10:21

## 2019-08-04 RX ADMIN — OXYCODONE HYDROCHLORIDE AND ACETAMINOPHEN 1 TABLET: 10; 325 TABLET ORAL at 14:13

## 2019-08-04 RX ADMIN — SODIUM CHLORIDE, PRESERVATIVE FREE 3 ML: 5 INJECTION INTRAVENOUS at 10:02

## 2019-08-04 RX ADMIN — DOCUSATE SODIUM 100 MG: 100 CAPSULE, LIQUID FILLED ORAL at 10:01

## 2019-08-04 RX ADMIN — ATORVASTATIN CALCIUM 80 MG: 80 TABLET, FILM COATED ORAL at 10:01

## 2019-08-04 RX ADMIN — INSULIN LISPRO 6 UNITS: 100 INJECTION, SOLUTION INTRAVENOUS; SUBCUTANEOUS at 17:19

## 2019-08-04 RX ADMIN — INSULIN GLARGINE 30 UNITS: 100 INJECTION, SOLUTION SUBCUTANEOUS at 06:30

## 2019-08-04 RX ADMIN — INSULIN LISPRO 6 UNITS: 100 INJECTION, SOLUTION INTRAVENOUS; SUBCUTANEOUS at 11:26

## 2019-08-04 RX ADMIN — DOCUSATE SODIUM 100 MG: 100 CAPSULE, LIQUID FILLED ORAL at 20:38

## 2019-08-04 RX ADMIN — HYDROMORPHONE HYDROCHLORIDE 1 MG: 1 INJECTION, SOLUTION INTRAMUSCULAR; INTRAVENOUS; SUBCUTANEOUS at 12:19

## 2019-08-04 RX ADMIN — METOPROLOL TARTRATE 12.5 MG: 25 TABLET ORAL at 20:38

## 2019-08-05 LAB
ANION GAP SERPL CALCULATED.3IONS-SCNC: 8.6 MMOL/L (ref 5–15)
BUN BLD-MCNC: 25 MG/DL (ref 8–23)
BUN/CREAT SERPL: 22.5 (ref 7–25)
CALCIUM SPEC-SCNC: 8.8 MG/DL (ref 8.6–10.5)
CHLORIDE SERPL-SCNC: 101 MMOL/L (ref 98–107)
CO2 SERPL-SCNC: 28.4 MMOL/L (ref 22–29)
CREAT BLD-MCNC: 1.11 MG/DL (ref 0.76–1.27)
GFR SERPL CREATININE-BSD FRML MDRD: 64 ML/MIN/1.73
GLUCOSE BLD-MCNC: 219 MG/DL (ref 65–99)
GLUCOSE BLDC GLUCOMTR-MCNC: 197 MG/DL (ref 70–130)
GLUCOSE BLDC GLUCOMTR-MCNC: 224 MG/DL (ref 70–130)
GLUCOSE BLDC GLUCOMTR-MCNC: 228 MG/DL (ref 70–130)
GLUCOSE BLDC GLUCOMTR-MCNC: 313 MG/DL (ref 70–130)
POTASSIUM BLD-SCNC: 4.1 MMOL/L (ref 3.5–5.2)
SODIUM BLD-SCNC: 138 MMOL/L (ref 136–145)

## 2019-08-05 PROCEDURE — 82962 GLUCOSE BLOOD TEST: CPT

## 2019-08-05 PROCEDURE — 63710000001 INSULIN LISPRO (HUMAN) PER 5 UNITS: Performed by: HOSPITALIST

## 2019-08-05 PROCEDURE — 25010000002 DEXAMETHASONE PER 1 MG: Performed by: PHYSICIAN ASSISTANT

## 2019-08-05 PROCEDURE — 80048 BASIC METABOLIC PNL TOTAL CA: CPT | Performed by: HOSPITALIST

## 2019-08-05 PROCEDURE — 25010000002 HYDROMORPHONE PER 4 MG: Performed by: HOSPITALIST

## 2019-08-05 PROCEDURE — 63710000001 INSULIN GLARGINE PER 5 UNITS: Performed by: NURSE PRACTITIONER

## 2019-08-05 RX ORDER — DEXAMETHASONE SODIUM PHOSPHATE 4 MG/ML
2 INJECTION, SOLUTION INTRA-ARTICULAR; INTRALESIONAL; INTRAMUSCULAR; INTRAVENOUS; SOFT TISSUE EVERY 8 HOURS
Status: DISCONTINUED | OUTPATIENT
Start: 2019-08-05 | End: 2019-08-08

## 2019-08-05 RX ADMIN — DOCUSATE SODIUM 100 MG: 100 CAPSULE, LIQUID FILLED ORAL at 08:05

## 2019-08-05 RX ADMIN — FUROSEMIDE 20 MG: 20 TABLET ORAL at 17:48

## 2019-08-05 RX ADMIN — OXYCODONE HYDROCHLORIDE AND ACETAMINOPHEN 1 TABLET: 10; 325 TABLET ORAL at 17:48

## 2019-08-05 RX ADMIN — DEXAMETHASONE SODIUM PHOSPHATE 2 MG: 4 INJECTION, SOLUTION INTRAMUSCULAR; INTRAVENOUS at 15:16

## 2019-08-05 RX ADMIN — INSULIN LISPRO 4 UNITS: 100 INJECTION, SOLUTION INTRAVENOUS; SUBCUTANEOUS at 08:05

## 2019-08-05 RX ADMIN — ATORVASTATIN CALCIUM 80 MG: 80 TABLET, FILM COATED ORAL at 08:05

## 2019-08-05 RX ADMIN — GABAPENTIN 300 MG: 300 CAPSULE ORAL at 20:04

## 2019-08-05 RX ADMIN — INSULIN LISPRO 4 UNITS: 100 INJECTION, SOLUTION INTRAVENOUS; SUBCUTANEOUS at 17:48

## 2019-08-05 RX ADMIN — HYDROMORPHONE HYDROCHLORIDE 0.5 MG: 1 INJECTION, SOLUTION INTRAMUSCULAR; INTRAVENOUS; SUBCUTANEOUS at 20:04

## 2019-08-05 RX ADMIN — DOCUSATE SODIUM 100 MG: 100 CAPSULE, LIQUID FILLED ORAL at 20:04

## 2019-08-05 RX ADMIN — GABAPENTIN 300 MG: 300 CAPSULE ORAL at 15:08

## 2019-08-05 RX ADMIN — METFORMIN HYDROCHLORIDE 500 MG: 500 TABLET ORAL at 17:48

## 2019-08-05 RX ADMIN — METOPROLOL TARTRATE 12.5 MG: 25 TABLET ORAL at 08:05

## 2019-08-05 RX ADMIN — SODIUM CHLORIDE, PRESERVATIVE FREE 3 ML: 5 INJECTION INTRAVENOUS at 08:05

## 2019-08-05 RX ADMIN — INSULIN GLARGINE 30 UNITS: 100 INJECTION, SOLUTION SUBCUTANEOUS at 21:54

## 2019-08-05 RX ADMIN — FUROSEMIDE 20 MG: 20 TABLET ORAL at 08:05

## 2019-08-05 RX ADMIN — OXYCODONE HYDROCHLORIDE AND ACETAMINOPHEN 1 TABLET: 10; 325 TABLET ORAL at 10:46

## 2019-08-05 RX ADMIN — ROPINIROLE 4 MG: 2 TABLET, FILM COATED ORAL at 20:04

## 2019-08-05 RX ADMIN — POLYETHYLENE GLYCOL 3350 17 G: 17 POWDER, FOR SOLUTION ORAL at 08:09

## 2019-08-05 RX ADMIN — DEXAMETHASONE SODIUM PHOSPHATE 2 MG: 4 INJECTION, SOLUTION INTRAMUSCULAR; INTRAVENOUS at 08:05

## 2019-08-05 RX ADMIN — OXYCODONE HYDROCHLORIDE AND ACETAMINOPHEN 1 TABLET: 10; 325 TABLET ORAL at 02:44

## 2019-08-05 RX ADMIN — GABAPENTIN 300 MG: 300 CAPSULE ORAL at 08:05

## 2019-08-05 RX ADMIN — HYDROMORPHONE HYDROCHLORIDE 0.5 MG: 1 INJECTION, SOLUTION INTRAMUSCULAR; INTRAVENOUS; SUBCUTANEOUS at 11:32

## 2019-08-05 RX ADMIN — OXYCODONE HYDROCHLORIDE AND ACETAMINOPHEN 1 TABLET: 10; 325 TABLET ORAL at 06:47

## 2019-08-05 RX ADMIN — SODIUM CHLORIDE, PRESERVATIVE FREE 3 ML: 5 INJECTION INTRAVENOUS at 20:04

## 2019-08-05 RX ADMIN — INSULIN GLARGINE 30 UNITS: 100 INJECTION, SOLUTION SUBCUTANEOUS at 06:58

## 2019-08-05 RX ADMIN — METFORMIN HYDROCHLORIDE 500 MG: 500 TABLET ORAL at 08:05

## 2019-08-05 RX ADMIN — METOPROLOL TARTRATE 12.5 MG: 25 TABLET ORAL at 20:04

## 2019-08-05 RX ADMIN — OXYCODONE HYDROCHLORIDE AND ACETAMINOPHEN 1 TABLET: 10; 325 TABLET ORAL at 21:54

## 2019-08-05 RX ADMIN — INSULIN LISPRO 2 UNITS: 100 INJECTION, SOLUTION INTRAVENOUS; SUBCUTANEOUS at 11:43

## 2019-08-05 RX ADMIN — DEXAMETHASONE SODIUM PHOSPHATE 2 MG: 4 INJECTION, SOLUTION INTRAMUSCULAR; INTRAVENOUS at 02:44

## 2019-08-05 RX ADMIN — INSULIN LISPRO 7 UNITS: 100 INJECTION, SOLUTION INTRAVENOUS; SUBCUTANEOUS at 21:54

## 2019-08-06 LAB
ANION GAP SERPL CALCULATED.3IONS-SCNC: 10.9 MMOL/L (ref 5–15)
BUN BLD-MCNC: 25 MG/DL (ref 8–23)
BUN/CREAT SERPL: 20.8 (ref 7–25)
CALCIUM SPEC-SCNC: 7.9 MG/DL (ref 8.6–10.5)
CHLORIDE SERPL-SCNC: 101 MMOL/L (ref 98–107)
CO2 SERPL-SCNC: 28.1 MMOL/L (ref 22–29)
CREAT BLD-MCNC: 1.2 MG/DL (ref 0.76–1.27)
GFR SERPL CREATININE-BSD FRML MDRD: 59 ML/MIN/1.73
GLUCOSE BLD-MCNC: 160 MG/DL (ref 65–99)
GLUCOSE BLDC GLUCOMTR-MCNC: 153 MG/DL (ref 70–130)
GLUCOSE BLDC GLUCOMTR-MCNC: 169 MG/DL (ref 70–130)
GLUCOSE BLDC GLUCOMTR-MCNC: 188 MG/DL (ref 70–130)
GLUCOSE BLDC GLUCOMTR-MCNC: 232 MG/DL (ref 70–130)
POTASSIUM BLD-SCNC: 4.2 MMOL/L (ref 3.5–5.2)
SODIUM BLD-SCNC: 140 MMOL/L (ref 136–145)

## 2019-08-06 PROCEDURE — 82962 GLUCOSE BLOOD TEST: CPT

## 2019-08-06 PROCEDURE — 25010000002 DEXAMETHASONE PER 1 MG: Performed by: PHYSICIAN ASSISTANT

## 2019-08-06 PROCEDURE — 25010000002 HYDROMORPHONE PER 4 MG: Performed by: HOSPITALIST

## 2019-08-06 PROCEDURE — 63710000001 INSULIN GLARGINE PER 5 UNITS: Performed by: NURSE PRACTITIONER

## 2019-08-06 PROCEDURE — 63710000001 INSULIN LISPRO (HUMAN) PER 5 UNITS: Performed by: HOSPITALIST

## 2019-08-06 PROCEDURE — 80048 BASIC METABOLIC PNL TOTAL CA: CPT | Performed by: HOSPITALIST

## 2019-08-06 RX ADMIN — OXYCODONE HYDROCHLORIDE AND ACETAMINOPHEN 1 TABLET: 10; 325 TABLET ORAL at 17:16

## 2019-08-06 RX ADMIN — CYCLOBENZAPRINE 10 MG: 10 TABLET, FILM COATED ORAL at 21:15

## 2019-08-06 RX ADMIN — METFORMIN HYDROCHLORIDE 500 MG: 500 TABLET ORAL at 08:46

## 2019-08-06 RX ADMIN — OXYCODONE HYDROCHLORIDE AND ACETAMINOPHEN 1 TABLET: 10; 325 TABLET ORAL at 12:02

## 2019-08-06 RX ADMIN — GABAPENTIN 300 MG: 300 CAPSULE ORAL at 16:08

## 2019-08-06 RX ADMIN — FUROSEMIDE 20 MG: 20 TABLET ORAL at 17:16

## 2019-08-06 RX ADMIN — DOCUSATE SODIUM 100 MG: 100 CAPSULE, LIQUID FILLED ORAL at 21:13

## 2019-08-06 RX ADMIN — ATORVASTATIN CALCIUM 80 MG: 80 TABLET, FILM COATED ORAL at 08:46

## 2019-08-06 RX ADMIN — INSULIN LISPRO 2 UNITS: 100 INJECTION, SOLUTION INTRAVENOUS; SUBCUTANEOUS at 08:45

## 2019-08-06 RX ADMIN — INSULIN GLARGINE 30 UNITS: 100 INJECTION, SOLUTION SUBCUTANEOUS at 06:21

## 2019-08-06 RX ADMIN — ROPINIROLE 4 MG: 2 TABLET, FILM COATED ORAL at 21:13

## 2019-08-06 RX ADMIN — DOCUSATE SODIUM 100 MG: 100 CAPSULE, LIQUID FILLED ORAL at 08:45

## 2019-08-06 RX ADMIN — HYDROMORPHONE HYDROCHLORIDE 0.5 MG: 1 INJECTION, SOLUTION INTRAMUSCULAR; INTRAVENOUS; SUBCUTANEOUS at 19:28

## 2019-08-06 RX ADMIN — DEXAMETHASONE SODIUM PHOSPHATE 2 MG: 4 INJECTION, SOLUTION INTRAMUSCULAR; INTRAVENOUS at 16:08

## 2019-08-06 RX ADMIN — OXYCODONE HYDROCHLORIDE AND ACETAMINOPHEN 1 TABLET: 10; 325 TABLET ORAL at 21:13

## 2019-08-06 RX ADMIN — OXYCODONE HYDROCHLORIDE AND ACETAMINOPHEN 1 TABLET: 10; 325 TABLET ORAL at 02:55

## 2019-08-06 RX ADMIN — SODIUM CHLORIDE, PRESERVATIVE FREE 3 ML: 5 INJECTION INTRAVENOUS at 08:46

## 2019-08-06 RX ADMIN — OXYCODONE HYDROCHLORIDE AND ACETAMINOPHEN 1 TABLET: 10; 325 TABLET ORAL at 07:50

## 2019-08-06 RX ADMIN — GABAPENTIN 300 MG: 300 CAPSULE ORAL at 08:45

## 2019-08-06 RX ADMIN — INSULIN LISPRO 2 UNITS: 100 INJECTION, SOLUTION INTRAVENOUS; SUBCUTANEOUS at 17:16

## 2019-08-06 RX ADMIN — METFORMIN HYDROCHLORIDE 500 MG: 500 TABLET ORAL at 17:16

## 2019-08-06 RX ADMIN — INSULIN LISPRO 4 UNITS: 100 INJECTION, SOLUTION INTRAVENOUS; SUBCUTANEOUS at 21:13

## 2019-08-06 RX ADMIN — POLYETHYLENE GLYCOL 3350 17 G: 17 POWDER, FOR SOLUTION ORAL at 08:45

## 2019-08-06 RX ADMIN — DEXAMETHASONE SODIUM PHOSPHATE 2 MG: 4 INJECTION, SOLUTION INTRAMUSCULAR; INTRAVENOUS at 00:20

## 2019-08-06 RX ADMIN — GABAPENTIN 300 MG: 300 CAPSULE ORAL at 21:13

## 2019-08-06 RX ADMIN — FUROSEMIDE 20 MG: 20 TABLET ORAL at 08:46

## 2019-08-06 RX ADMIN — HYDROMORPHONE HYDROCHLORIDE 0.5 MG: 1 INJECTION, SOLUTION INTRAMUSCULAR; INTRAVENOUS; SUBCUTANEOUS at 16:08

## 2019-08-06 RX ADMIN — INSULIN GLARGINE 30 UNITS: 100 INJECTION, SOLUTION SUBCUTANEOUS at 21:12

## 2019-08-06 RX ADMIN — SODIUM CHLORIDE, PRESERVATIVE FREE 3 ML: 5 INJECTION INTRAVENOUS at 21:14

## 2019-08-06 RX ADMIN — INSULIN LISPRO 2 UNITS: 100 INJECTION, SOLUTION INTRAVENOUS; SUBCUTANEOUS at 12:02

## 2019-08-06 RX ADMIN — METOPROLOL TARTRATE 12.5 MG: 25 TABLET ORAL at 21:13

## 2019-08-06 RX ADMIN — DEXAMETHASONE SODIUM PHOSPHATE 2 MG: 4 INJECTION, SOLUTION INTRAMUSCULAR; INTRAVENOUS at 08:47

## 2019-08-07 LAB
ANION GAP SERPL CALCULATED.3IONS-SCNC: 10.9 MMOL/L (ref 5–15)
BUN BLD-MCNC: 26 MG/DL (ref 8–23)
BUN/CREAT SERPL: 20.5 (ref 7–25)
CALCIUM SPEC-SCNC: 9 MG/DL (ref 8.6–10.5)
CHLORIDE SERPL-SCNC: 99 MMOL/L (ref 98–107)
CO2 SERPL-SCNC: 29.1 MMOL/L (ref 22–29)
CREAT BLD-MCNC: 1.27 MG/DL (ref 0.76–1.27)
GFR SERPL CREATININE-BSD FRML MDRD: 55 ML/MIN/1.73
GLUCOSE BLD-MCNC: 156 MG/DL (ref 65–99)
GLUCOSE BLDC GLUCOMTR-MCNC: 139 MG/DL (ref 70–130)
GLUCOSE BLDC GLUCOMTR-MCNC: 211 MG/DL (ref 70–130)
GLUCOSE BLDC GLUCOMTR-MCNC: 212 MG/DL (ref 70–130)
GLUCOSE BLDC GLUCOMTR-MCNC: 231 MG/DL (ref 70–130)
POTASSIUM BLD-SCNC: 4.2 MMOL/L (ref 3.5–5.2)
SODIUM BLD-SCNC: 139 MMOL/L (ref 136–145)

## 2019-08-07 PROCEDURE — 25010000002 DEXAMETHASONE PER 1 MG: Performed by: PHYSICIAN ASSISTANT

## 2019-08-07 PROCEDURE — 82962 GLUCOSE BLOOD TEST: CPT

## 2019-08-07 PROCEDURE — 80048 BASIC METABOLIC PNL TOTAL CA: CPT | Performed by: HOSPITALIST

## 2019-08-07 PROCEDURE — 25010000002 HYDROMORPHONE PER 4 MG: Performed by: HOSPITALIST

## 2019-08-07 PROCEDURE — 63710000001 INSULIN LISPRO (HUMAN) PER 5 UNITS: Performed by: HOSPITALIST

## 2019-08-07 PROCEDURE — 63710000001 INSULIN GLARGINE PER 5 UNITS: Performed by: NURSE PRACTITIONER

## 2019-08-07 RX ORDER — OXYCODONE HYDROCHLORIDE AND ACETAMINOPHEN 5; 325 MG/1; MG/1
1 TABLET ORAL ONCE
Status: COMPLETED | OUTPATIENT
Start: 2019-08-07 | End: 2019-08-07

## 2019-08-07 RX ORDER — OXYCODONE AND ACETAMINOPHEN 7.5; 325 MG/1; MG/1
2 TABLET ORAL EVERY 4 HOURS PRN
Status: DISPENSED | OUTPATIENT
Start: 2019-08-07 | End: 2019-08-17

## 2019-08-07 RX ADMIN — HYDROMORPHONE HYDROCHLORIDE 0.5 MG: 1 INJECTION, SOLUTION INTRAMUSCULAR; INTRAVENOUS; SUBCUTANEOUS at 08:48

## 2019-08-07 RX ADMIN — DEXAMETHASONE SODIUM PHOSPHATE 2 MG: 4 INJECTION, SOLUTION INTRAMUSCULAR; INTRAVENOUS at 23:23

## 2019-08-07 RX ADMIN — DEXAMETHASONE SODIUM PHOSPHATE 2 MG: 4 INJECTION, SOLUTION INTRAMUSCULAR; INTRAVENOUS at 17:02

## 2019-08-07 RX ADMIN — SODIUM CHLORIDE, PRESERVATIVE FREE 3 ML: 5 INJECTION INTRAVENOUS at 20:25

## 2019-08-07 RX ADMIN — DEXAMETHASONE SODIUM PHOSPHATE 2 MG: 4 INJECTION, SOLUTION INTRAMUSCULAR; INTRAVENOUS at 08:30

## 2019-08-07 RX ADMIN — METOPROLOL TARTRATE 12.5 MG: 25 TABLET ORAL at 08:30

## 2019-08-07 RX ADMIN — DOCUSATE SODIUM 100 MG: 100 CAPSULE, LIQUID FILLED ORAL at 08:30

## 2019-08-07 RX ADMIN — OXYCODONE HYDROCHLORIDE AND ACETAMINOPHEN 1 TABLET: 10; 325 TABLET ORAL at 10:16

## 2019-08-07 RX ADMIN — GABAPENTIN 300 MG: 300 CAPSULE ORAL at 08:30

## 2019-08-07 RX ADMIN — ATORVASTATIN CALCIUM 80 MG: 80 TABLET, FILM COATED ORAL at 08:30

## 2019-08-07 RX ADMIN — GABAPENTIN 300 MG: 300 CAPSULE ORAL at 20:24

## 2019-08-07 RX ADMIN — OXYCODONE HYDROCHLORIDE AND ACETAMINOPHEN 2 TABLET: 7.5; 325 TABLET ORAL at 23:06

## 2019-08-07 RX ADMIN — OXYCODONE HYDROCHLORIDE AND ACETAMINOPHEN 1 TABLET: 10; 325 TABLET ORAL at 06:29

## 2019-08-07 RX ADMIN — INSULIN GLARGINE 30 UNITS: 100 INJECTION, SOLUTION SUBCUTANEOUS at 23:07

## 2019-08-07 RX ADMIN — POLYETHYLENE GLYCOL 3350 17 G: 17 POWDER, FOR SOLUTION ORAL at 08:30

## 2019-08-07 RX ADMIN — INSULIN LISPRO 4 UNITS: 100 INJECTION, SOLUTION INTRAVENOUS; SUBCUTANEOUS at 23:06

## 2019-08-07 RX ADMIN — DEXAMETHASONE SODIUM PHOSPHATE 2 MG: 4 INJECTION, SOLUTION INTRAMUSCULAR; INTRAVENOUS at 00:12

## 2019-08-07 RX ADMIN — FUROSEMIDE 20 MG: 20 TABLET ORAL at 17:02

## 2019-08-07 RX ADMIN — METOPROLOL TARTRATE 12.5 MG: 25 TABLET ORAL at 20:24

## 2019-08-07 RX ADMIN — OXYCODONE HYDROCHLORIDE AND ACETAMINOPHEN 2 TABLET: 7.5; 325 TABLET ORAL at 14:19

## 2019-08-07 RX ADMIN — INSULIN GLARGINE 30 UNITS: 100 INJECTION, SOLUTION SUBCUTANEOUS at 06:28

## 2019-08-07 RX ADMIN — OXYCODONE HYDROCHLORIDE AND ACETAMINOPHEN 1 TABLET: 5; 325 TABLET ORAL at 10:40

## 2019-08-07 RX ADMIN — CYCLOBENZAPRINE 10 MG: 10 TABLET, FILM COATED ORAL at 23:06

## 2019-08-07 RX ADMIN — ROPINIROLE 4 MG: 2 TABLET, FILM COATED ORAL at 20:24

## 2019-08-07 RX ADMIN — METFORMIN HYDROCHLORIDE 500 MG: 500 TABLET ORAL at 08:30

## 2019-08-07 RX ADMIN — INSULIN LISPRO 4 UNITS: 100 INJECTION, SOLUTION INTRAVENOUS; SUBCUTANEOUS at 11:39

## 2019-08-07 RX ADMIN — INSULIN LISPRO 4 UNITS: 100 INJECTION, SOLUTION INTRAVENOUS; SUBCUTANEOUS at 17:02

## 2019-08-07 RX ADMIN — DOCUSATE SODIUM 100 MG: 100 CAPSULE, LIQUID FILLED ORAL at 20:24

## 2019-08-07 RX ADMIN — METFORMIN HYDROCHLORIDE 500 MG: 500 TABLET ORAL at 17:02

## 2019-08-07 RX ADMIN — FUROSEMIDE 20 MG: 20 TABLET ORAL at 08:30

## 2019-08-07 RX ADMIN — SODIUM CHLORIDE, PRESERVATIVE FREE 3 ML: 5 INJECTION INTRAVENOUS at 08:50

## 2019-08-07 RX ADMIN — GABAPENTIN 300 MG: 300 CAPSULE ORAL at 17:02

## 2019-08-07 RX ADMIN — OXYCODONE HYDROCHLORIDE AND ACETAMINOPHEN 2 TABLET: 7.5; 325 TABLET ORAL at 18:33

## 2019-08-08 ENCOUNTER — ANESTHESIA EVENT (OUTPATIENT)
Dept: PERIOP | Facility: HOSPITAL | Age: 79
End: 2019-08-08

## 2019-08-08 ENCOUNTER — ANESTHESIA (OUTPATIENT)
Dept: PERIOP | Facility: HOSPITAL | Age: 79
End: 2019-08-08

## 2019-08-08 ENCOUNTER — APPOINTMENT (OUTPATIENT)
Dept: GENERAL RADIOLOGY | Facility: HOSPITAL | Age: 79
End: 2019-08-08

## 2019-08-08 LAB
ABO GROUP BLD: NORMAL
ANION GAP SERPL CALCULATED.3IONS-SCNC: 10.5 MMOL/L (ref 5–15)
BLD GP AB SCN SERPL QL: NEGATIVE
BUN BLD-MCNC: 30 MG/DL (ref 8–23)
BUN/CREAT SERPL: 21.9 (ref 7–25)
CALCIUM SPEC-SCNC: 9.2 MG/DL (ref 8.6–10.5)
CHLORIDE SERPL-SCNC: 97 MMOL/L (ref 98–107)
CO2 SERPL-SCNC: 28.5 MMOL/L (ref 22–29)
CREAT BLD-MCNC: 1.37 MG/DL (ref 0.76–1.27)
GFR SERPL CREATININE-BSD FRML MDRD: 50 ML/MIN/1.73
GLUCOSE BLD-MCNC: 229 MG/DL (ref 65–99)
GLUCOSE BLDC GLUCOMTR-MCNC: 119 MG/DL (ref 70–130)
GLUCOSE BLDC GLUCOMTR-MCNC: 217 MG/DL (ref 70–130)
GLUCOSE BLDC GLUCOMTR-MCNC: 86 MG/DL (ref 70–130)
GLUCOSE BLDC GLUCOMTR-MCNC: 88 MG/DL (ref 70–130)
GLUCOSE BLDC GLUCOMTR-MCNC: 96 MG/DL (ref 70–130)
POTASSIUM BLD-SCNC: 4.2 MMOL/L (ref 3.5–5.2)
RH BLD: POSITIVE
SODIUM BLD-SCNC: 136 MMOL/L (ref 136–145)
T&S EXPIRATION DATE: NORMAL

## 2019-08-08 PROCEDURE — 0RG2071 FUSION OF 2 OR MORE CERVICAL VERTEBRAL JOINTS WITH AUTOLOGOUS TISSUE SUBSTITUTE, POSTERIOR APPROACH, POSTERIOR COLUMN, OPEN APPROACH: ICD-10-PCS | Performed by: ORTHOPAEDIC SURGERY

## 2019-08-08 PROCEDURE — 25010000002 SUCCINYLCHOLINE PER 20 MG: Performed by: NURSE ANESTHETIST, CERTIFIED REGISTERED

## 2019-08-08 PROCEDURE — 25010000002 FENTANYL CITRATE (PF) 100 MCG/2ML SOLUTION: Performed by: ANESTHESIOLOGY

## 2019-08-08 PROCEDURE — 82947 ASSAY GLUCOSE BLOOD QUANT: CPT

## 2019-08-08 PROCEDURE — 25010000002 ONDANSETRON PER 1 MG: Performed by: NURSE ANESTHETIST, CERTIFIED REGISTERED

## 2019-08-08 PROCEDURE — 63710000001 INSULIN GLARGINE PER 5 UNITS: Performed by: NURSE PRACTITIONER

## 2019-08-08 PROCEDURE — 25010000002 FENTANYL CITRATE (PF) 100 MCG/2ML SOLUTION: Performed by: NURSE ANESTHETIST, CERTIFIED REGISTERED

## 2019-08-08 PROCEDURE — 25010000002 DEXAMETHASONE PER 1 MG: Performed by: PHYSICIAN ASSISTANT

## 2019-08-08 PROCEDURE — 25010000003 CEFAZOLIN IN DEXTROSE 2-4 GM/100ML-% SOLUTION: Performed by: ORTHOPAEDIC SURGERY

## 2019-08-08 PROCEDURE — 82803 BLOOD GASES ANY COMBINATION: CPT

## 2019-08-08 PROCEDURE — 25010000002 MIDAZOLAM PER 1 MG: Performed by: ANESTHESIOLOGY

## 2019-08-08 PROCEDURE — 80048 BASIC METABOLIC PNL TOTAL CA: CPT | Performed by: HOSPITALIST

## 2019-08-08 PROCEDURE — 25010000002 PROPOFOL 10 MG/ML EMULSION: Performed by: NURSE ANESTHETIST, CERTIFIED REGISTERED

## 2019-08-08 PROCEDURE — 01N10ZZ RELEASE CERVICAL NERVE, OPEN APPROACH: ICD-10-PCS | Performed by: ORTHOPAEDIC SURGERY

## 2019-08-08 PROCEDURE — 94799 UNLISTED PULMONARY SVC/PX: CPT

## 2019-08-08 PROCEDURE — 25010000002 HYDROMORPHONE PER 4 MG: Performed by: NURSE ANESTHETIST, CERTIFIED REGISTERED

## 2019-08-08 PROCEDURE — 25010000003 BUPIVACAINE LIPOSOME 1.3 % SUSPENSION 20 ML VIAL: Performed by: ORTHOPAEDIC SURGERY

## 2019-08-08 PROCEDURE — C9290 INJ, BUPIVACAINE LIPOSOME: HCPCS | Performed by: ORTHOPAEDIC SURGERY

## 2019-08-08 PROCEDURE — 25010000002 PHENYLEPHRINE PER 1 ML: Performed by: NURSE ANESTHETIST, CERTIFIED REGISTERED

## 2019-08-08 PROCEDURE — 86901 BLOOD TYPING SEROLOGIC RH(D): CPT | Performed by: ORTHOPAEDIC SURGERY

## 2019-08-08 PROCEDURE — 86900 BLOOD TYPING SEROLOGIC ABO: CPT | Performed by: ORTHOPAEDIC SURGERY

## 2019-08-08 PROCEDURE — 85014 HEMATOCRIT: CPT

## 2019-08-08 PROCEDURE — 25010000002 VANCOMYCIN 1 G RECONSTITUTED SOLUTION: Performed by: NURSE ANESTHETIST, CERTIFIED REGISTERED

## 2019-08-08 PROCEDURE — 82962 GLUCOSE BLOOD TEST: CPT

## 2019-08-08 PROCEDURE — 63710000001 INSULIN LISPRO (HUMAN) PER 5 UNITS: Performed by: HOSPITALIST

## 2019-08-08 PROCEDURE — 0RG4071 FUSION OF CERVICOTHORACIC VERTEBRAL JOINT WITH AUTOLOGOUS TISSUE SUBSTITUTE, POSTERIOR APPROACH, POSTERIOR COLUMN, OPEN APPROACH: ICD-10-PCS | Performed by: ORTHOPAEDIC SURGERY

## 2019-08-08 PROCEDURE — 86850 RBC ANTIBODY SCREEN: CPT | Performed by: ORTHOPAEDIC SURGERY

## 2019-08-08 PROCEDURE — 85018 HEMOGLOBIN: CPT

## 2019-08-08 PROCEDURE — 25010000002 HYDROMORPHONE PER 4 MG: Performed by: ANESTHESIOLOGY

## 2019-08-08 PROCEDURE — C1762 CONN TISS, HUMAN(INC FASCIA): HCPCS | Performed by: ORTHOPAEDIC SURGERY

## 2019-08-08 PROCEDURE — 25010000002 VANCOMYCIN 1 G RECONSTITUTED SOLUTION: Performed by: ORTHOPAEDIC SURGERY

## 2019-08-08 DEVICE — WAX BONE HEMO NAT 2.5G: Type: IMPLANTABLE DEVICE | Status: FUNCTIONAL

## 2019-08-08 DEVICE — IMPLANTABLE DEVICE: Type: IMPLANTABLE DEVICE | Status: FUNCTIONAL

## 2019-08-08 RX ORDER — LABETALOL HYDROCHLORIDE 5 MG/ML
5 INJECTION, SOLUTION INTRAVENOUS
Status: DISCONTINUED | OUTPATIENT
Start: 2019-08-08 | End: 2019-08-08 | Stop reason: HOSPADM

## 2019-08-08 RX ORDER — HYDROMORPHONE HCL 110MG/55ML
PATIENT CONTROLLED ANALGESIA SYRINGE INTRAVENOUS AS NEEDED
Status: DISCONTINUED | OUTPATIENT
Start: 2019-08-08 | End: 2019-08-08 | Stop reason: SURG

## 2019-08-08 RX ORDER — DIPHENHYDRAMINE HCL 25 MG
25 CAPSULE ORAL
Status: DISCONTINUED | OUTPATIENT
Start: 2019-08-08 | End: 2019-08-08 | Stop reason: HOSPADM

## 2019-08-08 RX ORDER — NALOXONE HCL 0.4 MG/ML
0.2 VIAL (ML) INJECTION AS NEEDED
Status: DISCONTINUED | OUTPATIENT
Start: 2019-08-08 | End: 2019-08-08 | Stop reason: HOSPADM

## 2019-08-08 RX ORDER — FENTANYL CITRATE 50 UG/ML
50 INJECTION, SOLUTION INTRAMUSCULAR; INTRAVENOUS
Status: DISCONTINUED | OUTPATIENT
Start: 2019-08-08 | End: 2019-08-08 | Stop reason: HOSPADM

## 2019-08-08 RX ORDER — HYDROMORPHONE HYDROCHLORIDE 1 MG/ML
0.5 INJECTION, SOLUTION INTRAMUSCULAR; INTRAVENOUS; SUBCUTANEOUS
Status: DISCONTINUED | OUTPATIENT
Start: 2019-08-08 | End: 2019-08-08 | Stop reason: HOSPADM

## 2019-08-08 RX ORDER — FLUMAZENIL 0.1 MG/ML
0.2 INJECTION INTRAVENOUS AS NEEDED
Status: DISCONTINUED | OUTPATIENT
Start: 2019-08-08 | End: 2019-08-08 | Stop reason: HOSPADM

## 2019-08-08 RX ORDER — SODIUM CHLORIDE 0.9 % (FLUSH) 0.9 %
3-10 SYRINGE (ML) INJECTION AS NEEDED
Status: DISCONTINUED | OUTPATIENT
Start: 2019-08-08 | End: 2019-08-08 | Stop reason: HOSPADM

## 2019-08-08 RX ORDER — NALOXONE HCL 0.4 MG/ML
0.1 VIAL (ML) INJECTION
Status: DISCONTINUED | OUTPATIENT
Start: 2019-08-08 | End: 2019-08-17 | Stop reason: HOSPADM

## 2019-08-08 RX ORDER — PROPOFOL 10 MG/ML
VIAL (ML) INTRAVENOUS AS NEEDED
Status: DISCONTINUED | OUTPATIENT
Start: 2019-08-08 | End: 2019-08-08 | Stop reason: SURG

## 2019-08-08 RX ORDER — HYDROCODONE BITARTRATE AND ACETAMINOPHEN 7.5; 325 MG/1; MG/1
1 TABLET ORAL ONCE AS NEEDED
Status: DISCONTINUED | OUTPATIENT
Start: 2019-08-08 | End: 2019-08-08 | Stop reason: HOSPADM

## 2019-08-08 RX ORDER — HYDRALAZINE HYDROCHLORIDE 20 MG/ML
5 INJECTION INTRAMUSCULAR; INTRAVENOUS
Status: DISCONTINUED | OUTPATIENT
Start: 2019-08-08 | End: 2019-08-08 | Stop reason: HOSPADM

## 2019-08-08 RX ORDER — DIPHENHYDRAMINE HYDROCHLORIDE 50 MG/ML
25 INJECTION INTRAMUSCULAR; INTRAVENOUS EVERY 6 HOURS PRN
Status: DISCONTINUED | OUTPATIENT
Start: 2019-08-08 | End: 2019-08-17 | Stop reason: HOSPADM

## 2019-08-08 RX ORDER — ACETAMINOPHEN 325 MG/1
650 TABLET ORAL ONCE AS NEEDED
Status: DISCONTINUED | OUTPATIENT
Start: 2019-08-08 | End: 2019-08-08 | Stop reason: HOSPADM

## 2019-08-08 RX ORDER — MIDAZOLAM HYDROCHLORIDE 1 MG/ML
2 INJECTION INTRAMUSCULAR; INTRAVENOUS
Status: DISCONTINUED | OUTPATIENT
Start: 2019-08-08 | End: 2019-08-08 | Stop reason: HOSPADM

## 2019-08-08 RX ORDER — VANCOMYCIN HYDROCHLORIDE 1 G/20ML
INJECTION, POWDER, LYOPHILIZED, FOR SOLUTION INTRAVENOUS AS NEEDED
Status: DISCONTINUED | OUTPATIENT
Start: 2019-08-08 | End: 2019-08-08 | Stop reason: SURG

## 2019-08-08 RX ORDER — DEXAMETHASONE SODIUM PHOSPHATE 4 MG/ML
2 INJECTION, SOLUTION INTRA-ARTICULAR; INTRALESIONAL; INTRAMUSCULAR; INTRAVENOUS; SOFT TISSUE 2 TIMES DAILY
Status: DISCONTINUED | OUTPATIENT
Start: 2019-08-09 | End: 2019-08-10

## 2019-08-08 RX ORDER — PROMETHAZINE HYDROCHLORIDE 25 MG/1
25 SUPPOSITORY RECTAL ONCE AS NEEDED
Status: DISCONTINUED | OUTPATIENT
Start: 2019-08-08 | End: 2019-08-08 | Stop reason: HOSPADM

## 2019-08-08 RX ORDER — MIDAZOLAM HYDROCHLORIDE 1 MG/ML
1 INJECTION INTRAMUSCULAR; INTRAVENOUS
Status: DISCONTINUED | OUTPATIENT
Start: 2019-08-08 | End: 2019-08-08 | Stop reason: HOSPADM

## 2019-08-08 RX ORDER — ONDANSETRON 2 MG/ML
4 INJECTION INTRAMUSCULAR; INTRAVENOUS ONCE AS NEEDED
Status: COMPLETED | OUTPATIENT
Start: 2019-08-08 | End: 2019-08-08

## 2019-08-08 RX ORDER — EPHEDRINE SULFATE 50 MG/ML
5 INJECTION, SOLUTION INTRAVENOUS ONCE AS NEEDED
Status: DISCONTINUED | OUTPATIENT
Start: 2019-08-08 | End: 2019-08-08 | Stop reason: HOSPADM

## 2019-08-08 RX ORDER — DIPHENHYDRAMINE HYDROCHLORIDE 50 MG/ML
12.5 INJECTION INTRAMUSCULAR; INTRAVENOUS
Status: DISCONTINUED | OUTPATIENT
Start: 2019-08-08 | End: 2019-08-08 | Stop reason: HOSPADM

## 2019-08-08 RX ORDER — SODIUM CHLORIDE 0.9 % (FLUSH) 0.9 %
3 SYRINGE (ML) INJECTION EVERY 12 HOURS SCHEDULED
Status: DISCONTINUED | OUTPATIENT
Start: 2019-08-08 | End: 2019-08-08 | Stop reason: HOSPADM

## 2019-08-08 RX ORDER — HYDROMORPHONE HCL IN 0.9% NACL 10 MG/50ML
PATIENT CONTROLLED ANALGESIA SYRINGE INTRAVENOUS CONTINUOUS
Status: DISPENSED | OUTPATIENT
Start: 2019-08-08 | End: 2019-08-09

## 2019-08-08 RX ORDER — GLYCOPYRROLATE 0.2 MG/ML
INJECTION INTRAMUSCULAR; INTRAVENOUS AS NEEDED
Status: DISCONTINUED | OUTPATIENT
Start: 2019-08-08 | End: 2019-08-08 | Stop reason: SURG

## 2019-08-08 RX ORDER — OXYCODONE AND ACETAMINOPHEN 7.5; 325 MG/1; MG/1
1 TABLET ORAL ONCE AS NEEDED
Status: DISCONTINUED | OUTPATIENT
Start: 2019-08-08 | End: 2019-08-08 | Stop reason: HOSPADM

## 2019-08-08 RX ORDER — SUCCINYLCHOLINE CHLORIDE 20 MG/ML
INJECTION INTRAMUSCULAR; INTRAVENOUS AS NEEDED
Status: DISCONTINUED | OUTPATIENT
Start: 2019-08-08 | End: 2019-08-08 | Stop reason: SURG

## 2019-08-08 RX ORDER — PROMETHAZINE HYDROCHLORIDE 25 MG/1
25 TABLET ORAL ONCE AS NEEDED
Status: DISCONTINUED | OUTPATIENT
Start: 2019-08-08 | End: 2019-08-08 | Stop reason: HOSPADM

## 2019-08-08 RX ORDER — CEFAZOLIN SODIUM 1 G/50ML
1 INJECTION, SOLUTION INTRAVENOUS EVERY 8 HOURS
Status: DISCONTINUED | OUTPATIENT
Start: 2019-08-09 | End: 2019-08-09

## 2019-08-08 RX ORDER — TRANEXAMIC ACID 100 MG/ML
INJECTION, SOLUTION INTRAVENOUS AS NEEDED
Status: DISCONTINUED | OUTPATIENT
Start: 2019-08-08 | End: 2019-08-08 | Stop reason: SURG

## 2019-08-08 RX ORDER — VANCOMYCIN HYDROCHLORIDE 1 G/20ML
INJECTION, POWDER, LYOPHILIZED, FOR SOLUTION INTRAVENOUS AS NEEDED
Status: DISCONTINUED | OUTPATIENT
Start: 2019-08-08 | End: 2019-08-08 | Stop reason: HOSPADM

## 2019-08-08 RX ORDER — LIDOCAINE HYDROCHLORIDE 20 MG/ML
INJECTION, SOLUTION INFILTRATION; PERINEURAL AS NEEDED
Status: DISCONTINUED | OUTPATIENT
Start: 2019-08-08 | End: 2019-08-08 | Stop reason: SURG

## 2019-08-08 RX ORDER — SODIUM CHLORIDE 0.9 % (FLUSH) 0.9 %
1-10 SYRINGE (ML) INJECTION AS NEEDED
Status: DISCONTINUED | OUTPATIENT
Start: 2019-08-08 | End: 2019-08-08 | Stop reason: HOSPADM

## 2019-08-08 RX ORDER — PROMETHAZINE HYDROCHLORIDE 25 MG/ML
6.25 INJECTION, SOLUTION INTRAMUSCULAR; INTRAVENOUS
Status: DISCONTINUED | OUTPATIENT
Start: 2019-08-08 | End: 2019-08-08 | Stop reason: HOSPADM

## 2019-08-08 RX ORDER — CEFAZOLIN SODIUM 2 G/100ML
2 INJECTION, SOLUTION INTRAVENOUS ONCE
Status: COMPLETED | OUTPATIENT
Start: 2019-08-08 | End: 2019-08-08

## 2019-08-08 RX ORDER — PROMETHAZINE HYDROCHLORIDE 25 MG/ML
12.5 INJECTION, SOLUTION INTRAMUSCULAR; INTRAVENOUS ONCE AS NEEDED
Status: DISCONTINUED | OUTPATIENT
Start: 2019-08-08 | End: 2019-08-08 | Stop reason: HOSPADM

## 2019-08-08 RX ORDER — ROCURONIUM BROMIDE 10 MG/ML
INJECTION, SOLUTION INTRAVENOUS AS NEEDED
Status: DISCONTINUED | OUTPATIENT
Start: 2019-08-08 | End: 2019-08-08 | Stop reason: SURG

## 2019-08-08 RX ORDER — SODIUM CHLORIDE, SODIUM LACTATE, POTASSIUM CHLORIDE, CALCIUM CHLORIDE 600; 310; 30; 20 MG/100ML; MG/100ML; MG/100ML; MG/100ML
9 INJECTION, SOLUTION INTRAVENOUS CONTINUOUS
Status: DISCONTINUED | OUTPATIENT
Start: 2019-08-08 | End: 2019-08-08

## 2019-08-08 RX ORDER — PANTOPRAZOLE SODIUM 40 MG/1
40 TABLET, DELAYED RELEASE ORAL
Status: DISCONTINUED | OUTPATIENT
Start: 2019-08-08 | End: 2019-08-17 | Stop reason: HOSPADM

## 2019-08-08 RX ORDER — LIDOCAINE HYDROCHLORIDE 10 MG/ML
0.5 INJECTION, SOLUTION EPIDURAL; INFILTRATION; INTRACAUDAL; PERINEURAL ONCE AS NEEDED
Status: DISCONTINUED | OUTPATIENT
Start: 2019-08-08 | End: 2019-08-08 | Stop reason: HOSPADM

## 2019-08-08 RX ORDER — FAMOTIDINE 10 MG/ML
20 INJECTION, SOLUTION INTRAVENOUS ONCE
Status: COMPLETED | OUTPATIENT
Start: 2019-08-08 | End: 2019-08-08

## 2019-08-08 RX ADMIN — FAMOTIDINE 20 MG: 10 INJECTION INTRAVENOUS at 14:54

## 2019-08-08 RX ADMIN — PHENYLEPHRINE HYDROCHLORIDE 100 MCG: 10 INJECTION INTRAVENOUS at 18:45

## 2019-08-08 RX ADMIN — FENTANYL CITRATE 50 MCG: 50 INJECTION, SOLUTION INTRAMUSCULAR; INTRAVENOUS at 19:55

## 2019-08-08 RX ADMIN — SODIUM CHLORIDE, POTASSIUM CHLORIDE, SODIUM LACTATE AND CALCIUM CHLORIDE 9 ML/HR: 600; 310; 30; 20 INJECTION, SOLUTION INTRAVENOUS at 14:35

## 2019-08-08 RX ADMIN — DOCUSATE SODIUM 100 MG: 100 CAPSULE, LIQUID FILLED ORAL at 08:58

## 2019-08-08 RX ADMIN — ONDANSETRON HYDROCHLORIDE 4 MG: 2 SOLUTION INTRAMUSCULAR; INTRAVENOUS at 19:46

## 2019-08-08 RX ADMIN — PHENYLEPHRINE HYDROCHLORIDE 100 MCG: 10 INJECTION INTRAVENOUS at 16:42

## 2019-08-08 RX ADMIN — SODIUM CHLORIDE, POTASSIUM CHLORIDE, SODIUM LACTATE AND CALCIUM CHLORIDE: 600; 310; 30; 20 INJECTION, SOLUTION INTRAVENOUS at 19:25

## 2019-08-08 RX ADMIN — VANCOMYCIN HYDROCHLORIDE 1.5 G: 1 INJECTION, POWDER, LYOPHILIZED, FOR SOLUTION INTRAVENOUS at 16:42

## 2019-08-08 RX ADMIN — VANCOMYCIN HYDROCHLORIDE 1.5 G: 1 INJECTION, POWDER, LYOPHILIZED, FOR SOLUTION INTRAVENOUS at 19:05

## 2019-08-08 RX ADMIN — SODIUM CHLORIDE 50 ML/HR: 9 INJECTION, SOLUTION INTRAVENOUS at 23:55

## 2019-08-08 RX ADMIN — TRANEXAMIC ACID 1000 MG: 100 INJECTION, SOLUTION INTRAVENOUS at 19:04

## 2019-08-08 RX ADMIN — INSULIN LISPRO 4 UNITS: 100 INJECTION, SOLUTION INTRAVENOUS; SUBCUTANEOUS at 08:58

## 2019-08-08 RX ADMIN — FENTANYL CITRATE 50 MCG: 50 INJECTION INTRAMUSCULAR; INTRAVENOUS at 15:12

## 2019-08-08 RX ADMIN — PHENYLEPHRINE HYDROCHLORIDE 100 MCG: 10 INJECTION INTRAVENOUS at 17:07

## 2019-08-08 RX ADMIN — HYDROMORPHONE HYDROCHLORIDE: 10 INJECTION INTRAMUSCULAR; INTRAVENOUS; SUBCUTANEOUS at 19:45

## 2019-08-08 RX ADMIN — PROPOFOL 200 MG: 10 INJECTION, EMULSION INTRAVENOUS at 16:21

## 2019-08-08 RX ADMIN — INSULIN GLARGINE 30 UNITS: 100 INJECTION, SOLUTION SUBCUTANEOUS at 06:46

## 2019-08-08 RX ADMIN — POLYETHYLENE GLYCOL 3350 17 G: 17 POWDER, FOR SOLUTION ORAL at 08:58

## 2019-08-08 RX ADMIN — HYDROMORPHONE HYDROCHLORIDE 0.2 MG: 2 INJECTION INTRAMUSCULAR; INTRAVENOUS; SUBCUTANEOUS at 18:42

## 2019-08-08 RX ADMIN — PHENYLEPHRINE HYDROCHLORIDE 100 MCG: 10 INJECTION INTRAVENOUS at 17:25

## 2019-08-08 RX ADMIN — PHENYLEPHRINE HYDROCHLORIDE 100 MCG: 10 INJECTION INTRAVENOUS at 17:50

## 2019-08-08 RX ADMIN — PANTOPRAZOLE SODIUM 40 MG: 40 TABLET, DELAYED RELEASE ORAL at 09:00

## 2019-08-08 RX ADMIN — PHENYLEPHRINE HYDROCHLORIDE 100 MCG: 10 INJECTION INTRAVENOUS at 18:11

## 2019-08-08 RX ADMIN — FENTANYL CITRATE 50 MCG: 50 INJECTION INTRAMUSCULAR; INTRAVENOUS at 14:56

## 2019-08-08 RX ADMIN — SODIUM CHLORIDE, PRESERVATIVE FREE 3 ML: 5 INJECTION INTRAVENOUS at 11:45

## 2019-08-08 RX ADMIN — FENTANYL CITRATE 100 MCG: 50 INJECTION INTRAMUSCULAR; INTRAVENOUS at 16:21

## 2019-08-08 RX ADMIN — FENTANYL CITRATE 50 MCG: 50 INJECTION INTRAMUSCULAR; INTRAVENOUS at 16:33

## 2019-08-08 RX ADMIN — PHENYLEPHRINE HYDROCHLORIDE 100 MCG: 10 INJECTION INTRAVENOUS at 17:35

## 2019-08-08 RX ADMIN — OXYCODONE HYDROCHLORIDE AND ACETAMINOPHEN 2 TABLET: 7.5; 325 TABLET ORAL at 04:14

## 2019-08-08 RX ADMIN — GLYCOPYRROLATE 0.1 MG: 0.2 INJECTION INTRAMUSCULAR; INTRAVENOUS at 16:42

## 2019-08-08 RX ADMIN — PROPOFOL 50 MG: 10 INJECTION, EMULSION INTRAVENOUS at 16:32

## 2019-08-08 RX ADMIN — HYDROMORPHONE HYDROCHLORIDE 0.5 MG: 2 INJECTION INTRAMUSCULAR; INTRAVENOUS; SUBCUTANEOUS at 19:26

## 2019-08-08 RX ADMIN — TRANEXAMIC ACID 1000 MG: 100 INJECTION, SOLUTION INTRAVENOUS at 17:03

## 2019-08-08 RX ADMIN — GABAPENTIN 300 MG: 300 CAPSULE ORAL at 08:58

## 2019-08-08 RX ADMIN — HYDROMORPHONE HYDROCHLORIDE 0.5 MG: 1 INJECTION, SOLUTION INTRAMUSCULAR; INTRAVENOUS; SUBCUTANEOUS at 20:20

## 2019-08-08 RX ADMIN — MIDAZOLAM 1 MG: 1 INJECTION INTRAMUSCULAR; INTRAVENOUS at 14:54

## 2019-08-08 RX ADMIN — DEXAMETHASONE SODIUM PHOSPHATE 2 MG: 4 INJECTION, SOLUTION INTRAMUSCULAR; INTRAVENOUS at 11:43

## 2019-08-08 RX ADMIN — SUCCINYLCHOLINE CHLORIDE 100 MG: 20 INJECTION, SOLUTION INTRAMUSCULAR; INTRAVENOUS; PARENTERAL at 16:21

## 2019-08-08 RX ADMIN — PHENYLEPHRINE HYDROCHLORIDE 100 MCG: 10 INJECTION INTRAVENOUS at 18:15

## 2019-08-08 RX ADMIN — LIDOCAINE HYDROCHLORIDE 100 MG: 20 INJECTION, SOLUTION INFILTRATION; PERINEURAL at 16:21

## 2019-08-08 RX ADMIN — FENTANYL CITRATE 50 MCG: 50 INJECTION, SOLUTION INTRAMUSCULAR; INTRAVENOUS at 20:00

## 2019-08-08 RX ADMIN — ROCURONIUM BROMIDE 10 MG: 10 INJECTION INTRAVENOUS at 16:21

## 2019-08-08 RX ADMIN — ATORVASTATIN CALCIUM 80 MG: 80 TABLET, FILM COATED ORAL at 09:00

## 2019-08-08 RX ADMIN — CEFAZOLIN SODIUM 2 G: 2 INJECTION, SOLUTION INTRAVENOUS at 16:27

## 2019-08-08 NOTE — ANESTHESIA PROCEDURE NOTES
Arterial Line    Pre-sedation assessment completed: 8/8/2019 1:11 PM    Patient reassessed immediately prior to procedure    Patient location during procedure: holding area  Start time: 8/8/2019 1:12 PM  Stop Time:8/8/2019 1:15 PM       Line placed for hemodynamic monitoring and ABGs/Labs/ISTAT.  Preanesthetic Checklist  Completed: patient identified, site marked, surgical consent, pre-op evaluation, timeout performed, IV checked, risks and benefits discussed and monitors and equipment checked  Arterial Line Prep   Sterile Tech: gloves, mask, cap and sterile barriers  Prep: ChloraPrep  Patient monitoring: blood pressure monitoring, continuous pulse oximetry and EKG  Arterial Line Procedure   Laterality:left  Location:  radial artery  Catheter size: 20 G   Guidance: palpation technique  Number of attempts: 1  Successful placement: yes  Post Assessment   Dressing Type: occlusive dressing applied and secured with tape.   Complications no  Circ/Move/Sens Assessment: unchanged.

## 2019-08-08 NOTE — ANESTHESIA PROCEDURE NOTES
Airway  Urgency: elective    Airway not difficult    General Information and Staff    Patient location during procedure: OR  CRNA: Ruiz Arauz CRNA    Indications and Patient Condition  Indications for airway management: airway protection    Preoxygenated: yes  MILS maintained throughout  Mask difficulty assessment: 1 - vent by mask    Final Airway Details  Final airway type: endotracheal airway      Successful airway: ETT  Cuffed: yes   Successful intubation technique: direct laryngoscopy  Endotracheal tube insertion site: oral  Blade: Tommy  Blade size: 3  ETT size (mm): 7.0  Cormack-Lehane Classification: grade I - full view of glottis  Placement verified by: chest auscultation   Measured from: gums  ETT to gums (cm): 22  Number of attempts at approach: 1

## 2019-08-08 NOTE — ANESTHESIA PREPROCEDURE EVALUATION
Anesthesia Evaluation     NPO Solid Status: > 8 hours             Airway   TM distance: >3 FB  Neck ROM: full  No difficulty expected  Dental      Comment: posts    Pulmonary - normal exam   (+) sleep apnea,   Cardiovascular - normal exam    Patient on routine beta blocker    (+) hypertension, CAD, cardiac stents more than 12 months ago hyperlipidemia,       Neuro/Psych  (+) psychiatric history,       ROS Comment: Pain in right 3rd-5th fingers  GI/Hepatic/Renal/Endo    (+)   diabetes mellitus type 2 using insulin,     Musculoskeletal     (+) neck pain,   Abdominal    Substance History      OB/GYN          Other   (+) arthritis                   Anesthesia Plan    ASA 3     general     intravenous induction   Anesthetic plan, all risks, benefits, and alternatives have been provided, discussed and informed consent has been obtained with: patient.

## 2019-08-09 LAB
ANION GAP SERPL CALCULATED.3IONS-SCNC: 9.6 MMOL/L (ref 5–15)
BUN BLD-MCNC: 25 MG/DL (ref 8–23)
BUN/CREAT SERPL: 20.7 (ref 7–25)
CALCIUM SPEC-SCNC: 8.5 MG/DL (ref 8.6–10.5)
CHLORIDE SERPL-SCNC: 104 MMOL/L (ref 98–107)
CO2 SERPL-SCNC: 25.4 MMOL/L (ref 22–29)
CREAT BLD-MCNC: 1.21 MG/DL (ref 0.76–1.27)
DEPRECATED RDW RBC AUTO: 44.7 FL (ref 37–54)
ERYTHROCYTE [DISTWIDTH] IN BLOOD BY AUTOMATED COUNT: 13.6 % (ref 12.3–15.4)
GFR SERPL CREATININE-BSD FRML MDRD: 58 ML/MIN/1.73
GLUCOSE BLD-MCNC: 104 MG/DL (ref 65–99)
GLUCOSE BLDC GLUCOMTR-MCNC: 101 MG/DL (ref 70–130)
GLUCOSE BLDC GLUCOMTR-MCNC: 153 MG/DL (ref 70–130)
GLUCOSE BLDC GLUCOMTR-MCNC: 175 MG/DL (ref 70–130)
GLUCOSE BLDC GLUCOMTR-MCNC: 175 MG/DL (ref 70–130)
HCT VFR BLD AUTO: 42.5 % (ref 37.5–51)
HGB BLD-MCNC: 13.6 G/DL (ref 13–17.7)
MCH RBC QN AUTO: 28.8 PG (ref 26.6–33)
MCHC RBC AUTO-ENTMCNC: 32 G/DL (ref 31.5–35.7)
MCV RBC AUTO: 89.9 FL (ref 79–97)
PLATELET # BLD AUTO: 207 10*3/MM3 (ref 140–450)
PMV BLD AUTO: 9.9 FL (ref 6–12)
POTASSIUM BLD-SCNC: 4.6 MMOL/L (ref 3.5–5.2)
RBC # BLD AUTO: 4.73 10*6/MM3 (ref 4.14–5.8)
SODIUM BLD-SCNC: 139 MMOL/L (ref 136–145)
WBC NRBC COR # BLD: 16.47 10*3/MM3 (ref 3.4–10.8)

## 2019-08-09 PROCEDURE — 25010000002 DEXAMETHASONE PER 1 MG: Performed by: ORTHOPAEDIC SURGERY

## 2019-08-09 PROCEDURE — 63710000001 INSULIN LISPRO (HUMAN) PER 5 UNITS: Performed by: HOSPITALIST

## 2019-08-09 PROCEDURE — 63710000001 INSULIN GLARGINE PER 5 UNITS: Performed by: NURSE PRACTITIONER

## 2019-08-09 PROCEDURE — 82962 GLUCOSE BLOOD TEST: CPT

## 2019-08-09 PROCEDURE — 25010000003 CEFAZOLIN 1-4 GM/50ML-% SOLUTION: Performed by: ORTHOPAEDIC SURGERY

## 2019-08-09 PROCEDURE — 25010000003 CEFAZOLIN IN DEXTROSE 2-4 GM/100ML-% SOLUTION: Performed by: ORTHOPAEDIC SURGERY

## 2019-08-09 PROCEDURE — 80048 BASIC METABOLIC PNL TOTAL CA: CPT | Performed by: HOSPITALIST

## 2019-08-09 PROCEDURE — 85027 COMPLETE CBC AUTOMATED: CPT | Performed by: INTERNAL MEDICINE

## 2019-08-09 RX ORDER — CEFAZOLIN SODIUM 2 G/100ML
2 INJECTION, SOLUTION INTRAVENOUS EVERY 8 HOURS
Status: COMPLETED | OUTPATIENT
Start: 2019-08-09 | End: 2019-08-13

## 2019-08-09 RX ADMIN — GABAPENTIN 300 MG: 300 CAPSULE ORAL at 17:10

## 2019-08-09 RX ADMIN — INSULIN LISPRO 2 UNITS: 100 INJECTION, SOLUTION INTRAVENOUS; SUBCUTANEOUS at 12:17

## 2019-08-09 RX ADMIN — DOCUSATE SODIUM 100 MG: 100 CAPSULE, LIQUID FILLED ORAL at 21:01

## 2019-08-09 RX ADMIN — METOPROLOL TARTRATE 12.5 MG: 25 TABLET ORAL at 08:29

## 2019-08-09 RX ADMIN — DOCUSATE SODIUM 100 MG: 100 CAPSULE, LIQUID FILLED ORAL at 08:29

## 2019-08-09 RX ADMIN — SODIUM CHLORIDE, PRESERVATIVE FREE 3 ML: 5 INJECTION INTRAVENOUS at 21:58

## 2019-08-09 RX ADMIN — GABAPENTIN 300 MG: 300 CAPSULE ORAL at 08:29

## 2019-08-09 RX ADMIN — CEFAZOLIN SODIUM 2 G: 2 INJECTION, SOLUTION INTRAVENOUS at 08:28

## 2019-08-09 RX ADMIN — INSULIN LISPRO 2 UNITS: 100 INJECTION, SOLUTION INTRAVENOUS; SUBCUTANEOUS at 21:36

## 2019-08-09 RX ADMIN — INSULIN GLARGINE 30 UNITS: 100 INJECTION, SOLUTION SUBCUTANEOUS at 08:34

## 2019-08-09 RX ADMIN — CEFAZOLIN SODIUM 2 G: 2 INJECTION, SOLUTION INTRAVENOUS at 17:08

## 2019-08-09 RX ADMIN — SODIUM CHLORIDE, PRESERVATIVE FREE 3 ML: 5 INJECTION INTRAVENOUS at 08:30

## 2019-08-09 RX ADMIN — ATORVASTATIN CALCIUM 80 MG: 80 TABLET, FILM COATED ORAL at 08:29

## 2019-08-09 RX ADMIN — DEXAMETHASONE SODIUM PHOSPHATE 2 MG: 4 INJECTION, SOLUTION INTRAMUSCULAR; INTRAVENOUS at 21:01

## 2019-08-09 RX ADMIN — METOPROLOL TARTRATE 12.5 MG: 25 TABLET ORAL at 21:01

## 2019-08-09 RX ADMIN — ROPINIROLE 4 MG: 2 TABLET, FILM COATED ORAL at 21:01

## 2019-08-09 RX ADMIN — POLYETHYLENE GLYCOL 3350 17 G: 17 POWDER, FOR SOLUTION ORAL at 08:28

## 2019-08-09 RX ADMIN — DEXAMETHASONE SODIUM PHOSPHATE 2 MG: 4 INJECTION, SOLUTION INTRAMUSCULAR; INTRAVENOUS at 08:29

## 2019-08-09 RX ADMIN — INSULIN GLARGINE 30 UNITS: 100 INJECTION, SOLUTION SUBCUTANEOUS at 21:02

## 2019-08-09 RX ADMIN — GABAPENTIN 300 MG: 300 CAPSULE ORAL at 21:01

## 2019-08-09 RX ADMIN — INSULIN LISPRO 2 UNITS: 100 INJECTION, SOLUTION INTRAVENOUS; SUBCUTANEOUS at 17:10

## 2019-08-09 RX ADMIN — CEFAZOLIN SODIUM 1 G: 1 INJECTION, SOLUTION INTRAVENOUS at 00:38

## 2019-08-09 RX ADMIN — DEXAMETHASONE SODIUM PHOSPHATE 2 MG: 4 INJECTION, SOLUTION INTRAMUSCULAR; INTRAVENOUS at 00:38

## 2019-08-09 RX ADMIN — PANTOPRAZOLE SODIUM 40 MG: 40 TABLET, DELAYED RELEASE ORAL at 05:59

## 2019-08-09 NOTE — ANESTHESIA POSTPROCEDURE EVALUATION
"Patient: Chester Gama    Procedure Summary     Date:  08/08/19 Room / Location:  [facility] OR [address] [place] [address] MAIN OR    Anesthesia Start:  1618 Anesthesia Stop:  1928    Procedure:  LEFT FORAMINOTOMY C4-T1, REFUSION OF C4-T1, REMOVAL OF C6 SCREW, REVISION OF HARDWARE (N/A Spine Cervical) Diagnosis:      Surgeon:  Vincenzo Soliman DO Provider:  Carrie Galvan MD    Anesthesia Type:  general ASA Status:  3          Anesthesia Type: general  Last vitals  BP   137/80 (08/08/19 2030)   Temp   36.3 °C (97.4 °F) (08/08/19 2030)   Pulse   68 (08/08/19 2030)   Resp   18 (08/08/19 2030)     SpO2   99 % (08/08/19 2030)     Post Anesthesia Care and Evaluation    Patient location during evaluation: bedside  Patient participation: complete - patient participated  Level of consciousness: awake and alert  Pain management: adequate  Airway patency: patent  Anesthetic complications: No anesthetic complications    Cardiovascular status: acceptable  Respiratory status: acceptable  Hydration status: acceptable    Comments: /80   Pulse 68   Temp 36.3 °C (97.4 °F) (Oral)   Resp 18   Ht 172.7 cm (68\")   Wt 108 kg (238 lb 6.4 oz)   SpO2 99%   BMI 36.25 kg/m²       "

## 2019-08-10 LAB
ANION GAP SERPL CALCULATED.3IONS-SCNC: 12.2 MMOL/L (ref 5–15)
BUN BLD-MCNC: 23 MG/DL (ref 8–23)
BUN/CREAT SERPL: 19 (ref 7–25)
CALCIUM SPEC-SCNC: 8.4 MG/DL (ref 8.6–10.5)
CHLORIDE SERPL-SCNC: 102 MMOL/L (ref 98–107)
CO2 SERPL-SCNC: 23.8 MMOL/L (ref 22–29)
CREAT BLD-MCNC: 1.21 MG/DL (ref 0.76–1.27)
DEPRECATED RDW RBC AUTO: 46.8 FL (ref 37–54)
ERYTHROCYTE [DISTWIDTH] IN BLOOD BY AUTOMATED COUNT: 13.8 % (ref 12.3–15.4)
GFR SERPL CREATININE-BSD FRML MDRD: 58 ML/MIN/1.73
GLUCOSE BLD-MCNC: 166 MG/DL (ref 65–99)
GLUCOSE BLDC GLUCOMTR-MCNC: 158 MG/DL (ref 70–130)
GLUCOSE BLDC GLUCOMTR-MCNC: 160 MG/DL (ref 70–130)
GLUCOSE BLDC GLUCOMTR-MCNC: 243 MG/DL (ref 70–130)
GLUCOSE BLDC GLUCOMTR-MCNC: 274 MG/DL (ref 70–130)
HCT VFR BLD AUTO: 40.4 % (ref 37.5–51)
HGB BLD-MCNC: 12.7 G/DL (ref 13–17.7)
MCH RBC QN AUTO: 28.7 PG (ref 26.6–33)
MCHC RBC AUTO-ENTMCNC: 31.4 G/DL (ref 31.5–35.7)
MCV RBC AUTO: 91.2 FL (ref 79–97)
PLATELET # BLD AUTO: 194 10*3/MM3 (ref 140–450)
PMV BLD AUTO: 10.2 FL (ref 6–12)
POTASSIUM BLD-SCNC: 4.3 MMOL/L (ref 3.5–5.2)
RBC # BLD AUTO: 4.43 10*6/MM3 (ref 4.14–5.8)
SODIUM BLD-SCNC: 138 MMOL/L (ref 136–145)
WBC NRBC COR # BLD: 13.64 10*3/MM3 (ref 3.4–10.8)

## 2019-08-10 PROCEDURE — 85027 COMPLETE CBC AUTOMATED: CPT | Performed by: INTERNAL MEDICINE

## 2019-08-10 PROCEDURE — 25010000002 DEXAMETHASONE PER 1 MG: Performed by: ORTHOPAEDIC SURGERY

## 2019-08-10 PROCEDURE — 63710000001 INSULIN GLARGINE PER 5 UNITS: Performed by: NURSE PRACTITIONER

## 2019-08-10 PROCEDURE — 63710000001 INSULIN LISPRO (HUMAN) PER 5 UNITS: Performed by: HOSPITALIST

## 2019-08-10 PROCEDURE — 80048 BASIC METABOLIC PNL TOTAL CA: CPT | Performed by: HOSPITALIST

## 2019-08-10 PROCEDURE — 25010000003 CEFAZOLIN IN DEXTROSE 2-4 GM/100ML-% SOLUTION: Performed by: ORTHOPAEDIC SURGERY

## 2019-08-10 PROCEDURE — 82962 GLUCOSE BLOOD TEST: CPT

## 2019-08-10 RX ORDER — FUROSEMIDE 20 MG/1
20 TABLET ORAL DAILY
Status: DISCONTINUED | OUTPATIENT
Start: 2019-08-10 | End: 2019-08-17 | Stop reason: HOSPADM

## 2019-08-10 RX ORDER — DEXAMETHASONE SODIUM PHOSPHATE 4 MG/ML
2 INJECTION, SOLUTION INTRA-ARTICULAR; INTRALESIONAL; INTRAMUSCULAR; INTRAVENOUS; SOFT TISSUE DAILY
Status: DISCONTINUED | OUTPATIENT
Start: 2019-08-11 | End: 2019-08-11

## 2019-08-10 RX ADMIN — PANTOPRAZOLE SODIUM 40 MG: 40 TABLET, DELAYED RELEASE ORAL at 06:16

## 2019-08-10 RX ADMIN — OXYCODONE HYDROCHLORIDE AND ACETAMINOPHEN 2 TABLET: 7.5; 325 TABLET ORAL at 10:53

## 2019-08-10 RX ADMIN — OXYCODONE HYDROCHLORIDE AND ACETAMINOPHEN 2 TABLET: 7.5; 325 TABLET ORAL at 20:58

## 2019-08-10 RX ADMIN — INSULIN GLARGINE 30 UNITS: 100 INJECTION, SOLUTION SUBCUTANEOUS at 21:52

## 2019-08-10 RX ADMIN — INSULIN GLARGINE 30 UNITS: 100 INJECTION, SOLUTION SUBCUTANEOUS at 06:16

## 2019-08-10 RX ADMIN — OXYCODONE HYDROCHLORIDE AND ACETAMINOPHEN 2 TABLET: 7.5; 325 TABLET ORAL at 15:19

## 2019-08-10 RX ADMIN — INSULIN LISPRO 4 UNITS: 100 INJECTION, SOLUTION INTRAVENOUS; SUBCUTANEOUS at 21:51

## 2019-08-10 RX ADMIN — GABAPENTIN 300 MG: 300 CAPSULE ORAL at 08:07

## 2019-08-10 RX ADMIN — CEFAZOLIN SODIUM 2 G: 2 INJECTION, SOLUTION INTRAVENOUS at 08:06

## 2019-08-10 RX ADMIN — METOPROLOL TARTRATE 12.5 MG: 25 TABLET ORAL at 08:06

## 2019-08-10 RX ADMIN — POLYETHYLENE GLYCOL 3350 17 G: 17 POWDER, FOR SOLUTION ORAL at 08:07

## 2019-08-10 RX ADMIN — GABAPENTIN 300 MG: 300 CAPSULE ORAL at 20:59

## 2019-08-10 RX ADMIN — GABAPENTIN 300 MG: 300 CAPSULE ORAL at 15:19

## 2019-08-10 RX ADMIN — SODIUM CHLORIDE, PRESERVATIVE FREE 3 ML: 5 INJECTION INTRAVENOUS at 08:06

## 2019-08-10 RX ADMIN — CEFAZOLIN SODIUM 2 G: 2 INJECTION, SOLUTION INTRAVENOUS at 15:47

## 2019-08-10 RX ADMIN — DOCUSATE SODIUM 100 MG: 100 CAPSULE, LIQUID FILLED ORAL at 08:06

## 2019-08-10 RX ADMIN — INSULIN LISPRO 2 UNITS: 100 INJECTION, SOLUTION INTRAVENOUS; SUBCUTANEOUS at 12:28

## 2019-08-10 RX ADMIN — ROPINIROLE 4 MG: 2 TABLET, FILM COATED ORAL at 20:58

## 2019-08-10 RX ADMIN — FUROSEMIDE 20 MG: 20 TABLET ORAL at 15:19

## 2019-08-10 RX ADMIN — ATORVASTATIN CALCIUM 80 MG: 80 TABLET, FILM COATED ORAL at 08:06

## 2019-08-10 RX ADMIN — DEXAMETHASONE SODIUM PHOSPHATE 2 MG: 4 INJECTION, SOLUTION INTRAMUSCULAR; INTRAVENOUS at 08:07

## 2019-08-10 RX ADMIN — OXYCODONE HYDROCHLORIDE AND ACETAMINOPHEN 2 TABLET: 7.5; 325 TABLET ORAL at 06:16

## 2019-08-10 RX ADMIN — INSULIN LISPRO 2 UNITS: 100 INJECTION, SOLUTION INTRAVENOUS; SUBCUTANEOUS at 08:06

## 2019-08-10 RX ADMIN — METOPROLOL TARTRATE 12.5 MG: 25 TABLET ORAL at 20:58

## 2019-08-10 RX ADMIN — CEFAZOLIN SODIUM 2 G: 2 INJECTION, SOLUTION INTRAVENOUS at 00:24

## 2019-08-10 RX ADMIN — DOCUSATE SODIUM 100 MG: 100 CAPSULE, LIQUID FILLED ORAL at 20:59

## 2019-08-10 RX ADMIN — INSULIN LISPRO 6 UNITS: 100 INJECTION, SOLUTION INTRAVENOUS; SUBCUTANEOUS at 17:00

## 2019-08-11 LAB
ANION GAP SERPL CALCULATED.3IONS-SCNC: 12.1 MMOL/L (ref 5–15)
BUN BLD-MCNC: 23 MG/DL (ref 8–23)
BUN/CREAT SERPL: 17.6 (ref 7–25)
CALCIUM SPEC-SCNC: 8.8 MG/DL (ref 8.6–10.5)
CHLORIDE SERPL-SCNC: 104 MMOL/L (ref 98–107)
CO2 SERPL-SCNC: 22.9 MMOL/L (ref 22–29)
CREAT BLD-MCNC: 1.31 MG/DL (ref 0.76–1.27)
DEPRECATED RDW RBC AUTO: 45.1 FL (ref 37–54)
ERYTHROCYTE [DISTWIDTH] IN BLOOD BY AUTOMATED COUNT: 13.6 % (ref 12.3–15.4)
GFR SERPL CREATININE-BSD FRML MDRD: 53 ML/MIN/1.73
GLUCOSE BLD-MCNC: 180 MG/DL (ref 65–99)
GLUCOSE BLDC GLUCOMTR-MCNC: 142 MG/DL (ref 70–130)
GLUCOSE BLDC GLUCOMTR-MCNC: 216 MG/DL (ref 70–130)
GLUCOSE BLDC GLUCOMTR-MCNC: 251 MG/DL (ref 70–130)
GLUCOSE BLDC GLUCOMTR-MCNC: 276 MG/DL (ref 70–130)
HCT VFR BLD AUTO: 42.3 % (ref 37.5–51)
HGB BLD-MCNC: 13.4 G/DL (ref 13–17.7)
MCH RBC QN AUTO: 28.6 PG (ref 26.6–33)
MCHC RBC AUTO-ENTMCNC: 31.7 G/DL (ref 31.5–35.7)
MCV RBC AUTO: 90.4 FL (ref 79–97)
PLATELET # BLD AUTO: 203 10*3/MM3 (ref 140–450)
PMV BLD AUTO: 10 FL (ref 6–12)
POTASSIUM BLD-SCNC: 4.1 MMOL/L (ref 3.5–5.2)
RBC # BLD AUTO: 4.68 10*6/MM3 (ref 4.14–5.8)
SODIUM BLD-SCNC: 139 MMOL/L (ref 136–145)
WBC NRBC COR # BLD: 11.89 10*3/MM3 (ref 3.4–10.8)

## 2019-08-11 PROCEDURE — 82962 GLUCOSE BLOOD TEST: CPT

## 2019-08-11 PROCEDURE — 63710000001 INSULIN LISPRO (HUMAN) PER 5 UNITS: Performed by: HOSPITALIST

## 2019-08-11 PROCEDURE — 85027 COMPLETE CBC AUTOMATED: CPT | Performed by: INTERNAL MEDICINE

## 2019-08-11 PROCEDURE — 97162 PT EVAL MOD COMPLEX 30 MIN: CPT

## 2019-08-11 PROCEDURE — 25010000003 CEFAZOLIN IN DEXTROSE 2-4 GM/100ML-% SOLUTION: Performed by: ORTHOPAEDIC SURGERY

## 2019-08-11 PROCEDURE — 63710000001 INSULIN GLARGINE PER 5 UNITS: Performed by: NURSE PRACTITIONER

## 2019-08-11 PROCEDURE — 25010000002 DEXAMETHASONE PER 1 MG: Performed by: ORTHOPAEDIC SURGERY

## 2019-08-11 PROCEDURE — 97530 THERAPEUTIC ACTIVITIES: CPT

## 2019-08-11 PROCEDURE — 80048 BASIC METABOLIC PNL TOTAL CA: CPT | Performed by: HOSPITALIST

## 2019-08-11 RX ORDER — METHOCARBAMOL 500 MG/1
500 TABLET, FILM COATED ORAL EVERY 8 HOURS PRN
Status: DISCONTINUED | OUTPATIENT
Start: 2019-08-11 | End: 2019-08-12

## 2019-08-11 RX ADMIN — DEXAMETHASONE SODIUM PHOSPHATE 2 MG: 4 INJECTION, SOLUTION INTRAMUSCULAR; INTRAVENOUS at 08:13

## 2019-08-11 RX ADMIN — ATORVASTATIN CALCIUM 80 MG: 80 TABLET, FILM COATED ORAL at 08:12

## 2019-08-11 RX ADMIN — METHOCARBAMOL TABLETS 500 MG: 500 TABLET, COATED ORAL at 18:22

## 2019-08-11 RX ADMIN — GABAPENTIN 300 MG: 300 CAPSULE ORAL at 20:27

## 2019-08-11 RX ADMIN — ROPINIROLE 4 MG: 2 TABLET, FILM COATED ORAL at 20:27

## 2019-08-11 RX ADMIN — FUROSEMIDE 20 MG: 20 TABLET ORAL at 08:12

## 2019-08-11 RX ADMIN — OXYCODONE HYDROCHLORIDE AND ACETAMINOPHEN 2 TABLET: 7.5; 325 TABLET ORAL at 22:42

## 2019-08-11 RX ADMIN — DOCUSATE SODIUM 100 MG: 100 CAPSULE, LIQUID FILLED ORAL at 08:12

## 2019-08-11 RX ADMIN — CEFAZOLIN SODIUM 2 G: 2 INJECTION, SOLUTION INTRAVENOUS at 00:12

## 2019-08-11 RX ADMIN — INSULIN GLARGINE 30 UNITS: 100 INJECTION, SOLUTION SUBCUTANEOUS at 21:08

## 2019-08-11 RX ADMIN — OXYCODONE HYDROCHLORIDE AND ACETAMINOPHEN 2 TABLET: 7.5; 325 TABLET ORAL at 10:57

## 2019-08-11 RX ADMIN — INSULIN LISPRO 6 UNITS: 100 INJECTION, SOLUTION INTRAVENOUS; SUBCUTANEOUS at 17:13

## 2019-08-11 RX ADMIN — SODIUM CHLORIDE, PRESERVATIVE FREE 3 ML: 5 INJECTION INTRAVENOUS at 08:14

## 2019-08-11 RX ADMIN — DOCUSATE SODIUM 100 MG: 100 CAPSULE, LIQUID FILLED ORAL at 20:27

## 2019-08-11 RX ADMIN — CEFAZOLIN SODIUM 2 G: 2 INJECTION, SOLUTION INTRAVENOUS at 16:31

## 2019-08-11 RX ADMIN — GABAPENTIN 300 MG: 300 CAPSULE ORAL at 08:12

## 2019-08-11 RX ADMIN — OXYCODONE HYDROCHLORIDE AND ACETAMINOPHEN 2 TABLET: 7.5; 325 TABLET ORAL at 06:22

## 2019-08-11 RX ADMIN — GABAPENTIN 300 MG: 300 CAPSULE ORAL at 16:31

## 2019-08-11 RX ADMIN — INSULIN GLARGINE 30 UNITS: 100 INJECTION, SOLUTION SUBCUTANEOUS at 06:22

## 2019-08-11 RX ADMIN — POLYETHYLENE GLYCOL 3350 17 G: 17 POWDER, FOR SOLUTION ORAL at 08:13

## 2019-08-11 RX ADMIN — METHOCARBAMOL TABLETS 500 MG: 500 TABLET, COATED ORAL at 10:43

## 2019-08-11 RX ADMIN — ACETAMINOPHEN 650 MG: 325 TABLET, FILM COATED ORAL at 04:25

## 2019-08-11 RX ADMIN — OXYCODONE HYDROCHLORIDE AND ACETAMINOPHEN 2 TABLET: 7.5; 325 TABLET ORAL at 18:22

## 2019-08-11 RX ADMIN — INSULIN LISPRO 6 UNITS: 100 INJECTION, SOLUTION INTRAVENOUS; SUBCUTANEOUS at 21:09

## 2019-08-11 RX ADMIN — INSULIN LISPRO 4 UNITS: 100 INJECTION, SOLUTION INTRAVENOUS; SUBCUTANEOUS at 12:10

## 2019-08-11 RX ADMIN — CEFAZOLIN SODIUM 2 G: 2 INJECTION, SOLUTION INTRAVENOUS at 08:20

## 2019-08-11 RX ADMIN — ACETAMINOPHEN 650 MG: 325 TABLET, FILM COATED ORAL at 08:20

## 2019-08-11 RX ADMIN — SODIUM CHLORIDE, PRESERVATIVE FREE 3 ML: 5 INJECTION INTRAVENOUS at 20:28

## 2019-08-11 RX ADMIN — PANTOPRAZOLE SODIUM 40 MG: 40 TABLET, DELAYED RELEASE ORAL at 04:25

## 2019-08-11 RX ADMIN — METOPROLOL TARTRATE 12.5 MG: 25 TABLET ORAL at 20:29

## 2019-08-12 LAB
ANION GAP SERPL CALCULATED.3IONS-SCNC: 10.3 MMOL/L (ref 5–15)
BUN BLD-MCNC: 23 MG/DL (ref 8–23)
BUN/CREAT SERPL: 21.9 (ref 7–25)
CALCIUM SPEC-SCNC: 8.3 MG/DL (ref 8.6–10.5)
CHLORIDE SERPL-SCNC: 104 MMOL/L (ref 98–107)
CO2 SERPL-SCNC: 26.7 MMOL/L (ref 22–29)
CREAT BLD-MCNC: 1.05 MG/DL (ref 0.76–1.27)
DEPRECATED RDW RBC AUTO: 44.9 FL (ref 37–54)
ERYTHROCYTE [DISTWIDTH] IN BLOOD BY AUTOMATED COUNT: 13.6 % (ref 12.3–15.4)
GFR SERPL CREATININE-BSD FRML MDRD: 68 ML/MIN/1.73
GLUCOSE BLD-MCNC: 157 MG/DL (ref 65–99)
GLUCOSE BLDC GLUCOMTR-MCNC: 156 MG/DL (ref 70–130)
GLUCOSE BLDC GLUCOMTR-MCNC: 176 MG/DL (ref 70–130)
GLUCOSE BLDC GLUCOMTR-MCNC: 246 MG/DL (ref 70–130)
GLUCOSE BLDC GLUCOMTR-MCNC: 261 MG/DL (ref 70–130)
HCT VFR BLD AUTO: 38.7 % (ref 37.5–51)
HGB BLD-MCNC: 12.1 G/DL (ref 13–17.7)
MCH RBC QN AUTO: 28.3 PG (ref 26.6–33)
MCHC RBC AUTO-ENTMCNC: 31.3 G/DL (ref 31.5–35.7)
MCV RBC AUTO: 90.6 FL (ref 79–97)
PLATELET # BLD AUTO: 187 10*3/MM3 (ref 140–450)
PMV BLD AUTO: 10.2 FL (ref 6–12)
POTASSIUM BLD-SCNC: 4.1 MMOL/L (ref 3.5–5.2)
RBC # BLD AUTO: 4.27 10*6/MM3 (ref 4.14–5.8)
SODIUM BLD-SCNC: 141 MMOL/L (ref 136–145)
WBC NRBC COR # BLD: 11.6 10*3/MM3 (ref 3.4–10.8)

## 2019-08-12 PROCEDURE — 63710000001 METHYLPREDNISOLONE 4 MG TABLET THERAPY PACK 21 EACH DISP PACK: Performed by: INTERNAL MEDICINE

## 2019-08-12 PROCEDURE — 85027 COMPLETE CBC AUTOMATED: CPT | Performed by: INTERNAL MEDICINE

## 2019-08-12 PROCEDURE — 63710000001 INSULIN GLARGINE PER 5 UNITS: Performed by: NURSE PRACTITIONER

## 2019-08-12 PROCEDURE — 25010000003 CEFAZOLIN IN DEXTROSE 2-4 GM/100ML-% SOLUTION: Performed by: ORTHOPAEDIC SURGERY

## 2019-08-12 PROCEDURE — 63710000001 INSULIN LISPRO (HUMAN) PER 5 UNITS: Performed by: HOSPITALIST

## 2019-08-12 PROCEDURE — 97110 THERAPEUTIC EXERCISES: CPT

## 2019-08-12 PROCEDURE — 80048 BASIC METABOLIC PNL TOTAL CA: CPT | Performed by: HOSPITALIST

## 2019-08-12 PROCEDURE — 82962 GLUCOSE BLOOD TEST: CPT

## 2019-08-12 RX ORDER — METHYLPREDNISOLONE 4 MG/1
4 TABLET ORAL
Status: COMPLETED | OUTPATIENT
Start: 2019-08-13 | End: 2019-08-13

## 2019-08-12 RX ORDER — METHYLPREDNISOLONE 4 MG/1
4 TABLET ORAL
Status: COMPLETED | OUTPATIENT
Start: 2019-08-15 | End: 2019-08-15

## 2019-08-12 RX ORDER — METHYLPREDNISOLONE 4 MG/1
4 TABLET ORAL
Status: COMPLETED | OUTPATIENT
Start: 2019-08-12 | End: 2019-08-12

## 2019-08-12 RX ORDER — METHYLPREDNISOLONE 4 MG/1
8 TABLET ORAL
Status: COMPLETED | OUTPATIENT
Start: 2019-08-12 | End: 2019-08-12

## 2019-08-12 RX ORDER — METHYLPREDNISOLONE 4 MG/1
4 TABLET ORAL
Status: COMPLETED | OUTPATIENT
Start: 2019-08-14 | End: 2019-08-14

## 2019-08-12 RX ORDER — METHYLPREDNISOLONE 4 MG/1
8 TABLET ORAL
Status: COMPLETED | OUTPATIENT
Start: 2019-08-13 | End: 2019-08-13

## 2019-08-12 RX ORDER — METHOCARBAMOL 500 MG/1
750 TABLET, FILM COATED ORAL EVERY 6 HOURS PRN
Status: DISCONTINUED | OUTPATIENT
Start: 2019-08-12 | End: 2019-08-16

## 2019-08-12 RX ORDER — METHYLPREDNISOLONE 4 MG/1
4 TABLET ORAL
Status: COMPLETED | OUTPATIENT
Start: 2019-08-16 | End: 2019-08-16

## 2019-08-12 RX ORDER — METHYLPREDNISOLONE 4 MG/1
4 TABLET ORAL
Status: COMPLETED | OUTPATIENT
Start: 2019-08-17 | End: 2019-08-17

## 2019-08-12 RX ADMIN — DOCUSATE SODIUM 100 MG: 100 CAPSULE, LIQUID FILLED ORAL at 21:52

## 2019-08-12 RX ADMIN — METOPROLOL TARTRATE 12.5 MG: 25 TABLET ORAL at 08:31

## 2019-08-12 RX ADMIN — INSULIN LISPRO 2 UNITS: 100 INJECTION, SOLUTION INTRAVENOUS; SUBCUTANEOUS at 12:40

## 2019-08-12 RX ADMIN — CEFAZOLIN SODIUM 2 G: 2 INJECTION, SOLUTION INTRAVENOUS at 17:14

## 2019-08-12 RX ADMIN — INSULIN LISPRO 4 UNITS: 100 INJECTION, SOLUTION INTRAVENOUS; SUBCUTANEOUS at 17:14

## 2019-08-12 RX ADMIN — METHOCARBAMOL TABLETS 500 MG: 500 TABLET, COATED ORAL at 05:10

## 2019-08-12 RX ADMIN — GABAPENTIN 300 MG: 300 CAPSULE ORAL at 08:31

## 2019-08-12 RX ADMIN — METHYLPREDNISOLONE 8 MG: 4 TABLET ORAL at 09:57

## 2019-08-12 RX ADMIN — OXYCODONE HYDROCHLORIDE AND ACETAMINOPHEN 2 TABLET: 7.5; 325 TABLET ORAL at 05:05

## 2019-08-12 RX ADMIN — FUROSEMIDE 20 MG: 20 TABLET ORAL at 08:31

## 2019-08-12 RX ADMIN — METHYLPREDNISOLONE 4 MG: 4 TABLET ORAL at 12:40

## 2019-08-12 RX ADMIN — GABAPENTIN 300 MG: 300 CAPSULE ORAL at 21:52

## 2019-08-12 RX ADMIN — INSULIN GLARGINE 30 UNITS: 100 INJECTION, SOLUTION SUBCUTANEOUS at 06:01

## 2019-08-12 RX ADMIN — METOPROLOL TARTRATE 12.5 MG: 25 TABLET ORAL at 21:52

## 2019-08-12 RX ADMIN — METHYLPREDNISOLONE 8 MG: 4 TABLET ORAL at 21:52

## 2019-08-12 RX ADMIN — ROPINIROLE 4 MG: 2 TABLET, FILM COATED ORAL at 21:52

## 2019-08-12 RX ADMIN — METHOCARBAMOL TABLETS 750 MG: 500 TABLET, COATED ORAL at 12:40

## 2019-08-12 RX ADMIN — OXYCODONE HYDROCHLORIDE AND ACETAMINOPHEN 2 TABLET: 7.5; 325 TABLET ORAL at 09:57

## 2019-08-12 RX ADMIN — PANTOPRAZOLE SODIUM 40 MG: 40 TABLET, DELAYED RELEASE ORAL at 06:31

## 2019-08-12 RX ADMIN — CEFAZOLIN SODIUM 2 G: 2 INJECTION, SOLUTION INTRAVENOUS at 00:12

## 2019-08-12 RX ADMIN — SODIUM CHLORIDE, PRESERVATIVE FREE 3 ML: 5 INJECTION INTRAVENOUS at 08:32

## 2019-08-12 RX ADMIN — METHOCARBAMOL TABLETS 750 MG: 500 TABLET, COATED ORAL at 21:52

## 2019-08-12 RX ADMIN — GABAPENTIN 300 MG: 300 CAPSULE ORAL at 17:14

## 2019-08-12 RX ADMIN — METHYLPREDNISOLONE 4 MG: 4 TABLET ORAL at 18:07

## 2019-08-12 RX ADMIN — INSULIN LISPRO 2 UNITS: 100 INJECTION, SOLUTION INTRAVENOUS; SUBCUTANEOUS at 08:32

## 2019-08-12 RX ADMIN — CEFAZOLIN SODIUM 2 G: 2 INJECTION, SOLUTION INTRAVENOUS at 08:31

## 2019-08-12 RX ADMIN — POLYETHYLENE GLYCOL 3350 17 G: 17 POWDER, FOR SOLUTION ORAL at 08:31

## 2019-08-12 RX ADMIN — SODIUM CHLORIDE, PRESERVATIVE FREE 3 ML: 5 INJECTION INTRAVENOUS at 22:17

## 2019-08-12 RX ADMIN — INSULIN GLARGINE 30 UNITS: 100 INJECTION, SOLUTION SUBCUTANEOUS at 21:53

## 2019-08-12 RX ADMIN — INSULIN LISPRO 6 UNITS: 100 INJECTION, SOLUTION INTRAVENOUS; SUBCUTANEOUS at 21:52

## 2019-08-12 RX ADMIN — ATORVASTATIN CALCIUM 80 MG: 80 TABLET, FILM COATED ORAL at 08:31

## 2019-08-12 RX ADMIN — OXYCODONE HYDROCHLORIDE AND ACETAMINOPHEN 2 TABLET: 7.5; 325 TABLET ORAL at 21:52

## 2019-08-12 RX ADMIN — DOCUSATE SODIUM 100 MG: 100 CAPSULE, LIQUID FILLED ORAL at 08:31

## 2019-08-13 LAB
ANION GAP SERPL CALCULATED.3IONS-SCNC: 11.6 MMOL/L (ref 5–15)
BASE EXCESS BLDA CALC-SCNC: 0 MMOL/L (ref -5–5)
BUN BLD-MCNC: 20 MG/DL (ref 8–23)
BUN/CREAT SERPL: 15.5 (ref 7–25)
CALCIUM SPEC-SCNC: 9.1 MG/DL (ref 8.6–10.5)
CHLORIDE SERPL-SCNC: 98 MMOL/L (ref 98–107)
CO2 BLDA-SCNC: 25 MMOL/L (ref 24–29)
CO2 SERPL-SCNC: 27.4 MMOL/L (ref 22–29)
CREAT BLD-MCNC: 1.29 MG/DL (ref 0.76–1.27)
GFR SERPL CREATININE-BSD FRML MDRD: 54 ML/MIN/1.73
GLUCOSE BLD-MCNC: 168 MG/DL (ref 65–99)
GLUCOSE BLDC GLUCOMTR-MCNC: 127 MG/DL (ref 70–130)
GLUCOSE BLDC GLUCOMTR-MCNC: 155 MG/DL (ref 70–130)
GLUCOSE BLDC GLUCOMTR-MCNC: 161 MG/DL (ref 70–130)
GLUCOSE BLDC GLUCOMTR-MCNC: 231 MG/DL (ref 70–130)
GLUCOSE BLDC GLUCOMTR-MCNC: 281 MG/DL (ref 70–130)
HCO3 BLDA-SCNC: 24.2 MMOL/L (ref 22–26)
HCT VFR BLDA CALC: 41 % (ref 38–51)
HGB BLDA-MCNC: 13.9 G/DL (ref 12–17)
PCO2 BLDA: 36.7 MM HG (ref 35–45)
PH BLDA: 7.43 PH UNITS (ref 7.35–7.6)
PO2 BLDA: 150 MMHG (ref 80–105)
POTASSIUM BLD-SCNC: 4.4 MMOL/L (ref 3.5–5.2)
POTASSIUM BLDA-SCNC: 4.1 MMOL/L (ref 3.5–4.9)
SAO2 % BLDA: 99 % (ref 95–98)
SODIUM BLD-SCNC: 137 MMOL/L (ref 136–145)

## 2019-08-13 PROCEDURE — 25010000003 CEFAZOLIN IN DEXTROSE 2-4 GM/100ML-% SOLUTION: Performed by: ORTHOPAEDIC SURGERY

## 2019-08-13 PROCEDURE — 63710000001 INSULIN LISPRO (HUMAN) PER 5 UNITS: Performed by: HOSPITALIST

## 2019-08-13 PROCEDURE — 63710000001 METHYLPREDNISOLONE 4 MG TABLET THERAPY PACK 21 EACH DISP PACK: Performed by: INTERNAL MEDICINE

## 2019-08-13 PROCEDURE — 63710000001 INSULIN GLARGINE PER 5 UNITS: Performed by: NURSE PRACTITIONER

## 2019-08-13 PROCEDURE — 97110 THERAPEUTIC EXERCISES: CPT

## 2019-08-13 PROCEDURE — 80048 BASIC METABOLIC PNL TOTAL CA: CPT | Performed by: HOSPITALIST

## 2019-08-13 PROCEDURE — 82962 GLUCOSE BLOOD TEST: CPT

## 2019-08-13 RX ORDER — GABAPENTIN 300 MG/1
300 CAPSULE ORAL ONCE
Status: COMPLETED | OUTPATIENT
Start: 2019-08-13 | End: 2019-08-13

## 2019-08-13 RX ORDER — BISACODYL 10 MG
10 SUPPOSITORY, RECTAL RECTAL DAILY PRN
Status: DISCONTINUED | OUTPATIENT
Start: 2019-08-13 | End: 2019-08-17 | Stop reason: HOSPADM

## 2019-08-13 RX ORDER — GABAPENTIN 300 MG/1
600 CAPSULE ORAL EVERY 12 HOURS SCHEDULED
Status: DISCONTINUED | OUTPATIENT
Start: 2019-08-13 | End: 2019-08-17 | Stop reason: HOSPADM

## 2019-08-13 RX ADMIN — METHOCARBAMOL TABLETS 750 MG: 500 TABLET, COATED ORAL at 17:58

## 2019-08-13 RX ADMIN — INSULIN GLARGINE 30 UNITS: 100 INJECTION, SOLUTION SUBCUTANEOUS at 08:20

## 2019-08-13 RX ADMIN — SODIUM CHLORIDE, PRESERVATIVE FREE 3 ML: 5 INJECTION INTRAVENOUS at 08:14

## 2019-08-13 RX ADMIN — INSULIN LISPRO 6 UNITS: 100 INJECTION, SOLUTION INTRAVENOUS; SUBCUTANEOUS at 20:02

## 2019-08-13 RX ADMIN — METOPROLOL TARTRATE 12.5 MG: 25 TABLET ORAL at 20:01

## 2019-08-13 RX ADMIN — OXYCODONE HYDROCHLORIDE AND ACETAMINOPHEN 2 TABLET: 7.5; 325 TABLET ORAL at 13:31

## 2019-08-13 RX ADMIN — ATORVASTATIN CALCIUM 80 MG: 80 TABLET, FILM COATED ORAL at 08:13

## 2019-08-13 RX ADMIN — INSULIN GLARGINE 30 UNITS: 100 INJECTION, SOLUTION SUBCUTANEOUS at 20:03

## 2019-08-13 RX ADMIN — ROPINIROLE 4 MG: 2 TABLET, FILM COATED ORAL at 20:01

## 2019-08-13 RX ADMIN — OXYCODONE HYDROCHLORIDE AND ACETAMINOPHEN 2 TABLET: 7.5; 325 TABLET ORAL at 03:24

## 2019-08-13 RX ADMIN — GABAPENTIN 300 MG: 300 CAPSULE ORAL at 10:45

## 2019-08-13 RX ADMIN — DOCUSATE SODIUM 100 MG: 100 CAPSULE, LIQUID FILLED ORAL at 20:01

## 2019-08-13 RX ADMIN — METHYLPREDNISOLONE 4 MG: 4 TABLET ORAL at 12:11

## 2019-08-13 RX ADMIN — METHYLPREDNISOLONE 4 MG: 4 TABLET ORAL at 17:59

## 2019-08-13 RX ADMIN — INSULIN LISPRO 4 UNITS: 100 INJECTION, SOLUTION INTRAVENOUS; SUBCUTANEOUS at 17:59

## 2019-08-13 RX ADMIN — METHYLPREDNISOLONE 8 MG: 4 TABLET ORAL at 20:02

## 2019-08-13 RX ADMIN — OXYCODONE HYDROCHLORIDE AND ACETAMINOPHEN 2 TABLET: 7.5; 325 TABLET ORAL at 22:00

## 2019-08-13 RX ADMIN — POLYETHYLENE GLYCOL 3350 17 G: 17 POWDER, FOR SOLUTION ORAL at 08:13

## 2019-08-13 RX ADMIN — SODIUM CHLORIDE, PRESERVATIVE FREE 3 ML: 5 INJECTION INTRAVENOUS at 20:03

## 2019-08-13 RX ADMIN — CEFAZOLIN SODIUM 2 G: 2 INJECTION, SOLUTION INTRAVENOUS at 02:20

## 2019-08-13 RX ADMIN — METHOCARBAMOL TABLETS 750 MG: 500 TABLET, COATED ORAL at 03:24

## 2019-08-13 RX ADMIN — OXYCODONE HYDROCHLORIDE AND ACETAMINOPHEN 2 TABLET: 7.5; 325 TABLET ORAL at 17:58

## 2019-08-13 RX ADMIN — GABAPENTIN 300 MG: 300 CAPSULE ORAL at 08:13

## 2019-08-13 RX ADMIN — METHYLPREDNISOLONE 4 MG: 4 TABLET ORAL at 08:14

## 2019-08-13 RX ADMIN — DOCUSATE SODIUM 100 MG: 100 CAPSULE, LIQUID FILLED ORAL at 08:13

## 2019-08-13 RX ADMIN — METOPROLOL TARTRATE 12.5 MG: 25 TABLET ORAL at 08:14

## 2019-08-13 RX ADMIN — PANTOPRAZOLE SODIUM 40 MG: 40 TABLET, DELAYED RELEASE ORAL at 08:13

## 2019-08-13 RX ADMIN — METHOCARBAMOL TABLETS 750 MG: 500 TABLET, COATED ORAL at 10:45

## 2019-08-13 RX ADMIN — BISACODYL 10 MG: 10 SUPPOSITORY RECTAL at 20:02

## 2019-08-13 RX ADMIN — FUROSEMIDE 20 MG: 20 TABLET ORAL at 08:13

## 2019-08-13 RX ADMIN — INSULIN LISPRO 2 UNITS: 100 INJECTION, SOLUTION INTRAVENOUS; SUBCUTANEOUS at 12:10

## 2019-08-13 RX ADMIN — GABAPENTIN 600 MG: 300 CAPSULE ORAL at 20:01

## 2019-08-13 RX ADMIN — OXYCODONE HYDROCHLORIDE AND ACETAMINOPHEN 2 TABLET: 7.5; 325 TABLET ORAL at 08:34

## 2019-08-13 RX ADMIN — INSULIN LISPRO 2 UNITS: 100 INJECTION, SOLUTION INTRAVENOUS; SUBCUTANEOUS at 08:15

## 2019-08-14 LAB
ANION GAP SERPL CALCULATED.3IONS-SCNC: 9.5 MMOL/L (ref 5–15)
BUN BLD-MCNC: 19 MG/DL (ref 8–23)
BUN/CREAT SERPL: 17.9 (ref 7–25)
CALCIUM SPEC-SCNC: 8.6 MG/DL (ref 8.6–10.5)
CHLORIDE SERPL-SCNC: 99 MMOL/L (ref 98–107)
CO2 SERPL-SCNC: 26.5 MMOL/L (ref 22–29)
CREAT BLD-MCNC: 1.06 MG/DL (ref 0.76–1.27)
GFR SERPL CREATININE-BSD FRML MDRD: 68 ML/MIN/1.73
GLUCOSE BLD-MCNC: 161 MG/DL (ref 65–99)
GLUCOSE BLDC GLUCOMTR-MCNC: 134 MG/DL (ref 70–130)
GLUCOSE BLDC GLUCOMTR-MCNC: 262 MG/DL (ref 70–130)
GLUCOSE BLDC GLUCOMTR-MCNC: 283 MG/DL (ref 70–130)
GLUCOSE BLDC GLUCOMTR-MCNC: 288 MG/DL (ref 70–130)
POTASSIUM BLD-SCNC: 4.2 MMOL/L (ref 3.5–5.2)
SODIUM BLD-SCNC: 135 MMOL/L (ref 136–145)

## 2019-08-14 PROCEDURE — 25010000002 ENOXAPARIN PER 10 MG: Performed by: INTERNAL MEDICINE

## 2019-08-14 PROCEDURE — 82962 GLUCOSE BLOOD TEST: CPT

## 2019-08-14 PROCEDURE — 80048 BASIC METABOLIC PNL TOTAL CA: CPT | Performed by: HOSPITALIST

## 2019-08-14 PROCEDURE — 97110 THERAPEUTIC EXERCISES: CPT

## 2019-08-14 PROCEDURE — 63710000001 METHYLPREDNISOLONE 4 MG TABLET THERAPY PACK 21 EACH DISP PACK: Performed by: INTERNAL MEDICINE

## 2019-08-14 PROCEDURE — 63710000001 INSULIN LISPRO (HUMAN) PER 5 UNITS: Performed by: HOSPITALIST

## 2019-08-14 PROCEDURE — 63710000001 INSULIN GLARGINE PER 5 UNITS: Performed by: NURSE PRACTITIONER

## 2019-08-14 RX ORDER — GABAPENTIN 300 MG/1
300 CAPSULE ORAL
Status: DISCONTINUED | OUTPATIENT
Start: 2019-08-14 | End: 2019-08-17 | Stop reason: HOSPADM

## 2019-08-14 RX ADMIN — SODIUM CHLORIDE, PRESERVATIVE FREE 3 ML: 5 INJECTION INTRAVENOUS at 09:16

## 2019-08-14 RX ADMIN — METHOCARBAMOL TABLETS 750 MG: 500 TABLET, COATED ORAL at 06:03

## 2019-08-14 RX ADMIN — METHYLPREDNISOLONE 4 MG: 4 TABLET ORAL at 06:30

## 2019-08-14 RX ADMIN — ATORVASTATIN CALCIUM 80 MG: 80 TABLET, FILM COATED ORAL at 09:15

## 2019-08-14 RX ADMIN — GABAPENTIN 600 MG: 300 CAPSULE ORAL at 09:15

## 2019-08-14 RX ADMIN — METHYLPREDNISOLONE 4 MG: 4 TABLET ORAL at 22:08

## 2019-08-14 RX ADMIN — INSULIN LISPRO 6 UNITS: 100 INJECTION, SOLUTION INTRAVENOUS; SUBCUTANEOUS at 12:32

## 2019-08-14 RX ADMIN — PANTOPRAZOLE SODIUM 40 MG: 40 TABLET, DELAYED RELEASE ORAL at 06:03

## 2019-08-14 RX ADMIN — OXYCODONE HYDROCHLORIDE AND ACETAMINOPHEN 2 TABLET: 7.5; 325 TABLET ORAL at 06:03

## 2019-08-14 RX ADMIN — OXYCODONE HYDROCHLORIDE AND ACETAMINOPHEN 2 TABLET: 7.5; 325 TABLET ORAL at 17:16

## 2019-08-14 RX ADMIN — OXYCODONE HYDROCHLORIDE AND ACETAMINOPHEN 2 TABLET: 7.5; 325 TABLET ORAL at 22:07

## 2019-08-14 RX ADMIN — METHYLPREDNISOLONE 4 MG: 4 TABLET ORAL at 17:17

## 2019-08-14 RX ADMIN — METHOCARBAMOL TABLETS 750 MG: 500 TABLET, COATED ORAL at 12:33

## 2019-08-14 RX ADMIN — METHOCARBAMOL TABLETS 750 MG: 500 TABLET, COATED ORAL at 00:04

## 2019-08-14 RX ADMIN — SODIUM CHLORIDE, PRESERVATIVE FREE 3 ML: 5 INJECTION INTRAVENOUS at 22:08

## 2019-08-14 RX ADMIN — DOCUSATE SODIUM 100 MG: 100 CAPSULE, LIQUID FILLED ORAL at 09:15

## 2019-08-14 RX ADMIN — ENOXAPARIN SODIUM 40 MG: 40 INJECTION SUBCUTANEOUS at 17:17

## 2019-08-14 RX ADMIN — INSULIN LISPRO 6 UNITS: 100 INJECTION, SOLUTION INTRAVENOUS; SUBCUTANEOUS at 17:16

## 2019-08-14 RX ADMIN — METHOCARBAMOL TABLETS 750 MG: 500 TABLET, COATED ORAL at 18:51

## 2019-08-14 RX ADMIN — GABAPENTIN 600 MG: 300 CAPSULE ORAL at 22:07

## 2019-08-14 RX ADMIN — POLYETHYLENE GLYCOL 3350 17 G: 17 POWDER, FOR SOLUTION ORAL at 09:16

## 2019-08-14 RX ADMIN — METHYLPREDNISOLONE 4 MG: 4 TABLET ORAL at 12:33

## 2019-08-14 RX ADMIN — OXYCODONE HYDROCHLORIDE AND ACETAMINOPHEN 2 TABLET: 7.5; 325 TABLET ORAL at 02:09

## 2019-08-14 RX ADMIN — METOPROLOL TARTRATE 12.5 MG: 25 TABLET ORAL at 22:07

## 2019-08-14 RX ADMIN — ROPINIROLE 4 MG: 2 TABLET, FILM COATED ORAL at 22:07

## 2019-08-14 RX ADMIN — INSULIN GLARGINE 30 UNITS: 100 INJECTION, SOLUTION SUBCUTANEOUS at 22:27

## 2019-08-14 RX ADMIN — DOCUSATE SODIUM 100 MG: 100 CAPSULE, LIQUID FILLED ORAL at 22:16

## 2019-08-14 RX ADMIN — INSULIN LISPRO 6 UNITS: 100 INJECTION, SOLUTION INTRAVENOUS; SUBCUTANEOUS at 22:09

## 2019-08-14 RX ADMIN — INSULIN GLARGINE 30 UNITS: 100 INJECTION, SOLUTION SUBCUTANEOUS at 06:03

## 2019-08-14 RX ADMIN — METOPROLOL TARTRATE 12.5 MG: 25 TABLET ORAL at 09:14

## 2019-08-14 RX ADMIN — GABAPENTIN 300 MG: 300 CAPSULE ORAL at 12:33

## 2019-08-14 RX ADMIN — FUROSEMIDE 20 MG: 20 TABLET ORAL at 09:15

## 2019-08-15 LAB
ANION GAP SERPL CALCULATED.3IONS-SCNC: 9.9 MMOL/L (ref 5–15)
BUN BLD-MCNC: 19 MG/DL (ref 8–23)
BUN/CREAT SERPL: 15.7 (ref 7–25)
CALCIUM SPEC-SCNC: 9.1 MG/DL (ref 8.6–10.5)
CHLORIDE SERPL-SCNC: 104 MMOL/L (ref 98–107)
CO2 SERPL-SCNC: 29.1 MMOL/L (ref 22–29)
CREAT BLD-MCNC: 1.21 MG/DL (ref 0.76–1.27)
GFR SERPL CREATININE-BSD FRML MDRD: 58 ML/MIN/1.73
GLUCOSE BLD-MCNC: 181 MG/DL (ref 65–99)
GLUCOSE BLDC GLUCOMTR-MCNC: 131 MG/DL (ref 70–130)
GLUCOSE BLDC GLUCOMTR-MCNC: 191 MG/DL (ref 70–130)
GLUCOSE BLDC GLUCOMTR-MCNC: 282 MG/DL (ref 70–130)
GLUCOSE BLDC GLUCOMTR-MCNC: 284 MG/DL (ref 70–130)
POTASSIUM BLD-SCNC: 4.8 MMOL/L (ref 3.5–5.2)
SODIUM BLD-SCNC: 143 MMOL/L (ref 136–145)

## 2019-08-15 PROCEDURE — 63710000001 INSULIN LISPRO (HUMAN) PER 5 UNITS: Performed by: HOSPITALIST

## 2019-08-15 PROCEDURE — 63710000001 METHYLPREDNISOLONE 4 MG TABLET THERAPY PACK 21 EACH DISP PACK: Performed by: INTERNAL MEDICINE

## 2019-08-15 PROCEDURE — 82962 GLUCOSE BLOOD TEST: CPT

## 2019-08-15 PROCEDURE — 63710000001 INSULIN GLARGINE PER 5 UNITS: Performed by: NURSE PRACTITIONER

## 2019-08-15 PROCEDURE — 97110 THERAPEUTIC EXERCISES: CPT

## 2019-08-15 PROCEDURE — 25010000002 ENOXAPARIN PER 10 MG: Performed by: INTERNAL MEDICINE

## 2019-08-15 PROCEDURE — 80048 BASIC METABOLIC PNL TOTAL CA: CPT | Performed by: HOSPITALIST

## 2019-08-15 RX ADMIN — FUROSEMIDE 20 MG: 20 TABLET ORAL at 08:24

## 2019-08-15 RX ADMIN — METHOCARBAMOL TABLETS 750 MG: 500 TABLET, COATED ORAL at 08:34

## 2019-08-15 RX ADMIN — METHOCARBAMOL TABLETS 750 MG: 500 TABLET, COATED ORAL at 15:47

## 2019-08-15 RX ADMIN — POLYETHYLENE GLYCOL 3350 17 G: 17 POWDER, FOR SOLUTION ORAL at 08:24

## 2019-08-15 RX ADMIN — DOCUSATE SODIUM 100 MG: 100 CAPSULE, LIQUID FILLED ORAL at 08:25

## 2019-08-15 RX ADMIN — GABAPENTIN 300 MG: 300 CAPSULE ORAL at 12:24

## 2019-08-15 RX ADMIN — METHYLPREDNISOLONE 4 MG: 4 TABLET ORAL at 17:00

## 2019-08-15 RX ADMIN — METOPROLOL TARTRATE 12.5 MG: 25 TABLET ORAL at 08:25

## 2019-08-15 RX ADMIN — DOCUSATE SODIUM 100 MG: 100 CAPSULE, LIQUID FILLED ORAL at 21:31

## 2019-08-15 RX ADMIN — SODIUM CHLORIDE, PRESERVATIVE FREE 3 ML: 5 INJECTION INTRAVENOUS at 08:25

## 2019-08-15 RX ADMIN — METHYLPREDNISOLONE 4 MG: 4 TABLET ORAL at 06:51

## 2019-08-15 RX ADMIN — PANTOPRAZOLE SODIUM 40 MG: 40 TABLET, DELAYED RELEASE ORAL at 06:50

## 2019-08-15 RX ADMIN — INSULIN LISPRO 6 UNITS: 100 INJECTION, SOLUTION INTRAVENOUS; SUBCUTANEOUS at 21:32

## 2019-08-15 RX ADMIN — GABAPENTIN 600 MG: 300 CAPSULE ORAL at 08:24

## 2019-08-15 RX ADMIN — INSULIN LISPRO 2 UNITS: 100 INJECTION, SOLUTION INTRAVENOUS; SUBCUTANEOUS at 12:23

## 2019-08-15 RX ADMIN — METHYLPREDNISOLONE 4 MG: 4 TABLET ORAL at 12:24

## 2019-08-15 RX ADMIN — INSULIN GLARGINE 30 UNITS: 100 INJECTION, SOLUTION SUBCUTANEOUS at 21:34

## 2019-08-15 RX ADMIN — GABAPENTIN 600 MG: 300 CAPSULE ORAL at 21:31

## 2019-08-15 RX ADMIN — METHOCARBAMOL TABLETS 750 MG: 500 TABLET, COATED ORAL at 01:35

## 2019-08-15 RX ADMIN — INSULIN LISPRO 6 UNITS: 100 INJECTION, SOLUTION INTRAVENOUS; SUBCUTANEOUS at 17:00

## 2019-08-15 RX ADMIN — ATORVASTATIN CALCIUM 80 MG: 80 TABLET, FILM COATED ORAL at 08:24

## 2019-08-15 RX ADMIN — ROPINIROLE 4 MG: 2 TABLET, FILM COATED ORAL at 21:31

## 2019-08-15 RX ADMIN — METHOCARBAMOL TABLETS 750 MG: 500 TABLET, COATED ORAL at 21:31

## 2019-08-15 RX ADMIN — INSULIN GLARGINE 30 UNITS: 100 INJECTION, SOLUTION SUBCUTANEOUS at 08:23

## 2019-08-15 RX ADMIN — METOPROLOL TARTRATE 12.5 MG: 25 TABLET ORAL at 21:32

## 2019-08-15 RX ADMIN — ENOXAPARIN SODIUM 40 MG: 40 INJECTION SUBCUTANEOUS at 15:47

## 2019-08-16 LAB
ANION GAP SERPL CALCULATED.3IONS-SCNC: 10.8 MMOL/L (ref 5–15)
BUN BLD-MCNC: 19 MG/DL (ref 8–23)
BUN/CREAT SERPL: 19 (ref 7–25)
CALCIUM SPEC-SCNC: 9.2 MG/DL (ref 8.6–10.5)
CHLORIDE SERPL-SCNC: 105 MMOL/L (ref 98–107)
CO2 SERPL-SCNC: 27.2 MMOL/L (ref 22–29)
CREAT BLD-MCNC: 1 MG/DL (ref 0.76–1.27)
GFR SERPL CREATININE-BSD FRML MDRD: 72 ML/MIN/1.73
GLUCOSE BLD-MCNC: 136 MG/DL (ref 65–99)
GLUCOSE BLDC GLUCOMTR-MCNC: 181 MG/DL (ref 70–130)
GLUCOSE BLDC GLUCOMTR-MCNC: 225 MG/DL (ref 70–130)
GLUCOSE BLDC GLUCOMTR-MCNC: 273 MG/DL (ref 70–130)
GLUCOSE BLDC GLUCOMTR-MCNC: 99 MG/DL (ref 70–130)
POTASSIUM BLD-SCNC: 3.8 MMOL/L (ref 3.5–5.2)
SODIUM BLD-SCNC: 143 MMOL/L (ref 136–145)

## 2019-08-16 PROCEDURE — 82962 GLUCOSE BLOOD TEST: CPT

## 2019-08-16 PROCEDURE — 63710000001 INSULIN GLARGINE PER 5 UNITS: Performed by: NURSE PRACTITIONER

## 2019-08-16 PROCEDURE — 63710000001 METHYLPREDNISOLONE 4 MG TABLET THERAPY PACK 21 EACH DISP PACK: Performed by: INTERNAL MEDICINE

## 2019-08-16 PROCEDURE — 80048 BASIC METABOLIC PNL TOTAL CA: CPT | Performed by: HOSPITALIST

## 2019-08-16 PROCEDURE — 97110 THERAPEUTIC EXERCISES: CPT

## 2019-08-16 PROCEDURE — 63710000001 INSULIN LISPRO (HUMAN) PER 5 UNITS: Performed by: HOSPITALIST

## 2019-08-16 PROCEDURE — 25010000002 ENOXAPARIN PER 10 MG: Performed by: INTERNAL MEDICINE

## 2019-08-16 RX ORDER — METHOCARBAMOL 750 MG/1
750 TABLET, FILM COATED ORAL EVERY 6 HOURS SCHEDULED
Status: DISCONTINUED | OUTPATIENT
Start: 2019-08-16 | End: 2019-08-17 | Stop reason: HOSPADM

## 2019-08-16 RX ADMIN — INSULIN LISPRO 4 UNITS: 100 INJECTION, SOLUTION INTRAVENOUS; SUBCUTANEOUS at 17:34

## 2019-08-16 RX ADMIN — ATORVASTATIN CALCIUM 80 MG: 80 TABLET, FILM COATED ORAL at 08:50

## 2019-08-16 RX ADMIN — ROPINIROLE 4 MG: 2 TABLET, FILM COATED ORAL at 20:35

## 2019-08-16 RX ADMIN — ENOXAPARIN SODIUM 40 MG: 40 INJECTION SUBCUTANEOUS at 16:34

## 2019-08-16 RX ADMIN — METHYLPREDNISOLONE 4 MG: 4 TABLET ORAL at 20:35

## 2019-08-16 RX ADMIN — METHOCARBAMOL TABLETS 750 MG: 750 TABLET, COATED ORAL at 17:30

## 2019-08-16 RX ADMIN — FUROSEMIDE 20 MG: 20 TABLET ORAL at 08:50

## 2019-08-16 RX ADMIN — METHOCARBAMOL TABLETS 750 MG: 500 TABLET, COATED ORAL at 09:52

## 2019-08-16 RX ADMIN — GABAPENTIN 600 MG: 300 CAPSULE ORAL at 20:35

## 2019-08-16 RX ADMIN — METHYLPREDNISOLONE 4 MG: 4 TABLET ORAL at 08:49

## 2019-08-16 RX ADMIN — INSULIN LISPRO 6 UNITS: 100 INJECTION, SOLUTION INTRAVENOUS; SUBCUTANEOUS at 20:34

## 2019-08-16 RX ADMIN — DOCUSATE SODIUM 100 MG: 100 CAPSULE, LIQUID FILLED ORAL at 20:35

## 2019-08-16 RX ADMIN — DOCUSATE SODIUM 100 MG: 100 CAPSULE, LIQUID FILLED ORAL at 08:49

## 2019-08-16 RX ADMIN — METOPROLOL TARTRATE 12.5 MG: 25 TABLET ORAL at 20:35

## 2019-08-16 RX ADMIN — GABAPENTIN 600 MG: 300 CAPSULE ORAL at 08:49

## 2019-08-16 RX ADMIN — PANTOPRAZOLE SODIUM 40 MG: 40 TABLET, DELAYED RELEASE ORAL at 08:49

## 2019-08-16 RX ADMIN — OXYCODONE HYDROCHLORIDE AND ACETAMINOPHEN 2 TABLET: 7.5; 325 TABLET ORAL at 11:55

## 2019-08-16 RX ADMIN — INSULIN GLARGINE 30 UNITS: 100 INJECTION, SOLUTION SUBCUTANEOUS at 20:35

## 2019-08-16 RX ADMIN — INSULIN LISPRO 2 UNITS: 100 INJECTION, SOLUTION INTRAVENOUS; SUBCUTANEOUS at 11:51

## 2019-08-16 RX ADMIN — GABAPENTIN 300 MG: 300 CAPSULE ORAL at 11:51

## 2019-08-16 RX ADMIN — METOPROLOL TARTRATE 12.5 MG: 25 TABLET ORAL at 08:50

## 2019-08-16 RX ADMIN — INSULIN GLARGINE 30 UNITS: 100 INJECTION, SOLUTION SUBCUTANEOUS at 08:50

## 2019-08-16 RX ADMIN — METHOCARBAMOL TABLETS 750 MG: 500 TABLET, COATED ORAL at 03:29

## 2019-08-17 VITALS
BODY MASS INDEX: 36.13 KG/M2 | HEART RATE: 64 BPM | RESPIRATION RATE: 18 BRPM | DIASTOLIC BLOOD PRESSURE: 70 MMHG | OXYGEN SATURATION: 94 % | HEIGHT: 68 IN | TEMPERATURE: 97.5 F | WEIGHT: 238.4 LBS | SYSTOLIC BLOOD PRESSURE: 129 MMHG

## 2019-08-17 PROBLEM — G06.1 ABSCESS IN EPIDURAL SPACE OF CERVICAL SPINE: Status: RESOLVED | Noted: 2019-07-30 | Resolved: 2019-08-17

## 2019-08-17 LAB
ALBUMIN SERPL-MCNC: 3.3 G/DL (ref 3.5–5.2)
ALP SERPL-CCNC: 76 U/L (ref 39–117)
ALT SERPL W P-5'-P-CCNC: 12 U/L (ref 1–41)
ANION GAP SERPL CALCULATED.3IONS-SCNC: 9.4 MMOL/L (ref 5–15)
AST SERPL-CCNC: 17 U/L (ref 1–40)
BILIRUB CONJ SERPL-MCNC: <0.2 MG/DL (ref 0.2–0.3)
BILIRUB INDIRECT SERPL-MCNC: ABNORMAL MG/DL
BILIRUB SERPL-MCNC: 0.3 MG/DL (ref 0.2–1.2)
BUN BLD-MCNC: 18 MG/DL (ref 8–23)
BUN/CREAT SERPL: 16.2 (ref 7–25)
CALCIUM SPEC-SCNC: 9 MG/DL (ref 8.6–10.5)
CHLORIDE SERPL-SCNC: 106 MMOL/L (ref 98–107)
CO2 SERPL-SCNC: 29.6 MMOL/L (ref 22–29)
CREAT BLD-MCNC: 1.11 MG/DL (ref 0.76–1.27)
GFR SERPL CREATININE-BSD FRML MDRD: 64 ML/MIN/1.73
GLUCOSE BLD-MCNC: 111 MG/DL (ref 65–99)
GLUCOSE BLDC GLUCOMTR-MCNC: 218 MG/DL (ref 70–130)
GLUCOSE BLDC GLUCOMTR-MCNC: 88 MG/DL (ref 70–130)
POTASSIUM BLD-SCNC: 4.1 MMOL/L (ref 3.5–5.2)
PROT SERPL-MCNC: 6 G/DL (ref 6–8.5)
SODIUM BLD-SCNC: 145 MMOL/L (ref 136–145)

## 2019-08-17 PROCEDURE — 97110 THERAPEUTIC EXERCISES: CPT

## 2019-08-17 PROCEDURE — 63710000001 INSULIN LISPRO (HUMAN) PER 5 UNITS: Performed by: HOSPITALIST

## 2019-08-17 PROCEDURE — 82962 GLUCOSE BLOOD TEST: CPT

## 2019-08-17 PROCEDURE — 63710000001 INSULIN GLARGINE PER 5 UNITS: Performed by: NURSE PRACTITIONER

## 2019-08-17 PROCEDURE — 80048 BASIC METABOLIC PNL TOTAL CA: CPT | Performed by: HOSPITALIST

## 2019-08-17 PROCEDURE — 63710000001 METHYLPREDNISOLONE 4 MG TABLET THERAPY PACK 21 EACH DISP PACK: Performed by: INTERNAL MEDICINE

## 2019-08-17 PROCEDURE — 25010000002 ENOXAPARIN PER 10 MG: Performed by: INTERNAL MEDICINE

## 2019-08-17 PROCEDURE — 80076 HEPATIC FUNCTION PANEL: CPT | Performed by: HOSPITALIST

## 2019-08-17 RX ORDER — PANTOPRAZOLE SODIUM 40 MG/1
40 TABLET, DELAYED RELEASE ORAL DAILY
Qty: 15 TABLET | Refills: 0 | Status: SHIPPED | OUTPATIENT
Start: 2019-08-17 | End: 2022-03-23 | Stop reason: HOSPADM

## 2019-08-17 RX ORDER — OXYCODONE AND ACETAMINOPHEN 7.5; 325 MG/1; MG/1
1 TABLET ORAL EVERY 4 HOURS PRN
Status: DISCONTINUED | OUTPATIENT
Start: 2019-08-17 | End: 2019-08-17 | Stop reason: HOSPADM

## 2019-08-17 RX ORDER — FUROSEMIDE 20 MG/1
20 TABLET ORAL DAILY
Start: 2019-08-17

## 2019-08-17 RX ORDER — METHOCARBAMOL 750 MG/1
750 TABLET, FILM COATED ORAL 4 TIMES DAILY
Qty: 90 TABLET | Refills: 0 | Status: SHIPPED | OUTPATIENT
Start: 2019-08-17 | End: 2019-09-09

## 2019-08-17 RX ORDER — OXYCODONE AND ACETAMINOPHEN 7.5; 325 MG/1; MG/1
1 TABLET ORAL EVERY 4 HOURS PRN
Qty: 10 TABLET | Refills: 0 | Status: SHIPPED | OUTPATIENT
Start: 2019-08-17 | End: 2019-08-27

## 2019-08-17 RX ADMIN — METOPROLOL TARTRATE 12.5 MG: 25 TABLET ORAL at 08:36

## 2019-08-17 RX ADMIN — METHOCARBAMOL TABLETS 750 MG: 750 TABLET, COATED ORAL at 00:21

## 2019-08-17 RX ADMIN — ENOXAPARIN SODIUM 40 MG: 40 INJECTION SUBCUTANEOUS at 15:08

## 2019-08-17 RX ADMIN — METHYLPREDNISOLONE 4 MG: 4 TABLET ORAL at 07:28

## 2019-08-17 RX ADMIN — PANTOPRAZOLE SODIUM 40 MG: 40 TABLET, DELAYED RELEASE ORAL at 06:11

## 2019-08-17 RX ADMIN — GABAPENTIN 600 MG: 300 CAPSULE ORAL at 08:36

## 2019-08-17 RX ADMIN — GABAPENTIN 300 MG: 300 CAPSULE ORAL at 12:14

## 2019-08-17 RX ADMIN — INSULIN GLARGINE 30 UNITS: 100 INJECTION, SOLUTION SUBCUTANEOUS at 06:11

## 2019-08-17 RX ADMIN — METHOCARBAMOL TABLETS 750 MG: 750 TABLET, COATED ORAL at 06:11

## 2019-08-17 RX ADMIN — ATORVASTATIN CALCIUM 80 MG: 80 TABLET, FILM COATED ORAL at 08:35

## 2019-08-17 RX ADMIN — DOCUSATE SODIUM 100 MG: 100 CAPSULE, LIQUID FILLED ORAL at 08:36

## 2019-08-17 RX ADMIN — INSULIN LISPRO 4 UNITS: 100 INJECTION, SOLUTION INTRAVENOUS; SUBCUTANEOUS at 12:14

## 2019-08-17 RX ADMIN — METHOCARBAMOL TABLETS 750 MG: 750 TABLET, COATED ORAL at 12:14

## 2019-08-17 RX ADMIN — FUROSEMIDE 20 MG: 20 TABLET ORAL at 08:35

## 2019-08-18 ENCOUNTER — READMISSION MANAGEMENT (OUTPATIENT)
Dept: CALL CENTER | Facility: HOSPITAL | Age: 79
End: 2019-08-18

## 2019-08-18 NOTE — OUTREACH NOTE
Prep Survey      Responses   Facility patient discharged from?  [place]   Is patient eligible?  Yes   Discharge diagnosis  LEFT FORAMINOTOMY C4-T1, REFUSION OF C4-T1, REMOVAL OF C6 SCREW, REVISION OF HARDWARE   Does the patient have one of the following disease processes/diagnoses(primary or secondary)?  General Surgery   Does the patient have Home health ordered?  No   Is there a DME ordered?  No   Prep survey completed?  Yes          Jaky Morales RN

## 2019-08-19 ENCOUNTER — READMISSION MANAGEMENT (OUTPATIENT)
Dept: CALL CENTER | Facility: HOSPITAL | Age: 79
End: 2019-08-19

## 2019-08-19 NOTE — OUTREACH NOTE
General Surgery Week 1 Survey      Responses   Facility patient discharged from?  [place]   Does the patient have one of the following disease processes/diagnoses(primary or secondary)?  General Surgery   Is there a successful TCM telephone encounter documented?  No   Week 1 attempt successful?  Yes   Call start time  0940   Rescheduled  Revoked [Will call nurse call center if needed. Number for center given to spouse.]   Call end time  0948   Discharge diagnosis  LEFT FORAMINOTOMY C4-T1, REFUSION OF C4-T1, REMOVAL OF C6 SCREW, REVISION OF HARDWARE   Person spoke with today (if not patient) and relationship  Sadie Gama, spouse   Meds reviewed with patient/caregiver?  Yes   Is the patient having any side effects they believe may be caused by any medication additions or changes?  No   Does the patient have all medications related to this admission filled (includes all antibiotics, pain medications, etc.)  Yes   Is the patient taking all medications as directed (includes completed medication regime)?  Yes   Medication comments  AVS reviewed. Questions answered regarding medications    Week 1 call completed?  Yes   Wrap up additional comments  Wearing neck brace. Spouse complimentary of [facility] in PT          Celina Rich RN

## 2022-03-16 ENCOUNTER — APPOINTMENT (OUTPATIENT)
Dept: CT IMAGING | Facility: HOSPITAL | Age: 82
End: 2022-03-16

## 2022-03-16 ENCOUNTER — HOSPITAL ENCOUNTER (INPATIENT)
Facility: HOSPITAL | Age: 82
LOS: 7 days | Discharge: NURSING, REHAB OR LONG-TERM CARE | End: 2022-03-23
Attending: EMERGENCY MEDICINE | Admitting: INTERNAL MEDICINE

## 2022-03-16 DIAGNOSIS — S12.191A OTHER CLOSED NONDISPLACED FRACTURE OF SECOND CERVICAL VERTEBRA, INITIAL ENCOUNTER: Primary | ICD-10-CM

## 2022-03-16 PROBLEM — G62.9 PERIPHERAL NEUROPATHY: Status: ACTIVE | Noted: 2022-03-16

## 2022-03-16 PROBLEM — R29.898 LEFT ARM WEAKNESS: Status: ACTIVE | Noted: 2022-03-16

## 2022-03-16 PROBLEM — S12.101A CLOSED NONDISPLACED FRACTURE OF SECOND CERVICAL VERTEBRA (HCC): Status: ACTIVE | Noted: 2022-03-16

## 2022-03-16 PROBLEM — D72.829 LEUKOCYTOSIS: Status: ACTIVE | Noted: 2022-03-16

## 2022-03-16 PROBLEM — N18.31 STAGE 3A CHRONIC KIDNEY DISEASE (HCC): Status: ACTIVE | Noted: 2022-03-16
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
## 2022-03-16 LAB
ALBUMIN SERPL-MCNC: 4.5 G/DL (ref 3.5–5.2)
ALBUMIN/GLOB SERPL: 1.6 G/DL
ALP SERPL-CCNC: 86 U/L (ref 39–117)
ALT SERPL W P-5'-P-CCNC: 26 U/L (ref 1–41)
ANION GAP SERPL CALCULATED.3IONS-SCNC: 12 MMOL/L (ref 5–15)
AST SERPL-CCNC: 27 U/L (ref 1–40)
BASOPHILS # BLD AUTO: 0.04 10*3/MM3 (ref 0–0.2)
BASOPHILS NFR BLD AUTO: 0.3 % (ref 0–1.5)
BILIRUB SERPL-MCNC: 0.5 MG/DL (ref 0–1.2)
BUN SERPL-MCNC: 19 MG/DL (ref 8–23)
BUN/CREAT SERPL: 13.1 (ref 7–25)
CALCIUM SPEC-SCNC: 9.7 MG/DL (ref 8.6–10.5)
CHLORIDE SERPL-SCNC: 105 MMOL/L (ref 98–107)
CO2 SERPL-SCNC: 25 MMOL/L (ref 22–29)
CREAT SERPL-MCNC: 1.45 MG/DL (ref 0.76–1.27)
DEPRECATED RDW RBC AUTO: 40.8 FL (ref 37–54)
EGFRCR SERPLBLD CKD-EPI 2021: 49 ML/MIN/1.73
EOSINOPHIL # BLD AUTO: 0.06 10*3/MM3 (ref 0–0.4)
EOSINOPHIL NFR BLD AUTO: 0.4 % (ref 0.3–6.2)
ERYTHROCYTE [DISTWIDTH] IN BLOOD BY AUTOMATED COUNT: 13 % (ref 12.3–15.4)
GLOBULIN UR ELPH-MCNC: 2.8 GM/DL
GLUCOSE BLDC GLUCOMTR-MCNC: 145 MG/DL (ref 70–130)
GLUCOSE SERPL-MCNC: 183 MG/DL (ref 65–99)
HBA1C MFR BLD: 8.4 % (ref 4.8–5.6)
HCT VFR BLD AUTO: 45.7 % (ref 37.5–51)
HGB BLD-MCNC: 15.4 G/DL (ref 13–17.7)
IMM GRANULOCYTES # BLD AUTO: 0.08 10*3/MM3 (ref 0–0.05)
IMM GRANULOCYTES NFR BLD AUTO: 0.5 % (ref 0–0.5)
INR PPP: 1.04 (ref 0.9–1.1)
LYMPHOCYTES # BLD AUTO: 1.27 10*3/MM3 (ref 0.7–3.1)
LYMPHOCYTES NFR BLD AUTO: 8.3 % (ref 19.6–45.3)
MCH RBC QN AUTO: 29.2 PG (ref 26.6–33)
MCHC RBC AUTO-ENTMCNC: 33.7 G/DL (ref 31.5–35.7)
MCV RBC AUTO: 86.7 FL (ref 79–97)
MONOCYTES # BLD AUTO: 0.82 10*3/MM3 (ref 0.1–0.9)
MONOCYTES NFR BLD AUTO: 5.3 % (ref 5–12)
NEUTROPHILS NFR BLD AUTO: 13.12 10*3/MM3 (ref 1.7–7)
NEUTROPHILS NFR BLD AUTO: 85.2 % (ref 42.7–76)
NRBC BLD AUTO-RTO: 0 /100 WBC (ref 0–0.2)
PLATELET # BLD AUTO: 206 10*3/MM3 (ref 140–450)
PMV BLD AUTO: 9.3 FL (ref 6–12)
POTASSIUM SERPL-SCNC: 4.5 MMOL/L (ref 3.5–5.2)
PROT SERPL-MCNC: 7.3 G/DL (ref 6–8.5)
PROTHROMBIN TIME: 13.5 SECONDS (ref 11.7–14.2)
RBC # BLD AUTO: 5.27 10*6/MM3 (ref 4.14–5.8)
SARS-COV-2 RNA PNL SPEC NAA+PROBE: NOT DETECTED
SODIUM SERPL-SCNC: 142 MMOL/L (ref 136–145)
WBC NRBC COR # BLD: 15.39 10*3/MM3 (ref 3.4–10.8)

## 2022-03-16 PROCEDURE — 25010000002 FENTANYL CITRATE (PF) 50 MCG/ML SOLUTION: Performed by: EMERGENCY MEDICINE

## 2022-03-16 PROCEDURE — 25010000002 MORPHINE PER 10 MG: Performed by: INTERNAL MEDICINE

## 2022-03-16 PROCEDURE — 83036 HEMOGLOBIN GLYCOSYLATED A1C: CPT | Performed by: INTERNAL MEDICINE

## 2022-03-16 PROCEDURE — 80053 COMPREHEN METABOLIC PANEL: CPT | Performed by: EMERGENCY MEDICINE

## 2022-03-16 PROCEDURE — 99285 EMERGENCY DEPT VISIT HI MDM: CPT

## 2022-03-16 PROCEDURE — 85610 PROTHROMBIN TIME: CPT | Performed by: EMERGENCY MEDICINE

## 2022-03-16 PROCEDURE — 72128 CT CHEST SPINE W/O DYE: CPT

## 2022-03-16 PROCEDURE — 70450 CT HEAD/BRAIN W/O DYE: CPT

## 2022-03-16 PROCEDURE — 82962 GLUCOSE BLOOD TEST: CPT

## 2022-03-16 PROCEDURE — 85025 COMPLETE CBC W/AUTO DIFF WBC: CPT | Performed by: EMERGENCY MEDICINE

## 2022-03-16 PROCEDURE — 25010000002 HYDROMORPHONE PER 4 MG: Performed by: INTERNAL MEDICINE

## 2022-03-16 PROCEDURE — 87635 SARS-COV-2 COVID-19 AMP PRB: CPT | Performed by: EMERGENCY MEDICINE

## 2022-03-16 PROCEDURE — 72125 CT NECK SPINE W/O DYE: CPT

## 2022-03-16 RX ORDER — ONDANSETRON 4 MG/1
4 TABLET, FILM COATED ORAL EVERY 6 HOURS PRN
Status: DISCONTINUED | OUTPATIENT
Start: 2022-03-16 | End: 2022-03-23 | Stop reason: HOSPADM

## 2022-03-16 RX ORDER — CHOLECALCIFEROL (VITAMIN D3) 125 MCG
5 CAPSULE ORAL NIGHTLY PRN
Status: DISCONTINUED | OUTPATIENT
Start: 2022-03-16 | End: 2022-03-23 | Stop reason: HOSPADM

## 2022-03-16 RX ORDER — CHOLECALCIFEROL (VITAMIN D3) 125 MCG
1000 CAPSULE ORAL DAILY
Status: DISCONTINUED | OUTPATIENT
Start: 2022-03-17 | End: 2022-03-23 | Stop reason: HOSPADM

## 2022-03-16 RX ORDER — MORPHINE SULFATE 2 MG/ML
2 INJECTION, SOLUTION INTRAMUSCULAR; INTRAVENOUS EVERY 4 HOURS PRN
Status: DISCONTINUED | OUTPATIENT
Start: 2022-03-16 | End: 2022-03-18

## 2022-03-16 RX ORDER — METAXALONE 800 MG/1
400 TABLET ORAL 3 TIMES DAILY PRN
Status: DISCONTINUED | OUTPATIENT
Start: 2022-03-16 | End: 2022-03-16

## 2022-03-16 RX ORDER — SODIUM CHLORIDE 0.9 % (FLUSH) 0.9 %
3-10 SYRINGE (ML) INJECTION AS NEEDED
Status: DISCONTINUED | OUTPATIENT
Start: 2022-03-16 | End: 2022-03-23 | Stop reason: HOSPADM

## 2022-03-16 RX ORDER — ACETAMINOPHEN 325 MG/1
650 TABLET ORAL EVERY 4 HOURS PRN
Status: DISCONTINUED | OUTPATIENT
Start: 2022-03-16 | End: 2022-03-23 | Stop reason: HOSPADM

## 2022-03-16 RX ORDER — HYDROMORPHONE HYDROCHLORIDE 1 MG/ML
0.5 INJECTION, SOLUTION INTRAMUSCULAR; INTRAVENOUS; SUBCUTANEOUS ONCE
Status: COMPLETED | OUTPATIENT
Start: 2022-03-16 | End: 2022-03-16

## 2022-03-16 RX ORDER — SODIUM CHLORIDE 0.9 % (FLUSH) 0.9 %
3 SYRINGE (ML) INJECTION EVERY 12 HOURS SCHEDULED
Status: DISCONTINUED | OUTPATIENT
Start: 2022-03-16 | End: 2022-03-23 | Stop reason: HOSPADM

## 2022-03-16 RX ORDER — DEXTROSE MONOHYDRATE 25 G/50ML
25 INJECTION, SOLUTION INTRAVENOUS
Status: DISCONTINUED | OUTPATIENT
Start: 2022-03-16 | End: 2022-03-23 | Stop reason: HOSPADM

## 2022-03-16 RX ORDER — HYDROMORPHONE HCL IN 0.9% NACL 10 MG/50ML
PATIENT CONTROLLED ANALGESIA SYRINGE INTRAVENOUS CONTINUOUS
Status: DISCONTINUED | OUTPATIENT
Start: 2022-03-16 | End: 2022-03-17

## 2022-03-16 RX ORDER — INSULIN LISPRO 100 [IU]/ML
0-14 INJECTION, SOLUTION INTRAVENOUS; SUBCUTANEOUS
Status: DISCONTINUED | OUTPATIENT
Start: 2022-03-17 | End: 2022-03-23 | Stop reason: HOSPADM

## 2022-03-16 RX ORDER — BISACODYL 10 MG
10 SUPPOSITORY, RECTAL RECTAL DAILY PRN
Status: DISCONTINUED | OUTPATIENT
Start: 2022-03-16 | End: 2022-03-20

## 2022-03-16 RX ORDER — FAMOTIDINE 20 MG/1
20 TABLET, FILM COATED ORAL 2 TIMES DAILY PRN
Status: DISCONTINUED | OUTPATIENT
Start: 2022-03-16 | End: 2022-03-23 | Stop reason: HOSPADM

## 2022-03-16 RX ORDER — BISACODYL 5 MG/1
5 TABLET, DELAYED RELEASE ORAL DAILY PRN
Status: DISCONTINUED | OUTPATIENT
Start: 2022-03-16 | End: 2022-03-20

## 2022-03-16 RX ORDER — ONDANSETRON 2 MG/ML
4 INJECTION INTRAMUSCULAR; INTRAVENOUS EVERY 6 HOURS PRN
Status: DISCONTINUED | OUTPATIENT
Start: 2022-03-16 | End: 2022-03-23 | Stop reason: HOSPADM

## 2022-03-16 RX ORDER — SODIUM CHLORIDE 0.9 % (FLUSH) 0.9 %
10 SYRINGE (ML) INJECTION AS NEEDED
Status: DISCONTINUED | OUTPATIENT
Start: 2022-03-16 | End: 2022-03-23 | Stop reason: HOSPADM

## 2022-03-16 RX ORDER — DOCUSATE SODIUM 100 MG/1
200 CAPSULE, LIQUID FILLED ORAL DAILY
Status: DISCONTINUED | OUTPATIENT
Start: 2022-03-16 | End: 2022-03-18

## 2022-03-16 RX ORDER — NALOXONE HCL 0.4 MG/ML
0.4 VIAL (ML) INJECTION
Status: DISCONTINUED | OUTPATIENT
Start: 2022-03-16 | End: 2022-03-23 | Stop reason: HOSPADM

## 2022-03-16 RX ORDER — HYDROCODONE BITARTRATE AND ACETAMINOPHEN 5; 325 MG/1; MG/1
1 TABLET ORAL EVERY 4 HOURS PRN
Status: DISCONTINUED | OUTPATIENT
Start: 2022-03-16 | End: 2022-03-18

## 2022-03-16 RX ORDER — OXYCODONE HYDROCHLORIDE 5 MG/1
10 TABLET ORAL EVERY 4 HOURS PRN
Status: DISCONTINUED | OUTPATIENT
Start: 2022-03-16 | End: 2022-03-18

## 2022-03-16 RX ORDER — FENTANYL CITRATE 50 UG/ML
100 INJECTION, SOLUTION INTRAMUSCULAR; INTRAVENOUS ONCE
Status: COMPLETED | OUTPATIENT
Start: 2022-03-16 | End: 2022-03-16

## 2022-03-16 RX ORDER — ACETAMINOPHEN 650 MG/1
650 SUPPOSITORY RECTAL EVERY 4 HOURS PRN
Status: DISCONTINUED | OUTPATIENT
Start: 2022-03-16 | End: 2022-03-23 | Stop reason: HOSPADM

## 2022-03-16 RX ORDER — GABAPENTIN 300 MG/1
300 CAPSULE ORAL 3 TIMES DAILY
Status: DISCONTINUED | OUTPATIENT
Start: 2022-03-16 | End: 2022-03-19

## 2022-03-16 RX ORDER — ACETAMINOPHEN 500 MG
1000 TABLET ORAL 3 TIMES DAILY
Status: DISCONTINUED | OUTPATIENT
Start: 2022-03-17 | End: 2022-03-18

## 2022-03-16 RX ORDER — AMOXICILLIN 250 MG
2 CAPSULE ORAL 2 TIMES DAILY
Status: DISCONTINUED | OUTPATIENT
Start: 2022-03-16 | End: 2022-03-20

## 2022-03-16 RX ORDER — KETOROLAC TROMETHAMINE 15 MG/ML
15 INJECTION, SOLUTION INTRAMUSCULAR; INTRAVENOUS ONCE
Status: DISCONTINUED | OUTPATIENT
Start: 2022-03-16 | End: 2022-03-16

## 2022-03-16 RX ORDER — POLYETHYLENE GLYCOL 3350 17 G/17G
17 POWDER, FOR SOLUTION ORAL DAILY PRN
Status: DISCONTINUED | OUTPATIENT
Start: 2022-03-16 | End: 2022-03-20

## 2022-03-16 RX ORDER — METAXALONE 800 MG/1
400 TABLET ORAL 2 TIMES DAILY
Status: DISCONTINUED | OUTPATIENT
Start: 2022-03-16 | End: 2022-03-17

## 2022-03-16 RX ORDER — ASPIRIN 81 MG/1
81 TABLET ORAL DAILY
Status: DISCONTINUED | OUTPATIENT
Start: 2022-03-17 | End: 2022-03-23 | Stop reason: HOSPADM

## 2022-03-16 RX ORDER — ACETAMINOPHEN 160 MG/5ML
650 SOLUTION ORAL EVERY 4 HOURS PRN
Status: DISCONTINUED | OUTPATIENT
Start: 2022-03-16 | End: 2022-03-23 | Stop reason: HOSPADM

## 2022-03-16 RX ORDER — ATORVASTATIN CALCIUM 80 MG/1
80 TABLET, FILM COATED ORAL DAILY
Status: DISCONTINUED | OUTPATIENT
Start: 2022-03-16 | End: 2022-03-23 | Stop reason: HOSPADM

## 2022-03-16 RX ORDER — ACETAMINOPHEN 500 MG
1000 TABLET ORAL ONCE
Status: COMPLETED | OUTPATIENT
Start: 2022-03-16 | End: 2022-03-16

## 2022-03-16 RX ORDER — NALOXONE HCL 0.4 MG/ML
0.1 VIAL (ML) INJECTION
Status: DISCONTINUED | OUTPATIENT
Start: 2022-03-16 | End: 2022-03-23 | Stop reason: HOSPADM

## 2022-03-16 RX ORDER — SODIUM CHLORIDE 9 MG/ML
125 INJECTION, SOLUTION INTRAVENOUS CONTINUOUS
Status: ACTIVE | OUTPATIENT
Start: 2022-03-16 | End: 2022-03-17

## 2022-03-16 RX ORDER — NICOTINE POLACRILEX 4 MG
15 LOZENGE BUCCAL
Status: DISCONTINUED | OUTPATIENT
Start: 2022-03-16 | End: 2022-03-23 | Stop reason: HOSPADM

## 2022-03-16 RX ADMIN — FENTANYL CITRATE 100 MCG: 50 INJECTION, SOLUTION INTRAMUSCULAR; INTRAVENOUS at 19:20

## 2022-03-16 RX ADMIN — OXYCODONE HYDROCHLORIDE 10 MG: 5 TABLET ORAL at 22:36

## 2022-03-16 RX ADMIN — DOCUSATE SODIUM 50MG AND SENNOSIDES 8.6MG 2 TABLET: 8.6; 5 TABLET, FILM COATED ORAL at 22:34

## 2022-03-16 RX ADMIN — ATORVASTATIN CALCIUM 80 MG: 80 TABLET, FILM COATED ORAL at 22:35

## 2022-03-16 RX ADMIN — GABAPENTIN 300 MG: 300 CAPSULE ORAL at 22:34

## 2022-03-16 RX ADMIN — Medication 3 ML: at 22:36

## 2022-03-16 RX ADMIN — DOCUSATE SODIUM 200 MG: 100 CAPSULE, LIQUID FILLED ORAL at 22:34

## 2022-03-16 RX ADMIN — METAXALONE 400 MG: 800 TABLET ORAL at 21:06

## 2022-03-16 RX ADMIN — METOPROLOL TARTRATE 12.5 MG: 25 TABLET, FILM COATED ORAL at 22:35

## 2022-03-16 RX ADMIN — Medication 5 MG: at 23:57

## 2022-03-16 RX ADMIN — SODIUM CHLORIDE 125 ML/HR: 9 INJECTION, SOLUTION INTRAVENOUS at 22:46

## 2022-03-16 RX ADMIN — HYDROCODONE BITARTRATE AND ACETAMINOPHEN 1 TABLET: 5; 325 TABLET ORAL at 23:57

## 2022-03-16 RX ADMIN — MORPHINE SULFATE 2 MG: 2 INJECTION, SOLUTION INTRAMUSCULAR; INTRAVENOUS at 22:23

## 2022-03-16 RX ADMIN — ACETAMINOPHEN 1000 MG: 500 TABLET ORAL at 21:05

## 2022-03-16 RX ADMIN — HYDROMORPHONE HYDROCHLORIDE 0.5 MG: 1 INJECTION, SOLUTION INTRAMUSCULAR; INTRAVENOUS; SUBCUTANEOUS at 21:06

## 2022-03-16 RX ADMIN — FENTANYL CITRATE 100 MCG: 50 INJECTION, SOLUTION INTRAMUSCULAR; INTRAVENOUS at 19:55

## 2022-03-16 NOTE — ED NOTES
Pt arrives from home via EMS after fall backwards 2ft into ditch. 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 C/o extreme neck pain. Given 200mL of LR, 4mg of zofran, and 200mcg of fentanyl. Pt a&ox4. Arrives on back board and in c collar.     Patient wearing mask during triage. RN wearing appropriate PPE during triage. Hand hygiene performed.

## 2022-03-16 NOTE — ED PROVIDER NOTES
EMERGENCY DEPARTMENT ENCOUNTER    Room Number:  S513/1  Date of encounter:  3/16/2022  PCP: Demetrio Ko DO  Historian: Patient, spouse, daughter      HPI:  Chief Complaint: Fall and injury to neck  A complete HPI/ROS/PMH/PSH/SH/FH are unobtainable due to: Not applicable  Context: Chester Gama is a 81 y.o. male who presents to the ED c/o patient was digging a ditch for a  line.  The ditch was about 2 feet deep.  He was inside the ditch and fell back and hit the back portion of his head and the upper portion of his neck.  Complained of significant pain at that time.  His brother was there and other family member who helped get him out of the ditch.  He then sat down until his wife arrived approximately 20 or 30 minutes later.  EMS was called they placed him on a spine board as well as cervical collar and straps.  They gave him 200 of fentanyl prior to arrival here because of pain.  When I see him he complains of pain mainly in the back of his neck at the upper portion in the lower portion of his occipital scalp.  He has a hard cervical collar in place that was placed by EMS.  He denies any numbness or tingling or weakness to his arms or legs.  Denies any respiratory complaints such as shortness of breath, chest pain, abdominal pain.  He has had 3 surgeries on his neck.  He has never had a fracture of his neck.  He denies any nausea or vomiting.  He is not on any blood thinning medicine.  He denies any injury to any of his extremities or any other part of his body.        Previous Episodes: No  Current Symptoms: See above    MEDICAL HISTORY REVIEWED  3 previous surgeries on his cervical spine including screws and hardware in the past.      PAST MEDICAL HISTORY  Active Ambulatory Problems     Diagnosis Date Noted   • HTN (hypertension) 06/14/2019   • Sleep apnea 06/14/2019   • Diabetes mellitus (HCC) 06/14/2019   • Coronary artery disease 06/14/2019   • RLS (restless legs syndrome) 06/14/2019   •  Postoperative wound infection 2019   • Chest pain 2019   • Neck pain 2019   • Constipation 2019     Resolved Ambulatory Problems     Diagnosis Date Noted   • Cervical spinal stenosis 2018   • Cervical spinal stenosis 2019   • Abscess in epidural space of cervical spine 2019     Past Medical History:   Diagnosis Date   • Arthritis    • Cervical pain (neck)    • Depression    • Diverticular disease    • Hx of heart artery stent    • Hyperlipemia    • Hypertension    • PTSD (post-traumatic stress disorder)    • Structural abnormality of kidney    • Thyroid cyst          PAST SURGICAL HISTORY  Past Surgical History:   Procedure Laterality Date   • ANTERIOR CERVICAL DISCECTOMY W/ FUSION N/A 1/3/2018    Procedure: ANTERIOR CERVICAL DECOMPRESSION & FUSION C4 6;  Surgeon: Vincenzo Soliman DO;  Location: [facility];  Service:    • APPENDECTOMY     • CATARACT EXTRACTION W/ INTRAOCULAR LENS  IMPLANT, BILATERAL     • CERVICAL DISCECTOMY POSTERIOR FUSION WITH INSTRUMENTATION N/A 2019    Procedure: C4-T2 POSTERIOR DECOMPRESSION AND FUSION BONE MORPHOGENIC PROTEIN;  Surgeon: Vincenzo Soliman DO;  Location: [facility] OR;  Service: Orthopedic Spine   • CERVICAL DISCECTOMY POSTERIOR FUSION WITH INSTRUMENTATION N/A 2019    Procedure: LEFT FORAMINOTOMY C4-T1, REFUSION OF C4-T1, REMOVAL OF C6 SCREW, REVISION OF HARDWARE;  Surgeon: Vincenzo Soliman DO;  Location: [facility] OR;  Service: Orthopedics   • COLON RESECTION      COLOSTOMY X 6 MOS THEN REVERSED.   • CORONARY ANGIOPLASTY WITH STENT PLACEMENT  02/10/2010    [email or web address]         FAMILY HISTORY  Family History   Problem Relation Age of Onset   • Malig Hyperthermia Neg Hx          SOCIAL HISTORY  Social History     Socioeconomic History   • Marital status:    Tobacco Use   • Smoking status: Former Smoker     Years: 5.00     Types: Cigars     Quit date: 6/10/1966     Years since quittin.8   • Smokeless  tobacco: Never Used   Substance and Sexual Activity   • Alcohol use: No   • Drug use: No   • Sexual activity: Defer         ALLERGIES  Contrast dye and Cyclobenzaprine        REVIEW OF SYSTEMS  Review of Systems     All systems reviewed and negative except for those discussed in HPI.       PHYSICAL EXAM    I have reviewed the triage vital signs and nursing notes.    ED Triage Vitals [03/16/22 1743]   Temp Heart Rate Resp BP SpO2   97.8 °F (36.6 °C) 78 16 142/78 94 %      Temp src Heart Rate Source Patient Position BP Location FiO2 (%)   Temporal Monitor -- -- --       GENERAL: Elderly male laying on a long spine board who appears in pain and discomfort.  No acute cardiovascular or respiratory distress.Vital signs on my initial evaluation unremarkable  HENT: nares patent  Head/face are symmetric without gross deformity, signs of trauma, or swelling  EYES: no scleral icterus, no conjunctival pallor.  Pupils are equal round reactive to light.  Extraocular muscles are intact.  No visual impairment  NECK: Cervical collar is in place.  Is a hard cervical collar placed by EMS.  It was not removed during exam.  Has some pain on palpation throughout his cervical spine but more prominent in the upper portion of the cervical spine and palpating through the holes in the collar.  No gross deformity appreciated or step-off lesions appreciated on exam.  No bleeding seen or appreciated  CV: regular rhythm, regular rate with intact distal pulses.  RESPIRATORY: normal effort and no respiratory distress.  Clear to auscultation bilaterally.  Nontender to palpation to his chest abdomen pelvis  ABDOMEN: soft and nontender.  Back exam.  We logrolled him maintaining cervical spine immobilization.  His back has normal inspection.  Does also hurt at the lower portion of the cervical spine with palpation.  MUSCULOSKELETAL: no deformity.  Able to active and passively move all extremities.  No pain with movement of any of extremities.  Intact  distal pulses to upper and lower extremities are equal strong and symmetric.  NEURO: alert and appropriate, moves all extremities, follows commands.  No focal motor or sensory changes to any of his extremities.  Cranial nerves II through XII are intact.  SKIN: warm, dry    Vital signs and nursing notes reviewed.        LAB RESULTS  Recent Results (from the past 24 hour(s))   Comprehensive Metabolic Panel    Collection Time: 03/16/22  6:47 PM    Specimen: Blood   Result Value Ref Range    Glucose 183 (H) 65 - 99 mg/dL    BUN 19 8 - 23 mg/dL    Creatinine 1.45 (H) 0.76 - 1.27 mg/dL    Sodium 142 136 - 145 mmol/L    Potassium 4.5 3.5 - 5.2 mmol/L    Chloride 105 98 - 107 mmol/L    CO2 25.0 22.0 - 29.0 mmol/L    Calcium 9.7 8.6 - 10.5 mg/dL    Total Protein 7.3 6.0 - 8.5 g/dL    Albumin 4.50 3.50 - 5.20 g/dL    ALT (SGPT) 26 1 - 41 U/L    AST (SGOT) 27 1 - 40 U/L    Alkaline Phosphatase 86 39 - 117 U/L    Total Bilirubin 0.5 0.0 - 1.2 mg/dL    Globulin 2.8 gm/dL    A/G Ratio 1.6 g/dL    BUN/Creatinine Ratio 13.1 7.0 - 25.0    Anion Gap 12.0 5.0 - 15.0 mmol/L    eGFR 49.0 (L) >60.0 mL/min/1.73   Protime-INR    Collection Time: 03/16/22  6:47 PM    Specimen: Blood   Result Value Ref Range    Protime 13.5 11.7 - 14.2 Seconds    INR 1.04 0.90 - 1.10   CBC Auto Differential    Collection Time: 03/16/22  6:47 PM    Specimen: Blood   Result Value Ref Range    WBC 15.39 (H) 3.40 - 10.80 10*3/mm3    RBC 5.27 4.14 - 5.80 10*6/mm3    Hemoglobin 15.4 13.0 - 17.7 g/dL    Hematocrit 45.7 37.5 - 51.0 %    MCV 86.7 79.0 - 97.0 fL    MCH 29.2 26.6 - 33.0 pg    MCHC 33.7 31.5 - 35.7 g/dL    RDW 13.0 12.3 - 15.4 %    RDW-SD 40.8 37.0 - 54.0 fl    MPV 9.3 6.0 - 12.0 fL    Platelets 206 140 - 450 10*3/mm3    Neutrophil % 85.2 (H) 42.7 - 76.0 %    Lymphocyte % 8.3 (L) 19.6 - 45.3 %    Monocyte % 5.3 5.0 - 12.0 %    Eosinophil % 0.4 0.3 - 6.2 %    Basophil % 0.3 0.0 - 1.5 %    Immature Grans % 0.5 0.0 - 0.5 %    Neutrophils, Absolute 13.12  (H) 1.70 - 7.00 10*3/mm3    Lymphocytes, Absolute 1.27 0.70 - 3.10 10*3/mm3    Monocytes, Absolute 0.82 0.10 - 0.90 10*3/mm3    Eosinophils, Absolute 0.06 0.00 - 0.40 10*3/mm3    Basophils, Absolute 0.04 0.00 - 0.20 10*3/mm3    Immature Grans, Absolute 0.08 (H) 0.00 - 0.05 10*3/mm3    nRBC 0.0 0.0 - 0.2 /100 WBC   Hemoglobin A1c    Collection Time: 03/16/22  6:47 PM    Specimen: Blood   Result Value Ref Range    Hemoglobin A1C 8.40 (H) 4.80 - 5.60 %   POC Glucose Once    Collection Time: 03/16/22  7:29 PM    Specimen: Blood   Result Value Ref Range    Glucose 145 (H) 70 - 130 mg/dL   COVID-19,[facility] IN-HOUSE CEPHEID/JUDI NP SWAB IN TRANSPORT MEDIA 8-12 HR TAT - Swab, Nasopharynx    Collection Time: 03/16/22  9:28 PM    Specimen: Nasopharynx; Swab   Result Value Ref Range    COVID19 Not Detected Not Detected - Ref. Range       Ordered the above labs and independently reviewed the results.        RADIOLOGY  CT Head Without Contrast    Result Date: 3/16/2022  CT OF THE BRAIN WITHOUT CONTRAST 03/16/2022  HISTORY: Fell, head injury.  TECHNIQUE: Axial images were obtained through the brain without intravenous contrast.  FINDINGS: There is moderate diffuse atrophy most severe in the frontal lobes. There is no evidence of acute infarction, hemorrhage, midline shift or mass effect.  There is mild mucosal thickening in the ethmoid and rightward aspect of the frontal sinus. No bony abnormalities are seen.      1. No acute intracranial process. 2. Mild sinusitis.  Radiation dose reduction techniques were utilized, including automated exposure control and exposure modulation based on body size.  This report was finalized on 3/16/2022 10:13 PM by Dr. Tony Mckee M.D.      CT Cervical Spine Without Contrast, CT Thoracic Spine Without Contrast    Result Date: 3/16/2022  HISTORY: Fell. Neck and back pain.  CT OF THE CERVICAL SPINE WITHOUT CONTRAST 03/16/2022  TECHNIQUE: Axial images were obtained from the skull base to the  upper thoracic spine. Sagittal and coronal reconstruction images were reviewed.  FINDINGS: There is a mildly displaced fracture through the C2 vertebra at the junction of the posterior vertebral body and the posterior elements. The fracture appears mildly displaced. Fracture extends into the left transversarium foramen. There is mild canal stenosis at this level. No fractures of the base of the odontoid process is seen. There is degenerative disease of the C1-C2 articulation.  There is fusion of the C3-4 levels. Anterior cervical plate is seen fusing C4-C6. Intervertebral disc spacers are seen at C4-5 and C5-6. There is also some spondylosis at C6-7 and C7-T1. Posterior fusion is seen from C4 through T2.  No other fractures are seen.      1. Fracture of the C2 vertebra involving the posterior aspect of the C2 vertebral body at its junction with the posterior elements. This shows mild displacement and mild canal stenosis. Fracture extends into the left transversarium foramen. If further evaluation of the vertebral arteries is clinically indicated a CT angiogram of the neck may be helpful. 2.. No other fractures are seen. 3. Postoperative changes of the cervical spine.  CT OF THE THORACIC SPINE WITHOUT CONTRAST 03/16/2022  TECHNIQUE: Axial images were obtained through the thoracic spine. Sagittal and coronal reconstruction images were reviewed.  FINDINGS: C4-C6 anterior fusion device is seen with lower cervical and upper thoracic fusion to approximately the T2 level.  There is anterior osteophytosis at essentially all thoracic levels.  No thoracic compression fractures or other fractures are seen. No subluxation is seen. No significant canal stenosis is seen.  There are some ill-defined areas of scar, atelectasis or inflammatory infiltrates in the bilateral lower lobes.  IMPRESSION: 1. Postoperative changes of the cervicothoracic junction. 2. Thoracic osteophytosis.. 3. No thoracic spine fractures are seen.      Radiation dose reduction techniques were utilized, including automated exposure control and exposure modulation based on body size.  This report was finalized on 3/16/2022 10:17 PM by Dr. Tony Mckee M.D.        I ordered the above noted radiological studies. Reviewed by me and discussed with radiologist.  See dictation for official radiology interpretation.      PROCEDURES    Procedures      MEDICATIONS GIVEN IN ER    Medications   sodium chloride 0.9 % flush 10 mL (has no administration in time range)   sodium chloride 0.9 % infusion (125 mL/hr Intravenous New Bag 3/16/22 2246)   sodium chloride 0.9 % flush 3 mL (3 mL Intravenous Given 3/16/22 2236)   sodium chloride 0.9 % flush 3-10 mL (has no administration in time range)   acetaminophen (TYLENOL) tablet 650 mg (has no administration in time range)     Or   acetaminophen (TYLENOL) 160 MG/5ML solution 650 mg (has no administration in time range)     Or   acetaminophen (TYLENOL) suppository 650 mg (has no administration in time range)   famotidine (PEPCID) tablet 20 mg (has no administration in time range)   melatonin tablet 5 mg (has no administration in time range)   HYDROcodone-acetaminophen (NORCO) 5-325 MG per tablet 1 tablet (has no administration in time range)   morphine injection 2 mg (2 mg Intravenous Given 3/16/22 2223)     And   naloxone (NARCAN) injection 0.4 mg (has no administration in time range)   ondansetron (ZOFRAN) tablet 4 mg (has no administration in time range)     Or   ondansetron (ZOFRAN) injection 4 mg (has no administration in time range)   sennosides-docusate (PERICOLACE) 8.6-50 MG per tablet 2 tablet (2 tablets Oral Given 3/16/22 2234)     And   polyethylene glycol (MIRALAX) packet 17 g (has no administration in time range)     And   bisacodyl (DULCOLAX) EC tablet 5 mg (has no administration in time range)     And   bisacodyl (DULCOLAX) suppository 10 mg (has no administration in time range)   aspirin EC tablet 81 mg (has no  administration in time range)   atorvastatin (LIPITOR) tablet 80 mg (80 mg Oral Given 3/16/22 2235)   Diclofenac Sodium (VOLTAREN) 1 % gel 2 g (has no administration in time range)   docusate sodium (COLACE) capsule 200 mg (200 mg Oral Given 3/16/22 2234)   metFORMIN (GLUCOPHAGE) tablet 500 mg (has no administration in time range)   metoprolol tartrate (LOPRESSOR) tablet 12.5 mg (12.5 mg Oral Given 3/16/22 2235)   vitamin B-12 (CYANOCOBALAMIN) tablet 1,000 mcg (has no administration in time range)   dextrose (GLUTOSE) oral gel 15 g (has no administration in time range)   dextrose (D50W) (25 g/50 mL) IV injection 25 g (has no administration in time range)   glucagon (human recombinant) (GLUCAGEN DIAGNOSTIC) injection 1 mg (has no administration in time range)   insulin lispro (ADMELOG) injection 0-14 Units (has no administration in time range)   oxyCODONE (ROXICODONE) immediate release tablet 10 mg (10 mg Oral Given 3/16/22 2236)   naloxone (NARCAN) injection 0.1 mg (has no administration in time range)   HYDROmorphone (DILAUDID) PCA 0.2 mg/ml 50 mL syringe (has no administration in time range)   metaxalone (SKELAXIN) tablet 400 mg (has no administration in time range)   acetaminophen (TYLENOL) tablet 1,000 mg (has no administration in time range)   gabapentin (NEURONTIN) capsule 300 mg (300 mg Oral Given 3/16/22 2234)   insulin glargine (LANTUS, SEMGLEE) injection 8 Units (has no administration in time range)   fentaNYL citrate (PF) (SUBLIMAZE) injection 100 mcg (100 mcg Intravenous Given 3/16/22 1920)   fentaNYL citrate (PF) (SUBLIMAZE) injection 100 mcg (100 mcg Intravenous Given 3/16/22 1955)   HYDROmorphone (DILAUDID) injection 0.5 mg (0.5 mg Intravenous Given 3/16/22 2106)   acetaminophen (TYLENOL) tablet 1,000 mg (1,000 mg Oral Given 3/16/22 2105)         PROGRESS, DATA ANALYSIS, CONSULTS, AND MEDICAL DECISION MAKING    Informed patient as well as spouse and family members in the room of the test that we will  order.  Cervical collar is remained in place.  We will give another dose of fentanyl as he is requesting more pain medicine.  He does take hydrocodone daily.  His tolerance of pain medicine very possibly could be elevated.  All questions answered at this time.    We are currently under a pandemic from the COVID19 infection.  The patient presented to the emergency department by ambulance or personal vehicle. I followed the current protocols required by Infection Control at [facility] in my evaluation and treatment of the patient. The patient was wearing a face mask during my evaluation and throughout my encounter. During my whole encounter with this patient I used appropriate personal protective equipment.  This equipment consisted of eye protection, facemask, gown, and gloves.  I applied this equipment before entering the room.      All labs have been independently reviewed by me.  All radiology studies have been reviewed by me and discussed with radiologist dictating the report.   EKG's independently viewed and interpreted by me.  Discussion below represents my analysis of pertinent findings related to patient's condition, differential diagnosis, treatment plan and final disposition.      ED Course as of 03/16/22 2313   Wed Mar 16, 2022   1929 Creatinine(!): 1.45  Previous creatinine from 2 years ago was 1.11 [MM]   1956 I discussed the results of the CT scan of the head, cervical spine, and thoracic spine with Dr. Sainz the radiologist.  First the CT scan of the head showed no acute process no hemorrhage or fracture.  On the CT of the cervical spine there is a fracture of the posterior vertebral body of C2 at the junction of the posterior elements of C2.  There is minimal to mild displacement.  There is some canal stenosis at that area.  No other fractures appreciated.  He has had previous surgery and hard well below this fracture.  CT of the thoracic spine shows no acute fracture.  Please see  complete dictated report from the radiologist. [MM]   2018 I spoke with the neurosurgeon Dr. Noe and he reviewed the CT scan images.  He agrees with keeping the patient in a hard collar.  He will consult on the patient in the morning.  This appears to be a stable fracture and not unstable.  The current correct treatment is immobilization.  Will admit to medicine.  All questions answered. [MM]   2019 I have reevaluated the patient.  Currently he definitely looks more comfortable.  He is received 2 doses of fentanyl here in the emergency department.  He has a normal blood pressure and normal heart rate.  His O2 sat is 97% on 2 L.  I talked with the patient as well as the family and informed him of the results.  On repeat exam patient is alert and oriented x3.  He has no motor or sensory impairment is able to move all extremities.  All questions answered. [MM]   2041 I spoke with Dr. Alex and informed him of the patient's initial presenting symptoms and results of the CT scan.  Agrees to admit the patient to the hospital.  All questions answered at this time. [MM]   2101 Dr. Alex is at bedside evaluating the patient.  He canceled my order for Toradol.  He will control future pain medicine. [MM]      ED Course User Index  [MM] Demetrio Wilkinson MD       AS OF 23:13 EDT VITALS:    BP - 153/82  HR - 75  TEMP - 99.1 °F (37.3 °C) (Oral)  02 SATS - 95%        DIAGNOSIS  Final diagnoses:   Other closed nondisplaced fracture of second cervical vertebra, initial encounter (HCC)         DISPOSITION  I have reviewed the test results with my patient and explained the current treatment plan.  I answered all of the patient's questions.  The patient will be admitted to monitor bed at this time.  The patient is not hypotensive and is tolerating their current disease condition well enough for a monitored bed at this time.  The patient's current condition does not require intensive care treatment at this time.            DICTATED  UTILIZING DRAGON DICTATION     Demetrio Wilkinson MD  03/16/22 7511

## 2022-03-17 PROBLEM — E11.42 TYPE 2 DIABETES MELLITUS WITH DIABETIC POLYNEUROPATHY (HCC): Status: ACTIVE | Noted: 2019-06-14

## 2022-03-17 LAB
ANION GAP SERPL CALCULATED.3IONS-SCNC: 8.2 MMOL/L (ref 5–15)
BUN SERPL-MCNC: 15 MG/DL (ref 8–23)
BUN/CREAT SERPL: 11.1 (ref 7–25)
CALCIUM SPEC-SCNC: 9 MG/DL (ref 8.6–10.5)
CHLORIDE SERPL-SCNC: 106 MMOL/L (ref 98–107)
CO2 SERPL-SCNC: 25.8 MMOL/L (ref 22–29)
CREAT SERPL-MCNC: 1.35 MG/DL (ref 0.76–1.27)
DEPRECATED RDW RBC AUTO: 40.5 FL (ref 37–54)
EGFRCR SERPLBLD CKD-EPI 2021: 53.4 ML/MIN/1.73
ERYTHROCYTE [DISTWIDTH] IN BLOOD BY AUTOMATED COUNT: 12.7 % (ref 12.3–15.4)
GLUCOSE BLDC GLUCOMTR-MCNC: 112 MG/DL (ref 70–130)
GLUCOSE BLDC GLUCOMTR-MCNC: 129 MG/DL (ref 70–130)
GLUCOSE BLDC GLUCOMTR-MCNC: 90 MG/DL (ref 70–130)
GLUCOSE BLDC GLUCOMTR-MCNC: 95 MG/DL (ref 70–130)
GLUCOSE SERPL-MCNC: 119 MG/DL (ref 65–99)
HCT VFR BLD AUTO: 43 % (ref 37.5–51)
HGB BLD-MCNC: 14.1 G/DL (ref 13–17.7)
MCH RBC QN AUTO: 28.7 PG (ref 26.6–33)
MCHC RBC AUTO-ENTMCNC: 32.8 G/DL (ref 31.5–35.7)
MCV RBC AUTO: 87.4 FL (ref 79–97)
PLATELET # BLD AUTO: 187 10*3/MM3 (ref 140–450)
PMV BLD AUTO: 9.4 FL (ref 6–12)
POTASSIUM SERPL-SCNC: 4.1 MMOL/L (ref 3.5–5.2)
RBC # BLD AUTO: 4.92 10*6/MM3 (ref 4.14–5.8)
SODIUM SERPL-SCNC: 140 MMOL/L (ref 136–145)
WBC NRBC COR # BLD: 10.72 10*3/MM3 (ref 3.4–10.8)

## 2022-03-17 PROCEDURE — 82962 GLUCOSE BLOOD TEST: CPT

## 2022-03-17 PROCEDURE — 25010000002 ONDANSETRON PER 1 MG: Performed by: INTERNAL MEDICINE

## 2022-03-17 PROCEDURE — 36415 COLL VENOUS BLD VENIPUNCTURE: CPT | Performed by: INTERNAL MEDICINE

## 2022-03-17 PROCEDURE — 0 HYDROMORPHONE HCL PF 50 MG/5ML SOLUTION: Performed by: INTERNAL MEDICINE

## 2022-03-17 PROCEDURE — 85027 COMPLETE CBC AUTOMATED: CPT | Performed by: INTERNAL MEDICINE

## 2022-03-17 PROCEDURE — 80048 BASIC METABOLIC PNL TOTAL CA: CPT | Performed by: INTERNAL MEDICINE

## 2022-03-17 PROCEDURE — 99254 IP/OBS CNSLTJ NEW/EST MOD 60: CPT | Performed by: NURSE PRACTITIONER

## 2022-03-17 RX ORDER — HYDROMORPHONE HCL IN 0.9% NACL 10 MG/50ML
PATIENT CONTROLLED ANALGESIA SYRINGE INTRAVENOUS CONTINUOUS
Status: DISCONTINUED | OUTPATIENT
Start: 2022-03-17 | End: 2022-03-17

## 2022-03-17 RX ORDER — BACLOFEN 10 MG/1
10 TABLET ORAL 3 TIMES DAILY
Status: DISCONTINUED | OUTPATIENT
Start: 2022-03-17 | End: 2022-03-18

## 2022-03-17 RX ORDER — SODIUM CHLORIDE 9 MG/ML
75 INJECTION, SOLUTION INTRAVENOUS CONTINUOUS
Status: DISCONTINUED | OUTPATIENT
Start: 2022-03-17 | End: 2022-03-22

## 2022-03-17 RX ORDER — OXYCODONE AND ACETAMINOPHEN 10; 325 MG/1; MG/1
1 TABLET ORAL EVERY 6 HOURS
Status: DISCONTINUED | OUTPATIENT
Start: 2022-03-17 | End: 2022-03-18

## 2022-03-17 RX ORDER — HYDROMORPHONE HCL IN 0.9% NACL 10 MG/50ML
PATIENT CONTROLLED ANALGESIA SYRINGE INTRAVENOUS CONTINUOUS
Status: CANCELLED | OUTPATIENT
Start: 2022-03-17 | End: 2022-03-20

## 2022-03-17 RX ORDER — HYDROMORPHONE HCL IN 0.9% NACL 10 MG/50ML
PATIENT CONTROLLED ANALGESIA SYRINGE INTRAVENOUS CONTINUOUS
Status: DISCONTINUED | OUTPATIENT
Start: 2022-03-17 | End: 2022-03-18

## 2022-03-17 RX ADMIN — OXYCODONE HYDROCHLORIDE AND ACETAMINOPHEN 1 TABLET: 10; 325 TABLET ORAL at 10:55

## 2022-03-17 RX ADMIN — METFORMIN HYDROCHLORIDE 500 MG: 500 TABLET ORAL at 17:06

## 2022-03-17 RX ADMIN — DOCUSATE SODIUM 50MG AND SENNOSIDES 8.6MG 2 TABLET: 8.6; 5 TABLET, FILM COATED ORAL at 08:03

## 2022-03-17 RX ADMIN — ATORVASTATIN CALCIUM 80 MG: 80 TABLET, FILM COATED ORAL at 08:03

## 2022-03-17 RX ADMIN — DICLOFENAC SODIUM 2 G: 10 GEL TOPICAL at 08:04

## 2022-03-17 RX ADMIN — Medication 3 ML: at 08:22

## 2022-03-17 RX ADMIN — DICLOFENAC SODIUM 2 G: 10 GEL TOPICAL at 23:04

## 2022-03-17 RX ADMIN — GABAPENTIN 300 MG: 300 CAPSULE ORAL at 20:01

## 2022-03-17 RX ADMIN — BACLOFEN 10 MG: 10 TABLET ORAL at 20:01

## 2022-03-17 RX ADMIN — METFORMIN HYDROCHLORIDE 500 MG: 500 TABLET ORAL at 08:03

## 2022-03-17 RX ADMIN — ASPIRIN 81 MG: 81 TABLET, COATED ORAL at 08:02

## 2022-03-17 RX ADMIN — SODIUM CHLORIDE 75 ML/HR: 9 INJECTION, SOLUTION INTRAVENOUS at 17:06

## 2022-03-17 RX ADMIN — METOPROLOL TARTRATE 12.5 MG: 25 TABLET, FILM COATED ORAL at 08:02

## 2022-03-17 RX ADMIN — ACETAMINOPHEN 1000 MG: 500 TABLET ORAL at 15:47

## 2022-03-17 RX ADMIN — OXYCODONE HYDROCHLORIDE AND ACETAMINOPHEN 1 TABLET: 10; 325 TABLET ORAL at 17:06

## 2022-03-17 RX ADMIN — ACETAMINOPHEN 1000 MG: 500 TABLET ORAL at 08:03

## 2022-03-17 RX ADMIN — DOCUSATE SODIUM 200 MG: 100 CAPSULE, LIQUID FILLED ORAL at 08:02

## 2022-03-17 RX ADMIN — GABAPENTIN 300 MG: 300 CAPSULE ORAL at 08:03

## 2022-03-17 RX ADMIN — ONDANSETRON 4 MG: 2 INJECTION INTRAMUSCULAR; INTRAVENOUS at 08:22

## 2022-03-17 RX ADMIN — Medication 1000 MCG: at 08:03

## 2022-03-17 RX ADMIN — METAXALONE 400 MG: 800 TABLET ORAL at 08:03

## 2022-03-17 RX ADMIN — HYDROMORPHONE HYDROCHLORIDE: 10 INJECTION, SOLUTION INTRAMUSCULAR; INTRAVENOUS; SUBCUTANEOUS at 00:37

## 2022-03-17 RX ADMIN — BACLOFEN 10 MG: 10 TABLET ORAL at 15:47

## 2022-03-17 RX ADMIN — OXYCODONE HYDROCHLORIDE AND ACETAMINOPHEN 1 TABLET: 10; 325 TABLET ORAL at 23:03

## 2022-03-17 RX ADMIN — METOPROLOL TARTRATE 12.5 MG: 25 TABLET, FILM COATED ORAL at 20:01

## 2022-03-17 RX ADMIN — GABAPENTIN 300 MG: 300 CAPSULE ORAL at 15:47

## 2022-03-17 RX ADMIN — DOCUSATE SODIUM 50MG AND SENNOSIDES 8.6MG 2 TABLET: 8.6; 5 TABLET, FILM COATED ORAL at 23:05

## 2022-03-17 NOTE — CASE MANAGEMENT/SOCIAL WORK
Discharge Planning Assessment  [facility]     Patient Name: Chester Gama  MRN: [id number]  Today's Date: 3/17/2022    Admit Date: 3/16/2022     Discharge Needs Assessment     Row Name 03/17/22 1123       Living Environment    People in Home spouse    Current Living Arrangements home    Primary Care Provided by self    Provides Primary Care For no one    Family Caregiver if Needed spouse;child(maryjane), adult    Quality of Family Relationships helpful;involved;supportive    Able to Return to Prior Arrangements yes       Resource/Environmental Concerns    Resource/Environmental Concerns none       Transition Planning    Patient/Family Anticipates Transition to home with family    Transportation Anticipated family or friend will provide;car, drives self       Discharge Needs Assessment    Readmission Within the Last 30 Days no previous admission in last 30 days    Equipment Currently Used at Home cane, straight;cpap;glucometer  lift chair    Concerns to be Addressed discharge planning    Provided Post Acute Provider List? Yes    Post Acute Provider List Home Health    Provided Post Acute Provider Quality & Resource List? Yes    Post Acute Provider Quality and Resource List Home Health    Delivered To Patient;Support Person    Method of Delivery In person               Discharge Plan     Row Name 03/17/22 1127       Plan    Plan DC plans pending treatment plan    Plan Comments S/W pt and his spouse Sadie at bedside.  Facesheet info confirmed.  Pt lives in [place], [place] in a split level house w/ Sadie.  He is usually IADLs and can drive.  Home DME includes a cane, CPAP, glucometer and lift chair.  He has never used HH.  Pt has been to SNF in [place] after neck surgery in 2019.  They are unsure of DC needs at this time because the treatment plan is pending.  Sadie is retired and can assist pt at home as needed. CCP will followup and assist with appropriate DC plans.              Continued Care and Services -  Admitted Since 3/16/2022    Coordination has not been started for this encounter.          Demographic Summary     Row Name 03/17/22 1117       General Information    Admission Type inpatient    Arrived From home    Required Notices Provided Important Message from Medicare    Referral Source admission list    Reason for Consult discharge planning    Preferred Language English               Functional Status     Row Name 03/17/22 1118       Functional Status    Usual Activity Tolerance moderate       Functional Status, IADL    Medications independent;assistive person    Meal Preparation assistive person    Housekeeping assistive person    Laundry assistive person    Shopping assistive person       Mental Status    General Appearance WDL WDL       Mental Status Summary    Recent Changes in Mental Status/Cognitive Functioning no changes       Employment/    Employment Status retired                           Keerthi Hernandez RN

## 2022-03-17 NOTE — H&P
[facility] Medicine Services  HISTORY AND PHYSICAL    Patient Name: Chester Gama  : 1940  MRN: [id number]  Primary Care Physician: Demetrio Ko DO  Date of admission: 3/16/2022    Subjective   Subjective   Chief Complaint:  Fall and neck pain    HPI:  Chester Gama is a 81 y.o. male with past medical history of multiple cervical spine fractures and chronic left arm weakness and paresthesias was digging a ditch for a  line and during his did he tripped and fell over backwards causing acute severe upper neck pain that is worse with palpation and neck movement and improved with fentanyl in the emergency room.  The injury occurred earlier today.  EMS placed him in a spinal collar and took him to the emergency room where his cervical spine imaging showed acute C2 fracture.  Patient can move all of his extremities and reports his sensation remains intact aside from his chronic left arm weakness and neuropathy.  ER physician spoke to neurosurgery who recommended to keep him in the hard cervical collar and reviewed CT scan imaging.  Neurosurgeon plans to evaluate tomorrow.  It is thought that MRI would unlikely be of much benefit due to interference from his previous metal hardware from previous neck surgeries.  Patient was in severe pain despite multiple doses of fentanyl and will be admitted for PCA.    Review of Systems   Constitutional: Positive for fatigue. Negative for chills.   HENT: Negative.    Eyes: Negative.    Respiratory: Negative.    Cardiovascular: Negative.    Gastrointestinal: Negative.    Endocrine: Negative.    Genitourinary: Positive for dysuria. Negative for hematuria.   Musculoskeletal: Positive for neck pain and neck stiffness.   Skin: Negative.    Allergic/Immunologic: Negative.    Neurological: Negative.    Hematological: Negative.    Psychiatric/Behavioral: Negative.         All other systems reviewed and are negative.     Personal History     Past Medical History:    Diagnosis Date   • Arthritis     HANDS KNEES   • Cervical pain (neck)     LEFT ARM PARESTHESIA   • Coronary artery disease    • Depression    • Diabetes mellitus (CMS/HCC)     TYPE 2   • Diverticular disease     HX, S/P RESECTION   • Hx of heart artery stent    • Hyperlipemia    • Hypertension    • PTSD (post-traumatic stress disorder)    • RLS (restless legs syndrome)    • Sleep apnea     DIDNT TOLERATE MASK, CLAUSTROPHOBIC   • Structural abnormality of kidney     SAW NEPHROLOGIST FOR SMALLER KIDNEY - 3 YEARS AGO.   • Thyroid cyst     PER WIFE       Past Surgical History:   Procedure Laterality Date   • ANTERIOR CERVICAL DISCECTOMY W/ FUSION N/A 1/3/2018    Procedure: ANTERIOR CERVICAL DECOMPRESSION & FUSION C4 6;  Surgeon: Vincenzo Soliman DO;  Location: [facility] OR;  Service:    • APPENDECTOMY     • CATARACT EXTRACTION W/ INTRAOCULAR LENS  IMPLANT, BILATERAL     • CERVICAL DISCECTOMY POSTERIOR FUSION WITH INSTRUMENTATION N/A 6/14/2019    Procedure: C4-T2 POSTERIOR DECOMPRESSION AND FUSION BONE MORPHOGENIC PROTEIN;  Surgeon: Vincenzo Soliman DO;  Location: [facility] OR;  Service: Orthopedic Spine   • CERVICAL DISCECTOMY POSTERIOR FUSION WITH INSTRUMENTATION N/A 8/8/2019    Procedure: LEFT FORAMINOTOMY C4-T1, REFUSION OF C4-T1, REMOVAL OF C6 SCREW, REVISION OF HARDWARE;  Surgeon: Vincenzo Soliman DO;  Location: [facility] OR;  Service: Orthopedics   • COLON RESECTION      COLOSTOMY X 6 MOS THEN REVERSED.   • CORONARY ANGIOPLASTY WITH STENT PLACEMENT  02/10/2010    [email or web address]       Family History: family history is not on file. Other pertinent FHx was reviewed and unremarkable.     Social History:  reports that he quit smoking about 55 years ago. His smoking use included cigars. He quit after 5.00 years of use. He has never used smokeless tobacco. He reports that he does not drink alcohol and does not use drugs.      Medications:  Available home medication information reviewed.    Allergies    Allergen Reactions   • Contrast Dye Hives     IVP DYE   • Cyclobenzaprine Other (See Comments)     Night terrors         Objective   Objective   Vital Signs:   Temp:  [97.8 °F (36.6 °C)] 97.8 °F (36.6 °C)  Heart Rate:  [78-81] 81  Resp:  [16] 16  BP: (142-164)/(78-91) 164/91        Physical Exam   Constitutional: Awake, alert patient appears in pain and acutely quite distressed due to his severe pain  Eyes: PERRLA, sclerae anicteric, no conjunctival injection  HENT: Neck is tender to palpation and in hard collar, NCAT, mucous membranes moist  Neck: Supple, no thyromegaly, no lymphadenopathy, trachea midline  Respiratory: Clear to auscultation bilaterally, nonlabored respirations   Cardiovascular: RRR, palpable radial pulses bilaterally  Gastrointestinal: Positive bowel sounds, soft, nontender, nondistended  Musculoskeletal: Neck in collar as per above, elderly and somewhat chronically debilitated appearance, minimal lower extremity edema bilaterally  Psychiatric: Anxious affect, cooperative  Neurologic: Patient can move all extremities, he has weakness in his left  strength but patient and son report this is chronic and unchanged, oriented x 3, no slurred speech or facial droop  Skin: No rashes or jaundice      Results from last 7 days   Lab Units 03/16/22  1847   WBC 10*3/mm3 15.39*   HEMOGLOBIN g/dL 15.4   HEMATOCRIT % 45.7   PLATELETS 10*3/mm3 206   INR  1.04     Results from last 7 days   Lab Units 03/16/22  1847   SODIUM mmol/L 142   POTASSIUM mmol/L 4.5   CHLORIDE mmol/L 105   CO2 mmol/L 25.0   BUN mg/dL 19   CREATININE mg/dL 1.45*   GLUCOSE mg/dL 183*   CALCIUM mg/dL 9.7   ALT (SGPT) U/L 26   AST (SGOT) U/L 27     CrCl cannot be calculated (Unknown ideal weight.).  Brief Urine Lab Results     None        Imaging Results (Last 24 Hours)     Procedure Component Value Units Date/Time    CT Cervical Spine Without Contrast [[id number]] Collected: 03/16/22 2028     Updated: 03/16/22 2028    Narrative:       HISTORY: Fell. Head and back pain.     CT OF THE CERVICAL SPINE WITHOUT CONTRAST     TECHNIQUE: Axial images were obtained from the skull base to the upper  thoracic spine. Sagittal and coronal reconstruction images were  reviewed.     FINDINGS: There is a mildly displaced fracture through the C2 vertebra  at the junction of the posterior vertebral body and the posterior  elements. The fracture appears mildly displaced. Fracture extends into  the left transversarium foramen. There is mild canal stenosis at this  level. No fractures of the base of the odontoid process is seen. There  is degenerative disease of the C1-C2 articulation.     There is fusion of the C3-4 levels. Anterior cervical plate is seen  fusing C4-C6. Intervertebral disc spacers are seen at C4-5 and C5-6.  There is also some spondylosis at C6-7 and C7-T1. Posterior fusion is  seen from C4 through T2.     No other fractures are seen.       Impression:      1. Fracture of the C2 vertebra involving the posterior aspect of the C2  vertebral body at its junction with the posterior elements. This shows  mild displacement and mild canal stenosis. Fracture extends into the  left transversarium foramen.  2.. No other fractures are seen.  3. Postoperative changes of the cervical spine.     CT OF THE THORACIC SPINE WITHOUT CONTRAST     TECHNIQUE: Axial images were obtained through the thoracic spine.  Sagittal and coronal reconstruction images were reviewed.     FINDINGS: C4-C6 anterior fusion device is seen with lower cervical and  upper thoracic fusion to approximately the T2 level.     There is anterior osteophytosis at essentially all thoracic levels.     No thoracic compression fractures or other fractures are seen. No  subluxation is seen. No significant canal stenosis is seen.     There are some ill-defined areas of scar, atelectasis or inflammatory  infiltrates in the bilateral lower lobes.     IMPRESSION:  1. Postoperative changes of the  cervicothoracic junction.  2. Thoracic osteophytosis..  3. No thoracic spine fractures are seen.     COMMENT: RELEASE          Radiation dose reduction techniques were utilized, including automated  exposure control and exposure modulation based on body size.          CT Thoracic Spine Without Contrast [[id number]] Collected: 03/16/22 2028     Updated: 03/16/22 2028    Narrative:      HISTORY: Fell. Head and back pain.     CT OF THE CERVICAL SPINE WITHOUT CONTRAST     TECHNIQUE: Axial images were obtained from the skull base to the upper  thoracic spine. Sagittal and coronal reconstruction images were  reviewed.     FINDINGS: There is a mildly displaced fracture through the C2 vertebra  at the junction of the posterior vertebral body and the posterior  elements. The fracture appears mildly displaced. Fracture extends into  the left transversarium foramen. There is mild canal stenosis at this  level. No fractures of the base of the odontoid process is seen. There  is degenerative disease of the C1-C2 articulation.     There is fusion of the C3-4 levels. Anterior cervical plate is seen  fusing C4-C6. Intervertebral disc spacers are seen at C4-5 and C5-6.  There is also some spondylosis at C6-7 and C7-T1. Posterior fusion is  seen from C4 through T2.     No other fractures are seen.       Impression:      1. Fracture of the C2 vertebra involving the posterior aspect of the C2  vertebral body at its junction with the posterior elements. This shows  mild displacement and mild canal stenosis. Fracture extends into the  left transversarium foramen.  2.. No other fractures are seen.  3. Postoperative changes of the cervical spine.     CT OF THE THORACIC SPINE WITHOUT CONTRAST     TECHNIQUE: Axial images were obtained through the thoracic spine.  Sagittal and coronal reconstruction images were reviewed.     FINDINGS: C4-C6 anterior fusion device is seen with lower cervical and  upper thoracic fusion to approximately the T2  level.     There is anterior osteophytosis at essentially all thoracic levels.     No thoracic compression fractures or other fractures are seen. No  subluxation is seen. No significant canal stenosis is seen.     There are some ill-defined areas of scar, atelectasis or inflammatory  infiltrates in the bilateral lower lobes.     IMPRESSION:  1. Postoperative changes of the cervicothoracic junction.  2. Thoracic osteophytosis..  3. No thoracic spine fractures are seen.     COMMENT: RELEASE          Radiation dose reduction techniques were utilized, including automated  exposure control and exposure modulation based on body size.          CT Head Without Contrast [[id number]] Collected: 03/16/22 1949     Updated: 03/16/22 1949    Narrative:      CT OF THE BRAIN WITHOUT CONTRAST 03/16/2022     HISTORY: Fell, head injury.     TECHNIQUE: Axial images were obtained through the brain without  intravenous contrast.     FINDINGS: There is moderate diffuse atrophy most severe in the frontal  lobes. There is no evidence of acute infarction, hemorrhage, midline  shift or mass effect.     There is mild mucosal thickening in the ethmoid and rightward aspect of  the frontal sinus. No bony abnormalities are seen.       Impression:      1. No acute intracranial process.  2. Mild sinusitis.     Radiation dose reduction techniques were utilized, including automated  exposure control and exposure modulation based on body size.                  Assessment/Plan   Assessment & Plan     Active Hospital Problems    Diagnosis  POA   • **Closed nondisplaced fracture of second cervical vertebra (HCC) [S12.101A]  Yes   • Fall [W19.XXXA]  Yes   • Chronic peripheral neuropathy [G62.9]  Yes   • Chronic left arm weakness [R29.898]  Yes   • Leukocytosis [D72.829]  Yes   • Stage 3a chronic kidney disease (HCC) [N18.31]  Yes   • Hyperlipemia [E78.5]  Yes   • Constipation [K59.00]  Yes   • Neck pain [M54.2]  Yes   • Sleep apnea [G47.30]  Yes     DIDNT  TOLERATE MASK, CLAUSTROPHOBIC     • RLS (restless legs syndrome) [G25.81]  Yes   • HTN (hypertension) [I10]  Yes   • Diabetes mellitus (HCC) [E11.9]  Yes     TYPE 2     • Coronary artery disease [I25.10]  Yes     81-year-old male with previous neck surgery and cervical hardware presents to the hospital after a mechanical fall and acute C2 vertebral fracture.    Vertebral fracture C2:  Emergency physician discussed case with neurosurgery who recommended patient continue with hard cervical collar.  Neurosurgery felt MRI it would be unlikely to benefit due to issues with distortion from previous surgical hardware.  Plan to consult neurosurgery and will follow up on additional recommendations tomorrow.  Pain appears to be a severe acute issue.  Despite 2 doses of fentanyl patient is still appearing to be in severe pain.  I think the only likely way to control his pain will be through PCA.  I will also add muscle relaxant and scheduled extra strength Tylenol for multimodal pain control.  I will continue his home gabapentin however at a lower dose due to slight elevation in his creatinine above baseline.  If pain not better controlled tomorrow may need consult to pain management team.  Plan to monitor CO2 while on PCA.    Leukocytosis:  Felt to be most likely reactive.  Patient is afebrile with stable vital signs.  He does note some dysuria plan to check urinalysis and can treat if needed.    Chronic neuropathic pain:  As per above continue gabapentin but decrease the dose due to slightly elevated creatinine.    Chronic kidney disease 3A:  Previous records reviewed and baseline creatinine appears to range from 1.0-1.2.  Admitting creatinine is 1.45.  Possible progression of chronic kidney disease versus mild renal insufficiency.  Plan to give 1 L of IV fluid and recheck laboratory studies tomorrow.    Insulin-dependent diabetes:  Continue Metformin.  Monitor glucose and correction insulin as needed.  At home patient takes  approximately 30 units of Lantus twice daily plan tohold as oral intake will likely be decreased and adjust as needed.      Sleep apnea: Not on CPAP.  Monitor pulse oximetry and supplemental oxygen as needed.    I discussed treatment plan with patient and his son and both were in agreement with current plan.    DVT prophylaxis: SCDs for now    CODE STATUS:     Code Status and Medical Interventions:   Ordered at: 03/16/22 2047     Level Of Support Discussed With:    Patient     Code Status (Patient has no pulse and is not breathing):    CPR (Attempt to Resuscitate)     Medical Interventions (Patient has pulse or is breathing):    Full Support       Admission Status:  I believe this patient meets INPATIENT status due to cervical neck fracture and need for PCA due to severe pain.  I feel patient’s risk for adverse outcomes and need for care warrant INPATIENT evaluation and I predict the patient’s care encounter to likely last beyond 2 midnights.      Hardy Alex MD  03/16/22

## 2022-03-17 NOTE — PROGRESS NOTES
Name: Chester Gama ADMIT: 3/16/2022   : 1940  PCP: Demetrio Ko DO    MRN: [id number] LOS: 1 days   AGE/SEX: 81 y.o. male  ROOM: [facility]     Subjective   Subjective   Patient is resting in bed, he is accompanied by his wife.  Patient appears to remain in excruciating pain and moans throughout the entire exam.  He is able to answer questions however it is limited due to extreme pain.    Review of Systems   Unable to perform ROS: Acuity of condition        Objective   Objective   Vital Signs  Temp:  [97.8 °F (36.6 °C)-99.1 °F (37.3 °C)] 97.8 °F (36.6 °C)  Heart Rate:  [] 63  Resp:  [14-20] 14  BP: (113-167)/(72-91) 138/72  SpO2:  [90 %-98 %] 90 %  on  Flow (L/min):  [2] 2;   Device (Oxygen Therapy): nasal cannula  Body mass index is 37.74 kg/m².  Physical Exam  Constitutional:       General: He is in acute distress.      Appearance: Normal appearance. He is obese. He is not toxic-appearing.      Comments: Moaning in pain   HENT:      Head: Normocephalic and atraumatic.      Mouth/Throat:      Mouth: Mucous membranes are moist.      Pharynx: Oropharynx is clear.   Eyes:      Extraocular Movements: Extraocular movements intact.      Conjunctiva/sclera: Conjunctivae normal.      Pupils: Pupils are equal, round, and reactive to light.   Neck:      Comments: In c-collar immobilizer  Cardiovascular:      Rate and Rhythm: Normal rate and regular rhythm.      Pulses: Normal pulses.      Heart sounds: No murmur heard.  Pulmonary:      Effort: Pulmonary effort is normal. No respiratory distress.      Breath sounds: Normal breath sounds. No wheezing.   Abdominal:      General: Abdomen is flat. Bowel sounds are normal. There is no distension.      Palpations: Abdomen is soft.      Tenderness: There is no abdominal tenderness.   Musculoskeletal:         General: No swelling or tenderness.      Right lower leg: No edema.      Left lower leg: No edema.   Skin:     General: Skin is warm and dry.    Neurological:      General: No focal deficit present.      Mental Status: He is alert and oriented to person, place, and time. Mental status is at baseline.      Motor: No weakness.   Psychiatric:      Comments: Difficult to say as he continues to moan in pain         Results Review     I reviewed the patient's new clinical results.  Results from last 7 days   Lab Units 03/17/22  0922 03/16/22  1847   WBC 10*3/mm3 10.72 15.39*   HEMOGLOBIN g/dL 14.1 15.4   PLATELETS 10*3/mm3 187 206     Results from last 7 days   Lab Units 03/17/22  0922 03/16/22  1847   SODIUM mmol/L 140 142   POTASSIUM mmol/L 4.1 4.5   CHLORIDE mmol/L 106 105   CO2 mmol/L 25.8 25.0   BUN mg/dL 15 19   CREATININE mg/dL 1.35* 1.45*   GLUCOSE mg/dL 119* 183*   CrCl cannot be calculated (Unknown ideal weight.).  Results from last 7 days   Lab Units 03/16/22  1847   ALBUMIN g/dL 4.50   BILIRUBIN mg/dL 0.5   ALK PHOS U/L 86   AST (SGOT) U/L 27   ALT (SGPT) U/L 26     Results from last 7 days   Lab Units 03/17/22  0922 03/16/22  1847   CALCIUM mg/dL 9.0 9.7   ALBUMIN g/dL  --  4.50       COVID19   Date Value Ref Range Status   03/16/2022 Not Detected Not Detected - Ref. Range Final     Hemoglobin A1C   Date/Time Value Ref Range Status   03/16/2022 1847 8.40 (H) 4.80 - 5.60 % Final     Glucose   Date/Time Value Ref Range Status   03/17/2022 1053 129 70 - 130 mg/dL Final     Comment:     Meter: [id number] : [id number] [place] Jennifer TYLERA   03/17/2022 0603 95 70 - 130 mg/dL Final     Comment:     Meter: [id number] : [id number] Natalya Chapman NA   03/16/2022 1929 145 (H) 70 - 130 mg/dL Final     Comment:     Meter: [id number] : [id number] Pedro Perkins EDT       CT Cervical Spine Without Contrast, CT Thoracic Spine Without Contrast  Narrative: HISTORY: Fell. Neck and back pain.     CT OF THE CERVICAL SPINE WITHOUT CONTRAST 03/16/2022     TECHNIQUE: Axial images were obtained from the skull base to the upper  thoracic spine. Sagittal and coronal  reconstruction images were  reviewed.     FINDINGS: There is a mildly displaced fracture through the C2 vertebra  at the junction of the posterior vertebral body and the posterior  elements. The fracture appears mildly displaced. Fracture extends into  the left transversarium foramen. There is mild canal stenosis at this  level. No fractures of the base of the odontoid process is seen. There  is degenerative disease of the C1-C2 articulation.     There is fusion of the C3-4 levels. Anterior cervical plate is seen  fusing C4-C6. Intervertebral disc spacers are seen at C4-5 and C5-6.  There is also some spondylosis at C6-7 and C7-T1. Posterior fusion is  seen from C4 through T2.     No other fractures are seen.     Impression: 1. Fracture of the C2 vertebra involving the posterior aspect of the C2  vertebral body at its junction with the posterior elements. This shows  mild displacement and mild canal stenosis. Fracture extends into the  left transversarium foramen. If further evaluation of the vertebral  arteries is clinically indicated a CT angiogram of the neck may be  helpful.  2.. No other fractures are seen.  3. Postoperative changes of the cervical spine.     CT OF THE THORACIC SPINE WITHOUT CONTRAST 03/16/2022     TECHNIQUE: Axial images were obtained through the thoracic spine.  Sagittal and coronal reconstruction images were reviewed.     FINDINGS: C4-C6 anterior fusion device is seen with lower cervical and  upper thoracic fusion to approximately the T2 level.     There is anterior osteophytosis at essentially all thoracic levels.     No thoracic compression fractures or other fractures are seen. No  subluxation is seen. No significant canal stenosis is seen.     There are some ill-defined areas of scar, atelectasis or inflammatory  infiltrates in the bilateral lower lobes.     IMPRESSION:  1. Postoperative changes of the cervicothoracic junction.  2. Thoracic osteophytosis..  3. No thoracic spine fractures  are seen.              Radiation dose reduction techniques were utilized, including automated  exposure control and exposure modulation based on body size.     This report was finalized on 3/16/2022 10:17 PM by Dr. Tnoy Mckee M.D.     CT Head Without Contrast  Narrative: CT OF THE BRAIN WITHOUT CONTRAST 03/16/2022     HISTORY: Fell, head injury.     TECHNIQUE: Axial images were obtained through the brain without  intravenous contrast.     FINDINGS: There is moderate diffuse atrophy most severe in the frontal  lobes. There is no evidence of acute infarction, hemorrhage, midline  shift or mass effect.     There is mild mucosal thickening in the ethmoid and rightward aspect of  the frontal sinus. No bony abnormalities are seen.     Impression: 1. No acute intracranial process.  2. Mild sinusitis.     Radiation dose reduction techniques were utilized, including automated  exposure control and exposure modulation based on body size.     This report was finalized on 3/16/2022 10:13 PM by Dr. Tony Mckee M.D.       Scheduled Medications  acetaminophen, 1,000 mg, Oral, TID  aspirin, 81 mg, Oral, Daily  atorvastatin, 80 mg, Oral, Daily  baclofen, 10 mg, Oral, TID  Diclofenac Sodium, 2 g, Topical, BID  docusate sodium, 200 mg, Oral, Daily  gabapentin, 300 mg, Oral, TID  insulin lispro, 0-14 Units, Subcutaneous, TID AC  metFORMIN, 500 mg, Oral, BID With Meals  metoprolol tartrate, 12.5 mg, Oral, Q12H  oxyCODONE-acetaminophen, 1 tablet, Oral, Q6H  senna-docusate sodium, 2 tablet, Oral, BID  sodium chloride, 3 mL, Intravenous, Q12H  vitamin B-12, 1,000 mcg, Oral, Daily    Infusions  HYDROmorphone HCl-NaCl,     Diet  Diet Regular; Cardiac         I have personally reviewed:  [x]  Laboratory   []  Microbiology   [x]  Radiology   []  EKG/Telemetry   []  Cardiology/Vascular   []  Pathology   [x]  Records    Assessment/Plan     Active Hospital Problems    Diagnosis  POA   • **Closed nondisplaced fracture of second  cervical vertebra (HCC) [S12.101A]  Yes   • Fall [W19.XXXA]  Yes   • Chronic peripheral neuropathy [G62.9]  Yes   • Chronic left arm weakness [R29.898]  Yes   • Leukocytosis [D72.829]  Yes   • Stage 3a chronic kidney disease (HCC) [N18.31]  Yes   • Hyperlipemia [E78.5]  Yes   • Constipation [K59.00]  Yes   • Neck pain [M54.2]  Yes   • Sleep apnea [G47.30]  Yes   • RLS (restless legs syndrome) [G25.81]  Yes   • HTN (hypertension) [I10]  Yes   • Type 2 diabetes mellitus with diabetic polyneuropathy (HCC) [E11.42]  Yes   • Coronary artery disease [I25.10]  Yes      Resolved Hospital Problems   No resolved problems to display.     81-year-old male with previous neck surgery and cervical hardware presents to the hospital after a mechanical fall and acute C2 vertebral fracture.     Vertebral fracture C2:  - Discussed with CHAARN Hudson NP- continue in immobilizer- switch to Aspen collar  - Will also start scheduled baclofen at their recommendation  - Patient still having severe pain, patient not remembering to push PCA  - Will start Percocet 10 u1yqnud scheduled; continue PCA, if pain gets better controlled on percocet can discuss DCing dilaudid PCA and switching to dilaudid pushes     Leukocytosis:  - Felt to be most likely reactive.  Patient is afebrile with stable vital signs, UA negative  - down trended this AM without intervention     Chronic neuropathic pain:  - As per above continue gabapentin but decrease the dose due to slightly elevated creatinine.  - Plan on resuming home dose when kidney fcn improves     Chronic kidney disease 3A:  Previous records reviewed and baseline creatinine appears to range from 1.0-1.2.  Admitting creatinine is 1.45; improved to 1.35 overnight  - Will start IVF at 75cc/hr     Insulin-dependent diabetes:  Continue Metformin.  Monitor glucose and correction insulin as needed.  At home patient takes approximately 30 units of Lantus twice daily plan tohold as oral intake will likely be  decreased and adjust as needed.    - BG within range ; no SSI given     Sleep apnea: Not on CPAP.  Monitor pulse oximetry and supplemental oxygen as needed.    SCDs for DVT prophylaxis.  Full code.  Discussed with patient, family, nursing staff, CCP, care team on multidisciplinary rounds and Pharmacist.  Anticipate discharge to SNU facility timing yet to be determined.      Danuta Nair MD  [facility]  03/17/22  15:38 EDT    I wore protective equipment throughout this patient encounter including a face mask, gloves and protective eyewear.  Hand hygiene was performed before donning protective equipment and after removal when leaving the room.

## 2022-03-17 NOTE — CONSULTS
[facility]   Consult Note    Patient Name: Chester Gama  : 1940  MRN: [id number]  Primary Care Physician:  Demetrio Ko DO  Referring Physician: Hardy Alex,*  Date of admission: 3/16/2022    Neurosurgery (on-call MD unless specified)  Consult performed by: Carrie Wheat APRN  Consult ordered by: Demetrio Wilkinson MD  Reason for consult: Fall with C2 fracture        Subjective   Subjective     Reason for Consult/ Chief Complaint: Fall with C2 fracture    History of Present Illness  Chester Gama is a 81 y.o. 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  He then underwent C4-T2 posterior decompression and fusion with morphogenic protein in 2019 followed by posterior cervical left foraminotomies C4-T1, refusion C4-T1, removal of C6 screw, and revision of hardware 2019 performed by orthopedic spine surgeon Dr. Soliman.  He also has a history of diabetes mellitus type 2 for which he takes insulin, LATANYA on CPAP at home, CKD Zoey HTN, hyperlipidemia and coronary artery disease with angioplasty and stent placement .  History of colon resection and subsequent colostomy reversal with diverticular disease.      The patient's wife, who is at bedside is able to give more history than the patient due to his pain.  She states that the patient had been wanting to work on their septic tank system.  He was encouraged by his son to wait until next week when he was available however the patient went ahead and proceeded on this alone.  He had been in the process of digging a ditch.  He got out of the vehicle and went down into the ditch to attach some pipe.  Once he got himself out of the ditch he subsequently fell backwards into the ditch striking the back of his head on the pipe.  He did not lose consciousness.  He had extreme, intense neck pain only no arm pain or leg pain.  He has a history of chronic neck and left arm  pain stemming from the previous surgery.  In addition he had noticed persistent twitching/stiffening episodes of his left arm and in both of his legs as well as difficulty with gait for the last year to year and a half.  His wife reports that the patient does not seem to  his feet when he is walking.  Mr. Gama saw a physician in [place] who had suspected that this may be due to Parkinson's disease and he was in the midst of having that worked up.      Following the fall discussed above, he was brought to [facility] where imaging of the cervical and thoracic spine revealed a C2 body fracture.  Given the history of multilevel cervical fusion and hardware, MRI would not be beneficial.  The patient has been admitted to the hospital for pain control.  The patient is wearing a hard cervical collar.  Neurosurgery has been asked to evaluate and make further recommendations.    Review of Systems   Constitutional: Positive for activity change.   HENT: Negative for trouble swallowing.    Eyes: Negative for visual disturbance.   Respiratory: Negative for cough, shortness of breath and wheezing.    Cardiovascular: Negative for chest pain and palpitations.   Gastrointestinal: Negative for abdominal pain, constipation, nausea and vomiting.   Genitourinary: Negative for dysuria, frequency and urgency.   Musculoskeletal: Positive for gait problem, neck pain and neck stiffness.   Neurological: Positive for weakness, numbness and headaches. Negative for seizures and speech difficulty.   Hematological: Negative.    Psychiatric/Behavioral: Negative.             Personal History     Past Medical History:   Diagnosis Date   • Arthritis     HANDS KNEES   • Cervical pain (neck)     LEFT ARM PARESTHESIA   • Coronary artery disease    • Depression    • Diabetes mellitus (CMS/HCC)     TYPE 2   • Diverticular disease     HX, S/P RESECTION   • Hx of heart artery stent    • Hyperlipemia    • Hypertension    • PTSD  (post-traumatic stress disorder)    • RLS (restless legs syndrome)    • Sleep apnea     DIDNT TOLERATE MASK, CLAUSTROPHOBIC   • Structural abnormality of kidney     SAW NEPHROLOGIST FOR SMALLER KIDNEY - 3 YEARS AGO.   • Thyroid cyst     PER WIFE       Past Surgical History:   Procedure Laterality Date   • ANTERIOR CERVICAL DISCECTOMY W/ FUSION N/A 1/3/2018    Procedure: ANTERIOR CERVICAL DECOMPRESSION & FUSION C4 6;  Surgeon: Vincenzo Soliman DO;  Location: [facility] OR;  Service:    • APPENDECTOMY     • CATARACT EXTRACTION W/ INTRAOCULAR LENS  IMPLANT, BILATERAL     • CERVICAL DISCECTOMY POSTERIOR FUSION WITH INSTRUMENTATION N/A 6/14/2019    Procedure: C4-T2 POSTERIOR DECOMPRESSION AND FUSION BONE MORPHOGENIC PROTEIN;  Surgeon: Vincenzo Soliman DO;  Location: [facility] OR;  Service: Orthopedic Spine   • CERVICAL DISCECTOMY POSTERIOR FUSION WITH INSTRUMENTATION N/A 8/8/2019    Procedure: LEFT FORAMINOTOMY C4-T1, REFUSION OF C4-T1, REMOVAL OF C6 SCREW, REVISION OF HARDWARE;  Surgeon: Vincenzo Soliman DO;  Location: [facility] OR;  Service: Orthopedics   • COLON RESECTION      COLOSTOMY X 6 MOS THEN REVERSED.   • CORONARY ANGIOPLASTY WITH STENT PLACEMENT  02/10/2010    [email or web address]       Family History: family history is not on file. Otherwise pertinent FHx was reviewed and not pertinent to current issue.    Social History:  reports that he quit smoking about 55 years ago. His smoking use included cigars. He quit after 5.00 years of use. He has never used smokeless tobacco. He reports that he does not drink alcohol and does not use drugs.    Home Medications:   Artificial Tear, aspirin, atorvastatin, diclofenac, docusate sodium, furosemide, gabapentin, insulin glargine, metFORMIN, metoprolol tartrate, pantoprazole, rOPINIRole, and vitamin B-12    Allergies:  Allergies   Allergen Reactions   • Contrast Dye Hives     IVP DYE   • Cyclobenzaprine Other (See Comments)     Night terrors         Objective     Objective     Vitals:  Temp:  [97.8 °F (36.6 °C)-99.1 °F (37.3 °C)] 98.4 °F (36.9 °C)  Heart Rate:  [] 60  Resp:  [16-20] 20  BP: (113-167)/(73-91) 162/78  Flow (L/min):  [2] 2    Physical Exam  Vitals reviewed.   Constitutional:       General: He is not in acute distress.     Appearance: He is well-developed. He is obese. He is not toxic-appearing or diaphoretic.      Comments: Appears uncomfortable.  Wearing hard cervical collar from the field.  Will need to have collar changed to an [place] for better comfort.  The patient is able to explain the events leading up to his hospitalization but in short sentences due to his discomfort.   HENT:      Head: Normocephalic and atraumatic.      Right Ear: External ear normal.      Left Ear: External ear normal.      Nose: Nose normal.      Mouth/Throat:      Mouth: Mucous membranes are moist.      Pharynx: Oropharynx is clear.   Eyes:      General:         Right eye: No discharge.         Left eye: No discharge.      Conjunctiva/sclera: Conjunctivae normal.      Pupils: Pupils are equal, round, and reactive to light.      Comments: Bilateral scleral edema.  No evidence of trauma to the eyes.  PERRL.  EOMs intact.   Neck:      Trachea: No tracheal deviation.      Comments: Exquisitely tender to touch in the upper to mid cervical spine region as well as the lower cervical region out through both trapezius regions.  Cardiovascular:      Rate and Rhythm: Normal rate and regular rhythm.   Pulmonary:      Effort: Pulmonary effort is normal. No respiratory distress.      Breath sounds: No stridor. No rhonchi.   Abdominal:      General: There is no distension.      Palpations: Abdomen is soft.      Tenderness: There is no abdominal tenderness.   Musculoskeletal:         General: Tenderness (to palpation in the cervical occipital, upper cervical, mid cervical and then very low cervical thoracic region to palpation.) present. No swelling. Normal range of motion.       Cervical back: Normal range of motion and neck supple. Tenderness present.   Skin:     General: Skin is warm and dry.      Findings: No erythema.   Neurological:      Mental Status: He is alert and oriented to person, place, and time.      GCS: GCS eye subscore is 4. GCS verbal subscore is 5. GCS motor subscore is 6.      Cranial Nerves: No cranial nerve deficit.      Sensory: No sensory deficit.      Motor: Weakness (L arm weakness, numbness for the last year to year and a half. ) present. No abnormal muscle tone.      Coordination: Coordination normal.      Gait: Gait abnormal (Progressively worsening for the last year. ).      Deep Tendon Reflexes: Reflexes are normal and symmetric. Reflexes normal.      Comments: Motor deficit noted in the left upper extremity with 1/5 left deltoid, 4/5 bicep, 4+/5 tricep, 3/5 left wrist extensor and .  2/5 left intrinsics.  Visible spasms noted in the left arm.  Sensation is decreased in the left upper extremity and left leg.  The patient states that this is chronic.  The remainder of the motor and sensory exam is within normal limits.  DTR's normal. Negative Dugan's; negative clonus.    Psychiatric:         Behavior: Behavior is cooperative.         Thought Content: Thought content normal.       Result Review    Result Review:  I have personally reviewed the results from the time of this admission to 3/17/2022 09:51 EDT and agree with these findings:  [x]  Laboratory  []  Microbiology  [x]  Radiology  []  EKG/Telemetry   []  Cardiology/Vascular   []  Pathology  [x]  Old records  []  Other:  Most notable findings include:     Cervical and thoracic spine CT images without contrast dated 3/16/2022 were reviewed by myself and discussed later by phone with Dr. Ramon.  CT scan shows C2 vertebrae fracture at posterior aspect of the vertebral body into the posterior elements extending down to the left transversarium foramen.  Degenerative changes also noted at C1-2.  Previous  fusion at C3-4.  Anterior plate C4-C6 with disc spacers intact at both levels.  Cervical spondylosis noted C6-7 and C7-T1.  Posterior fusion hardware intact from C4-T2.                                 CT images of the thoracic spine show C4-C6 anterior fusion hardware as well as the lower cervical upper thoracic fusion hardware to the level of T2.  Degenerative anterior osteophytosis noted at numerous levels of the thoracic spine.  There is no evidence of fracture in the thoracic spine or canal stenosis.  No evidence of subluxation.                        CT head without contrast dated 3/16/2022 revealed moderate diffuse atrophy.  No evidence of acute intracranial abnormality.      .  Results from last 7 days   Lab Units 03/17/22  0922 03/16/22  1847   WBC 10*3/mm3 10.72 15.39*   HEMOGLOBIN g/dL 14.1 15.4   HEMATOCRIT % 43.0 45.7   PLATELETS 10*3/mm3 187 206     .  Results from last 7 days   Lab Units 03/17/22  0922 03/16/22  1847   SODIUM mmol/L 140 142   POTASSIUM mmol/L 4.1 4.5   CHLORIDE mmol/L 106 105   CO2 mmol/L 25.8 25.0   BUN mg/dL 15 19   CREATININE mg/dL 1.35* 1.45*   GLUCOSE mg/dL 119* 183*   CALCIUM mg/dL 9.0 9.7     .  Results from last 7 days   Lab Units 03/16/22  1847   INR  1.04         Assessment/Plan   Assessment / Plan     Brief Patient Summary:  Chester Gama is a 81 y.o. male who lost his footing and took a fall while digging a ditch.  He landed back into the ditch striking the back of his head on a  pipe.  No loss of consciousness.  Patient has a history of extensive anterior, posterior cervical fusion surgery.  He developed severe pain in the upper portion of the cervical spine more to the left after this fall.    Active Hospital Problems:  Active Hospital Problems    Diagnosis    • **Closed nondisplaced fracture of second cervical vertebra (HCC)    • Fall    • Chronic peripheral neuropathy    • Chronic left arm weakness    • Leukocytosis    • Stage 3a chronic kidney disease (HCC)     • Hyperlipemia    • Constipation    • Neck pain    • Sleep apnea      DIDNT TOLERATE MASK, CLAUSTROPHOBIC     • RLS (restless legs syndrome)    • HTN (hypertension)    • Type 2 diabetes mellitus with diabetic polyneuropathy (HCC)      TYPE 2     • Coronary artery disease      Plan:     Previous extensive cervical thoracic spine fusion at anteriorly and posteriorly back in 2019.  Patient has had continued left arm pain, weakness and visible spasm for the last year to year and a half as well as worsening gait for the last year.  Status post fall 3/16/2022  Worsening cervical pain secondary to C2 fracture      I have spoken with Dr. Ramon regarding the above images.  There is no sense in pursuing MRI imaging given the presence of extensive hardware.  Keep the patient immobilized in a cervical hard collar -we will switch to Aspen collar  Continue with symptom management for now  I have spoken with the admitting physician Dr. Nair.  Routine baclofen will be added.  Further recommendations to follow.      Carrie Wheat, APRN

## 2022-03-17 NOTE — PLAN OF CARE
Goal Outcome Evaluation:  Plan of Care Reviewed With: patient, spouse        Progress: improving  Outcome Evaluation: VSS on 2LNC. AOx4. This morning pain was 10/10. scheduled Q6H percocet added and pt pain has been much better controlled since. Pain is now5-6/10. Wife remains at bedside with pt. C collar switched to aspen collar per neurosx. Refusing SCDs at this time d/t RLS. NS infusing at 75 mL/hr and PCA on demand doses. No new complaints at this time. WCTM.

## 2022-03-18 ENCOUNTER — HOSPITAL ENCOUNTER (INPATIENT)
Facility: HOSPITAL | Age: 82
End: 2022-03-18
Attending: PHYSICAL MEDICINE & REHABILITATION | Admitting: PHYSICAL MEDICINE & REHABILITATION

## 2022-03-18 ENCOUNTER — APPOINTMENT (OUTPATIENT)
Dept: GENERAL RADIOLOGY | Facility: HOSPITAL | Age: 82
End: 2022-03-18

## 2022-03-18 PROBLEM — S49.92XA INJURY OF LEFT SHOULDER: Status: ACTIVE | Noted: 2022-03-18

## 2022-03-18 LAB
BASOPHILS # BLD AUTO: 0.04 10*3/MM3 (ref 0–0.2)
BASOPHILS NFR BLD AUTO: 0.4 % (ref 0–1.5)
DEPRECATED RDW RBC AUTO: 40.3 FL (ref 37–54)
EOSINOPHIL # BLD AUTO: 0.35 10*3/MM3 (ref 0–0.4)
EOSINOPHIL NFR BLD AUTO: 3.6 % (ref 0.3–6.2)
ERYTHROCYTE [DISTWIDTH] IN BLOOD BY AUTOMATED COUNT: 13 % (ref 12.3–15.4)
GLUCOSE BLDC GLUCOMTR-MCNC: 107 MG/DL (ref 70–130)
GLUCOSE BLDC GLUCOMTR-MCNC: 129 MG/DL (ref 70–130)
GLUCOSE BLDC GLUCOMTR-MCNC: 130 MG/DL (ref 70–130)
GLUCOSE BLDC GLUCOMTR-MCNC: 155 MG/DL (ref 70–130)
HCT VFR BLD AUTO: 45.9 % (ref 37.5–51)
HGB BLD-MCNC: 15.5 G/DL (ref 13–17.7)
LYMPHOCYTES # BLD AUTO: 1.56 10*3/MM3 (ref 0.7–3.1)
LYMPHOCYTES NFR BLD AUTO: 16.1 % (ref 19.6–45.3)
MCH RBC QN AUTO: 29.2 PG (ref 26.6–33)
MCHC RBC AUTO-ENTMCNC: 33.8 G/DL (ref 31.5–35.7)
MCV RBC AUTO: 86.6 FL (ref 79–97)
MONOCYTES # BLD AUTO: 0.72 10*3/MM3 (ref 0.1–0.9)
MONOCYTES NFR BLD AUTO: 7.5 % (ref 5–12)
NEUTROPHILS NFR BLD AUTO: 6.95 10*3/MM3 (ref 1.7–7)
NEUTROPHILS NFR BLD AUTO: 72 % (ref 42.7–76)
PLATELET # BLD AUTO: 126 10*3/MM3 (ref 140–450)
PMV BLD AUTO: 11 FL (ref 6–12)
RBC # BLD AUTO: 5.3 10*6/MM3 (ref 4.14–5.8)
WBC NRBC COR # BLD: 9.66 10*3/MM3 (ref 3.4–10.8)

## 2022-03-18 PROCEDURE — 97530 THERAPEUTIC ACTIVITIES: CPT

## 2022-03-18 PROCEDURE — 82962 GLUCOSE BLOOD TEST: CPT

## 2022-03-18 PROCEDURE — 97167 OT EVAL HIGH COMPLEX 60 MIN: CPT

## 2022-03-18 PROCEDURE — 85025 COMPLETE CBC W/AUTO DIFF WBC: CPT | Performed by: STUDENT IN AN ORGANIZED HEALTH CARE EDUCATION/TRAINING PROGRAM

## 2022-03-18 PROCEDURE — 73030 X-RAY EXAM OF SHOULDER: CPT

## 2022-03-18 PROCEDURE — 99231 SBSQ HOSP IP/OBS SF/LOW 25: CPT | Performed by: NURSE PRACTITIONER

## 2022-03-18 PROCEDURE — 85007 BL SMEAR W/DIFF WBC COUNT: CPT | Performed by: STUDENT IN AN ORGANIZED HEALTH CARE EDUCATION/TRAINING PROGRAM

## 2022-03-18 PROCEDURE — 25010000002 MORPHINE PER 10 MG: Performed by: INTERNAL MEDICINE

## 2022-03-18 PROCEDURE — 97162 PT EVAL MOD COMPLEX 30 MIN: CPT

## 2022-03-18 RX ORDER — METHOCARBAMOL 500 MG/1
1000 TABLET, FILM COATED ORAL EVERY 8 HOURS SCHEDULED
Status: DISCONTINUED | OUTPATIENT
Start: 2022-03-18 | End: 2022-03-23 | Stop reason: HOSPADM

## 2022-03-18 RX ORDER — HYDROMORPHONE HYDROCHLORIDE 1 MG/ML
0.5 INJECTION, SOLUTION INTRAMUSCULAR; INTRAVENOUS; SUBCUTANEOUS
Status: DISCONTINUED | OUTPATIENT
Start: 2022-03-18 | End: 2022-03-23 | Stop reason: HOSPADM

## 2022-03-18 RX ORDER — OXYCODONE AND ACETAMINOPHEN 10; 325 MG/1; MG/1
1 TABLET ORAL EVERY 4 HOURS
Status: DISCONTINUED | OUTPATIENT
Start: 2022-03-18 | End: 2022-03-19

## 2022-03-18 RX ORDER — NITROGLYCERIN 0.4 MG/1
0.4 TABLET SUBLINGUAL
Status: DISCONTINUED | OUTPATIENT
Start: 2022-03-18 | End: 2022-03-23 | Stop reason: HOSPADM

## 2022-03-18 RX ADMIN — METOPROLOL TARTRATE 12.5 MG: 25 TABLET, FILM COATED ORAL at 20:25

## 2022-03-18 RX ADMIN — Medication 1000 MCG: at 08:03

## 2022-03-18 RX ADMIN — Medication 3 ML: at 08:04

## 2022-03-18 RX ADMIN — ASPIRIN 81 MG: 81 TABLET, COATED ORAL at 08:03

## 2022-03-18 RX ADMIN — BACLOFEN 10 MG: 10 TABLET ORAL at 08:03

## 2022-03-18 RX ADMIN — OXYCODONE HYDROCHLORIDE AND ACETAMINOPHEN 1 TABLET: 10; 325 TABLET ORAL at 05:25

## 2022-03-18 RX ADMIN — DICLOFENAC SODIUM 2 G: 10 GEL TOPICAL at 20:35

## 2022-03-18 RX ADMIN — METHOCARBAMOL TABLETS 1000 MG: 500 TABLET, COATED ORAL at 14:14

## 2022-03-18 RX ADMIN — GABAPENTIN 300 MG: 300 CAPSULE ORAL at 15:20

## 2022-03-18 RX ADMIN — POLYETHYLENE GLYCOL 3350 17 G: 17 POWDER, FOR SOLUTION ORAL at 14:20

## 2022-03-18 RX ADMIN — METFORMIN HYDROCHLORIDE 500 MG: 500 TABLET ORAL at 17:18

## 2022-03-18 RX ADMIN — OXYCODONE HYDROCHLORIDE AND ACETAMINOPHEN 1 TABLET: 10; 325 TABLET ORAL at 15:57

## 2022-03-18 RX ADMIN — OXYCODONE HYDROCHLORIDE AND ACETAMINOPHEN 1 TABLET: 10; 325 TABLET ORAL at 20:25

## 2022-03-18 RX ADMIN — MORPHINE SULFATE 2 MG: 2 INJECTION, SOLUTION INTRAMUSCULAR; INTRAVENOUS at 05:29

## 2022-03-18 RX ADMIN — SODIUM CHLORIDE 75 ML/HR: 9 INJECTION, SOLUTION INTRAVENOUS at 17:18

## 2022-03-18 RX ADMIN — DOCUSATE SODIUM 200 MG: 100 CAPSULE, LIQUID FILLED ORAL at 08:02

## 2022-03-18 RX ADMIN — METHOCARBAMOL TABLETS 1000 MG: 500 TABLET, COATED ORAL at 22:17

## 2022-03-18 RX ADMIN — OXYCODONE HYDROCHLORIDE AND ACETAMINOPHEN 1 TABLET: 10; 325 TABLET ORAL at 10:01

## 2022-03-18 RX ADMIN — Medication 3 ML: at 03:33

## 2022-03-18 RX ADMIN — GABAPENTIN 300 MG: 300 CAPSULE ORAL at 20:25

## 2022-03-18 RX ADMIN — METOPROLOL TARTRATE 12.5 MG: 25 TABLET, FILM COATED ORAL at 08:02

## 2022-03-18 RX ADMIN — GABAPENTIN 300 MG: 300 CAPSULE ORAL at 08:02

## 2022-03-18 RX ADMIN — SODIUM CHLORIDE 75 ML/HR: 9 INJECTION, SOLUTION INTRAVENOUS at 03:33

## 2022-03-18 RX ADMIN — DOCUSATE SODIUM 50MG AND SENNOSIDES 8.6MG 2 TABLET: 8.6; 5 TABLET, FILM COATED ORAL at 20:25

## 2022-03-18 RX ADMIN — DICLOFENAC SODIUM 2 G: 10 GEL TOPICAL at 08:03

## 2022-03-18 RX ADMIN — DOCUSATE SODIUM 50MG AND SENNOSIDES 8.6MG 2 TABLET: 8.6; 5 TABLET, FILM COATED ORAL at 08:03

## 2022-03-18 RX ADMIN — Medication 3 ML: at 20:26

## 2022-03-18 RX ADMIN — ATORVASTATIN CALCIUM 80 MG: 80 TABLET, FILM COATED ORAL at 08:03

## 2022-03-18 RX ADMIN — METFORMIN HYDROCHLORIDE 500 MG: 500 TABLET ORAL at 08:03

## 2022-03-18 NOTE — THERAPY EVALUATION
Patient Name: Chester Gama  : 1940    MRN: [id number]                              Today's Date: 3/18/2022       Admit Date: 3/16/2022    Visit Dx:     ICD-10-CM ICD-9-CM   1. Other closed nondisplaced fracture of second cervical vertebra, initial encounter ([facility])  S12.191A 805.02     Patient Active Problem List   Diagnosis   • HTN (hypertension)   • Sleep apnea   • Type 2 diabetes mellitus with diabetic polyneuropathy ([facility])   • Coronary artery disease   • RLS (restless legs syndrome)   • Postoperative wound infection   • Chest pain   • Neck pain   • Constipation   • Closed nondisplaced fracture of second cervical vertebra ([facility])   • Hyperlipemia   • Fall   • Chronic peripheral neuropathy   • Chronic left arm weakness   • Leukocytosis   • Stage 3a chronic kidney disease ([facility])   • Injury of left shoulder     Past Medical History:   Diagnosis Date   • Arthritis     HANDS KNEES   • Cervical pain (neck)     LEFT ARM PARESTHESIA   • Coronary artery disease    • Depression    • Diabetes mellitus ([facility])     TYPE 2   • Diverticular disease     HX, S/P RESECTION   • Hx of heart artery stent    • Hyperlipemia    • Hypertension    • PTSD (post-traumatic stress disorder)    • RLS (restless legs syndrome)    • Sleep apnea     DIDNT TOLERATE MASK, CLAUSTROPHOBIC   • Structural abnormality of kidney     SAW NEPHROLOGIST FOR SMALLER KIDNEY - 3 YEARS AGO.   • Thyroid cyst     PER WIFE     Past Surgical History:   Procedure Laterality Date   • ANTERIOR CERVICAL DISCECTOMY W/ FUSION N/A 1/3/2018    Procedure: ANTERIOR CERVICAL DECOMPRESSION & FUSION C4 6;  Surgeon: Vincenzo Soliman DO;  Location: [facility] OR;  Service:    • APPENDECTOMY     • CATARACT EXTRACTION W/ INTRAOCULAR LENS  IMPLANT, BILATERAL     • CERVICAL DISCECTOMY POSTERIOR FUSION WITH INSTRUMENTATION N/A 2019    Procedure: C4-T2 POSTERIOR DECOMPRESSION AND FUSION BONE MORPHOGENIC PROTEIN;  Surgeon: Vincenzo Soliman DO;  Location: [facility] OR;  Service:  Orthopedic Spine   • CERVICAL DISCECTOMY POSTERIOR FUSION WITH INSTRUMENTATION N/A 8/8/2019    Procedure: LEFT FORAMINOTOMY C4-T1, REFUSION OF C4-T1, REMOVAL OF C6 SCREW, REVISION OF HARDWARE;  Surgeon: Vincenzo Soliman DO;  Location: [facility] OR;  Service: Orthopedics   • COLON RESECTION      COLOSTOMY X 6 MOS THEN REVERSED.   • CORONARY ANGIOPLASTY WITH STENT PLACEMENT  02/10/2010    [email or web address]      General Information     Row Name 03/18/22 1440          Physical Therapy Time and Intention    Document Type evaluation  -SV     Mode of Treatment individual therapy;physical therapy  -SV     Row Name 03/18/22 1440          General Information    Patient Profile Reviewed yes  -SV     Prior Level of Function independent:  all aspects, digging a trench for septic tank and fell backward, onset of LUE tremor , hx of LUE weakness from prior extensive cervical surgery  -SV     Existing Precautions/Restrictions fall;oxygen therapy device and L/min;brace worn when out of bed  ASPEN brace at all times  -SV     Barriers to Rehab medically complex  -SV     Row Name 03/18/22 1440          Living Environment    People in Home spouse  -SV     Row Name 03/18/22 1440          Cognition    Orientation Status (Cognition) oriented x 4  -SV     Row Name 03/18/22 1440          Safety Issues, Functional Mobility    Impairments Affecting Function (Mobility) balance;endurance/activity tolerance;shortness of breath;pain;strength  grasp LUE , motor control LUE  -SV           User Key  (r) = Recorded By, (t) = Taken By, (c) = Cosigned By    Initials Name Provider Type    SV Latrice Kirk, PT Physical Therapist               Mobility     Row Name 03/18/22 1730          Bed Mobility    Bed Mobility supine-sit-supine  -SV     Supine-Sit [place] (Bed Mobility) maximum assist (25% patient effort);moderate assist (50% patient effort);2 person assist  -SV     Sit-Supine [place] (Bed Mobility) maximum assist (25% patient  effort);moderate assist (50% patient effort);2 person assist  -SV     Assistive Device (Bed Mobility) bed rails;draw sheet;head of bed elevated  -     Comment, (Bed Mobility) partial log rolling today , severe pain with bed mobility  -     Row Name 03/18/22 1738          Sit-Stand Transfer    Sit-Stand [place] (Transfers) moderate assist (50% patient effort);2 person assist  -SV     Assistive Device (Sit-Stand Transfers) walker, front-wheeled  -SV     Comment, (Sit-Stand Transfer) rwx shortened today, HHA with elevating LUE onto  due to pt not tolerating any left shoulder rom , once on  pt able to use to balance himself  -     Row Name 03/18/22 1738          Gait/Stairs (Locomotion)    [place] Level (Gait) minimum assist (75% patient effort);2 person assist  -SV     Assistive Device (Gait) walker, front-wheeled  -SV     Distance in Feet (Gait) pt amb 9-12 inches  from bed with very tiny steps and then backward , guarded due to severe neck pain but able to bear weight BUE and BLE while using rwx  -SV           User Key  (r) = Recorded By, (t) = Taken By, (c) = Cosigned By    Initials Name Provider Type     Latrice Kirk, PT Physical Therapist               Obj/Interventions     Row Name 03/18/22 1744          Range of Motion Comprehensive    Comment, General Range of Motion [place] shoulder elevation: right moderately, left markedly with severe pain noted on left shoulder any attempt,right elbow/hand wfl, Left elbow and hand limited grossly 50 % arom , BLE grossly appear wfl with sup activity: hip flex, hip abd, DF/PF  -     Row Name 03/18/22 1744          Strength Comprehensive (MMT)    Comment, General Manual Muscle Testing (MMT) Assessment LUE 2-/5 grossly, Right shoulder 2-/5 elbow/hand 3-/5, BLE grossly 3 or 3-/5  -     Row Name 03/18/22 1744          Balance    Comment, Balance sit bal eob with cga for > 10 minutes  -     Row Name 03/18/22 1744          Sensory Assessment  (Somatosensory)    Sensory Assessment (Somatosensory) --  impaired sensation BLE Left worse than right, BUE NT today  -SV           User Key  (r) = Recorded By, (t) = Taken By, (c) = Cosigned By    Initials Name Provider Type    Latrice Arana, PT Physical Therapist               Goals/Plan     Row Name 03/18/22 1749          Bed Mobility Goal 1 (PT)    Activity/Assistive Device (Bed Mobility Goal 1, PT) sit to supine/supine to sit  -SV     [place] Level/Cues Needed (Bed Mobility Goal 1, PT) minimum assist (75% or more patient effort)  -SV     Time Frame (Bed Mobility Goal 1, PT) 2 weeks  -SV     Row Name 03/18/22 1749          Transfer Goal 1 (PT)    Activity/Assistive Device (Transfer Goal 1, PT) sit-to-stand/stand-to-sit  -SV     [place] Level/Cues Needed (Transfer Goal 1, PT) minimum assist (75% or more patient effort)  -SV     Time Frame (Transfer Goal 1, PT) 2 weeks  -SV     Row Name 03/18/22 1749          Gait Training Goal 1 (PT)    Activity/Assistive Device (Gait Training Goal 1, PT) gait (walking locomotion);walker, rolling  -SV     [place] Level (Gait Training Goal 1, PT) minimum assist (75% or more patient effort)  -SV     Distance (Gait Training Goal 1, PT) 120'  -SV     Time Frame (Gait Training Goal 1, PT) 2 weeks  -SV           User Key  (r) = Recorded By, (t) = Taken By, (c) = Cosigned By    Initials Name Provider Type    Latrice Arana, PT Physical Therapist               Clinical Impression     Row Name 03/18/22 1748          Pain    Pretreatment Pain Rating 9/10  -SV     Posttreatment Pain Rating 9/10  -SV     Pain Location - neck  -SV     Pain Intervention(s) Repositioned  -SV     Row Name 03/18/22 1748          Plan of Care Review    Plan of Care Reviewed With patient;spouse  -SV     Row Name 03/18/22 1748          Therapy Assessment/Plan (PT)    Patient/Family Therapy Goals Statement (PT) return to indep amb  -SV     Rehab Potential (PT) good, to achieve stated therapy  goals  -SV     Criteria for Skilled Interventions Met (PT) yes  -SV     Predicted Duration of Therapy Intervention (PT) 3-6 weeks  -SV     Row Name 03/18/22 1748          Vital Signs    O2 Delivery Pre Treatment supplemental O2  -SV     O2 Delivery Intra Treatment supplemental O2  -SV     O2 Delivery Post Treatment supplemental O2  -SV     Pre Patient Position Supine  -SV     Intra Patient Position Standing  -SV     Post Patient Position Supine  -SV     Row Name 03/18/22 1748          Positioning and Restraints    Pre-Treatment Position in bed  -SV     Post Treatment Position bed  -SV     In Bed with nsg;with family/caregiver;fowlers  -SV           User Key  (r) = Recorded By, (t) = Taken By, (c) = Cosigned By    Initials Name Provider Type    SV Latrice Kirk, PT Physical Therapist               Outcome Measures     Row Name 03/18/22 1751          How much help from another person do you currently need...    Turning from your back to your side while in flat bed without using bedrails? 2  -SV     Moving from lying on back to sitting on the side of a flat bed without bedrails? 2  -SV     Moving to and from a bed to a chair (including a wheelchair)? 1  -SV     Standing up from a chair using your arms (e.g., wheelchair, bedside chair)? 2  -SV     Climbing 3-5 steps with a railing? 1  -SV     To walk in hospital room? 1  -SV     AM-PAC 6 Clicks Score (PT) 9  -SV     Row Name 03/18/22 1751 03/18/22 1307       Functional Assessment    Outcome Measure Options AM-PAC 6 Clicks Basic Mobility (PT)  -SV AM-PAC 6 Clicks Daily Activity (OT)  -SM          User Key  (r) = Recorded By, (t) = Taken By, (c) = Cosigned By    Initials Name Provider Type    Latrice Arana, PT Physical Therapist    Lizeth Lam OT Occupational Therapist                             Physical Therapy Education                 Title: PT OT SLP Therapies (In Progress)     Topic: Physical Therapy (In Progress)     Point: Mobility training  (Done)     Learning Progress Summary           Patient Acceptance, E, VU,NR by  at 3/18/2022 1751                   Point: Home exercise program (Not Started)     Learner Progress:  Not documented in this visit.          Point: Body mechanics (Not Started)     Learner Progress:  Not documented in this visit.          Point: Precautions (Not Started)     Learner Progress:  Not documented in this visit.                      User Key     Initials Effective Dates Name Provider Type Discipline     06/16/21 -  Latrice Kirk, PT Physical Therapist PT              PT Recommendation and Plan  Planned Therapy Interventions (PT): balance training, bed mobility training, strengthening, ROM (range of motion), patient/family education, postural re-education, home exercise program, gait training  Plan of Care Reviewed With: patient, spouse     Time Calculation:    PT Charges     Row Name 03/18/22 1804             Time Calculation    Start Time 1440  -      Stop Time 1511  -      Time Calculation (min) 31 min  -      PT Received On 03/18/22  -      PT - Next Appointment 03/19/22  -      PT Goal Re-Cert Due Date 04/01/22  -            User Key  (r) = Recorded By, (t) = Taken By, (c) = Cosigned By    Initials Name Provider Type     Latrice Kirk, PT Physical Therapist              Therapy Charges for Today     Code Description Service Date Service Provider Modifiers Qty    05438048614 HC PT EVAL MOD COMPLEXITY 3 3/18/2022 Latrice Kirk, PT GP 1    55691177784 HC PT THER SUPP EA 15 MIN 3/18/2022 Latrice Kirk, PT GP 1    00460680995 HC PT THERAPEUTIC ACT EA 15 MIN 3/18/2022 Latrice Kirk, PT GP 1          PT G-Codes  Outcome Measure Options: AM-PAC 6 Clicks Basic Mobility (PT)  AM-PAC 6 Clicks Score (PT): 9  AM-PAC 6 Clicks Score (OT): 8    Latrice Kirk PT  3/18/2022

## 2022-03-18 NOTE — PROGRESS NOTES
NEUROSURGERY PROGRESS NOTE     LOS: 2 days   Patient Care Team:  Demetrio Ko DO as PCP - General (Family Medicine)  Jacky Cristina MD as Consulting Physician (Internal Medicine)    Chief Complaint:  Neck pain    Subjective       Interval History: Reports improvement in neck pain in the Aspen collar. Attempted to get up with OT this am but only able to sit on edge of bed because of severe L shoulder pain.     History taken from: patient chart family    Objective        Vital Signs  Temp:  [97.9 °F (36.6 °C)-98.1 °F (36.7 °C)] 97.9 °F (36.6 °C)  Heart Rate:  [56-70] 68  Resp:  [14-18] 18  BP: (144-162)/(73-81) 144/73     Physical Exam:     AA&O x 3. Aspen collar intact.   Only able to raise L arm about 8 inches off of bed due to pain. Intolerant of passive movement of arm across the chest or with lateral rotation of the arm. Exquisitely tender to palpation of L shoulder joint.   Moving R arm and both legs without difficulty.        Results Review:     I reviewed the patient's new clinical results.   .  Results from last 7 days   Lab Units 03/18/22  0824 03/17/22  0922 03/16/22  1847   WBC 10*3/mm3 9.66 10.72 15.39*   HEMOGLOBIN g/dL 15.5 14.1 15.4   HEMATOCRIT % 45.9 43.0 45.7   PLATELETS 10*3/mm3 126* 187 206     .  Results from last 7 days   Lab Units 03/17/22  0922 03/16/22  1847   SODIUM mmol/L 140 142   POTASSIUM mmol/L 4.1 4.5   CHLORIDE mmol/L 106 105   CO2 mmol/L 25.8 25.0   BUN mg/dL 15 19   CREATININE mg/dL 1.35* 1.45*   GLUCOSE mg/dL 119* 183*   CALCIUM mg/dL 9.0 9.7       Assessment/Plan       Closed nondisplaced fracture of second cervical vertebra (HCC)    HTN (hypertension)    Sleep apnea    Type 2 diabetes mellitus with diabetic polyneuropathy (HCC)    Coronary artery disease    RLS (restless legs syndrome)    Neck pain    Constipation    Hyperlipemia    Fall    Chronic peripheral neuropathy    Chronic left arm weakness    Leukocytosis    Stage 3a chronic kidney disease (HCC)    Injury of left  shoulder      Will check X-rays of L shoulder  Consult orthopedics  Continue cervical collar for C2 fracture  Mobilize as able with PT/OT    Carrie Wheat, APRN  03/18/22  15:51 EDT

## 2022-03-18 NOTE — THERAPY EVALUATION
Patient Name: Chester Gmaa  : 1940    MRN: [id number]                              Today's Date: 3/18/2022       Admit Date: 3/16/2022    Visit Dx:     ICD-10-CM ICD-9-CM   1. Other closed nondisplaced fracture of second cervical vertebra, initial encounter ([facility])  S12.191A 805.02     Patient Active Problem List   Diagnosis   • HTN (hypertension)   • Sleep apnea   • Type 2 diabetes mellitus with diabetic polyneuropathy ([facility])   • Coronary artery disease   • RLS (restless legs syndrome)   • Postoperative wound infection   • Chest pain   • Neck pain   • Constipation   • Closed nondisplaced fracture of second cervical vertebra ([facility])   • Hyperlipemia   • Fall   • Chronic peripheral neuropathy   • Chronic left arm weakness   • Leukocytosis   • Stage 3a chronic kidney disease ([facility])     Past Medical History:   Diagnosis Date   • Arthritis     HANDS KNEES   • Cervical pain (neck)     LEFT ARM PARESTHESIA   • Coronary artery disease    • Depression    • Diabetes mellitus (CMS/[facility])     TYPE 2   • Diverticular disease     HX, S/P RESECTION   • Hx of heart artery stent    • Hyperlipemia    • Hypertension    • PTSD (post-traumatic stress disorder)    • RLS (restless legs syndrome)    • Sleep apnea     DIDNT TOLERATE MASK, CLAUSTROPHOBIC   • Structural abnormality of kidney     SAW NEPHROLOGIST FOR SMALLER KIDNEY - 3 YEARS AGO.   • Thyroid cyst     PER WIFE     Past Surgical History:   Procedure Laterality Date   • ANTERIOR CERVICAL DISCECTOMY W/ FUSION N/A 1/3/2018    Procedure: ANTERIOR CERVICAL DECOMPRESSION & FUSION C4 6;  Surgeon: Vincenzo Soliman DO;  Location: [facility];  Service:    • APPENDECTOMY     • CATARACT EXTRACTION W/ INTRAOCULAR LENS  IMPLANT, BILATERAL     • CERVICAL DISCECTOMY POSTERIOR FUSION WITH INSTRUMENTATION N/A 2019    Procedure: C4-T2 POSTERIOR DECOMPRESSION AND FUSION BONE MORPHOGENIC PROTEIN;  Surgeon: Vincenzo Soliman DO;  Location: [facility];  Service: Orthopedic Spine   •  CERVICAL DISCECTOMY POSTERIOR FUSION WITH INSTRUMENTATION N/A 8/8/2019    Procedure: LEFT FORAMINOTOMY C4-T1, REFUSION OF C4-T1, REMOVAL OF C6 SCREW, REVISION OF HARDWARE;  Surgeon: Vincenzo Soliman DO;  Location: [facility] OR;  Service: Orthopedics   • COLON RESECTION      COLOSTOMY X 6 MOS THEN REVERSED.   • CORONARY ANGIOPLASTY WITH STENT PLACEMENT  02/10/2010    [email or web address]      General Information     Row Name 03/18/22 1247          OT Time and Intention    Document Type evaluation  -     Mode of Treatment occupational therapy;individual therapy  -     Row Name 03/18/22 1247          General Information    Patient Profile Reviewed yes  -SM     Prior Level of Function independent:;ADL's;all household mobility;driving  iADLs  -     Existing Precautions/Restrictions fall;other (see comments);oxygen therapy device and L/min  ASPEN collar at all times per CHARAN, CO2 monitoring, multiple tubes and wires.  -     Barriers to Rehab medically complex  -     Row Name 03/18/22 1247          Occupational Profile    Environmental Supports and Barriers (Occupational Profile) Pt has a wx, and a rollator, no bathroom DME for ADLs. Spouse reports he had a fall in the kitchen 1 year ago but no falls since then until fall resulting in admission.  -     Row Name 03/18/22 1247          Living Environment    People in Home spouse  -     Row Name 03/18/22 1247          Cognition    Orientation Status (Cognition) oriented to;person  pt initally difficult to arouse, RN reports pt had recieved pain meds this morning. He is able to state name and that he is in a hospital but reports city is [facility]. Reports not sure on date/year.  -     Row Name 03/18/22 1247          Safety Issues, Functional Mobility    Safety Issues Affecting Function (Mobility) insight into deficits/self-awareness;awareness of need for assistance;ability to follow commands  -     Impairments Affecting Function (Mobility)  balance;coordination;endurance/activity tolerance;pain;strength;range of motion (ROM);postural/trunk control;cognition;grasp;motor control  -           User Key  (r) = Recorded By, (t) = Taken By, (c) = Cosigned By    Initials Name Provider Type    Lizeth Lam OT Occupational Therapist                 Mobility/ADL's     Row Name 03/18/22 1251          Bed Mobility    Bed Mobility supine-sit;sit-supine  -     Supine-Sit [place] (Bed Mobility) maximum assist (25% patient effort);2 person assist;verbal cues  -     Sit-Supine [place] (Bed Mobility) maximum assist (25% patient effort);2 person assist;verbal cues  -     Assistive Device (Bed Mobility) head of bed elevated;draw sheet;bed rails  -     Comment, (Bed Mobility) log roll technique  -     Row Name 03/18/22 1251          Transfers    Comment, (Transfers) not completed this date, pt in significant pain with EOB sitting today.  -     Row Name 03/18/22 1251          Activities of Daily Living    BADL Assessment/Intervention lower body dressing;grooming;toileting;feeding  -     Row Name 03/18/22 1251          Lower Body Dressing Assessment/Training    [place] Level (Lower Body Dressing) lower body dressing skills;don;socks;dependent (less than 25% patient effort)  -     Position (Lower Body Dressing) edge of bed sitting  -     Row Name 03/18/22 1251          Grooming Assessment/Training    [place] Level (Grooming) grooming skills;maximum assist (25% patient effort);wash face, hands  -     Assistive Devices (Grooming) hand over hand  -     Position (Grooming) sitting up in bed  -     Comment, (Grooming) with R hand  -     Row Name 03/18/22 1251          Toileting Assessment/Training    [place] Level (Toileting) toileting skills;dependent (less than 25% patient effort)  -     Comment, (Toileting) purewick and brief in place, not safe to transfer to [facility] today.  -     Row Name 03/18/22 1251           Self-Feeding Assessment/Training    Comment, (Feeding) Pt spouse reports she assisted with meals today  -           User Key  (r) = Recorded By, (t) = Taken By, (c) = Cosigned By    Initials Name Provider Type    Lizeth Lam OT Occupational Therapist               Obj/Interventions     Row Name 03/18/22 1253          Sensory Interventions    Comment, Sensory Intervention difficult to assess 2/2 pt alertness  -     Row Name 03/18/22 1253          Vision Assessment/Intervention    Vision Assessment Comment Pt opens eyes during encounter but mostly closed 2/2 to pain.  -     Row Name 03/18/22 1253          Range of Motion Comprehensive    Comment, General Range of Motion Impaired AROM all planes BUE. LUE: hands, wrist, elbow impaired 50%. L shoulder flexion impaired 75%. RUE: hands, wrist, elbow impaired 25%. R shoulder flexion impaired 50-75%.  -[facility] Name 03/18/22 1253          Strength Comprehensive (MMT)    Comment, General Manual Muscle Testing (MMT) Assessment generalized weakness. LUE impaired strength at baseline. LUE 2-/5 grossly. RUE 2/5 to 2+/5 grossly.  -[facility] Name 03/18/22 1253          Motor Skills    Motor Skills coordination;muscle tone  -     Coordination --  LUE tremors noted. BUE coordination severally impaired 2/2 weakness and ROM limiting engagement in all ADLs.  -     Muscle Tone left;upper extremity(s);hypertonia  -[facility] Name 03/18/22 1253          Balance    Balance Assessment sitting static balance  -     Static Sitting Balance --  initally max A but able to progress to CGA.  -     Position, Sitting Balance sitting edge of bed  -     Comment, Balance Sat EOB for 8 minutes this date, reports some pain relief in sitting position.  -           User Key  (r) = Recorded By, (t) = Taken By, (c) = Cosigned By    Initials Name Provider Type    Lizeth Lam OT Occupational Therapist               Goals/Plan     Row Name 03/18/22 1309          Transfer  Goal 1 (OT)    Activity/Assistive Device (Transfer Goal 1, OT) transfers, all;sit-to-stand/stand-to-sit;bed-to-chair/chair-to-bed;commode, bedside without drop arms  -SM     [place] Level/Cues Needed (Transfer Goal 1, OT) maximum assist (25-49% patient effort)  -SM     Time Frame (Transfer Goal 1, OT) short term goal (STG);2 weeks  -SM     Progress/Outcome (Transfer Goal 1, OT) goal ongoing  -     Row Name 03/18/22 1305          Bathing Goal 1 (OT)    Activity/Device (Bathing Goal 1, OT) bathing skills, all;upper body bathing  -SM     [place] Level/Cues Needed (Bathing Goal 1, OT) minimum assist (75% or more patient effort)  -SM     Time Frame (Bathing Goal 1, OT) short term goal (STG);2 weeks  -SM     Progress/Outcomes (Bathing Goal 1, OT) goal ongoing  -     Row Name 03/18/22 1305          Dressing Goal 1 (OT)    Activity/Device (Dressing Goal 1, OT) dressing skills, all;upper body dressing  -SM     [place]/Cues Needed (Dressing Goal 1, OT) minimum assist (75% or more patient effort)  -SM     Time Frame (Dressing Goal 1, OT) short term goal (STG);2 weeks  -SM     Progress/Outcome (Dressing Goal 1, OT) goal ongoing  -     Row Name 03/18/22 1305          Grooming Goal 1 (OT)    Activity/Device (Grooming Goal 1, OT) grooming skills, all  -SM     [place] (Grooming Goal 1, OT) standby assist  -SM     Time Frame (Grooming Goal 1, OT) short term goal (STG);2 weeks  -SM     Progress/Outcome (Grooming Goal 1, OT) goal ongoing  -     Row Name 03/18/22 1305          Self-Feeding Goal 1 (OT)    Activity/Device (Self-Feeding Goal 1, OT) self-feeding skills, all  -SM     [place] Level/Cues Needed (Self-Feeding Goal 1, OT) standby assist  -SM     Time Frame (Self-Feeding Goal 1, OT) short term goal (STG);2 weeks  -SM     Strategies/Barriers (Self-Feeding Goal 1, OT) may benefit from built up utensils.  -SM     Progress/Outcomes (Self-Feeding Goal 1, OT) goal ongoing  -     Row Name 03/18/22  1305          Therapy Assessment/Plan (OT)    Planned Therapy Interventions (OT) activity tolerance training;adaptive equipment training;BADL retraining;cognitive/visual perception retraining;functional balance retraining;IADL retraining;neuromuscular control/coordination retraining;occupation/activity based interventions;orthotic fabrication/fitting/training;passive ROM/stretching;patient/caregiver education/training;ROM/therapeutic exercise;strengthening exercise;transfer/mobility retraining  -           User Key  (r) = Recorded By, (t) = Taken By, (c) = Cosigned By    Initials Name Provider Type     Lzieth Peoples, FREDRICK Occupational Therapist               Clinical Impression     Row Name 03/18/22 0718          Pain Assessment    Pain Intervention(s) Medication (See MAR);Ambulation/increased activity;Repositioned  -     Additional Documentation Pain Scale: FACES Pre/Post-Treatment (Group)  -     Row Name 03/18/22 2130          Pain Scale: FACES Pre/Post-Treatment    Pain: FACES Scale, Pretreatment 10-->hurts worst  -SM     Posttreatment Pain Rating 10-->hurts worst  -     Row Name 03/18/22 9138          Plan of Care Review    Plan of Care Reviewed With patient  -     Outcome Evaluation Pt is a 81 y.o male admitted to [facility] after falling backwards while digging a ditch, he was found to have fracture of 2nd vertebra body. He has an extensive hx of cervical and thoracic spine surgeries. He was evaluated by CHARAN and recommended ASPEN collar at all times. Pt is typically independent at home and lives with his spouse. He is ongoing work up prior to this fall for early stages of PD. Spouse reports he typically has good balance and does not use AD. He has chronic LUE weakness from previous surgeries and onset of tremor. Today pt evaluated by OT. He requires Max AX2 for all bed mobility and extra time to complete. Pt currently max/total A for all ADLs 2/2 UE weakness. He requires max A to wash face with  washcloth with his R hand. Pt appears to be in significant pain despite having pain meds prior to session. Extra time spent discussing with spouse dc recommendations. He is not safe to dc home at present. Pt would benefit from acute rehab at [place] and continued OT to address deficits and increase independence with ADLs.  -[facility] Name 03/18/22 1255          Therapy Assessment/Plan (OT)    Rehab Potential (OT) good, to achieve stated therapy goals  -     Criteria for Skilled Therapeutic Interventions Met (OT) yes;skilled treatment is necessary  -     Therapy Frequency (OT) 5 times/wk  -     Row Name 03/18/22 1258          Therapy Plan Review/Discharge Plan (OT)    Anticipated Discharge Disposition (OT) inpatient rehabilitation facility  -     Row Name 03/18/22 1258          Positioning and Restraints    Pre-Treatment Position in bed  -     Post Treatment Position bed  -SM     In Bed fowlers;call light within reach;encouraged to call for assist;with family/caregiver;notified nsg  -           User Key  (r) = Recorded By, (t) = Taken By, (c) = Cosigned By    Initials Name Provider Type    Lizeth Lam OT Occupational Therapist               Outcome Measures     Row Name 03/18/22 4629          How much help from another is currently needed...    Putting on and taking off regular lower body clothing? 1  -SM     Bathing (including washing, rinsing, and drying) 1  -SM     Toileting (which includes using toilet bed pan or urinal) 1  -SM     Putting on and taking off regular upper body clothing 1  -SM     Taking care of personal grooming (such as brushing teeth) 2  -SM     Eating meals 2  -SM     AM-PAC 6 Clicks Score (OT) 8  -[facility] Name 03/18/22 130          Functional Assessment    Outcome Measure Options AM-PAC 6 Clicks Daily Activity (OT)  -           User Key  (r) = Recorded By, (t) = Taken By, (c) = Cosigned By    Initials Name Provider Type    Lizeth Lam OT Occupational Therapist                 Occupational Therapy Education                 Title: PT OT SLP Therapies (In Progress)     Topic: Occupational Therapy (In Progress)     Point: ADL training (Done)     Description:   Instruct learner(s) on proper safety adaptation and remediation techniques during self care or transfers.   Instruct in proper use of assistive devices.              Learning Progress Summary           Patient Acceptance, E, VU by  at 3/18/2022 6448    Comment: Role of OT and POC/goals. UE ROM exercises which spouse states she has been doing as she assists with LUE stretching at baseline. DC recommendations.                   Point: Home exercise program (Not Started)     Description:   Instruct learner(s) on appropriate technique for monitoring, assisting and/or progressing therapeutic exercises/activities.              Learner Progress:  Not documented in this visit.          Point: Precautions (Not Started)     Description:   Instruct learner(s) on prescribed precautions during self-care and functional transfers.              Learner Progress:  Not documented in this visit.          Point: Body mechanics (Not Started)     Description:   Instruct learner(s) on proper positioning and spine alignment during self-care, functional mobility activities and/or exercises.              Learner Progress:  Not documented in this visit.                      User Key     Initials Effective Dates Name Provider Type Discipline     04/02/20 -  Lizeth Peoples OT Occupational Therapist OT              OT Recommendation and Plan  Planned Therapy Interventions (OT): activity tolerance training, adaptive equipment training, BADL retraining, cognitive/visual perception retraining, functional balance retraining, IADL retraining, neuromuscular control/coordination retraining, occupation/activity based interventions, orthotic fabrication/fitting/training, passive ROM/stretching, patient/caregiver education/training, ROM/therapeutic  exercise, strengthening exercise, transfer/mobility retraining  Therapy Frequency (OT): 5 times/wk  Plan of Care Review  Plan of Care Reviewed With: patient  Outcome Evaluation: Pt is a 81 y.o male admitted to [facility] after falling backwards while digging a ditch, he was found to have fracture of 2nd vertebra body. He has an extensive hx of cervical and thoracic spine surgeries. He was evaluated by CHARAN and recommended ASPEN collar at all times. Pt is typically independent at home and lives with his spouse. He is ongoing work up prior to this fall for early stages of PD. Spouse reports he typically has good balance and does not use AD. He has chronic LUE weakness from previous surgeries and onset of tremor. Today pt evaluated by OT. He requires Max AX2 for all bed mobility and extra time to complete. Pt currently max/total A for all ADLs 2/2 UE weakness. He requires max A to wash face with washcloth with his R hand. Pt appears to be in significant pain despite having pain meds prior to session. Extra time spent discussing with spouse dc recommendations. He is not safe to dc home at present. Pt would benefit from acute rehab at [place] and continued OT to address deficits and increase independence with ADLs.     Time Calculation:    Time Calculation- OT     Row Name 03/18/22 1309             Time Calculation- OT    OT Start Time 1135  -SM      OT Stop Time 1210  -      OT Time Calculation (min) 35 min  -SM      Total Timed Code Minutes- OT 20 minute(s)  -SM      OT Received On 03/18/22  -      OT - Next Appointment 03/21/22  -      OT Goal Re-Cert Due Date 04/01/22  -SM              Timed Charges    26594 - OT Therapeutic Activity Minutes 20  -SM      25202 - OT Self Care/Mgmt Minutes --  -SM              Untimed Charges    OT Eval/Re-eval Minutes 15  -SM              Total Minutes    Timed Charges Total Minutes 20  -SM      Untimed Charges Total Minutes 15  -SM       Total Minutes 35  -SM            User Key  (r) =  Recorded By, (t) = Taken By, (c) = Cosigned By    Initials Name Provider Type     Lizeth Peoples OT Occupational Therapist              Therapy Charges for Today     Code Description Service Date Service Provider Modifiers Qty    [id number]  OT EVAL HIGH COMPLEXITY 3 3/18/2022 Lizeth Peoples OT GO 1    [id number]  OT THERAPEUTIC ACT EA 15 MIN 3/18/2022 Lizeth Peoples OT GO 1               Lizeth Peoples OT  3/18/2022

## 2022-03-18 NOTE — CASE MANAGEMENT/SOCIAL WORK
Continued Stay Note  [facility]     Patient Name: Chester Gama  MRN: [id number]  Today's Date: 3/18/2022    Admit Date: 3/16/2022     Discharge Plan     Row Name 03/18/22 1238       Plan    Plan Referrals to [facility] and [facility] per spouse request.    Plan Comments OT s/w CCP and said pt currently not safe for home upon DC.  CCP s/w pt's spouse Sadie at bedside who requests a referral to [facility].  She is in agreement with referral to [facility] as a backup.  CCP discussed backup subacute plans.  Sadie states she does not want him to go to a nursing home.  CCP attempted to discuss the SNF vs LTC.  Sadie will discuss with their family and let CCP know.  CCP contact info given.  Referrals called to [facility] and [facility].  Evals pending. ...............Keerthi PEÑA/ MARYLOU               Discharge Codes    No documentation.               Expected Discharge Date and Time     Expected Discharge Date Expected Discharge Time    Mar 19, 2022             Keerthi Hernandez RN

## 2022-03-18 NOTE — PLAN OF CARE
Goal Outcome Evaluation:  Plan of Care Reviewed With: patient           Outcome Evaluation: Pt is a 81 y.o male admitted to [facility] after falling backwards while digging a ditch, he was found to have fracture of 2nd vertebra body. He has an extensive hx of cervical and thoracic spine surgeries. He was evaluated by CHARAN and recommended ASPEN collar at all times. Pt is typically independent at home and lives with his spouse. He is ongoing work up prior to this fall for early stages of PD. Spouse reports he typically has good balance and does not use AD. He has chronic LUE weakness from previous surgeries and onset of tremor. Today pt evaluated by OT. He requires Max AX2 for all bed mobility and extra time to complete. Pt currently max/total A for all ADLs 2/2 UE weakness. He requires max A to wash face with washcloth with his R hand. Pt appears to be in significant pain despite having pain meds prior to session. Extra time spent discussing with spouse dc recommendations. He is not safe to dc home at present. Pt would benefit from acute rehab at [place] and continued OT to address deficits and increase independence with ADLs.    OT wore mask, eye protection, gloves, and completed hand hygiene. Pt did not wear a mask.

## 2022-03-18 NOTE — PROGRESS NOTES
Name: Chester Gama ADMIT: 3/16/2022   : 1940  PCP: Demetrio Ko DO    MRN: [id number] LOS: 2 days   AGE/SEX: 81 y.o. male  ROOM: [facility]     Subjective   Subjective   Patient is resting in bed, he is accompanied by his wife.  Today the patient is asleep, difficult to arose/lethargic.  Dilaudid PCA has been stopped.  He is to work with physical therapy today.    Review of Systems   Unable to perform ROS: Other (Very drowsy, recently taken off of PCA pump)        Objective   Objective   Vital Signs  Temp:  [98 °F (36.7 °C)-98.1 °F (36.7 °C)] 98.1 °F (36.7 °C)  Heart Rate:  [56-70] 67  Resp:  [14-18] 18  BP: (153-162)/(79-81) 162/81  SpO2:  [94 %-99 %] 97 %  on  Flow (L/min):  [2] 2;   Device (Oxygen Therapy): nasal cannula  Body mass index is 37.74 kg/m².  Physical Exam  Constitutional:       Appearance: Normal appearance. He is obese. He is not toxic-appearing.      Comments: Moaning in pain   HENT:      Head: Normocephalic and atraumatic.      Mouth/Throat:      Mouth: Mucous membranes are moist.      Pharynx: Oropharynx is clear.   Eyes:      Extraocular Movements: Extraocular movements intact.      Conjunctiva/sclera: Conjunctivae normal.      Pupils: Pupils are equal, round, and reactive to light.   Neck:      Comments: In Aspen collar  Cardiovascular:      Rate and Rhythm: Normal rate and regular rhythm.      Pulses: Normal pulses.      Heart sounds: No murmur heard.  Pulmonary:      Effort: Pulmonary effort is normal. No respiratory distress.      Breath sounds: Normal breath sounds. No wheezing.   Abdominal:      General: Abdomen is flat. Bowel sounds are normal. There is no distension.      Palpations: Abdomen is soft.      Tenderness: There is no abdominal tenderness.   Musculoskeletal:         General: No swelling or tenderness.      Right lower leg: No edema.      Left lower leg: No edema.   Skin:     General: Skin is warm and dry.   Neurological:      General: No focal deficit present.       Mental Status: He is alert. Mental status is at baseline.      Motor: No weakness.   Psychiatric:      Comments: Asleep, difficult to arouse         Results Review     I reviewed the patient's new clinical results.  Results from last 7 days   Lab Units 03/18/22  0824 03/17/22  0922 03/16/22  1847   WBC 10*3/mm3 9.66 10.72 15.39*   HEMOGLOBIN g/dL 15.5 14.1 15.4   PLATELETS 10*3/mm3 126* 187 206     Results from last 7 days   Lab Units 03/17/22  0922 03/16/22  1847   SODIUM mmol/L 140 142   POTASSIUM mmol/L 4.1 4.5   CHLORIDE mmol/L 106 105   CO2 mmol/L 25.8 25.0   BUN mg/dL 15 19   CREATININE mg/dL 1.35* 1.45*   GLUCOSE mg/dL 119* 183*   CrCl cannot be calculated (Unknown ideal weight.).  Results from last 7 days   Lab Units 03/16/22  1847   ALBUMIN g/dL 4.50   BILIRUBIN mg/dL 0.5   ALK PHOS U/L 86   AST (SGOT) U/L 27   ALT (SGPT) U/L 26     Results from last 7 days   Lab Units 03/17/22  0922 03/16/22  1847   CALCIUM mg/dL 9.0 9.7   ALBUMIN g/dL  --  4.50       COVID19   Date Value Ref Range Status   03/16/2022 Not Detected Not Detected - Ref. Range Final     Hemoglobin A1C   Date/Time Value Ref Range Status   03/16/2022 1847 8.40 (H) 4.80 - 5.60 % Final     Glucose   Date/Time Value Ref Range Status   03/18/2022 1104 130 70 - 130 mg/dL Final     Comment:     Meter: [id number] : [id number] Alisa Maya NA   03/18/2022 0622 107 70 - 130 mg/dL Final     Comment:     Meter: [id number] : [id number] Carolynn Hamlin NA   03/17/2022 2213 112 70 - 130 mg/dL Final     Comment:     Meter: [id number] : [id number] Carolynn Hamlin NA   03/17/2022 1557 90 70 - 130 mg/dL Final     Comment:     Meter: [id number] : [id number] Reno Rodriguez CNA   03/17/2022 1053 129 70 - 130 mg/dL Final     Comment:     Meter: [id number] : [id number] Reno Rodriguez CNA   03/17/2022 0603 95 70 - 130 mg/dL Final     Comment:     Meter: [id number] : [id number] Natalya JOHNSON   03/16/2022 1929 145 (H) 70 - 130 mg/dL Final      Comment:     Meter: [id number] : [id number] Pedro Perkins EDT       CT Cervical Spine Without Contrast, CT Thoracic Spine Without Contrast  Narrative: HISTORY: Fell. Neck and back pain.     CT OF THE CERVICAL SPINE WITHOUT CONTRAST 03/16/2022     TECHNIQUE: Axial images were obtained from the skull base to the upper  thoracic spine. Sagittal and coronal reconstruction images were  reviewed.     FINDINGS: There is a mildly displaced fracture through the C2 vertebra  at the junction of the posterior vertebral body and the posterior  elements. The fracture appears mildly displaced. Fracture extends into  the left transversarium foramen. There is mild canal stenosis at this  level. No fractures of the base of the odontoid process is seen. There  is degenerative disease of the C1-C2 articulation.     There is fusion of the C3-4 levels. Anterior cervical plate is seen  fusing C4-C6. Intervertebral disc spacers are seen at C4-5 and C5-6.  There is also some spondylosis at C6-7 and C7-T1. Posterior fusion is  seen from C4 through T2.     No other fractures are seen.     Impression: 1. Fracture of the C2 vertebra involving the posterior aspect of the C2  vertebral body at its junction with the posterior elements. This shows  mild displacement and mild canal stenosis. Fracture extends into the  left transversarium foramen. If further evaluation of the vertebral  arteries is clinically indicated a CT angiogram of the neck may be  helpful.  2.. No other fractures are seen.  3. Postoperative changes of the cervical spine.     CT OF THE THORACIC SPINE WITHOUT CONTRAST 03/16/2022     TECHNIQUE: Axial images were obtained through the thoracic spine.  Sagittal and coronal reconstruction images were reviewed.     FINDINGS: C4-C6 anterior fusion device is seen with lower cervical and  upper thoracic fusion to approximately the T2 level.     There is anterior osteophytosis at essentially all thoracic levels.     No thoracic  compression fractures or other fractures are seen. No  subluxation is seen. No significant canal stenosis is seen.     There are some ill-defined areas of scar, atelectasis or inflammatory  infiltrates in the bilateral lower lobes.     IMPRESSION:  1. Postoperative changes of the cervicothoracic junction.  2. Thoracic osteophytosis..  3. No thoracic spine fractures are seen.              Radiation dose reduction techniques were utilized, including automated  exposure control and exposure modulation based on body size.     This report was finalized on 3/16/2022 10:17 PM by Dr. Tony Mckee M.D.     CT Head Without Contrast  Narrative: CT OF THE BRAIN WITHOUT CONTRAST 03/16/2022     HISTORY: Fell, head injury.     TECHNIQUE: Axial images were obtained through the brain without  intravenous contrast.     FINDINGS: There is moderate diffuse atrophy most severe in the frontal  lobes. There is no evidence of acute infarction, hemorrhage, midline  shift or mass effect.     There is mild mucosal thickening in the ethmoid and rightward aspect of  the frontal sinus. No bony abnormalities are seen.     Impression: 1. No acute intracranial process.  2. Mild sinusitis.     Radiation dose reduction techniques were utilized, including automated  exposure control and exposure modulation based on body size.     This report was finalized on 3/16/2022 10:13 PM by Dr. Tony Mckee M.D.       Scheduled Medications  aspirin, 81 mg, Oral, Daily  atorvastatin, 80 mg, Oral, Daily  Diclofenac Sodium, 2 g, Topical, BID  docusate sodium, 200 mg, Oral, Daily  gabapentin, 300 mg, Oral, TID  insulin lispro, 0-14 Units, Subcutaneous, TID AC  metFORMIN, 500 mg, Oral, BID With Meals  methocarbamol, 1,000 mg, Oral, Q8H  metoprolol tartrate, 12.5 mg, Oral, Q12H  oxyCODONE-acetaminophen, 1 tablet, Oral, Q4H  senna-docusate sodium, 2 tablet, Oral, BID  sodium chloride, 3 mL, Intravenous, Q12H  vitamin B-12, 1,000 mcg, Oral,  Daily    Infusions  sodium chloride, 75 mL/hr, Last Rate: 75 mL/hr (03/18/22 0333)    Diet  Diet Regular; Cardiac         I have personally reviewed:  [x]  Laboratory   []  Microbiology   [x]  Radiology   []  EKG/Telemetry   []  Cardiology/Vascular   []  Pathology   []  Records    Assessment/Plan     Active Hospital Problems    Diagnosis  POA   • **Closed nondisplaced fracture of second cervical vertebra (HCC) [S12.101A]  Yes   • Fall [W19.XXXA]  Yes   • Chronic peripheral neuropathy [G62.9]  Yes   • Chronic left arm weakness [R29.898]  Yes   • Leukocytosis [D72.829]  Yes   • Stage 3a chronic kidney disease (HCC) [N18.31]  Yes   • Hyperlipemia [E78.5]  Yes   • Constipation [K59.00]  Yes   • Neck pain [M54.2]  Yes   • Sleep apnea [G47.30]  Yes   • RLS (restless legs syndrome) [G25.81]  Yes   • HTN (hypertension) [I10]  Yes   • Type 2 diabetes mellitus with diabetic polyneuropathy (HCC) [E11.42]  Yes   • Coronary artery disease [I25.10]  Yes      Resolved Hospital Problems   No resolved problems to display.     81-year-old male with previous neck surgery and cervical hardware presents to the hospital after a mechanical fall and acute C2 vertebral fracture.     Vertebral fracture C2:  - continue Aspen collar, no acute surgical intervention  - Pain meds adjusted: now on percocet 10mg [id number], dilaudid 0.5mg [id number] PRN, robaxin 1g [id number]; stop dilaudid PCA and baclofen  - has prn narcan for resp depression     Leukocytosis:  - Felt to be most likely reactive.  Patient is afebrile with stable vital signs, UA negative  - down trended without intervention     Chronic neuropathic pain:  - As per above continue gabapentin but decrease the dose due to slightly elevated creatinine.  - Plan on resuming home dose when kidney fcn improves     Chronic kidney disease 3A:  Previous records reviewed and baseline creatinine appears to range from 1.0-1.2.  Admitting creatinine is 1.45; improved to 1.35 overnight  - Will start IVF at  75cc/hr  - still pending AM labs     Insulin-dependent diabetes:  Continue Metformin.  Monitor glucose and correction insulin as needed.  At home patient takes approximately 30 units of Lantus twice daily plan tohold as oral intake will likely be decreased and adjust as needed.    - BG within range ; no SSI given     Sleep apnea: Not on CPAP.  Monitor pulse oximetry and supplemental oxygen as needed.    SCDs for DVT prophylaxis.  Full code.  Discussed with patient, family, nursing staff, CCP, care team on multidisciplinary rounds and Pharmacist.  Anticipate discharge home with HH vs SNU facility timing yet to be determined. maybe this weekend if pain can be controlled?      Danuta Nair MD  [facility]  03/18/22  12:33 EDT    I wore protective equipment throughout this patient encounter including a face mask, gloves and protective eyewear.  Hand hygiene was performed before donning protective equipment and after removal when leaving the room.

## 2022-03-18 NOTE — PLAN OF CARE
Goal Outcome Evaluation:  Plan of Care Reviewed With: patient, spouse            Pt is a very active  81 year old who was admitted due to C2 fx falling a fall sustained while he was digging a trench for a septic system. Per CHARAN mobilize with Aspen collar on at all times. PMH is prior extensive cervical surgery with residual LUE weakness. Per wife pt recent dx with onset of Parkinsons with LUE non intention tremor noted. Pt also RLS, CAD, HTN and DM with PN. BLE impaired sensation noted today with left worse than right. Pt LUE is markedly impaired at shoulder due to severe pain. Xray ordered and performed and was noted negative for fx just prior to this note. NATASHA shoulders strength is 2-/5.  and elbow flexion 2-/5  as well on left. PT BLE appear 3- to 3/5 grossly. Pt able to slr NATASHA. Pt max of 2 for sup to /from sit and at least mod of 2 for STS to rwx. LUE asst with placement on  due to severe pain with attempt to move LUE in any directions. Anticipate further imaging of LUE needed. Pt able to stand with cga of 2 using rwx and able to take very small steps 9-12 inches forward and backward. He is very slow and guarded with each movement. Pt with severe pain in neck during stand to sit. Recommend further education on avoidance of leaning forward as able to reduce neck pain as able . Pt demonstrated BUE/BLE weakness, impaired endurance , impaired mobility in all areas , impaired balance. Pt will likely benefit from cont skilled PT to address these deficits and progress as able prior t d/c to rehab. Recommend acute rehab for pt. Pt is very cooperative and motivated.

## 2022-03-18 NOTE — DISCHARGE PLACEMENT REQUEST
Chester Garcia (81 y.o. Male)             Date of Birth   1940    Social Security Number       Address   [address] [place] [address]    Home Phone   [phone]    MRN   [id number]       [facility]    Marital Status                               Admission Date   3/16/22    Admission Type   Emergency    Admitting Provider   Hardy Alex MD    Attending Provider   Danuta Nair MD    Department, Room/Bed   [address], S513/1       Discharge Date       Discharge Disposition       Discharge Destination                               Attending Provider: Danuta Nair MD    Allergies: Contrast Dye, Cyclobenzaprine    Isolation: None   Infection: None   Code Status: CPR   Advance Care Planning Activity    Ht: --   Wt: 113 kg (248 lb 3.8 oz)    Admission Cmt: None   Principal Problem: Closed nondisplaced fracture of second cervical vertebra (HCC) [S12.101A]                 Active Insurance as of 3/16/2022     Primary Coverage     Payor Plan Insurance Group Employer/Plan Group    MEDICARE MEDICARE A ONLY      Payor Plan Address Payor Plan Phone Number Payor Plan Fax Number Effective Dates    [address] [phone]  6/1/2007 - None Entered    [facility] [id number]       Subscriber Name Subscriber Birth Date Member ID       CHESTER GARCIA 1940 [id number]           Secondary Coverage     Payor Plan Insurance Group Employer/Plan Group    [facility] DEPT [id number]      Payor Plan Address Payor Plan Phone Number Payor Plan Fax Number Effective Dates    [facility] OFFICE OF [facility] CARE [phone]  12/1/2017 - None Entered    [address] [address]       [facility] [id number]       Subscriber Name Subscriber Birth Date Member ID       CHESTER GARCIA 1940 [id number]                 Emergency Contacts      (Rel.) Home Phone Work Phone Mobile Phone    Sadie Garcia (Spouse) [phone] -- [phone]    Jeanine Mcmanus  (Daughter) -- -- [phone]    Duc Gama (Son) [phone] -- [phone]

## 2022-03-18 NOTE — PROGRESS NOTES
[facility] Acute Inpt Rehab     Received referral for acute inpatient rehab.  Chart reviewed.  At this time, patient unable to participate in transfers due to pain per occupational therapy eval.  Will follow to see if patient would be able to tolerate 3 hours of therapy a day, which is the requirement for acute rehab.    Await physical therapy evaluation.     Will follow at this time for rehab needs.    Thanks,   Candace Egan RN  Rehab Admission Nurse  [phone]

## 2022-03-18 NOTE — PLAN OF CARE
Goal Outcome Evaluation:  Plan of Care Reviewed With: patient, spouse        Progress: improving  Outcome Evaluation: VSS on 2LNC. AOx4. Pt worked with PT/OT today and able to take couple steps with walker and assistx2. Aspen C collar remains in place. PCA DC'd. Scheduled percocet Q4H and dilaudid Q2 PRN added. Pt lethargic, sleeping between care. Plan for inpatient acute rehab at discharge. WCKAILEY.

## 2022-03-19 LAB
ANION GAP SERPL CALCULATED.3IONS-SCNC: 7 MMOL/L (ref 5–15)
BASOPHILS # BLD AUTO: 0.04 10*3/MM3 (ref 0–0.2)
BASOPHILS NFR BLD AUTO: 0.4 % (ref 0–1.5)
BUN SERPL-MCNC: 14 MG/DL (ref 8–23)
BUN/CREAT SERPL: 12.1 (ref 7–25)
CALCIUM SPEC-SCNC: 8.2 MG/DL (ref 8.6–10.5)
CHLORIDE SERPL-SCNC: 105 MMOL/L (ref 98–107)
CO2 SERPL-SCNC: 25 MMOL/L (ref 22–29)
CREAT SERPL-MCNC: 1.16 MG/DL (ref 0.76–1.27)
DEPRECATED RDW RBC AUTO: 39.5 FL (ref 37–54)
EGFRCR SERPLBLD CKD-EPI 2021: 64.1 ML/MIN/1.73
EOSINOPHIL # BLD AUTO: 0.32 10*3/MM3 (ref 0–0.4)
EOSINOPHIL NFR BLD AUTO: 3.6 % (ref 0.3–6.2)
ERYTHROCYTE [DISTWIDTH] IN BLOOD BY AUTOMATED COUNT: 12.5 % (ref 12.3–15.4)
GLUCOSE BLDC GLUCOMTR-MCNC: 114 MG/DL (ref 70–130)
GLUCOSE BLDC GLUCOMTR-MCNC: 143 MG/DL (ref 70–130)
GLUCOSE BLDC GLUCOMTR-MCNC: 154 MG/DL (ref 70–130)
GLUCOSE BLDC GLUCOMTR-MCNC: 156 MG/DL (ref 70–130)
GLUCOSE SERPL-MCNC: 162 MG/DL (ref 65–99)
HCT VFR BLD AUTO: 45.1 % (ref 37.5–51)
HGB BLD-MCNC: 14.8 G/DL (ref 13–17.7)
IMM GRANULOCYTES # BLD AUTO: 0.02 10*3/MM3 (ref 0–0.05)
IMM GRANULOCYTES NFR BLD AUTO: 0.2 % (ref 0–0.5)
LYMPHOCYTES # BLD AUTO: 1.18 10*3/MM3 (ref 0.7–3.1)
LYMPHOCYTES NFR BLD AUTO: 13.2 % (ref 19.6–45.3)
MAGNESIUM SERPL-MCNC: 2.2 MG/DL (ref 1.6–2.4)
MCH RBC QN AUTO: 28.6 PG (ref 26.6–33)
MCHC RBC AUTO-ENTMCNC: 32.8 G/DL (ref 31.5–35.7)
MCV RBC AUTO: 87.2 FL (ref 79–97)
MONOCYTES # BLD AUTO: 0.82 10*3/MM3 (ref 0.1–0.9)
MONOCYTES NFR BLD AUTO: 9.2 % (ref 5–12)
NEUTROPHILS NFR BLD AUTO: 6.54 10*3/MM3 (ref 1.7–7)
NEUTROPHILS NFR BLD AUTO: 73.4 % (ref 42.7–76)
NRBC BLD AUTO-RTO: 0 /100 WBC (ref 0–0.2)
PHOSPHATE SERPL-MCNC: 2.7 MG/DL (ref 2.5–4.5)
PLATELET # BLD AUTO: 157 10*3/MM3 (ref 140–450)
PMV BLD AUTO: 9.4 FL (ref 6–12)
POTASSIUM SERPL-SCNC: 4.4 MMOL/L (ref 3.5–5.2)
RBC # BLD AUTO: 5.17 10*6/MM3 (ref 4.14–5.8)
SODIUM SERPL-SCNC: 137 MMOL/L (ref 136–145)
WBC NRBC COR # BLD: 8.92 10*3/MM3 (ref 3.4–10.8)

## 2022-03-19 PROCEDURE — 25010000002 HYDROMORPHONE PER 4 MG: Performed by: STUDENT IN AN ORGANIZED HEALTH CARE EDUCATION/TRAINING PROGRAM

## 2022-03-19 PROCEDURE — 83735 ASSAY OF MAGNESIUM: CPT | Performed by: STUDENT IN AN ORGANIZED HEALTH CARE EDUCATION/TRAINING PROGRAM

## 2022-03-19 PROCEDURE — 80048 BASIC METABOLIC PNL TOTAL CA: CPT | Performed by: STUDENT IN AN ORGANIZED HEALTH CARE EDUCATION/TRAINING PROGRAM

## 2022-03-19 PROCEDURE — 63710000001 INSULIN LISPRO (HUMAN) PER 5 UNITS: Performed by: INTERNAL MEDICINE

## 2022-03-19 PROCEDURE — 99232 SBSQ HOSP IP/OBS MODERATE 35: CPT | Performed by: NEUROLOGICAL SURGERY

## 2022-03-19 PROCEDURE — 85025 COMPLETE CBC W/AUTO DIFF WBC: CPT | Performed by: STUDENT IN AN ORGANIZED HEALTH CARE EDUCATION/TRAINING PROGRAM

## 2022-03-19 PROCEDURE — 82962 GLUCOSE BLOOD TEST: CPT

## 2022-03-19 PROCEDURE — 84100 ASSAY OF PHOSPHORUS: CPT | Performed by: STUDENT IN AN ORGANIZED HEALTH CARE EDUCATION/TRAINING PROGRAM

## 2022-03-19 RX ORDER — GABAPENTIN 300 MG/1
600 CAPSULE ORAL 3 TIMES DAILY
Status: DISCONTINUED | OUTPATIENT
Start: 2022-03-19 | End: 2022-03-23 | Stop reason: HOSPADM

## 2022-03-19 RX ORDER — OXYCODONE AND ACETAMINOPHEN 10; 325 MG/1; MG/1
2 TABLET ORAL EVERY 6 HOURS
Status: DISCONTINUED | OUTPATIENT
Start: 2022-03-19 | End: 2022-03-22

## 2022-03-19 RX ADMIN — METOPROLOL TARTRATE 12.5 MG: 25 TABLET, FILM COATED ORAL at 08:24

## 2022-03-19 RX ADMIN — METOPROLOL TARTRATE 12.5 MG: 25 TABLET, FILM COATED ORAL at 21:26

## 2022-03-19 RX ADMIN — DOCUSATE SODIUM 50MG AND SENNOSIDES 8.6MG 2 TABLET: 8.6; 5 TABLET, FILM COATED ORAL at 21:26

## 2022-03-19 RX ADMIN — METFORMIN HYDROCHLORIDE 500 MG: 500 TABLET ORAL at 08:23

## 2022-03-19 RX ADMIN — OXYCODONE HYDROCHLORIDE AND ACETAMINOPHEN 1 TABLET: 10; 325 TABLET ORAL at 08:24

## 2022-03-19 RX ADMIN — GABAPENTIN 300 MG: 300 CAPSULE ORAL at 08:24

## 2022-03-19 RX ADMIN — OXYCODONE HYDROCHLORIDE AND ACETAMINOPHEN 2 TABLET: 10; 325 TABLET ORAL at 16:05

## 2022-03-19 RX ADMIN — METHOCARBAMOL TABLETS 1000 MG: 500 TABLET, COATED ORAL at 14:26

## 2022-03-19 RX ADMIN — DICLOFENAC SODIUM 2 G: 10 GEL TOPICAL at 08:25

## 2022-03-19 RX ADMIN — OXYCODONE HYDROCHLORIDE AND ACETAMINOPHEN 2 TABLET: 10; 325 TABLET ORAL at 21:27

## 2022-03-19 RX ADMIN — OXYCODONE HYDROCHLORIDE AND ACETAMINOPHEN 1 TABLET: 10; 325 TABLET ORAL at 11:55

## 2022-03-19 RX ADMIN — GABAPENTIN 600 MG: 300 CAPSULE ORAL at 21:27

## 2022-03-19 RX ADMIN — POLYETHYLENE GLYCOL 3350 17 G: 17 POWDER, FOR SOLUTION ORAL at 16:10

## 2022-03-19 RX ADMIN — ASPIRIN 81 MG: 81 TABLET, COATED ORAL at 08:23

## 2022-03-19 RX ADMIN — GABAPENTIN 600 MG: 300 CAPSULE ORAL at 16:06

## 2022-03-19 RX ADMIN — OXYCODONE HYDROCHLORIDE AND ACETAMINOPHEN 1 TABLET: 10; 325 TABLET ORAL at 00:10

## 2022-03-19 RX ADMIN — SODIUM CHLORIDE 75 ML/HR: 9 INJECTION, SOLUTION INTRAVENOUS at 08:25

## 2022-03-19 RX ADMIN — Medication 3 ML: at 08:23

## 2022-03-19 RX ADMIN — ATORVASTATIN CALCIUM 80 MG: 80 TABLET, FILM COATED ORAL at 08:23

## 2022-03-19 RX ADMIN — METHOCARBAMOL TABLETS 1000 MG: 500 TABLET, COATED ORAL at 21:26

## 2022-03-19 RX ADMIN — METHOCARBAMOL TABLETS 1000 MG: 500 TABLET, COATED ORAL at 06:12

## 2022-03-19 RX ADMIN — DOCUSATE SODIUM 50MG AND SENNOSIDES 8.6MG 2 TABLET: 8.6; 5 TABLET, FILM COATED ORAL at 08:23

## 2022-03-19 RX ADMIN — Medication 1000 MCG: at 08:23

## 2022-03-19 RX ADMIN — INSULIN LISPRO 3 UNITS: 100 INJECTION, SOLUTION INTRAVENOUS; SUBCUTANEOUS at 11:55

## 2022-03-19 RX ADMIN — HYDROMORPHONE HYDROCHLORIDE 0.5 MG: 1 INJECTION, SOLUTION INTRAMUSCULAR; INTRAVENOUS; SUBCUTANEOUS at 04:01

## 2022-03-19 RX ADMIN — METFORMIN HYDROCHLORIDE 500 MG: 500 TABLET ORAL at 18:05

## 2022-03-19 RX ADMIN — HYDROMORPHONE HYDROCHLORIDE 0.5 MG: 1 INJECTION, SOLUTION INTRAMUSCULAR; INTRAVENOUS; SUBCUTANEOUS at 06:12

## 2022-03-19 NOTE — PROGRESS NOTES
NEUROSURGERY PROGRESS NOTE     LOS: 3 days   Patient Care Team:  Demetrio Ko DO as PCP - General (Family Medicine)  Jacky Cristina MD as Consulting Physician (Internal Medicine)    Chief Complaint: Status post fall    Subjective     Interval History:     Hospital day 4 for admission of pain management of C2 body fracture and now fairly significant left shoulder pain.  Patient's neck pain has become better controlled that he did require 1 dose of Dilaudid last night for his neck pain.  His activity has been more limited recently by his left shoulder pain which appears to be structural left shoulder pain having chronic shoulder issues in the past to maybe exacerbated by the fall.  X-rays were done yesterday and orthopedic consult is pending    While in the room and during my examination of the patient I wore a mask and eye protection.  I washed my hands before and after this patient encounter.  The patient was also wearing a mask.     History taken from: patient and his wife    Objective      Vital Signs  Temp:  [97.9 °F (36.6 °C)-98.6 °F (37 °C)] 98.3 °F (36.8 °C)  Heart Rate:  [60-77] 77  Resp:  [16-20] 16  BP: (144-175)/(73-86) 175/86  Body mass index is 37.74 kg/m².    Intake/Output last 3 shifts:  I/O last 3 completed shifts:  In: 1311.1 [P.O.:360; I.V.:951.1]  Out: 725 [Urine:725]    Intake/Output this shift:  I/O this shift:  In: 975 [I.V.:975]  Out: -     Physical    Much more conversant today as he is less distracted by pain  Hard collar in good position  Chronic left arm weakness unchanged  Range of motion of left shoulder is markedly limited secondary to pain with passive motion of the shoulder      Results Review:     I reviewed the patient's new clinical results.    Labs:    Lab Results (last 24 hours)     Procedure Component Value Units Date/Time    POC Glucose Once [[id number]]  (Normal) Collected: 03/18/22 1104    Specimen: Blood Updated: 03/18/22 1106     Glucose 130 mg/dL      Comment: Meter:  [id number] : [id number] Alisawil Maya NA       POC Glucose Once [[id number]]  (Normal) Collected: 03/18/22 1553    Specimen: Blood Updated: 03/18/22 1559     Glucose 129 mg/dL      Comment: Meter: [id number] : [id number] Alisawil Maya NA       POC Glucose Once [[id number]]  (Abnormal) Collected: 03/18/22 2123    Specimen: Blood Updated: 03/18/22 2134     Glucose 155 mg/dL      Comment: Meter: [id number] : [id number] Carolynn Hamlin NA       POC Glucose Once [[id number]]  (Normal) Collected: 03/19/22 0615    Specimen: Blood Updated: 03/19/22 0617     Glucose 114 mg/dL      Comment: Meter: [id number] : [id number] Carolynn Hamlin NA       POC Glucose Once [[id number]]  (Abnormal) Collected: 03/19/22 1029    Specimen: Blood Updated: 03/19/22 1030     Glucose 156 mg/dL      Comment: Meter: [id number] : [id number] Orestes JOHNSON             Imaging:    I personally reviewed the images from the following radiographic studies.    Shoulder series left internal and external rotation views demonstrate mild to moderate narrowing of the glenohumeral joint space and also mild bony spurring at the acromioclavicular joint. There is no evidence of recent or old fracture or dislocation.    Current Medications:   Scheduled Meds:aspirin, 81 mg, Oral, Daily  atorvastatin, 80 mg, Oral, Daily  Diclofenac Sodium, 2 g, Topical, BID  gabapentin, 300 mg, Oral, TID  insulin lispro, 0-14 Units, Subcutaneous, TID AC  metFORMIN, 500 mg, Oral, BID With Meals  methocarbamol, 1,000 mg, Oral, Q8H  metoprolol tartrate, 12.5 mg, Oral, Q12H  oxyCODONE-acetaminophen, 1 tablet, Oral, Q4H  senna-docusate sodium, 2 tablet, Oral, BID  sodium chloride, 3 mL, Intravenous, Q12H  vitamin B-12, 1,000 mcg, Oral, Daily      Continuous Infusions:sodium chloride, 75 mL/hr, Last Rate: 75 mL/hr (03/19/22 0825)        Assessment/Plan       Closed nondisplaced fracture of second cervical vertebra (HCC)    HTN (hypertension)    Sleep apnea    Type 2 diabetes  mellitus with diabetic polyneuropathy (HCC)    Coronary artery disease    RLS (restless legs syndrome)    Neck pain    Constipation    Hyperlipemia    Fall    Chronic peripheral neuropathy    Chronic left arm weakness    Leukocytosis    Stage 3a chronic kidney disease (HCC)    Injury of left shoulder      Plan: Hopefully he will continue to improve with his pain control to come off parenteral medications to be able to be discharged for treatment of his cervical 2 fracture in a collar.  He will require follow-up with a spinal subspecialist at the [facility] as he does not desire to follow-up with Dr. Soliman who did his complex cervical reconstruction in the past.  If he cannot tolerate the collar then we may have to transfer him to the emergency [place] next week to be evaluated by the spinal subspecialist there.    He appears to have significant musculoskeletal pain of the left shoulder which we have asked the orthopedists to take a look at him.      Allen Ramon MD  03/19/22  11:03 EDT

## 2022-03-19 NOTE — PROGRESS NOTES
Name: Chester Gama ADMIT: 3/16/2022   : 1940  PCP: Demetrio Ko DO    MRN: [id number] LOS: 3 days   AGE/SEX: 81 y.o. male  ROOM: [facility]     Subjective   Subjective   Patient is resting in bed, he is accompanied by his son, wife was present this AM. Today the patient is awake but drowsy, answers questions appropriately. Overnight he received 2 doses IV dilaudid.    Review of Systems   Constitutional: Negative for fatigue and fever.   HENT: Negative for sinus pressure and sneezing.    Eyes: Negative for pain and redness.   Respiratory: Negative for cough and shortness of breath.    Cardiovascular: Negative for chest pain and leg swelling.   Gastrointestinal: Negative for abdominal pain, nausea and vomiting.   Musculoskeletal:        L shoulder pain, neck pain   Skin: Negative for rash and wound.   Neurological: Negative for seizures and headaches.        Objective   Objective   Vital Signs  Temp:  [98.3 °F (36.8 °C)-98.6 °F (37 °C)] 98.3 °F (36.8 °C)  Heart Rate:  [60-77] 62  Resp:  [16-20] 18  BP: (148-175)/(77-87) 148/78  SpO2:  [94 %-97 %] (P) 95 %  on  Flow (L/min):  [1-2] 1;   Device (Oxygen Therapy): (P) room air  Body mass index is 37.74 kg/m².  Physical Exam  Constitutional:       Appearance: Normal appearance. He is obese. He is not toxic-appearing.      Comments: Awake, but drowsy, answers questions appropriately   HENT:      Head: Normocephalic and atraumatic.      Mouth/Throat:      Mouth: Mucous membranes are moist.      Pharynx: Oropharynx is clear.   Eyes:      Extraocular Movements: Extraocular movements intact.      Conjunctiva/sclera: Conjunctivae normal.      Pupils: Pupils are equal, round, and reactive to light.   Neck:      Comments: In Aspen collar  Cardiovascular:      Rate and Rhythm: Normal rate and regular rhythm.      Pulses: Normal pulses.      Heart sounds: No murmur heard.  Pulmonary:      Effort: Pulmonary effort is normal. No respiratory distress.      Breath  sounds: Normal breath sounds. No wheezing.   Abdominal:      General: Abdomen is flat. Bowel sounds are normal. There is no distension.      Palpations: Abdomen is soft.      Tenderness: There is no abdominal tenderness.   Musculoskeletal:         General: No swelling or tenderness.      Right lower leg: No edema.      Left lower leg: No edema.   Skin:     General: Skin is warm and dry.   Neurological:      General: No focal deficit present.      Mental Status: He is alert. Mental status is at baseline.      Motor: No weakness.   Psychiatric:      Comments: Awake but drowsy         Results Review     I reviewed the patient's new clinical results.  Results from last 7 days   Lab Units 03/19/22  1207 03/18/22  0824 03/17/22  0922 03/16/22  1847   WBC 10*3/mm3 8.92 9.66 10.72 15.39*   HEMOGLOBIN g/dL 14.8 15.5 14.1 15.4   PLATELETS 10*3/mm3 157 126* 187 206     Results from last 7 days   Lab Units 03/19/22  1207 03/17/22  0922 03/16/22  1847   SODIUM mmol/L 137 140 142   POTASSIUM mmol/L 4.4 4.1 4.5   CHLORIDE mmol/L 105 106 105   CO2 mmol/L 25.0 25.8 25.0   BUN mg/dL 14 15 19   CREATININE mg/dL 1.16 1.35* 1.45*   GLUCOSE mg/dL 162* 119* 183*   CrCl cannot be calculated (Unknown ideal weight.).  Results from last 7 days   Lab Units 03/16/22  1847   ALBUMIN g/dL 4.50   BILIRUBIN mg/dL 0.5   ALK PHOS U/L 86   AST (SGOT) U/L 27   ALT (SGPT) U/L 26     Results from last 7 days   Lab Units 03/19/22  1207 03/17/22  0922 03/16/22  1847   CALCIUM mg/dL 8.2* 9.0 9.7   ALBUMIN g/dL  --   --  4.50   MAGNESIUM mg/dL 2.2  --   --    PHOSPHORUS mg/dL 2.7  --   --        COVID19   Date Value Ref Range Status   03/16/2022 Not Detected Not Detected - Ref. Range Final     Hemoglobin A1C   Date/Time Value Ref Range Status   03/16/2022 1847 8.40 (H) 4.80 - 5.60 % Final     Glucose   Date/Time Value Ref Range Status   03/19/2022 1029 156 (H) 70 - 130 mg/dL Final     Comment:     Meter: [id number] : [id number] Orestes JOHNSON   03/19/2022  0615 114 70 - 130 mg/dL Final     Comment:     Meter: [id number] : [id number] Carolynn Hamlin NA   03/18/2022 2123 155 (H) 70 - 130 mg/dL Final     Comment:     Meter: [id number] : [id number] Carolynn Hamlin NA   03/18/2022 1553 129 70 - 130 mg/dL Final     Comment:     Meter: [id number] : [id number] Alisa Maya NA   03/18/2022 1104 130 70 - 130 mg/dL Final     Comment:     Meter: [id number] : [id number] Alisa Lam Nerisa NA   03/18/2022 0622 107 70 - 130 mg/dL Final     Comment:     Meter: [id number] : [id number] Carolynn Hamlin NA   03/17/2022 2213 112 70 - 130 mg/dL Final     Comment:     Meter: [id number] : [id number] Carolynn JOHNSON       XR Shoulder 2+ View Left  LEFT SHOULDER X-RAYS     CLINICAL HISTORY: Patient fell. Left shoulder pain.     Internal and external rotation views demonstrate mild to moderate  narrowing of the glenohumeral joint space and also mild bony spurring at  the acromioclavicular joint. There is no evidence of recent or old  fracture or dislocation.     This report was finalized on 3/18/2022 4:26 PM by Dr. Jorge Menjivar M.D.       Scheduled Medications  aspirin, 81 mg, Oral, Daily  atorvastatin, 80 mg, Oral, Daily  Diclofenac Sodium, 2 g, Topical, BID  gabapentin, 600 mg, Oral, TID  insulin lispro, 0-14 Units, Subcutaneous, TID AC  metFORMIN, 500 mg, Oral, BID With Meals  methocarbamol, 1,000 mg, Oral, Q8H  metoprolol tartrate, 12.5 mg, Oral, Q12H  oxyCODONE-acetaminophen, 2 tablet, Oral, Q6H  senna-docusate sodium, 2 tablet, Oral, BID  sodium chloride, 3 mL, Intravenous, Q12H  vitamin B-12, 1,000 mcg, Oral, Daily    Infusions  sodium chloride, 75 mL/hr, Last Rate: 75 mL/hr (03/19/22 0825)    Diet  Diet Regular; Cardiac         I have personally reviewed:  [x]  Laboratory   []  Microbiology   []  Radiology   []  EKG/Telemetry   []  Cardiology/Vascular   []  Pathology   []  Records    Assessment/Plan     Active Hospital Problems    Diagnosis  POA   • **Closed  nondisplaced fracture of second cervical vertebra (HCC) [S12.101A]  Yes   • Injury of left shoulder [S49.92XA]  Unknown   • Fall [W19.XXXA]  Yes   • Chronic peripheral neuropathy [G62.9]  Yes   • Chronic left arm weakness [R29.898]  Yes   • Leukocytosis [D72.829]  Yes   • Stage 3a chronic kidney disease (HCC) [N18.31]  Yes   • Hyperlipemia [E78.5]  Yes   • Constipation [K59.00]  Yes   • Neck pain [M54.2]  Yes   • Sleep apnea [G47.30]  Yes   • RLS (restless legs syndrome) [G25.81]  Yes   • HTN (hypertension) [I10]  Yes   • Type 2 diabetes mellitus with diabetic polyneuropathy (HCC) [E11.42]  Yes   • Coronary artery disease [I25.10]  Yes      Resolved Hospital Problems   No resolved problems to display.     81-year-old male with previous neck surgery and cervical hardware presents to the hospital after a mechanical fall and acute C2 vertebral fracture.     Vertebral fracture C2:  - continue Aspen collar, no acute surgical intervention  - Pain meds adjusted 3/19:increase to 2 tabs of percocet 10mg [id number], continue dilaudid 0.5mg [id number] PRN, robaxin 1g [id number]; increase to home gabapentin dose 600mg  - has prn narcan for resp depression     L shoulder pain  - CHARAN has requested orthopedic consult for possible shoulder injury  - L shoulder XR negative for acute fx    Chronic neuropathic pain:  - Resume home gabapentin dose     Chronic kidney disease 3A:  Previous records reviewed and baseline creatinine appears to range from 1.0-1.2.  Admitting creatinine is 1.45; improved to 1.1 with IVF  - plan on DC IVF tomorrow     Insulin-dependent diabetes:  Continue Metformin.  Monitor glucose and correction insulin as needed.  At home patient takes approximately 30 units of Lantus twice daily plan tohold as oral intake will likely be decreased and adjust as needed.    - BG moslty less than 150; continue ssi     Sleep apnea: Not on CPAP.  Monitor pulse oximetry and supplemental oxygen as needed.    SCDs for DVT prophylaxis.  Full  code.  Discussed with patient, family and nursing staff.  Anticipate discharge home with HH vs SNU facility timing yet to be determined. maybe this weekend if pain can be controlled?      Danuta Nair MD  [facility]  03/19/22  15:14 EDT    I wore protective equipment throughout this patient encounter including a face mask, gloves and protective eyewear.  Hand hygiene was performed before donning protective equipment and after removal when leaving the room.

## 2022-03-19 NOTE — CONSULTS
Orthopedic Consult      Patient: Chester Gama  YOB: 1940     Date of Admission: 3/16/2022  6:14 PM    Medical Record Number: [id number]    Attending Physician: Danuta Nair MD    Consulting Physician: Freddy Preston MD    Reason for Consult: Left shoulder pain    History of Present Illness: 81 y.o. male admitted to [facility] with Other closed nondisplaced fracture of second cervical vertebra, initial encounter ([facility]) [S12.191A].     The patient was evaluated in the emergency room and was diagnosed with a a C2 vertebral fracture    Secondary to the age and multiple medical co morbidities the patient was admitted to the hospitalist.   As I was on call for the emergency room I was consulted for further evaluation and treatment.   The patient was in the usual state of health and   fell climbing out of a ditch about 3 feet deep.     He is a poor historian his son and daughter were at the bedside at the time of my examination.  He has a previous history of extensive cervical fusion by Dr. Soliman.  He has always had some left upper extremity pain mostly around the scapular area.  Daughter states that it did subside with some osteopathic maneuvers.    Patient states that his left shoulder pain has improved to some extent since hospitalization.  He continues to notice weakness of the left shoulder  and inability to elevate the shoulder.    Past medical history, Past surgical history, family history, Social history, current medications, home medications Have been reviewed by me.    Past Medical History:   Diagnosis Date   • Arthritis     HANDS KNEES   • Cervical pain (neck)     LEFT ARM PARESTHESIA   • Coronary artery disease    • Depression    • Diabetes mellitus (HCC)     TYPE 2   • Diverticular disease     HX, S/P RESECTION   • Hx of heart artery stent    • Hyperlipemia    • Hypertension    • PTSD (post-traumatic stress disorder)    • RLS (restless legs syndrome)    • Sleep  apnea     DIDNT TOLERATE MASK, CLAUSTROPHOBIC   • Structural abnormality of kidney     SAW NEPHROLOGIST FOR SMALLER KIDNEY - 3 YEARS AGO.   • Thyroid cyst     PER WIFE     Past Surgical History:   Procedure Laterality Date   • ANTERIOR CERVICAL DISCECTOMY W/ FUSION N/A 1/3/2018    Procedure: ANTERIOR CERVICAL DECOMPRESSION & FUSION C4 6;  Surgeon: Vincenzo Soliman DO;  Location: [facility]  Service:    • APPENDECTOMY     • CATARACT EXTRACTION W/ INTRAOCULAR LENS  IMPLANT, BILATERAL     • CERVICAL DISCECTOMY POSTERIOR FUSION WITH INSTRUMENTATION N/A 2019    Procedure: C4-T2 POSTERIOR DECOMPRESSION AND FUSION BONE MORPHOGENIC PROTEIN;  Surgeon: Vincenzo Soliman DO;  Location: [facility] OR;  Service: Orthopedic Spine   • CERVICAL DISCECTOMY POSTERIOR FUSION WITH INSTRUMENTATION N/A 2019    Procedure: LEFT FORAMINOTOMY C4-T1, REFUSION OF C4-T1, REMOVAL OF C6 SCREW, REVISION OF HARDWARE;  Surgeon: Vincenzo Soliman DO;  Location: [facility]  Service: Orthopedics   • COLON RESECTION      COLOSTOMY X 6 MOS THEN REVERSED.   • CORONARY ANGIOPLASTY WITH STENT PLACEMENT  02/10/2010    [email or web address]     Social History     Occupational History   • Not on file   Tobacco Use   • Smoking status: Former Smoker     Years: 5.00     Types: Cigars     Quit date: 6/10/1966     Years since quittin.8   • Smokeless tobacco: Never Used   Substance and Sexual Activity   • Alcohol use: No   • Drug use: No   • Sexual activity: Defer      Social History     Social History Narrative   • Not on file     Family History   Problem Relation Age of Onset   • Malig Hyperthermia Neg Hx           Allergies   Allergen Reactions   • Contrast Dye Hives     IVP DYE   • Cyclobenzaprine Other (See Comments)     Night terrors          Home Medications:  Medications Prior to Admission   Medication Sig Dispense Refill Last Dose   • ARTIFICIAL TEAR OP Apply 1 drop to eye As Needed. Both eyes   3/16/2022 at Unknown time   • aspirin 81  MG EC tablet Take 81 mg by mouth Daily. STOPPED 6/7/19   3/16/2022 at Unknown time   • atorvastatin (LIPITOR) 80 MG tablet Take 80 mg by mouth Daily.   3/16/2022 at Unknown time   • diclofenac (VOLTAREN) 1 % gel gel Apply 4 g topically to the appropriate area as directed 4 (Four) Times a Day As Needed (ON HOLS FOR SX). STOPPED PREOP, LAST DOSE 10/2017   3/16/2022 at Unknown time   • docusate sodium (COLACE) 250 MG capsule Take 250 mg by mouth 2 (Two) Times a Day As Needed for Constipation.   Past Week at Unknown time   • furosemide (LASIX) 20 MG tablet Take 1 tablet by mouth Daily.   3/16/2022 at Unknown time   • gabapentin (NEURONTIN) 300 MG capsule Take 600 mg by mouth 3 (Three) Times a Day.   3/16/2022 at Unknown time   • metFORMIN (GLUCOPHAGE) 500 MG tablet Take 500 mg by mouth 2 (Two) Times a Day With Meals.   3/16/2022 at Unknown time   • metoprolol tartrate (LOPRESSOR) 25 MG tablet Take 25 mg by mouth Every 12 (Twelve) Hours. 1/2 TAB EACH DOSE   3/16/2022 at Unknown time   • rOPINIRole (REQUIP) 4 MG tablet Take 4 mg by mouth Every Night.   3/16/2022 at Unknown time   • vitamin B-12 (CYANOCOBALAMIN) 100 MCG tablet Take 100 mcg by mouth Daily.   3/16/2022 at Unknown time   • insulin glargine (LANTUS) 100 UNIT/ML injection Inject 30 Units under the skin Every Morning.      • insulin glargine (LANTUS) 100 UNIT/ML injection Inject 28 Units under the skin Every Night. (Patient taking differently: Inject 30 Units under the skin into the appropriate area as directed Every Night.) 1 Units 0    • pantoprazole (PROTONIX) 40 MG EC tablet Take 1 tablet by mouth Daily. x7 days then prn for dyspepsia 15 tablet 0        Current Medications:  Scheduled Meds:aspirin, 81 mg, Oral, Daily  atorvastatin, 80 mg, Oral, Daily  Diclofenac Sodium, 2 g, Topical, BID  gabapentin, 600 mg, Oral, TID  insulin lispro, 0-14 Units, Subcutaneous, TID AC  metFORMIN, 500 mg, Oral, BID With Meals  methocarbamol, 1,000 mg, Oral, Q8H  metoprolol  tartrate, 12.5 mg, Oral, Q12H  oxyCODONE-acetaminophen, 2 tablet, Oral, Q6H  senna-docusate sodium, 2 tablet, Oral, BID  sodium chloride, 3 mL, Intravenous, Q12H  vitamin B-12, 1,000 mcg, Oral, Daily      Continuous Infusions:sodium chloride, 75 mL/hr, Last Rate: 75 mL/hr (03/19/22 0825)      PRN Meds:.•  acetaminophen **OR** acetaminophen **OR** acetaminophen  •  senna-docusate sodium **AND** polyethylene glycol **AND** bisacodyl **AND** bisacodyl  •  dextrose  •  dextrose  •  famotidine  •  glucagon (human recombinant)  •  HYDROmorphone  •  melatonin  •  naloxone  •  [DISCONTINUED] Morphine **AND** naloxone  •  nitroglycerin  •  ondansetron **OR** ondansetron  •  Insert peripheral IV **AND** sodium chloride  •  sodium chloride    Review of Systems:   A 12 point system review was reviewed with the patient and from the chart  and is negative except as mentioned in history of present illness.      Physical Exam: 81 y.o. male                    Vitals:    03/19/22 1157 03/19/22 1202 03/19/22 1339 03/19/22 1428   BP: 164/87  148/78    BP Location: Left arm  Right arm    Patient Position: Lying  Lying    Pulse: 61 61 62    Resp:   18    Temp:   98.3 °F (36.8 °C)    TempSrc:   Oral    SpO2: 96% 97%  Comment: weaned from 2L to 1L 94% 95%  Comment: weaned to RA   Weight:            Gait: Not evaluated.     Mental/HEENT/cardio/skin: The patient's general appearance was well-nourished, well-hydrated, no acute distress.  Orientation was alert and oriented ×3.  The patient's mood was normal.   Pulmonary exam shows normal late exchange, no labored breathing, or shortness of breath.    The  skin exam showed normal temperature and color in the area of examination.    Extremities: Left shoulder positive mild tenderness anteriorly.  He is not letting me do rotations of the shoulder.  He is able to raise the left arm off the bed by just couple of inches.  Movements are very slow and sluggish around the left shoulder area.    Pulses:   Pulses palpable and equal bilaterally    Diagnostic Tests:    Results from last 7 days   Lab Units 03/19/22  1207 03/18/22  0824 03/17/22  0922   WBC 10*3/mm3 8.92 9.66 10.72   HEMOGLOBIN g/dL 14.8 15.5 14.1   HEMATOCRIT % 45.1 45.9 43.0   PLATELETS 10*3/mm3 157 126* 187     Results from last 7 days   Lab Units 03/19/22  1207 03/17/22  0922 03/16/22  1847   SODIUM mmol/L 137 140 142   POTASSIUM mmol/L 4.4 4.1 4.5   CHLORIDE mmol/L 105 106 105   CO2 mmol/L 25.0 25.8 25.0   BUN mg/dL 14 15 19   CREATININE mg/dL 1.16 1.35* 1.45*   GLUCOSE mg/dL 162* 119* 183*   CALCIUM mg/dL 8.2* 9.0 9.7     Results from last 7 days   Lab Units 03/16/22  1847   INR  1.04     No results found for: URICACID  No results found for: CRYSTAL  Microbiology Results (last 10 days)     Procedure Component Value - Date/Time    COVID PRE-OP / PRE-PROCEDURE SCREENING ORDER (NO ISOLATION) - Swab, Nasopharynx [[id number]]  (Normal) Collected: 03/16/22 2128    Lab Status: Final result Specimen: Swab from Nasopharynx Updated: 03/16/22 2159    Narrative:      The following orders were created for panel order COVID PRE-OP / PRE-PROCEDURE SCREENING ORDER (NO ISOLATION) - Swab, Nasopharynx.  Procedure                               Abnormality         Status                     ---------                               -----------         ------                     COVID-19,[facility] IN-HOUSE...[[id number]]  Normal              Final result                 Please view results for these tests on the individual orders.    COVID-19,[facility]-HOU[facility] CEPHEID/JUDI NP SWAB IN TRANSPORT MEDIA 8-12 HR TAT - Swab, Nasopharynx [[id number]]  (Normal) Collected: 03/16/22 2128    Lab Status: Final result Specimen: Swab from Nasopharynx Updated: 03/16/22 2159     COVID19 Not Detected    Narrative:      Fact sheet for providers: https://www.fda.gov/media/181573/download    Fact sheet for patients: https://www.fda.gov/media/416718/download    Test performed by PCR.        CT Head  Without Contrast    Result Date: 3/16/2022  1. No acute intracranial process. 2. Mild sinusitis.  Radiation dose reduction techniques were utilized, including automated exposure control and exposure modulation based on body size.  This report was finalized on 3/16/2022 10:13 PM by Dr. Tony Mckee M.D.      CT Cervical Spine Without Contrast    Result Date: 3/16/2022  1. Fracture of the C2 vertebra involving the posterior aspect of the C2 vertebral body at its junction with the posterior elements. This shows mild displacement and mild canal stenosis. Fracture extends into the left transversarium foramen. If further evaluation of the vertebral arteries is clinically indicated a CT angiogram of the neck may be helpful. 2.. No other fractures are seen. 3. Postoperative changes of the cervical spine.  CT OF THE THORACIC SPINE WITHOUT CONTRAST 03/16/2022  TECHNIQUE: Axial images were obtained through the thoracic spine. Sagittal and coronal reconstruction images were reviewed.  FINDINGS: C4-C6 anterior fusion device is seen with lower cervical and upper thoracic fusion to approximately the T2 level.  There is anterior osteophytosis at essentially all thoracic levels.  No thoracic compression fractures or other fractures are seen. No subluxation is seen. No significant canal stenosis is seen.  There are some ill-defined areas of scar, atelectasis or inflammatory infiltrates in the bilateral lower lobes.  IMPRESSION: 1. Postoperative changes of the cervicothoracic junction. 2. Thoracic osteophytosis.. 3. No thoracic spine fractures are seen.     Radiation dose reduction techniques were utilized, including automated exposure control and exposure modulation based on body size.  This report was finalized on 3/16/2022 10:17 PM by Dr. Tony Mckee M.D.      CT Thoracic Spine Without Contrast    Result Date: 3/16/2022  1. Fracture of the C2 vertebra involving the posterior aspect of the C2 vertebral body at its  junction with the posterior elements. This shows mild displacement and mild canal stenosis. Fracture extends into the left transversarium foramen. If further evaluation of the vertebral arteries is clinically indicated a CT angiogram of the neck may be helpful. 2.. No other fractures are seen. 3. Postoperative changes of the cervical spine.  CT OF THE THORACIC SPINE WITHOUT CONTRAST 03/16/2022  TECHNIQUE: Axial images were obtained through the thoracic spine. Sagittal and coronal reconstruction images were reviewed.  FINDINGS: C4-C6 anterior fusion device is seen with lower cervical and upper thoracic fusion to approximately the T2 level.  There is anterior osteophytosis at essentially all thoracic levels.  No thoracic compression fractures or other fractures are seen. No subluxation is seen. No significant canal stenosis is seen.  There are some ill-defined areas of scar, atelectasis or inflammatory infiltrates in the bilateral lower lobes.  IMPRESSION: 1. Postoperative changes of the cervicothoracic junction. 2. Thoracic osteophytosis.. 3. No thoracic spine fractures are seen.     Radiation dose reduction techniques were utilized, including automated exposure control and exposure modulation based on body size.  This report was finalized on 3/16/2022 10:17 PM by Dr. Tony Mckee M.D.        The labs, X-ray results for preoperative evaluation have been reviewed by me.    Assessment: Left shoulder injury for evaluation, left shoulder pain    Patient Active Problem List   Diagnosis   • HTN (hypertension)   • Sleep apnea   • Type 2 diabetes mellitus with diabetic polyneuropathy (HCC)   • Coronary artery disease   • RLS (restless legs syndrome)   • Postoperative wound infection   • Chest pain   • Neck pain   • Constipation   • Closed nondisplaced fracture of second cervical vertebra (HCC)   • Hyperlipemia   • Fall   • Chronic peripheral neuropathy   • Chronic left arm weakness   • Leukocytosis   • Stage 3a chronic  kidney disease (HCC)   • Injury of left shoulder       Plan:    Options and alternatives were discussed in detail with the patient and   family.   There could be some amount of overlay of pain from his neck radiating to the shoulder however reproduction of symptoms of pain on rotation of the shoulder cannot be explained with this.  X-rays are negative for fractures.  The patient is indicated for further evaluation of the left shoulder.   Advised MRI of the left shoulder.  If this can be accomplished during this hospitalization we can advise further if he needs any intervention for the shoulder.  MRI has been ordered.       Date: 3/19/2022    Freddy Preston MD     CC: Demetrio Ko DO; MD Korey Damon Margaux M, MD

## 2022-03-19 NOTE — PLAN OF CARE
Problem: Adult Inpatient Plan of Care  Goal: Plan of Care Review  Outcome: Ongoing, Progressing  Flowsheets (Taken 3/19/2022 1820)  Progress: no change  Plan of Care Reviewed With:   patient   spouse   daughter   son  Outcome Evaluation: Cont. constant pain to posterior cervical spine. C-collar on at all times. Pt sleeping between cares. Encouraged to turn, move in bed. Schedule percocet increased to 20mg, gabapentin increased to 600mg (home dose). No IV Dilaudid given this shift. VSS. On RA. No falls.  Goal: Patient-Specific Goal (Individualized)  Outcome: Ongoing, Progressing  Goal: Absence of Hospital-Acquired Illness or Injury  Outcome: Ongoing, Progressing  Intervention: Identify and Manage Fall Risk  Recent Flowsheet Documentation  Taken 3/19/2022 1605 by Melanie Shah RN  Safety Promotion/Fall Prevention:   safety round/check completed   room organization consistent   nonskid shoes/slippers when out of bed   fall prevention program maintained   clutter free environment maintained   assistive device/personal items within reach   activity supervised  Taken 3/19/2022 0724 by Melanie Shah RN  Safety Promotion/Fall Prevention: safety round/check completed  Intervention: Prevent Skin Injury  Recent Flowsheet Documentation  Taken 3/19/2022 1605 by Melanie Shah RN  Body Position:   turned   right  Taken 3/19/2022 0823 by Melanie Shah RN  Body Position:   supine   turned  Intervention: Prevent and Manage VTE (Venous Thromboembolism) Risk  Recent Flowsheet Documentation  Taken 3/19/2022 1605 by Melanie Shah RN  Activity Management: activity adjusted per tolerance  Taken 3/19/2022 0823 by Melanie Shah RN  Activity Management: activity adjusted per tolerance  VTE Prevention/Management:   bilateral   sequential compression devices off   patient refused intervention  Goal: Optimal Comfort and Wellbeing  Outcome: Ongoing, Progressing  Intervention: Monitor Pain and Promote Comfort  Recent  Flowsheet Documentation  Taken 3/19/2022 1806 by Melanie Shah RN  Pain Management Interventions:   pillow support provided   position adjusted  Taken 3/19/2022 0823 by Melanie Shah RN  Pain Management Interventions:   see MAR   position adjusted  Goal: Readiness for Transition of Care  Outcome: Ongoing, Progressing  Goal: Plan of Care Review  Outcome: Ongoing, Progressing  Flowsheets (Taken 3/19/2022 1820)  Progress: no change  Plan of Care Reviewed With:   patient   spouse   daughter   son  Outcome Evaluation: Cont. constant pain to posterior cervical spine. C-collar on at all times. Pt sleeping between cares. Encouraged to turn, move in bed. Schedule percocet increased to 20mg, gabapentin increased to 600mg (home dose). No IV Dilaudid given this shift. VSS. On RA. No falls.  Goal: Patient-Specific Goal (Individualized)  Outcome: Ongoing, Progressing  Goal: Absence of Hospital-Acquired Illness or Injury  Outcome: Ongoing, Progressing  Intervention: Identify and Manage Fall Risk  Recent Flowsheet Documentation  Taken 3/19/2022 1605 by Melanie Shah RN  Safety Promotion/Fall Prevention:   safety round/check completed   room organization consistent   nonskid shoes/slippers when out of bed   fall prevention program maintained   clutter free environment maintained   assistive device/personal items within reach   activity supervised  Taken 3/19/2022 0724 by Melanie Shah RN  Safety Promotion/Fall Prevention: safety round/check completed  Intervention: Prevent Skin Injury  Recent Flowsheet Documentation  Taken 3/19/2022 1605 by Melanie Shah RN  Body Position:   turned   right  Taken 3/19/2022 0823 by Melanie Shah RN  Body Position:   supine   turned  Intervention: Prevent and Manage VTE (Venous Thromboembolism) Risk  Recent Flowsheet Documentation  Taken 3/19/2022 1605 by Melanie Shah RN  Activity Management: activity adjusted per tolerance  Taken 3/19/2022 0823 by Melanie Shah  RN  Activity Management: activity adjusted per tolerance  VTE Prevention/Management:   bilateral   sequential compression devices off   patient refused intervention  Goal: Optimal Comfort and Wellbeing  Outcome: Ongoing, Progressing  Intervention: Monitor Pain and Promote Comfort  Recent Flowsheet Documentation  Taken 3/19/2022 1806 by Melanie Shah RN  Pain Management Interventions:   pillow support provided   position adjusted  Taken 3/19/2022 0823 by Melanie Shah RN  Pain Management Interventions:   see MAR   position adjusted  Goal: Readiness for Transition of Care  Outcome: Ongoing, Progressing     Problem: Fall Injury Risk  Goal: Absence of Fall and Fall-Related Injury  Outcome: Ongoing, Progressing  Intervention: Identify and Manage Contributors  Recent Flowsheet Documentation  Taken 3/19/2022 1605 by Melanie Shah RN  Medication Review/Management: medications reviewed  Taken 3/19/2022 1428 by Melanie Shah RN  Medication Review/Management: medications reviewed  Taken 3/19/2022 1155 by Melanie Shah RN  Medication Review/Management: medications reviewed  Taken 3/19/2022 0823 by Melanie Shah RN  Medication Review/Management: medications reviewed  Intervention: Promote Injury-Free Environment  Recent Flowsheet Documentation  Taken 3/19/2022 1605 by Melanie Shah RN  Safety Promotion/Fall Prevention:   safety round/check completed   room organization consistent   nonskid shoes/slippers when out of bed   fall prevention program maintained   clutter free environment maintained   assistive device/personal items within reach   activity supervised  Taken 3/19/2022 0724 by Melanie Shah RN  Safety Promotion/Fall Prevention: safety round/check completed     Problem: Pain Acute  Goal: Acceptable Pain Control and Functional Ability  Outcome: Ongoing, Progressing  Intervention: Prevent or Manage Pain  Recent Flowsheet Documentation  Taken 3/19/2022 1605 by Melanie Shah RN  Medication  Review/Management: medications reviewed  Taken 3/19/2022 1428 by Melanie Shah RN  Medication Review/Management: medications reviewed  Taken 3/19/2022 1155 by Melanie Shah RN  Medication Review/Management: medications reviewed  Taken 3/19/2022 0823 by Melanie Shah RN  Medication Review/Management: medications reviewed  Intervention: Develop Pain Management Plan  Recent Flowsheet Documentation  Taken 3/19/2022 1806 by Melanie Shah RN  Pain Management Interventions:   pillow support provided   position adjusted  Taken 3/19/2022 0823 by Melanie Shah RN  Pain Management Interventions:   see MAR   position adjusted  Intervention: Optimize Psychosocial Wellbeing  Recent Flowsheet Documentation  Taken 3/19/2022 0823 by Melanie Shah RN  Diversional Activities: television     Problem: Skin Injury Risk Increased  Goal: Skin Health and Integrity  Outcome: Ongoing, Progressing  Intervention: Promote and Optimize Oral Intake  Recent Flowsheet Documentation  Taken 3/19/2022 0823 by Melanie Shah RN  Oral Nutrition Promotion:   rest periods promoted   physical activity promoted   calorie-dense liquids provided  Intervention: Optimize Skin Protection  Recent Flowsheet Documentation  Taken 3/19/2022 1605 by Melanie Sahh RN  Head of Bed (HOB) Positioning: HOB at 15 degrees  Taken 3/19/2022 1428 by Melanie Shah RN  Pressure Reduction Devices:   alternating pressure pump (ADD)   pressure-redistributing mattress utilized     Problem: Bleeding (Orthopaedic Fracture)  Goal: Absence of Bleeding  Outcome: Ongoing, Progressing     Problem: Embolism (Orthopaedic Fracture)  Goal: Absence of Embolism Signs and Symptoms  Outcome: Ongoing, Progressing  Intervention: Prevent or Manage Embolism Risk  Recent Flowsheet Documentation  Taken 3/19/2022 0823 by Melanie Shah RN  VTE Prevention/Management:   bilateral   sequential compression devices off   patient refused intervention     Problem: Fracture  Stabilization and Management (Orthopaedic Fracture)  Goal: Fracture Stability  Outcome: Ongoing, Progressing     Problem: Functional Ability Impaired (Orthopaedic Fracture)  Goal: Optimal Functional Ability  Outcome: Ongoing, Progressing  Intervention: Optimize Functional Ability  Recent Flowsheet Documentation  Taken 3/19/2022 1605 by Melanie Shah RN  Activity Management: activity adjusted per tolerance  Activity Assistance Provided:   assistance, 2 people   lift team assistance  Taken 3/19/2022 0823 by Melanie Shah RN  Activity Management: activity adjusted per tolerance  Activity Assistance Provided: assistance, 2 people     Problem: Infection (Orthopaedic Fracture)  Goal: Absence of Infection Signs and Symptoms  Outcome: Ongoing, Progressing     Problem: Neurovascular Compromise (Orthopaedic Fracture)  Goal: Effective Tissue Perfusion  Outcome: Ongoing, Progressing     Problem: Pain (Orthopaedic Fracture)  Goal: Acceptable Pain Control  Outcome: Ongoing, Progressing  Intervention: Manage Acute Orthopaedic-Related Pain  Recent Flowsheet Documentation  Taken 3/19/2022 1806 by Melanie Shah RN  Pain Management Interventions:   pillow support provided   position adjusted  Taken 3/19/2022 0823 by Melanie Shah RN  Pain Management Interventions:   see MAR   position adjusted  Diversional Activities: television     Problem: Respiratory Compromise (Orthopaedic Fracture)  Goal: Effective Oxygenation and Ventilation  Outcome: Ongoing, Progressing     Problem: Activity Intolerance  Goal: Enhanced Capacity and Energy  Outcome: Ongoing, Progressing  Intervention: Optimize Activity Tolerance  Recent Flowsheet Documentation  Taken 3/19/2022 1605 by Melanie Shah RN  Activity Management: activity adjusted per tolerance  Taken 3/19/2022 0823 by Melanie Shah RN  Activity Management: activity adjusted per tolerance     Problem: Mobility Impairment  Goal: Optimal Mobility  Outcome: Ongoing,  Progressing  Intervention: Optimize Mobility  Recent Flowsheet Documentation  Taken 3/19/2022 1605 by Melanie Shah, RN  Activity Management: activity adjusted per tolerance  Taken 3/19/2022 0823 by Melanie Shah, RN  Activity Management: activity adjusted per tolerance   Goal Outcome Evaluation:  Plan of Care Reviewed With: patient, spouse, daughter, son        Progress: no change  Outcome Evaluation: Cont. constant pain to posterior cervical spine. C-collar on at all times. Pt sleeping between cares. Encouraged to turn, move in bed. Schedule percocet increased to 20mg, gabapentin increased to 600mg (home dose). No IV Dilaudid given this shift. VSS. On RA. No falls.

## 2022-03-20 LAB
ANION GAP SERPL CALCULATED.3IONS-SCNC: 9 MMOL/L (ref 5–15)
BASOPHILS # BLD AUTO: 0.03 10*3/MM3 (ref 0–0.2)
BASOPHILS NFR BLD AUTO: 0.4 % (ref 0–1.5)
BUN SERPL-MCNC: 15 MG/DL (ref 8–23)
BUN/CREAT SERPL: 13.4 (ref 7–25)
CALCIUM SPEC-SCNC: 8.3 MG/DL (ref 8.6–10.5)
CHLORIDE SERPL-SCNC: 105 MMOL/L (ref 98–107)
CO2 SERPL-SCNC: 23 MMOL/L (ref 22–29)
CREAT SERPL-MCNC: 1.12 MG/DL (ref 0.76–1.27)
DEPRECATED RDW RBC AUTO: 39.8 FL (ref 37–54)
EGFRCR SERPLBLD CKD-EPI 2021: 66.8 ML/MIN/1.73
EOSINOPHIL # BLD AUTO: 0.4 10*3/MM3 (ref 0–0.4)
EOSINOPHIL NFR BLD AUTO: 4.9 % (ref 0.3–6.2)
ERYTHROCYTE [DISTWIDTH] IN BLOOD BY AUTOMATED COUNT: 12.7 % (ref 12.3–15.4)
GLUCOSE BLDC GLUCOMTR-MCNC: 165 MG/DL (ref 70–130)
GLUCOSE BLDC GLUCOMTR-MCNC: 166 MG/DL (ref 70–130)
GLUCOSE BLDC GLUCOMTR-MCNC: 214 MG/DL (ref 70–130)
GLUCOSE SERPL-MCNC: 126 MG/DL (ref 65–99)
HCT VFR BLD AUTO: 43.5 % (ref 37.5–51)
HGB BLD-MCNC: 14.4 G/DL (ref 13–17.7)
IMM GRANULOCYTES # BLD AUTO: 0.02 10*3/MM3 (ref 0–0.05)
IMM GRANULOCYTES NFR BLD AUTO: 0.2 % (ref 0–0.5)
LYMPHOCYTES # BLD AUTO: 1.29 10*3/MM3 (ref 0.7–3.1)
LYMPHOCYTES NFR BLD AUTO: 15.8 % (ref 19.6–45.3)
MAGNESIUM SERPL-MCNC: 2.2 MG/DL (ref 1.6–2.4)
MCH RBC QN AUTO: 28.7 PG (ref 26.6–33)
MCHC RBC AUTO-ENTMCNC: 33.1 G/DL (ref 31.5–35.7)
MCV RBC AUTO: 86.7 FL (ref 79–97)
MONOCYTES # BLD AUTO: 0.78 10*3/MM3 (ref 0.1–0.9)
MONOCYTES NFR BLD AUTO: 9.5 % (ref 5–12)
NEUTROPHILS NFR BLD AUTO: 5.66 10*3/MM3 (ref 1.7–7)
NEUTROPHILS NFR BLD AUTO: 69.2 % (ref 42.7–76)
NRBC BLD AUTO-RTO: 0 /100 WBC (ref 0–0.2)
PHOSPHATE SERPL-MCNC: 2.6 MG/DL (ref 2.5–4.5)
PLATELET # BLD AUTO: 174 10*3/MM3 (ref 140–450)
PMV BLD AUTO: 9.5 FL (ref 6–12)
POTASSIUM SERPL-SCNC: 4.5 MMOL/L (ref 3.5–5.2)
RBC # BLD AUTO: 5.02 10*6/MM3 (ref 4.14–5.8)
SODIUM SERPL-SCNC: 137 MMOL/L (ref 136–145)
WBC NRBC COR # BLD: 8.18 10*3/MM3 (ref 3.4–10.8)

## 2022-03-20 PROCEDURE — 63710000001 INSULIN LISPRO (HUMAN) PER 5 UNITS: Performed by: INTERNAL MEDICINE

## 2022-03-20 PROCEDURE — 80048 BASIC METABOLIC PNL TOTAL CA: CPT | Performed by: STUDENT IN AN ORGANIZED HEALTH CARE EDUCATION/TRAINING PROGRAM

## 2022-03-20 PROCEDURE — 99232 SBSQ HOSP IP/OBS MODERATE 35: CPT | Performed by: NEUROLOGICAL SURGERY

## 2022-03-20 PROCEDURE — 25010000002 HYDROMORPHONE PER 4 MG: Performed by: STUDENT IN AN ORGANIZED HEALTH CARE EDUCATION/TRAINING PROGRAM

## 2022-03-20 PROCEDURE — 85025 COMPLETE CBC W/AUTO DIFF WBC: CPT | Performed by: STUDENT IN AN ORGANIZED HEALTH CARE EDUCATION/TRAINING PROGRAM

## 2022-03-20 PROCEDURE — 83735 ASSAY OF MAGNESIUM: CPT | Performed by: STUDENT IN AN ORGANIZED HEALTH CARE EDUCATION/TRAINING PROGRAM

## 2022-03-20 PROCEDURE — 82962 GLUCOSE BLOOD TEST: CPT

## 2022-03-20 PROCEDURE — 84100 ASSAY OF PHOSPHORUS: CPT | Performed by: STUDENT IN AN ORGANIZED HEALTH CARE EDUCATION/TRAINING PROGRAM

## 2022-03-20 RX ORDER — BISACODYL 10 MG
10 SUPPOSITORY, RECTAL RECTAL DAILY PRN
Status: DISCONTINUED | OUTPATIENT
Start: 2022-03-20 | End: 2022-03-23 | Stop reason: HOSPADM

## 2022-03-20 RX ORDER — POLYETHYLENE GLYCOL 3350 17 G/17G
17 POWDER, FOR SOLUTION ORAL DAILY
Status: DISCONTINUED | OUTPATIENT
Start: 2022-03-20 | End: 2022-03-23 | Stop reason: HOSPADM

## 2022-03-20 RX ORDER — LISINOPRIL 10 MG/1
10 TABLET ORAL DAILY
COMMUNITY

## 2022-03-20 RX ORDER — AMOXICILLIN 250 MG
2 CAPSULE ORAL 2 TIMES DAILY
Status: DISCONTINUED | OUTPATIENT
Start: 2022-03-20 | End: 2022-03-23 | Stop reason: HOSPADM

## 2022-03-20 RX ORDER — BISACODYL 5 MG/1
5 TABLET, DELAYED RELEASE ORAL DAILY PRN
Status: DISCONTINUED | OUTPATIENT
Start: 2022-03-20 | End: 2022-03-23 | Stop reason: HOSPADM

## 2022-03-20 RX ADMIN — INSULIN LISPRO 3 UNITS: 100 INJECTION, SOLUTION INTRAVENOUS; SUBCUTANEOUS at 17:26

## 2022-03-20 RX ADMIN — GABAPENTIN 600 MG: 300 CAPSULE ORAL at 09:22

## 2022-03-20 RX ADMIN — OXYCODONE HYDROCHLORIDE AND ACETAMINOPHEN 2 TABLET: 10; 325 TABLET ORAL at 21:40

## 2022-03-20 RX ADMIN — GABAPENTIN 600 MG: 300 CAPSULE ORAL at 21:40

## 2022-03-20 RX ADMIN — METOPROLOL TARTRATE 12.5 MG: 25 TABLET, FILM COATED ORAL at 21:40

## 2022-03-20 RX ADMIN — METFORMIN HYDROCHLORIDE 500 MG: 500 TABLET ORAL at 09:22

## 2022-03-20 RX ADMIN — DOCUSATE SODIUM 50MG AND SENNOSIDES 8.6MG 2 TABLET: 8.6; 5 TABLET, FILM COATED ORAL at 21:40

## 2022-03-20 RX ADMIN — METHOCARBAMOL TABLETS 1000 MG: 500 TABLET, COATED ORAL at 14:14

## 2022-03-20 RX ADMIN — DOCUSATE SODIUM 50MG AND SENNOSIDES 8.6MG 2 TABLET: 8.6; 5 TABLET, FILM COATED ORAL at 09:22

## 2022-03-20 RX ADMIN — HYDROMORPHONE HYDROCHLORIDE 0.5 MG: 1 INJECTION, SOLUTION INTRAMUSCULAR; INTRAVENOUS; SUBCUTANEOUS at 14:14

## 2022-03-20 RX ADMIN — DICLOFENAC SODIUM 2 G: 10 GEL TOPICAL at 21:41

## 2022-03-20 RX ADMIN — INSULIN LISPRO 5 UNITS: 100 INJECTION, SOLUTION INTRAVENOUS; SUBCUTANEOUS at 12:08

## 2022-03-20 RX ADMIN — ATORVASTATIN CALCIUM 80 MG: 80 TABLET, FILM COATED ORAL at 09:22

## 2022-03-20 RX ADMIN — OXYCODONE HYDROCHLORIDE AND ACETAMINOPHEN 2 TABLET: 10; 325 TABLET ORAL at 17:26

## 2022-03-20 RX ADMIN — OXYCODONE HYDROCHLORIDE AND ACETAMINOPHEN 2 TABLET: 10; 325 TABLET ORAL at 09:21

## 2022-03-20 RX ADMIN — Medication 3 ML: at 21:40

## 2022-03-20 RX ADMIN — GABAPENTIN 600 MG: 300 CAPSULE ORAL at 17:26

## 2022-03-20 RX ADMIN — INSULIN LISPRO 3 UNITS: 100 INJECTION, SOLUTION INTRAVENOUS; SUBCUTANEOUS at 09:21

## 2022-03-20 RX ADMIN — METOPROLOL TARTRATE 12.5 MG: 25 TABLET, FILM COATED ORAL at 09:23

## 2022-03-20 RX ADMIN — METFORMIN HYDROCHLORIDE 500 MG: 500 TABLET ORAL at 17:26

## 2022-03-20 RX ADMIN — HYDROMORPHONE HYDROCHLORIDE 0.5 MG: 1 INJECTION, SOLUTION INTRAMUSCULAR; INTRAVENOUS; SUBCUTANEOUS at 23:39

## 2022-03-20 RX ADMIN — OXYCODONE HYDROCHLORIDE AND ACETAMINOPHEN 2 TABLET: 10; 325 TABLET ORAL at 04:00

## 2022-03-20 RX ADMIN — Medication 1000 MCG: at 09:22

## 2022-03-20 RX ADMIN — POLYETHYLENE GLYCOL 3350 17 G: 17 POWDER, FOR SOLUTION ORAL at 17:27

## 2022-03-20 RX ADMIN — ASPIRIN 81 MG: 81 TABLET, COATED ORAL at 09:22

## 2022-03-20 RX ADMIN — METHOCARBAMOL TABLETS 1000 MG: 500 TABLET, COATED ORAL at 06:12

## 2022-03-20 RX ADMIN — METHOCARBAMOL TABLETS 1000 MG: 500 TABLET, COATED ORAL at 21:40

## 2022-03-20 NOTE — PROGRESS NOTES
NEUROSURGERY PROGRESS NOTE     LOS: 4 days   Patient Care Team:  Demetrio Ko DO as PCP - General (Family Medicine)  Jacky Cristina MD as Consulting Physician (Internal Medicine)    Chief Complaint: Status post post fall    Subjective     Interval History:     Patient reports he thinks that the neck pain is much more tolerable today than it had been.  Currently he is struggling more with his left shoulder pain.  Family asked whether he can have his head up or if he can get up with physical therapy as they did not see him yesterday.    While in the room and during my examination of the patient I wore a mask and eye protection.  I washed my hands before and after this patient encounter.  The patient was also wearing a mask.     History taken from: patient family    Objective      Vital Signs  Temp:  [98.2 °F (36.8 °C)-98.6 °F (37 °C)] 98.5 °F (36.9 °C)  Heart Rate:  [61-69] 68  Resp:  [18] 18  BP: (145-186)/(72-87) 186/87  Body mass index is 37.74 kg/m².    Intake/Output last 3 shifts:  I/O last 3 completed shifts:  In: 1215 [P.O.:240; I.V.:975]  Out: 500 [Urine:500]    Intake/Output this shift:  No intake/output data recorded.    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    Labs:    Lab Results (last 24 hours)     Procedure Component Value Units Date/Time    POC Glucose Once [[id number]]  (Abnormal) Collected: 03/19/22 1029    Specimen: Blood Updated: 03/19/22 1030     Glucose 156 mg/dL      Comment: Meter: [id number] : [id number] [facility] NA       Basic Metabolic Panel [[id number]]  (Abnormal) Collected: 03/19/22 1207    Specimen: Blood Updated: 03/19/22 1237     Glucose 162 mg/dL       BUN 14 mg/dL      Creatinine 1.16 mg/dL      Sodium 137 mmol/L      Potassium 4.4 mmol/L      Chloride 105 mmol/L      CO2 25.0 mmol/L      Calcium 8.2 mg/dL      BUN/Creatinine Ratio 12.1     Anion Gap 7.0 mmol/L      eGFR 64.1 mL/min/1.73      Comment: National Kidney Foundation and American Society of Nephrology (ASN) Task Force recommended calculation based on the Chronic Kidney Disease Epidemiology Collaboration (CKD-EPI) equation refit without adjustment for race.       Narrative:      GFR Normal >60  Chronic Kidney Disease <60  Kidney Failure <15      CBC & Differential [[id number]]  (Abnormal) Collected: 03/19/22 1207    Specimen: Blood Updated: 03/19/22 1220    Narrative:      The following orders were created for panel order CBC & Differential.  Procedure                               Abnormality         Status                     ---------                               -----------         ------                     CBC Auto Differential[[id number]]        Abnormal            Final result                 Please view results for these tests on the individual orders.    Magnesium [[id number]]  (Normal) Collected: 03/19/22 1207    Specimen: Blood Updated: 03/19/22 1237     Magnesium 2.2 mg/dL     Phosphorus [[id number]]  (Normal) Collected: 03/19/22 1207    Specimen: Blood Updated: 03/19/22 1237     Phosphorus 2.7 mg/dL     CBC Auto Differential [[id number]]  (Abnormal) Collected: 03/19/22 1207    Specimen: Blood Updated: 03/19/22 1220     WBC 8.92 10*3/mm3      RBC 5.17 10*6/mm3      Hemoglobin 14.8 g/dL      Hematocrit 45.1 %      MCV 87.2 fL      MCH 28.6 pg      MCHC 32.8 g/dL      RDW 12.5 %      RDW-SD 39.5 fl      MPV 9.4 fL      Platelets 157 10*3/mm3      Neutrophil % 73.4 %      Lymphocyte % 13.2 %      Monocyte % 9.2 %      Eosinophil % 3.6 %      Basophil % 0.4 %      Immature Grans % 0.2 %      Neutrophils, Absolute 6.54 10*3/mm3      Lymphocytes, Absolute 1.18 10*3/mm3      Monocytes, Absolute  0.82 10*3/mm3      Eosinophils, Absolute 0.32 10*3/mm3      Basophils, Absolute 0.04 10*3/mm3      Immature Grans, Absolute 0.02 10*3/mm3      nRBC 0.0 /100 WBC     POC Glucose Once [[id number]]  (Abnormal) Collected: 03/19/22 1646    Specimen: Blood Updated: 03/19/22 1648     Glucose 143 mg/dL      Comment: Meter: [id number] : [id number] [facility]rest[facility] Hern[facility] NA       POC Glucose Once [[id number]]  (Abnormal) Collected: 03/19/22 2150    Specimen: Blood Updated: 03/19/22 2152     Glucose 154 mg/dL      Comment: Meter: [id number] : [id number] Carolynn JOHNSON       Basic Metabolic Panel [[id number]]  (Abnormal) Collected: 03/20/22 0358    Specimen: Blood Updated: 03/20/22 0514     Glucose 126 mg/dL      BUN 15 mg/dL      Creatinine 1.12 mg/dL      Sodium 137 mmol/L      Potassium 4.5 mmol/L      Chloride 105 mmol/L      CO2 23.0 mmol/L      Calcium 8.3 mg/dL      BUN/Creatinine Ratio 13.4     Anion Gap 9.0 mmol/L      eGFR 66.8 mL/min/1.73      Comment: National Kidney Foundation and American Society of Nephrology (ASN) Task Force recommended calculation based on the Chronic Kidney Disease Epidemiology Collaboration (CKD-EPI) equation refit without adjustment for race.       Narrative:      GFR Normal >60  Chronic Kidney Disease <60  Kidney Failure <15      CBC & Differential [[id number]]  (Abnormal) Collected: 03/20/22 0358    Specimen: Blood Updated: 03/20/22 8974    Narrative:      The following orders were created for panel order CBC & Differential.  Procedure                               Abnormality         Status                     ---------                               -----------         ------                     CBC Auto Differential[[id number]]        Abnormal            Final result                 Please view results for these tests on the individual orders.    Magnesium [[id number]]  (Normal) Collected: 03/20/22 0358    Specimen: Blood Updated: 03/20/22 0514     Magnesium 2.2 mg/dL     Phosphorus [[id number]]   (Normal) Collected: 03/20/22 0358    Specimen: Blood Updated: 03/20/22 0514     Phosphorus 2.6 mg/dL     CBC Auto Differential [[id number]]  (Abnormal) Collected: 03/20/22 0358    Specimen: Blood Updated: 03/20/22 0459     WBC 8.18 10*3/mm3      RBC 5.02 10*6/mm3      Hemoglobin 14.4 g/dL      Hematocrit 43.5 %      MCV 86.7 fL      MCH 28.7 pg      MCHC 33.1 g/dL      RDW 12.7 %      RDW-SD 39.8 fl      MPV 9.5 fL      Platelets 174 10*3/mm3      Neutrophil % 69.2 %      Lymphocyte % 15.8 %      Monocyte % 9.5 %      Eosinophil % 4.9 %      Basophil % 0.4 %      Immature Grans % 0.2 %      Neutrophils, Absolute 5.66 10*3/mm3      Lymphocytes, Absolute 1.29 10*3/mm3      Monocytes, Absolute 0.78 10*3/mm3      Eosinophils, Absolute 0.40 10*3/mm3      Basophils, Absolute 0.03 10*3/mm3      Immature Grans, Absolute 0.02 10*3/mm3      nRBC 0.0 /100 WBC     POC Glucose Once [[id number]]  (Abnormal) Collected: 03/20/22 0611    Specimen: Blood Updated: 03/20/22 0611     Glucose 165 mg/dL      Comment: Meter: [id number] : [id number] Carolynn JOHNSON             Imaging:    I personally reviewed the images from the following radiographic studies.    MRI left shoulder ordered but pending    Current Medications:   Scheduled Meds:aspirin, 81 mg, Oral, Daily  atorvastatin, 80 mg, Oral, Daily  Diclofenac Sodium, 2 g, Topical, BID  gabapentin, 600 mg, Oral, TID  insulin lispro, 0-14 Units, Subcutaneous, TID AC  metFORMIN, 500 mg, Oral, BID With Meals  methocarbamol, 1,000 mg, Oral, Q8H  metoprolol tartrate, 12.5 mg, Oral, Q12H  oxyCODONE-acetaminophen, 2 tablet, Oral, Q6H  senna-docusate sodium, 2 tablet, Oral, BID  sodium chloride, 3 mL, Intravenous, Q12H  vitamin B-12, 1,000 mcg, Oral, Daily      Continuous Infusions:sodium chloride, 75 mL/hr, Last Rate: 75 mL/hr (03/19/22 8623)        Assessment/Plan       Closed nondisplaced fracture of second cervical vertebra (HCC)    HTN (hypertension)    Sleep apnea    Type 2 diabetes  mellitus with diabetic polyneuropathy (HCC)    Coronary artery disease    RLS (restless legs syndrome)    Neck pain    Constipation    Hyperlipemia    Fall    Chronic peripheral neuropathy    Chronic left arm weakness    Leukocytosis    Stage 3a chronic kidney disease (HCC)    Injury of left shoulder      Plan: It appears he is going to tolerate the use of the cervical collar in hopes that his C2 fracture will heal over the period of the next 6 weeks.  We will arrange follow-up with [facility] neurosurgery specifically Dr. 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    Orthopedics is working up the left shoulder pain today with an MRI.    I agree with the family I think is appropriate since his pain is under better control that we see how he does with mobilization with therapy and his head of bed up more.      Allen Ramon MD  03/20/22  10:17 EDT

## 2022-03-20 NOTE — PROGRESS NOTES
Acute rehab evaluation ongoing. With permission from patient this RN spoke with his daughter Jeanine via telephone. Discussed acute rehab admission criteria and program details. Will continue to follow.    Thank you!    Jay Crandall RN  p

## 2022-03-20 NOTE — SIGNIFICANT NOTE
03/20/22 1432   OTHER   Discipline physical therapist   Rehab Time/Intention   Session Not Performed other (see comments)  (awaiting MRI for LUE. pt with severe pain and unable to tolerate mobility with nursings staff)   Recommendation   PT - Next Appointment 03/21/22

## 2022-03-20 NOTE — PROGRESS NOTES
Name: Chester Gama ADMIT: 3/16/2022   : 1940  PCP: Demetrio Ko DO    MRN: [id number] LOS: 4 days   AGE/SEX: 81 y.o. male  ROOM: [facility]     Subjective   Subjective   Patient is resting in bed, he is accompanied by his daughter.  He has not received any IV Dilaudid in the last 24+ hours.  He reports his pain is moderately well controlled.  He is complaining of constipation.    Review of Systems   Constitutional: Negative for fatigue and fever.   HENT: Negative for sinus pressure and sneezing.    Eyes: Negative for pain and redness.   Respiratory: Negative for cough and shortness of breath.    Cardiovascular: Negative for chest pain and leg swelling.   Gastrointestinal: Negative for abdominal pain, nausea and vomiting.   Musculoskeletal:        L shoulder pain, neck pain   Skin: Negative for rash and wound.   Neurological: Negative for seizures and headaches.        Objective   Objective   Vital Signs  Temp:  [98.2 °F (36.8 °C)-98.6 °F (37 °C)] 98.5 °F (36.9 °C)  Heart Rate:  [62-69] 68  Resp:  [18] 18  BP: (145-186)/(72-87) 186/87  SpO2:  [93 %-95 %] 93 %  on  Flow (L/min):  [1] 1;   Device (Oxygen Therapy): nasal cannula  Body mass index is 37.74 kg/m².  Physical Exam  Constitutional:       Appearance: Normal appearance. He is obese. He is not toxic-appearing.      Comments: Awake, but drowsy, answers questions appropriately   HENT:      Head: Normocephalic and atraumatic.      Mouth/Throat:      Mouth: Mucous membranes are moist.      Pharynx: Oropharynx is clear.   Eyes:      Extraocular Movements: Extraocular movements intact.      Conjunctiva/sclera: Conjunctivae normal.      Pupils: Pupils are equal, round, and reactive to light.   Neck:      Comments: In [place] collar  Cardiovascular:      Rate and Rhythm: Normal rate and regular rhythm.      Pulses: Normal pulses.      Heart sounds: No murmur heard.  Pulmonary:      Effort: Pulmonary effort is normal. No respiratory distress.      Breath  sounds: Normal breath sounds. No wheezing.   Abdominal:      General: Abdomen is flat. Bowel sounds are normal. There is no distension.      Palpations: Abdomen is soft.      Tenderness: There is no abdominal tenderness.   Musculoskeletal:         General: No swelling or tenderness.      Right lower leg: No edema.      Left lower leg: No edema.   Skin:     General: Skin is warm and dry.   Neurological:      General: No focal deficit present.      Mental Status: He is alert. Mental status is at baseline.      Motor: No weakness.   Psychiatric:      Comments: Awake but drowsy         Results Review     I reviewed the patient's new clinical results.  Results from last 7 days   Lab Units 03/20/22  0358 03/19/22  1207 03/18/22  0824 03/17/22  0922   WBC 10*3/mm3 8.18 8.92 9.66 10.72   HEMOGLOBIN g/dL 14.4 14.8 15.5 14.1   PLATELETS 10*3/mm3 174 157 126* 187     Results from last 7 days   Lab Units 03/20/22  0358 03/19/22  1207 03/17/22  0922 03/16/22  1847   SODIUM mmol/L 137 137 140 142   POTASSIUM mmol/L 4.5 4.4 4.1 4.5   CHLORIDE mmol/L 105 105 106 105   CO2 mmol/L 23.0 25.0 25.8 25.0   BUN mg/dL 15 14 15 19   CREATININE mg/dL 1.12 1.16 1.35* 1.45*   GLUCOSE mg/dL 126* 162* 119* 183*   CrCl cannot be calculated (Unknown ideal weight.).  Results from last 7 days   Lab Units 03/16/22  1847   ALBUMIN g/dL 4.50   BILIRUBIN mg/dL 0.5   ALK PHOS U/L 86   AST (SGOT) U/L 27   ALT (SGPT) U/L 26     Results from last 7 days   Lab Units 03/20/22  0358 03/19/22  1207 03/17/22  0922 03/16/22  1847   CALCIUM mg/dL 8.3* 8.2* 9.0 9.7   ALBUMIN g/dL  --   --   --  4.50   MAGNESIUM mg/dL 2.2 2.2  --   --    PHOSPHORUS mg/dL 2.6 2.7  --   --        COVID19   Date Value Ref Range Status   03/16/2022 Not Detected Not Detected - Ref. Range Final     Glucose   Date/Time Value Ref Range Status   03/20/2022 1113 214 (H) 70 - 130 mg/dL Final     Comment:     Meter: [id number] : [id number] Vlad JOHNSON   03/20/2022 0611 165 (H) 70 -  130 mg/dL Final     Comment:     Meter: [id number] : [id number] Carolynn JOHNSON   03/19/2022 2150 154 (H) 70 - 130 mg/dL Final     Comment:     Meter: [id number] : [id number] Carolynn JOHNSON   03/19/2022 1646 143 (H) 70 - 130 mg/dL Final     Comment:     Meter: [id number] : [id number] Orestes Betty NA   03/19/2022 1029 156 (H) 70 - 130 mg/dL Final     Comment:     Meter: [id number] : [id number] Orestes [facility] NA   03/19/2022 0615 114 70 - 130 mg/dL Final     Comment:     Meter: [id number] : [id number] aCrolynn JOHNSON   03/18/2022 2123 155 (H) 70 - 130 mg/dL Final     Comment:     Meter: [id number] : [id number] Carolynn JOHNSON       XR Shoulder 2+ View Left  LEFT SHOULDER X-RAYS     CLINICAL HISTORY: Patient fell. Left shoulder pain.     Internal and external rotation views demonstrate mild to moderate  narrowing of the glenohumeral joint space and also mild bony spurring at  the acromioclavicular joint. There is no evidence of recent or old  fracture or dislocation.     This report was finalized on 3/18/2022 4:26 PM by Dr. Jorge Menjivar M.D.       Scheduled Medications  aspirin, 81 mg, Oral, Daily  atorvastatin, 80 mg, Oral, Daily  Diclofenac Sodium, 2 g, Topical, BID  gabapentin, 600 mg, Oral, TID  insulin lispro, 0-14 Units, Subcutaneous, TID AC  metFORMIN, 500 mg, Oral, BID With Meals  methocarbamol, 1,000 mg, Oral, Q8H  metoprolol tartrate, 12.5 mg, Oral, Q12H  oxyCODONE-acetaminophen, 2 tablet, Oral, Q6H  senna-docusate sodium, 2 tablet, Oral, BID  sodium chloride, 3 mL, Intravenous, Q12H  vitamin B-12, 1,000 mcg, Oral, Daily    Infusions  sodium chloride, 75 mL/hr, Last Rate: 75 mL/hr (03/19/22 0825)    Diet  Diet Soft Texture; Ground; Cardiac         I have personally reviewed:  [x]  Laboratory   []  Microbiology   []  Radiology   []  EKG/Telemetry   []  Cardiology/Vascular   []  Pathology   []  Records    Assessment/Plan     Active Hospital Problems    Diagnosis  POA   • **Closed nondisplaced fracture  of second cervical vertebra (HCC) [S12.101A]  Yes   • Injury of left shoulder [S49.92XA]  Unknown   • Fall [W19.XXXA]  Yes   • Chronic peripheral neuropathy [G62.9]  Yes   • Chronic left arm weakness [R29.898]  Yes   • Leukocytosis [D72.829]  Yes   • Stage 3a chronic kidney disease (HCC) [N18.31]  Yes   • Hyperlipemia [E78.5]  Yes   • Constipation [K59.00]  Yes   • Neck pain [M54.2]  Yes   • Sleep apnea [G47.30]  Yes   • RLS (restless legs syndrome) [G25.81]  Yes   • HTN (hypertension) [I10]  Yes   • Type 2 diabetes mellitus with diabetic polyneuropathy (HCC) [E11.42]  Yes   • Coronary artery disease [I25.10]  Yes      Resolved Hospital Problems   No resolved problems to display.     81-year-old male with previous neck surgery and cervical hardware presents to the hospital after a mechanical fall and acute C2 vertebral fracture.     Vertebral fracture C2:  - continue Russell collar for at least 6 weeks, no acute surgical intervention; neurosurgery following, he is to follow-up at [facility] w/ Dr. Pickett  - Pain meds:  2 tabs of percocet 10mg [id number], continue dilaudid 0.5mg [id number] PRN, robaxin 1g [id number]; gabapentin dose 600mg  - has prn narcan for resp depression  - d/w daughter and RN, peter leave prn dilaudid if he needs it after working with PT but otherwise will try to stay off IV pain meds     L shoulder pain  - [facility] has requested orthopedic consult for possible shoulder injury  - L shoulder XR negative for acute fx  - MRI pending    Chronic neuropathic pain:  - Resume home gabapentin dose     Chronic kidney disease 3A:  - limited PO intake, on 75cc IVF, dc when eating more     Insulin-dependent diabetes:  Continue Metformin.  Monitor glucose and correction insulin as needed.  At home patient takes approximately 30 units of Lantus twice daily plan tohold as oral intake will likely be decreased and adjust as needed.    - BG moslty less than 150; continue ssi     Sleep apnea: Not on CPAP.  Monitor pulse oximetry  and supplemental oxygen as needed.    SCDs for DVT prophylaxis.  Full code.  Discussed with patient, family and nursing staff.  Anticipate discharge home with HH vs SNU facility in 2-3 days.       Danuta Nair MD  [facility]  03/20/22  12:20 EDT    I wore protective equipment throughout this patient encounter including a face mask, gloves and protective eyewear.  Hand hygiene was performed before donning protective equipment and after removal when leaving the room.

## 2022-03-21 ENCOUNTER — APPOINTMENT (OUTPATIENT)
Dept: MRI IMAGING | Facility: HOSPITAL | Age: 82
End: 2022-03-21

## 2022-03-21 ENCOUNTER — TELEPHONE (OUTPATIENT)
Dept: NEUROSURGERY | Facility: CLINIC | Age: 82
End: 2022-03-21

## 2022-03-21 DIAGNOSIS — M54.2 NECK PAIN: ICD-10-CM

## 2022-03-21 DIAGNOSIS — R29.898 LEFT ARM WEAKNESS: Primary | ICD-10-CM

## 2022-03-21 DIAGNOSIS — S12.101A CLOSED NONDISPLACED FRACTURE OF SECOND CERVICAL VERTEBRA, UNSPECIFIED FRACTURE MORPHOLOGY, INITIAL ENCOUNTER: ICD-10-CM

## 2022-03-21 LAB
ANION GAP SERPL CALCULATED.3IONS-SCNC: 7.2 MMOL/L (ref 5–15)
BASOPHILS # BLD AUTO: 0.04 10*3/MM3 (ref 0–0.2)
BASOPHILS NFR BLD AUTO: 0.5 % (ref 0–1.5)
BUN SERPL-MCNC: 17 MG/DL (ref 8–23)
BUN/CREAT SERPL: 14.3 (ref 7–25)
CALCIUM SPEC-SCNC: 8.5 MG/DL (ref 8.6–10.5)
CHLORIDE SERPL-SCNC: 107 MMOL/L (ref 98–107)
CO2 SERPL-SCNC: 24.8 MMOL/L (ref 22–29)
CREAT SERPL-MCNC: 1.19 MG/DL (ref 0.76–1.27)
DEPRECATED RDW RBC AUTO: 40.3 FL (ref 37–54)
EGFRCR SERPLBLD CKD-EPI 2021: 62.1 ML/MIN/1.73
EOSINOPHIL # BLD AUTO: 0.49 10*3/MM3 (ref 0–0.4)
EOSINOPHIL NFR BLD AUTO: 6.1 % (ref 0.3–6.2)
ERYTHROCYTE [DISTWIDTH] IN BLOOD BY AUTOMATED COUNT: 12.5 % (ref 12.3–15.4)
GLUCOSE BLDC GLUCOMTR-MCNC: 151 MG/DL (ref 70–130)
GLUCOSE BLDC GLUCOMTR-MCNC: 157 MG/DL (ref 70–130)
GLUCOSE BLDC GLUCOMTR-MCNC: 164 MG/DL (ref 70–130)
GLUCOSE BLDC GLUCOMTR-MCNC: 173 MG/DL (ref 70–130)
GLUCOSE SERPL-MCNC: 184 MG/DL (ref 65–99)
HCT VFR BLD AUTO: 42.4 % (ref 37.5–51)
HGB BLD-MCNC: 14 G/DL (ref 13–17.7)
IMM GRANULOCYTES # BLD AUTO: 0.03 10*3/MM3 (ref 0–0.05)
IMM GRANULOCYTES NFR BLD AUTO: 0.4 % (ref 0–0.5)
LYMPHOCYTES # BLD AUTO: 1.25 10*3/MM3 (ref 0.7–3.1)
LYMPHOCYTES NFR BLD AUTO: 15.5 % (ref 19.6–45.3)
MAGNESIUM SERPL-MCNC: 2.3 MG/DL (ref 1.6–2.4)
MCH RBC QN AUTO: 29.3 PG (ref 26.6–33)
MCHC RBC AUTO-ENTMCNC: 33 G/DL (ref 31.5–35.7)
MCV RBC AUTO: 88.7 FL (ref 79–97)
MONOCYTES # BLD AUTO: 0.82 10*3/MM3 (ref 0.1–0.9)
MONOCYTES NFR BLD AUTO: 10.2 % (ref 5–12)
NEUTROPHILS NFR BLD AUTO: 5.41 10*3/MM3 (ref 1.7–7)
NEUTROPHILS NFR BLD AUTO: 67.3 % (ref 42.7–76)
NRBC BLD AUTO-RTO: 0 /100 WBC (ref 0–0.2)
PHOSPHATE SERPL-MCNC: 3.2 MG/DL (ref 2.5–4.5)
PLATELET # BLD AUTO: 185 10*3/MM3 (ref 140–450)
PMV BLD AUTO: 9.3 FL (ref 6–12)
POTASSIUM SERPL-SCNC: 4.6 MMOL/L (ref 3.5–5.2)
RBC # BLD AUTO: 4.78 10*6/MM3 (ref 4.14–5.8)
SODIUM SERPL-SCNC: 139 MMOL/L (ref 136–145)
WBC NRBC COR # BLD: 8.04 10*3/MM3 (ref 3.4–10.8)

## 2022-03-21 PROCEDURE — 85025 COMPLETE CBC W/AUTO DIFF WBC: CPT | Performed by: STUDENT IN AN ORGANIZED HEALTH CARE EDUCATION/TRAINING PROGRAM

## 2022-03-21 PROCEDURE — 84100 ASSAY OF PHOSPHORUS: CPT | Performed by: STUDENT IN AN ORGANIZED HEALTH CARE EDUCATION/TRAINING PROGRAM

## 2022-03-21 PROCEDURE — 82962 GLUCOSE BLOOD TEST: CPT

## 2022-03-21 PROCEDURE — 99232 SBSQ HOSP IP/OBS MODERATE 35: CPT | Performed by: PHYSICIAN ASSISTANT

## 2022-03-21 PROCEDURE — 80048 BASIC METABOLIC PNL TOTAL CA: CPT | Performed by: STUDENT IN AN ORGANIZED HEALTH CARE EDUCATION/TRAINING PROGRAM

## 2022-03-21 PROCEDURE — 83735 ASSAY OF MAGNESIUM: CPT | Performed by: STUDENT IN AN ORGANIZED HEALTH CARE EDUCATION/TRAINING PROGRAM

## 2022-03-21 PROCEDURE — 25010000002 HYDROMORPHONE PER 4 MG: Performed by: STUDENT IN AN ORGANIZED HEALTH CARE EDUCATION/TRAINING PROGRAM

## 2022-03-21 PROCEDURE — 73221 MRI JOINT UPR EXTREM W/O DYE: CPT

## 2022-03-21 RX ADMIN — OXYCODONE HYDROCHLORIDE AND ACETAMINOPHEN 2 TABLET: 10; 325 TABLET ORAL at 15:34

## 2022-03-21 RX ADMIN — METOPROLOL TARTRATE 12.5 MG: 25 TABLET, FILM COATED ORAL at 09:16

## 2022-03-21 RX ADMIN — GABAPENTIN 600 MG: 300 CAPSULE ORAL at 09:15

## 2022-03-21 RX ADMIN — METFORMIN HYDROCHLORIDE 500 MG: 500 TABLET ORAL at 17:49

## 2022-03-21 RX ADMIN — METHOCARBAMOL TABLETS 1000 MG: 500 TABLET, COATED ORAL at 21:13

## 2022-03-21 RX ADMIN — DOCUSATE SODIUM 50MG AND SENNOSIDES 8.6MG 2 TABLET: 8.6; 5 TABLET, FILM COATED ORAL at 09:17

## 2022-03-21 RX ADMIN — HYDROMORPHONE HYDROCHLORIDE 0.5 MG: 1 INJECTION, SOLUTION INTRAMUSCULAR; INTRAVENOUS; SUBCUTANEOUS at 13:02

## 2022-03-21 RX ADMIN — ATORVASTATIN CALCIUM 80 MG: 80 TABLET, FILM COATED ORAL at 09:17

## 2022-03-21 RX ADMIN — GABAPENTIN 600 MG: 300 CAPSULE ORAL at 15:34

## 2022-03-21 RX ADMIN — OXYCODONE HYDROCHLORIDE AND ACETAMINOPHEN 2 TABLET: 10; 325 TABLET ORAL at 21:13

## 2022-03-21 RX ADMIN — OXYCODONE HYDROCHLORIDE AND ACETAMINOPHEN 2 TABLET: 10; 325 TABLET ORAL at 04:09

## 2022-03-21 RX ADMIN — HYDROMORPHONE HYDROCHLORIDE 0.5 MG: 1 INJECTION, SOLUTION INTRAMUSCULAR; INTRAVENOUS; SUBCUTANEOUS at 02:54

## 2022-03-21 RX ADMIN — METHOCARBAMOL TABLETS 1000 MG: 500 TABLET, COATED ORAL at 06:19

## 2022-03-21 RX ADMIN — POLYETHYLENE GLYCOL 3350 17 G: 17 POWDER, FOR SOLUTION ORAL at 09:17

## 2022-03-21 RX ADMIN — DICLOFENAC SODIUM 2 G: 10 GEL TOPICAL at 21:14

## 2022-03-21 RX ADMIN — METOPROLOL TARTRATE 12.5 MG: 25 TABLET, FILM COATED ORAL at 21:12

## 2022-03-21 RX ADMIN — GABAPENTIN 600 MG: 300 CAPSULE ORAL at 21:13

## 2022-03-21 RX ADMIN — Medication 10 ML: at 21:14

## 2022-03-21 RX ADMIN — ASPIRIN 81 MG: 81 TABLET, COATED ORAL at 09:17

## 2022-03-21 RX ADMIN — METFORMIN HYDROCHLORIDE 500 MG: 500 TABLET ORAL at 09:17

## 2022-03-21 RX ADMIN — DOCUSATE SODIUM 50MG AND SENNOSIDES 8.6MG 2 TABLET: 8.6; 5 TABLET, FILM COATED ORAL at 21:12

## 2022-03-21 RX ADMIN — OXYCODONE HYDROCHLORIDE AND ACETAMINOPHEN 2 TABLET: 10; 325 TABLET ORAL at 09:15

## 2022-03-21 RX ADMIN — Medication 1000 MCG: at 09:16

## 2022-03-21 NOTE — PLAN OF CARE
Goal Outcome Evaluation:  Plan of Care Reviewed With: patient        Progress: no change  Outcome Evaluation: Increased pain overnight. Required IV dilaudid x 2. Pain worse with activity. Weight shifting encouraged. Turns self to R side frequently for meds and care. [place] collar remains in place. VSS. Will ct to monitor.

## 2022-03-21 NOTE — SIGNIFICANT NOTE
03/21/22 1431   OTHER   Discipline physical therapist   Rehab Time/Intention   Session Not Performed other (see comments)  (Pt has been on hold until MRI completed and read. Pt off floor this pm for MRI. Will f/u 3/22 if appropriate. Nurse aware)   Recommendation   PT - Next Appointment 03/22/22

## 2022-03-21 NOTE — PROGRESS NOTES
[facility] NEUROSURGERY PROGRESS NOTE    HD #6: s/p fall, traumatic C2 body fracture    Interval History: Patient stable.  No acute issues overnight.  Continues to complain of severe left shoulder pain.  Is awaiting left shoulder MRI imaging today.  Hard cervical collar is in place.  States that his neck pain is tolerable, but that it is his left shoulder pain that is bothering him the most.    ROS:  Constitutional: No fever, chills  Neck: no neck pain  GI: No nausea, vomiting, no swallow difficulties  Neuro: No numbness, tingling, or weakness,  no balance difficulties  : no difficulty voiding, no incontinence    Vital signs in last 24 hours:  Temp:  [97.7 °F (36.5 °C)-98.6 °F (37 °C)] 98.6 °F (37 °C)  Heart Rate:  [56-80] 61  Resp:  [18] 18  BP: (125-180)/(61-90) 125/61    Intake/Output this shift:  No intake/output data recorded.    LABS:  Recent Results (from the past 24 hour(s))   POC Glucose Once    Collection Time: 03/20/22  4:09 PM    Specimen: Blood   Result Value Ref Range    Glucose 166 (H) 70 - 130 mg/dL   POC Glucose Once    Collection Time: 03/21/22  6:19 AM    Specimen: Blood   Result Value Ref Range    Glucose 157 (H) 70 - 130 mg/dL   Basic Metabolic Panel    Collection Time: 03/21/22  6:23 AM    Specimen: Blood   Result Value Ref Range    Glucose 184 (H) 65 - 99 mg/dL    BUN 17 8 - 23 mg/dL    Creatinine 1.19 0.76 - 1.27 mg/dL    Sodium 139 136 - 145 mmol/L    Potassium 4.6 3.5 - 5.2 mmol/L    Chloride 107 98 - 107 mmol/L    CO2 24.8 22.0 - 29.0 mmol/L    Calcium 8.5 (L) 8.6 - 10.5 mg/dL    BUN/Creatinine Ratio 14.3 7.0 - 25.0    Anion Gap 7.2 5.0 - 15.0 mmol/L    eGFR 62.1 >60.0 mL/min/1.73   Magnesium    Collection Time: 03/21/22  6:23 AM    Specimen: Blood   Result Value Ref Range    Magnesium 2.3 1.6 - 2.4 mg/dL   Phosphorus    Collection Time: 03/21/22  6:23 AM    Specimen: Blood   Result Value Ref Range    Phosphorus 3.2 2.5 - 4.5 mg/dL   CBC Auto Differential    Collection Time: 03/21/22  6:23  AM    Specimen: Blood   Result Value Ref Range    WBC 8.04 3.40 - 10.80 10*3/mm3    RBC 4.78 4.14 - 5.80 10*6/mm3    Hemoglobin 14.0 13.0 - 17.7 g/dL    Hematocrit 42.4 37.5 - 51.0 %    MCV 88.7 79.0 - 97.0 fL    MCH 29.3 26.6 - 33.0 pg    MCHC 33.0 31.5 - 35.7 g/dL    RDW 12.5 12.3 - 15.4 %    RDW-SD 40.3 37.0 - 54.0 fl    MPV 9.3 6.0 - 12.0 fL    Platelets 185 140 - 450 10*3/mm3    Neutrophil % 67.3 42.7 - 76.0 %    Lymphocyte % 15.5 (L) 19.6 - 45.3 %    Monocyte % 10.2 5.0 - 12.0 %    Eosinophil % 6.1 0.3 - 6.2 %    Basophil % 0.5 0.0 - 1.5 %    Immature Grans % 0.4 0.0 - 0.5 %    Neutrophils, Absolute 5.41 1.70 - 7.00 10*3/mm3    Lymphocytes, Absolute 1.25 0.70 - 3.10 10*3/mm3    Monocytes, Absolute 0.82 0.10 - 0.90 10*3/mm3    Eosinophils, Absolute 0.49 (H) 0.00 - 0.40 10*3/mm3    Basophils, Absolute 0.04 0.00 - 0.20 10*3/mm3    Immature Grans, Absolute 0.03 0.00 - 0.05 10*3/mm3    nRBC 0.0 0.0 - 0.2 /100 WBC   POC Glucose Once    Collection Time: 03/21/22 10:53 AM    Specimen: Blood   Result Value Ref Range    Glucose 164 (H) 70 - 130 mg/dL       IMAGING STUDIES:  HISTORY: Fell. Neck and back pain.     CT OF THE CERVICAL SPINE WITHOUT CONTRAST 03/16/2022     TECHNIQUE: Axial images were obtained from the skull base to the upper  thoracic spine. Sagittal and coronal reconstruction images were  reviewed.     FINDINGS: There is a mildly displaced fracture through the C2 vertebra  at the junction of the posterior vertebral body and the posterior  elements. The fracture appears mildly displaced. Fracture extends into  the left transversarium foramen. There is mild canal stenosis at this  level. No fractures of the base of the odontoid process is seen. There  is degenerative disease of the C1-C2 articulation.     There is fusion of the C3-4 levels. Anterior cervical plate is seen  fusing C4-C6. Intervertebral disc spacers are seen at C4-5 and C5-6.  There is also some spondylosis at C6-7 and C7-T1. Posterior fusion  is  seen from C4 through T2.     No other fractures are seen.     IMPRESSION:  1. Fracture of the C2 vertebra involving the posterior aspect of the C2  vertebral body at its junction with the posterior elements. This shows  mild displacement and mild canal stenosis. Fracture extends into the  left transversarium foramen. If further evaluation of the vertebral  arteries is clinically indicated a CT angiogram of the neck may be  helpful.  2.. No other fractures are seen.  3. Postoperative changes of the cervical spine.     CT OF THE THORACIC SPINE WITHOUT CONTRAST 03/16/2022     TECHNIQUE: Axial images were obtained through the thoracic spine.  Sagittal and coronal reconstruction images were reviewed.     FINDINGS: C4-C6 anterior fusion device is seen with lower cervical and  upper thoracic fusion to approximately the T2 level.     There is anterior osteophytosis at essentially all thoracic levels.     No thoracic compression fractures or other fractures are seen. No  subluxation is seen. No significant canal stenosis is seen.     There are some ill-defined areas of scar, atelectasis or inflammatory  infiltrates in the bilateral lower lobes.     IMPRESSION:  1. Postoperative changes of the cervicothoracic junction.  2. Thoracic osteophytosis..  3. No thoracic spine fractures are seen.              Radiation dose reduction techniques were utilized, including automated  exposure control and exposure modulation based on body size.     This report was finalized on 3/16/2022 10:17 PM by Dr. Tony Mckee M.D.    I personally viewed and interpreted the patient's head, cervical and thoracic CTs.    PHYSICAL EXAM:  Mental Status: The patient is awake, alert and oriented x 3. Recent and remote memory functions are normal. The patient is attentive with normal concentration.  Language is fluent. Speech is clear. The speech is non-dysarthric. Fund of knowledge is normal.  Cranial Nerve I: Not tested.  Cranial Nerve II: The  "pupils are 3 mm, equally round and reactive to light.   Cranial Nerves III, IV, VI: The extraocular movements are full in all directions of gaze without nystagmus.  Cranial Nerve VII: Facial movements are symmetric  Motor: Tone is normal in all four extremities without fasciculations, atrophy, or myoclonus. There are no involuntary movements.   Cervical collar in place.    Patient has significant pain in left shoulder with any movement of the left shoulder.     Diagnosis Plan   1. Other closed nondisplaced fracture of second cervical vertebra, initial encounter ([facility])       PLAN: The patient is a 81-year-old male with history of previous extensive cervical surgery (previous anterior fusion C4-C6 and previous posterior fusion C4-T2, as well as potential congenital fusion at C3-4).  The patient presented to the emergency department after a fall at home in which he sustained a fracture through the body of the odontoid.  The patient is now in a cervical collar.  The patient does not wish to follow-up with his previous surgeon, Dr. Soliman.  His main complaint at this time is left shoulder pain.  Orthopedic consultation has been placed and a left shoulder MRI is pending.  We have recommended that he remain in the cervical collar and follow-up with Dr. Pickett and the [facility] in 6 weeks.  We will sign off at this time.  Please feel free to reach out to our service with any questions or concerns in the meantime.    I discussed the patient's findings and my recommendations with patient and family     LOS: 5 days     JUDE Jackson  3/21/2022  13:45 EDT    \"Dictated utilizing Dragon dictation\".      "

## 2022-03-21 NOTE — CASE MANAGEMENT/SOCIAL WORK
Continued Stay Note  [facility]     Patient Name: Chester Gama  MRN: [id number]  Today's Date: 3/21/2022    Admit Date: 3/16/2022     Discharge Plan     Row Name 03/21/22 1319       Plan    Plan Referrals to [facility] and [facility] pending.    Plan Comments S/w pt's spouse Sadie to discuss DC options.  Discussed backup plans in case pt not accepted for acute rehab.  Sadie states plan will be either acute rehab or home.  She declines subacute rehab.  Acute rehb evals pending.  ..................Keerthi PEÑA/ CCP               Discharge Codes    No documentation.               Expected Discharge Date and Time     Expected Discharge Date Expected Discharge Time    Mar 23, 2022             Keerthi Hernandez RN

## 2022-03-21 NOTE — TELEPHONE ENCOUNTER
"----- Message from JUDE Jackson sent at 3/21/2022  1:49 PM EDT -----  Regarding: referral to [facility] neurosurgeon  This patient has a C2 fracture that needs to be referred to [facility] neurosurgery (specifically Dr. Motta) per Dr. Ramon - \"since he has a very complex cervical anatomy following cervical fusion from C3 to the upper thoracic spine both anterior and posterior\" in 6 weeks.  He had spine imaging in the hospital here, so he will need to take discs with imaging from here to that appointment I would assume.    Thank you!    "

## 2022-03-21 NOTE — PROGRESS NOTES
[facility] Acute Rehab     Based on current therapies, Dr. Medellin does not feel patient is appropriate for acute inpatient rehab here at [facility].     Discussed with spouse, who does not feel therapies have displayed patients abilities.  Spouse would like therapies to re-evaluate patient.  Rehab Admissions in agreement, will be happy to re-evaluate patient after therapies have seen.      Will follow for rehab needs. CCP, Keerthi, updated.  Asked Keerthi to make sure therapies are aware to see patient.    Thanks,   Candace Egan RN  Rehab Admission Nurse   [phone]

## 2022-03-21 NOTE — PLAN OF CARE
Pt. VS WNL.  C/o persistent neck and left shoulder pain, some relief with PO and IV pain meds.  Pt. Receiving IVFs.  Pt. Awaiting MRI, s/w tech, will be done tomorrow.  Pt. Encouraged oral intake, not eating much, drinks several boosts per shift.  Pt. Q2 turn.  Pt. A&O x4.  PT. With hard neck collar in place.  Pt. With adequate UOP.  Pt. Given meds to have BM, no BM since 3/16.  Pt. Resting comfortably at present, will continue to monitor closely.      Problem: Adult Inpatient Plan of Care  Goal: Plan of Care Review  Outcome: Ongoing, Progressing  Flowsheets (Taken 3/20/2022 2007)  Progress: no change  Plan of Care Reviewed With: patient

## 2022-03-21 NOTE — PROGRESS NOTES
[facility]     LOS: 5 days     Subjective:    CC:Fall and Neck Pain    DIET:  Diet Order   Procedures   • Diet Soft Texture; Ground; Cardiac     No cp  No soa  No n/v/d    Objective:    Vital Signs:  Temp:  [97.7 °F (36.5 °C)-98.6 °F (37 °C)] 98.6 °F (37 °C)  Heart Rate:  [56-80] 65  Resp:  [18] 18  BP: (142-184)/() 142/71    SpO2:  [92 %-97 %] 96 %  on  Flow (L/min):  [1] 1;   Device (Oxygen Therapy): room air  Body mass index is 32.75 kg/m².    Physical Exam  Constitutional:       Appearance: Normal appearance.      Comments: Patient lays very still in bed does not turn his head at all and has neck brace in place.  He is able to answer questions that I ask him and awakens but then falls asleep during the rest of my conversation with the patient's wife at the bedside   HENT:      Head: Normocephalic and atraumatic.   Cardiovascular:      Rate and Rhythm: Normal rate and regular rhythm.      Heart sounds: No murmur heard.    No friction rub.   Pulmonary:      Effort: Pulmonary effort is normal.      Breath sounds: Normal breath sounds.   Abdominal:      General: Bowel sounds are normal. There is no distension.      Palpations: Abdomen is soft.      Tenderness: There is no abdominal tenderness.   Skin:     General: Skin is warm and dry.   Neurological:      Mental Status: He is alert.   Psychiatric:         Mood and Affect: Mood normal.         Behavior: Behavior normal.         Results Review:    Glucose   Date Value Ref Range Status   03/21/2022 184 (H) 65 - 99 mg/dL Final   03/20/2022 126 (H) 65 - 99 mg/dL Final   03/19/2022 162 (H) 65 - 99 mg/dL Final     Results from last 7 days   Lab Units 03/21/22  0623   WBC 10*3/mm3 8.04   HEMOGLOBIN g/dL 14.0   HEMATOCRIT % 42.4   PLATELETS 10*3/mm3 185     Results from last 7 days   Lab Units 03/21/22  0623 03/17/22  0922 03/16/22  1847   SODIUM mmol/L 139   < > 142   POTASSIUM mmol/L 4.6   < > 4.5   CHLORIDE mmol/L 107   < > 105   CO2  mmol/L 24.8   < > 25.0   BUN mg/dL 17   < > 19   CREATININE mg/dL 1.19   < > 1.45*   CALCIUM mg/dL 8.5*   < > 9.7   BILIRUBIN mg/dL  --   --  0.5   ALK PHOS U/L  --   --  86   ALT (SGPT) U/L  --   --  26   AST (SGOT) U/L  --   --  27   GLUCOSE mg/dL 184*   < > 183*    < > = values in this interval not displayed.     Results from last 7 days   Lab Units 03/16/22  1847   INR  1.04     Results from last 7 days   Lab Units 03/21/22  0623   MAGNESIUM mg/dL 2.3         Cultures:  No results found for: BLOODCX, URINECX, WOUNDCX, MRSACX, RESPCX, STOOLCX    I have reviewed daily medications and changes in CPOE    Scheduled meds  aspirin, 81 mg, Oral, Daily  atorvastatin, 80 mg, Oral, Daily  Diclofenac Sodium, 2 g, Topical, BID  gabapentin, 600 mg, Oral, TID  insulin lispro, 0-14 Units, Subcutaneous, TID AC  metFORMIN, 500 mg, Oral, BID With Meals  methocarbamol, 1,000 mg, Oral, Q8H  metoprolol tartrate, 12.5 mg, Oral, Q12H  oxyCODONE-acetaminophen, 2 tablet, Oral, Q6H  polyethylene glycol, 17 g, Oral, Daily   And  senna-docusate sodium, 2 tablet, Oral, BID  sodium chloride, 3 mL, Intravenous, Q12H  vitamin B-12, 1,000 mcg, Oral, Daily        sodium chloride, 75 mL/hr, Last Rate: 75 mL/hr (03/19/22 0825)      PRN meds  •  acetaminophen **OR** acetaminophen **OR** acetaminophen  •  senna-docusate sodium **AND** polyethylene glycol **AND** bisacodyl **AND** bisacodyl  •  dextrose  •  dextrose  •  famotidine  •  glucagon (human recombinant)  •  HYDROmorphone  •  melatonin  •  naloxone  •  [DISCONTINUED] Morphine **AND** naloxone  •  nitroglycerin  •  ondansetron **OR** ondansetron  •  Insert peripheral IV **AND** sodium chloride  •  sodium chloride        Closed nondisplaced fracture of second cervical vertebra (HCC)    HTN (hypertension)    Sleep apnea    Type 2 diabetes mellitus with diabetic polyneuropathy (HCC)    Coronary artery disease    RLS (restless legs syndrome)    Neck pain    Constipation    Hyperlipemia    Fall     Chronic peripheral neuropathy    Chronic left arm weakness    Leukocytosis    Stage 3a chronic kidney disease (HCC)    Injury of left shoulder        Assessment/Plan:  81-year-old male with previous neck surgery and cervical hardware presents to the hospital after a mechanical fall and acute C2 vertebral fracture.     Vertebral fracture C2:  - continue Gibsonia collar for at least 6 weeks, no acute surgical intervention; neurosurgery following, he is to follow-up at [facility] w/ Dr. Pickett  - Pain meds:  2 tabs of percocet 10mg [id number], continue dilaudid 0.5mg [id number] PRN, robaxin 1g [id number]; gabapentin dose 600mg  - has prn narcan for resp depression  - d/w wife and RN, peter leave prn dilaudid if he needs it after working with PT but otherwise will try to stay off IV pain meds  -Patient to work with physical therapy today     L shoulder pain  - orthopedic consult for possible shoulder injury  - L shoulder mri    Chronic neuropathic pain:  - Resume home gabapentin dose     Chronic kidney disease 3A:  - limited PO intake, on 75cc IVF, dc when eating more     Insulin-dependent diabetes:  Continue Metformin.  Monitor glucose and correction insulin as needed.  At home patient takes approximately 30 units of Lantus twice daily plan tohold as oral intake will likely be decreased and adjust as needed.    - BG moslty less than 150; continue ssi     Sleep apnea: Not on CPAP.  Monitor pulse oximetry and supplemental oxygen as needed.     · SCDs for DVT prophylaxis.  · Full code.  · Discussed with patient, family and nursing staff.  · Anticipate discharge home with HH vs SNU facility in 2-3 days.     >35 minutes spent, > 1/2 time spent counseling and coordination of care on fracture and pain    Hardy Howe MD  03/21/22  11:21 EDT

## 2022-03-22 LAB
ANION GAP SERPL CALCULATED.3IONS-SCNC: 7.4 MMOL/L (ref 5–15)
BASOPHILS # BLD AUTO: 0.04 10*3/MM3 (ref 0–0.2)
BASOPHILS NFR BLD AUTO: 0.5 % (ref 0–1.5)
BUN SERPL-MCNC: 16 MG/DL (ref 8–23)
BUN/CREAT SERPL: 14.7 (ref 7–25)
CALCIUM SPEC-SCNC: 8.4 MG/DL (ref 8.6–10.5)
CHLORIDE SERPL-SCNC: 109 MMOL/L (ref 98–107)
CO2 SERPL-SCNC: 23.6 MMOL/L (ref 22–29)
CREAT SERPL-MCNC: 1.09 MG/DL (ref 0.76–1.27)
DEPRECATED RDW RBC AUTO: 38.6 FL (ref 37–54)
EGFRCR SERPLBLD CKD-EPI 2021: 69 ML/MIN/1.73
EOSINOPHIL # BLD AUTO: 0.58 10*3/MM3 (ref 0–0.4)
EOSINOPHIL NFR BLD AUTO: 7.2 % (ref 0.3–6.2)
ERYTHROCYTE [DISTWIDTH] IN BLOOD BY AUTOMATED COUNT: 12.6 % (ref 12.3–15.4)
GLUCOSE BLDC GLUCOMTR-MCNC: 123 MG/DL (ref 70–130)
GLUCOSE BLDC GLUCOMTR-MCNC: 133 MG/DL (ref 70–130)
GLUCOSE BLDC GLUCOMTR-MCNC: 139 MG/DL (ref 70–130)
GLUCOSE BLDC GLUCOMTR-MCNC: 150 MG/DL (ref 70–130)
GLUCOSE SERPL-MCNC: 150 MG/DL (ref 65–99)
HCT VFR BLD AUTO: 39.4 % (ref 37.5–51)
HGB BLD-MCNC: 13.3 G/DL (ref 13–17.7)
IMM GRANULOCYTES # BLD AUTO: 0.02 10*3/MM3 (ref 0–0.05)
IMM GRANULOCYTES NFR BLD AUTO: 0.2 % (ref 0–0.5)
LYMPHOCYTES # BLD AUTO: 1.03 10*3/MM3 (ref 0.7–3.1)
LYMPHOCYTES NFR BLD AUTO: 12.8 % (ref 19.6–45.3)
MAGNESIUM SERPL-MCNC: 2.3 MG/DL (ref 1.6–2.4)
MCH RBC QN AUTO: 28.9 PG (ref 26.6–33)
MCHC RBC AUTO-ENTMCNC: 33.8 G/DL (ref 31.5–35.7)
MCV RBC AUTO: 85.7 FL (ref 79–97)
MONOCYTES # BLD AUTO: 0.66 10*3/MM3 (ref 0.1–0.9)
MONOCYTES NFR BLD AUTO: 8.2 % (ref 5–12)
NEUTROPHILS NFR BLD AUTO: 5.73 10*3/MM3 (ref 1.7–7)
NEUTROPHILS NFR BLD AUTO: 71.1 % (ref 42.7–76)
NRBC BLD AUTO-RTO: 0 /100 WBC (ref 0–0.2)
PHOSPHATE SERPL-MCNC: 2.8 MG/DL (ref 2.5–4.5)
PLATELET # BLD AUTO: 187 10*3/MM3 (ref 140–450)
PMV BLD AUTO: 9.4 FL (ref 6–12)
POTASSIUM SERPL-SCNC: 4.5 MMOL/L (ref 3.5–5.2)
RBC # BLD AUTO: 4.6 10*6/MM3 (ref 4.14–5.8)
SARS-COV-2 RNA RESP QL NAA+PROBE: NOT DETECTED
SODIUM SERPL-SCNC: 140 MMOL/L (ref 136–145)
WBC NRBC COR # BLD: 8.06 10*3/MM3 (ref 3.4–10.8)

## 2022-03-22 PROCEDURE — U0005 INFEC AGEN DETEC AMPLI PROBE: HCPCS | Performed by: HOSPITALIST

## 2022-03-22 PROCEDURE — 82962 GLUCOSE BLOOD TEST: CPT

## 2022-03-22 PROCEDURE — U0003 INFECTIOUS AGENT DETECTION BY NUCLEIC ACID (DNA OR RNA); SEVERE ACUTE RESPIRATORY SYNDROME CORONAVIRUS 2 (SARS-COV-2) (CORONAVIRUS DISEASE [COVID-19]), AMPLIFIED PROBE TECHNIQUE, MAKING USE OF HIGH THROUGHPUT TECHNOLOGIES AS DESCRIBED BY CMS-2020-01-R: HCPCS | Performed by: HOSPITALIST

## 2022-03-22 PROCEDURE — 83735 ASSAY OF MAGNESIUM: CPT | Performed by: STUDENT IN AN ORGANIZED HEALTH CARE EDUCATION/TRAINING PROGRAM

## 2022-03-22 PROCEDURE — 97530 THERAPEUTIC ACTIVITIES: CPT

## 2022-03-22 PROCEDURE — 84100 ASSAY OF PHOSPHORUS: CPT | Performed by: STUDENT IN AN ORGANIZED HEALTH CARE EDUCATION/TRAINING PROGRAM

## 2022-03-22 PROCEDURE — 97110 THERAPEUTIC EXERCISES: CPT

## 2022-03-22 PROCEDURE — C1776 JOINT DEVICE (IMPLANTABLE): HCPCS

## 2022-03-22 PROCEDURE — 85025 COMPLETE CBC W/AUTO DIFF WBC: CPT | Performed by: STUDENT IN AN ORGANIZED HEALTH CARE EDUCATION/TRAINING PROGRAM

## 2022-03-22 PROCEDURE — 25010000002 ENOXAPARIN PER 10 MG: Performed by: HOSPITALIST

## 2022-03-22 PROCEDURE — 80048 BASIC METABOLIC PNL TOTAL CA: CPT | Performed by: STUDENT IN AN ORGANIZED HEALTH CARE EDUCATION/TRAINING PROGRAM

## 2022-03-22 RX ORDER — OXYCODONE AND ACETAMINOPHEN 7.5; 325 MG/1; MG/1
2 TABLET ORAL 3 TIMES DAILY
Status: DISCONTINUED | OUTPATIENT
Start: 2022-03-22 | End: 2022-03-23 | Stop reason: HOSPADM

## 2022-03-22 RX ADMIN — OXYCODONE HYDROCHLORIDE AND ACETAMINOPHEN 2 TABLET: 10; 325 TABLET ORAL at 05:41

## 2022-03-22 RX ADMIN — DOCUSATE SODIUM 50MG AND SENNOSIDES 8.6MG 2 TABLET: 8.6; 5 TABLET, FILM COATED ORAL at 22:15

## 2022-03-22 RX ADMIN — Medication 1000 MCG: at 09:04

## 2022-03-22 RX ADMIN — ASPIRIN 81 MG: 81 TABLET, COATED ORAL at 09:05

## 2022-03-22 RX ADMIN — GABAPENTIN 600 MG: 300 CAPSULE ORAL at 16:19

## 2022-03-22 RX ADMIN — METHOCARBAMOL TABLETS 1000 MG: 500 TABLET, COATED ORAL at 22:15

## 2022-03-22 RX ADMIN — GABAPENTIN 600 MG: 300 CAPSULE ORAL at 22:15

## 2022-03-22 RX ADMIN — OXYCODONE HYDROCHLORIDE AND ACETAMINOPHEN 2 TABLET: 10; 325 TABLET ORAL at 09:04

## 2022-03-22 RX ADMIN — METHOCARBAMOL TABLETS 1000 MG: 500 TABLET, COATED ORAL at 05:41

## 2022-03-22 RX ADMIN — GABAPENTIN 600 MG: 300 CAPSULE ORAL at 09:04

## 2022-03-22 RX ADMIN — ENOXAPARIN SODIUM 40 MG: 100 INJECTION SUBCUTANEOUS at 16:19

## 2022-03-22 RX ADMIN — METHOCARBAMOL TABLETS 1000 MG: 500 TABLET, COATED ORAL at 16:21

## 2022-03-22 RX ADMIN — ATORVASTATIN CALCIUM 80 MG: 80 TABLET, FILM COATED ORAL at 09:05

## 2022-03-22 RX ADMIN — OXYCODONE HYDROCHLORIDE AND ACETAMINOPHEN 2 TABLET: 7.5; 325 TABLET ORAL at 22:14

## 2022-03-22 RX ADMIN — DOCUSATE SODIUM 50MG AND SENNOSIDES 8.6MG 2 TABLET: 8.6; 5 TABLET, FILM COATED ORAL at 09:05

## 2022-03-22 RX ADMIN — METFORMIN HYDROCHLORIDE 500 MG: 500 TABLET ORAL at 18:23

## 2022-03-22 RX ADMIN — Medication 10 ML: at 22:15

## 2022-03-22 RX ADMIN — POLYETHYLENE GLYCOL 3350 17 G: 17 POWDER, FOR SOLUTION ORAL at 09:05

## 2022-03-22 RX ADMIN — OXYCODONE HYDROCHLORIDE AND ACETAMINOPHEN 2 TABLET: 7.5; 325 TABLET ORAL at 16:19

## 2022-03-22 RX ADMIN — DICLOFENAC SODIUM 2 G: 10 GEL TOPICAL at 22:15

## 2022-03-22 RX ADMIN — METOPROLOL TARTRATE 12.5 MG: 25 TABLET, FILM COATED ORAL at 09:04

## 2022-03-22 RX ADMIN — METOPROLOL TARTRATE 12.5 MG: 25 TABLET, FILM COATED ORAL at 22:15

## 2022-03-22 RX ADMIN — METFORMIN HYDROCHLORIDE 500 MG: 500 TABLET ORAL at 09:04

## 2022-03-22 NOTE — CASE MANAGEMENT/SOCIAL WORK
Continued Stay Note  [facility]     Patient Name: Chester Gama  MRN: [id number]  Today's Date: 3/22/2022    Admit Date: 3/16/2022     Discharge Plan     Row Name 03/22/22 0925       Plan    Plan [facility] and [facility] evals pending.    Plan Comments Facilities await new PT/ OT notes to eval if appropriate for acute rehab.  CHARAN feels [facility] would be a good option if pt qualifies, due to pt's neurosurgeon having privileges there.  Pt's spouse has declined subacute rehab.  ............Keerthi PEÑA/ MARYLOU               Discharge Codes    No documentation.               Expected Discharge Date and Time     Expected Discharge Date Expected Discharge Time    Mar 23, 2022             Keerthi Hernandez RN

## 2022-03-22 NOTE — CASE MANAGEMENT/SOCIAL WORK
Continued Stay Note  [facility]     Patient Name: Chester Gama  MRN: [id number]  Today's Date: 3/22/2022    Admit Date: 3/16/2022     Discharge Plan     Row Name 03/22/22 1716       Plan    Plan [facility]    Plan Comments [facility]/W [facility] - pt accepted pending bed. She will notifiy [facility] when bed is available - hopefully tomorrow.  Pt's spouse Sadie notified and is in agreement.  .............Keerthi PEÑA/ [facility]               Discharge Codes    No documentation.               Expected Discharge Date and Time     Expected Discharge Date Expected Discharge Time    Mar 23, 2022             Keerthi Hernandez RN

## 2022-03-22 NOTE — PLAN OF CARE
Goal Outcome Evaluation:      Vitals as charted, c/o pain that is better controlled w/ PRN pain meds, up w/ 2 assist and a walker to the chair, A&Ox4, pain medications adjusted, plan to discharge to rehab tomorrow, will continue to monitor.

## 2022-03-22 NOTE — PROGRESS NOTES
[facility]     Continuing to follow patient progress.  Worked with PT today, still awaiting OT eval.     Per MD notes, [facility] may be better rehab option if appropriate for acute rehab in order to be near patient's neurosurgeon.      Will review with Dr. Medellin when all therapy evaluations are in.    Thanks,   Candace Egan RN  Rehab Admission Nurse   [phone]

## 2022-03-22 NOTE — PLAN OF CARE
Pt participated in OT this afternoon. Pt had been in the bedside chair >4 hours and was fatigued. Pt participated in BUE reaching and AROM/AAROM. LUE with limited tolerance for hand to face coordination or reaching. Total A LBD due to BLE weakness and impaired reaching. Mod A x2 to stand and Min A to pivot back to the EOB. Mod A x2 to assist back to supine. Pt unable to use a urinal and from supine his brief and purewick changed by RN. Will continue to follow. Pt's wife very interested in [facility] vs. [facility] at d/c.    Patient was not wearing a face mask during this therapy encounter. Therapist used appropriate personal protective equipment including mask and gloves. Mask used was standard procedure mask. Appropriate PPE was worn during the entire therapy session. Hand hygiene was completed before and after therapy session. Patient is not in enhanced droplet precautions.

## 2022-03-22 NOTE — THERAPY TREATMENT NOTE
Patient Name: Chester Gama  : 1940    MRN: [id number]                              Today's Date: 3/22/2022       Admit Date: 3/16/2022    Visit Dx:     ICD-10-CM ICD-9-CM   1. Other closed nondisplaced fracture of second cervical vertebra, initial encounter ([facility])  S12.191A 805.02     Patient Active Problem List   Diagnosis   • HTN (hypertension)   • Sleep apnea   • Type 2 diabetes mellitus with diabetic polyneuropathy ([facility])   • Coronary artery disease   • RLS (restless legs syndrome)   • Postoperative wound infection   • Chest pain   • Neck pain   • Constipation   • Closed nondisplaced fracture of second cervical vertebra ([facility])   • Hyperlipemia   • Fall   • Chronic peripheral neuropathy   • Chronic left arm weakness   • Leukocytosis   • Stage 3a chronic kidney disease ([facility])   • Injury of left shoulder     Past Medical History:   Diagnosis Date   • Arthritis     HANDS KNEES   • Cervical pain (neck)     LEFT ARM PARESTHESIA   • Coronary artery disease    • Depression    • Diabetes mellitus ([facility])     TYPE 2   • Diverticular disease     HX, S/P RESECTION   • Hx of heart artery stent    • Hyperlipemia    • Hypertension    • PTSD (post-traumatic stress disorder)    • RLS (restless legs syndrome)    • Sleep apnea     DIDNT TOLERATE MASK, CLAUSTROPHOBIC   • Structural abnormality of kidney     SAW NEPHROLOGIST FOR SMALLER KIDNEY - 3 YEARS AGO.   • Thyroid cyst     PER WIFE     Past Surgical History:   Procedure Laterality Date   • ANTERIOR CERVICAL DISCECTOMY W/ FUSION N/A 1/3/2018    Procedure: ANTERIOR CERVICAL DECOMPRESSION & FUSION C4 6;  Surgeon: Vincenzo Soliman DO;  Location: [facility] OR;  Service:    • APPENDECTOMY     • CATARACT EXTRACTION W/ INTRAOCULAR LENS  IMPLANT, BILATERAL     • CERVICAL DISCECTOMY POSTERIOR FUSION WITH INSTRUMENTATION N/A 2019    Procedure: C4-T2 POSTERIOR DECOMPRESSION AND FUSION BONE MORPHOGENIC PROTEIN;  Surgeon: Vincenzo Soliman DO;  Location: [facility] OR;  Service:  Orthopedic Spine   • CERVICAL DISCECTOMY POSTERIOR FUSION WITH INSTRUMENTATION N/A 8/8/2019    Procedure: LEFT FORAMINOTOMY C4-T1, REFUSION OF C4-T1, REMOVAL OF C6 SCREW, REVISION OF HARDWARE;  Surgeon: Vincenzo Soliman DO;  Location: [facility] OR;  Service: Orthopedics   • COLON RESECTION      COLOSTOMY X 6 MOS THEN REVERSED.   • CORONARY ANGIOPLASTY WITH STENT PLACEMENT  02/10/2010    [email or web address]      General Information     Row Name 03/22/22 1633          OT Time and Intention    Document Type therapy note (daily note)  -RB     Mode of Treatment individual therapy;occupational therapy  -RB     Row Name 03/22/22 1633          General Information    Patient Profile Reviewed yes  -RB     Existing Precautions/Restrictions fall;brace worn when out of bed  -RB     Row Name 03/22/22 1633          Cognition    Orientation Status (Cognition) oriented x 4  -RB           User Key  (r) = Recorded By, (t) = Taken By, (c) = Cosigned By    Initials Name Provider Type    RB Aye Amaro OT Occupational Therapist                 Mobility/ADL's     Row Name 03/22/22 1633          Bed Mobility    Bed Mobility sit-supine  -RB     Supine-Sit [place] (Bed Mobility) moderate assist (50% patient effort);2 person assist;nonverbal cues (demo/gesture);verbal cues  -RB     Assistive Device (Bed Mobility) bed rails  -RB     Comment, (Bed Mobility) Education provided prior to his transition to supine to maintain cervical neck precautions and log roll.  -RB     Row Name 03/22/22 1633          Transfers    Transfers stand-sit transfer;sit-stand transfer;chair-bed transfer  -RB     Chair-Bed [place] (Transfers) minimum assist (75% patient effort);1 person assist;verbal cues;nonverbal cues (demo/gesture)  -RB     Sit-Stand [place] (Transfers) moderate assist (50% patient effort);2 person assist;nonverbal cues (demo/gesture);verbal cues  -RB     Stand-Sit [place] (Transfers) moderate assist (50% patient  effort);2 person assist;verbal cues;nonverbal cues (demo/gesture)  -RB     Row Name 03/22/22 1633          Sit-Stand Transfer    Assistive Device (Sit-Stand Transfers) walker, front-wheeled  -RB     Comment, (Sit-Stand Transfer) Cues for leaning forward, rocking, and standing on 3. Hand placement cues also required  -RB     Row Name 03/22/22 1633          Activities of Daily Living    BADL Assessment/Intervention lower body dressing  -RB     Row Name 03/22/22 1633          Lower Body Dressing Assessment/Training    [place] Level (Lower Body Dressing) lower body dressing skills;dependent (less than 25% patient effort)  -RB     Position (Lower Body Dressing) supported sitting  -RB     Row Name 03/22/22 1633          Chair-Bed Transfer    Assistive Device (Chair-Bed Transfers) walker, front-wheeled  -RB     Comment, (Chair-Bed Transfer) Much improvement with this transfer - no LOB seen. slow and steady with a walker.  -RB           User Key  (r) = Recorded By, (t) = Taken By, (c) = Cosigned By    Initials Name Provider Type    RB Aye Amaro OT Occupational Therapist               Obj/Interventions     Row Name 03/22/22 1635          Shoulder (Therapeutic Exercise)    Shoulder (Therapeutic Exercise) AAROM (active assistive range of motion)  -RB     Shoulder AAROM (Therapeutic Exercise) bilateral;extension;10 repetitions;sitting  AAROM/PROM provided to L shoulder as it is much more painful than the RUE. As tolerated pt able to participate with little pain  -RB     Row Name 03/22/22 1635          Elbow/Forearm (Therapeutic Exercise)    Elbow/Forearm (Therapeutic Exercise) AROM (active range of motion)  -RB     Elbow/Forearm AROM (Therapeutic Exercise) bilateral;flexion;extension;supination;pronation;sitting;10 repetitions  -RB     Row Name 03/22/22 1635          Wrist (Therapeutic Exercise)    Wrist (Therapeutic Exercise) AROM (active range of motion)  -RB     Wrist AROM (Therapeutic Exercise)  bilateral;flexion;extension;10 repetitions  -RB     Row Name 03/22/22 1635          Hand (Therapeutic Exercise)    Hand (Therapeutic Exercise) AROM (active range of motion)  -RB     Hand AROM/AAROM (Therapeutic Exercise) bilateral;AROM (active range of motion);finger flexion;finger extension;5 second hold  -RB     Row Name 03/22/22 1635          Motor Skills    Therapeutic Exercise shoulder;elbow/forearm;wrist;hand  -RB     Row Name 03/22/22 1635          Balance    Static Sitting Balance contact guard  -RB     Position, Sitting Balance sitting edge of bed  -RB     Static Standing Balance minimal assist;verbal cues;1-person assist;non-verbal cues (demo/gesture)  -RB     Position/Device Used, Standing Balance walker, front-wheeled  -RB     Balance Interventions occupation based/functional task  -RB           User Key  (r) = Recorded By, (t) = Taken By, (c) = Cosigned By    Initials Name Provider Type    RB Aye Amaro, OT Occupational Therapist               Goals/Plan    No documentation.                Clinical Impression     Row Name 03/22/22 1637          Pain Assessment    Pretreatment Pain Rating 5/10  -RB     Posttreatment Pain Rating 5/10  -RB     Pain Location - neck  -RB     Pain Intervention(s) Repositioned  -RB     Row Name 03/22/22 1637          Plan of Care Review    Plan of Care Reviewed With patient;spouse  -RB     Progress improving  -RB     Row Name 03/22/22 1637          Therapy Assessment/Plan (OT)    Rehab Potential (OT) good, to achieve stated therapy goals  -RB     Criteria for Skilled Therapeutic Interventions Met (OT) yes;skilled treatment is necessary  -RB     Therapy Frequency (OT) 5 times/wk  -RB     Row Name 03/22/22 1637          Therapy Plan Review/Discharge Plan (OT)    Anticipated Discharge Disposition (OT) inpatient rehabilitation facility  -RB     Row Name 03/22/22 1637          Vital Signs    Pre SpO2 (%) 96  -RB     O2 Delivery Pre Treatment supplemental O2  -RB     O2  Delivery Intra Treatment supplemental O2  -RB     Post SpO2 (%) 97  -RB     O2 Delivery Post Treatment supplemental O2  -RB     Pre Patient Position Sitting  -RB     Intra Patient Position Standing  -RB     Post Patient Position Supine  -RB     Row Name 03/22/22 1637          Positioning and Restraints    Pre-Treatment Position sitting in chair/recliner  -RB     Post Treatment Position bed  -RB     In Bed notified nsg;supine;call light within reach;encouraged to call for assist;exit alarm on;legs elevated;fowlers  -RB           User Key  (r) = Recorded By, (t) = Taken By, (c) = Cosigned By    Initials Name Provider Type    RB Aye Amaro OT Occupational Therapist               Outcome Measures     Row Name 03/22/22 1300          How much help from another person do you currently need...    Turning from your back to your side while in flat bed without using bedrails? 2  -PH     Moving from lying on back to sitting on the side of a flat bed without bedrails? 2  -PH     Moving to and from a bed to a chair (including a wheelchair)? 2  -PH     Standing up from a chair using your arms (e.g., wheelchair, bedside chair)? 2  -PH     Climbing 3-5 steps with a railing? 1  -PH     To walk in hospital room? 1  -PH     AM-PAC 6 Clicks Score (PT) 10  -PH     Row Name 03/22/22 1300          Functional Assessment    Outcome Measure Options AM-PAC 6 Clicks Basic Mobility (PT)  -PH           User Key  (r) = Recorded By, (t) = Taken By, (c) = Cosigned By    Initials Name Provider Type    PH Chanelle Henderson PTA Physical Therapist Assistant                Occupational Therapy Education                 Title: PT OT SLP Therapies (In Progress)     Topic: Occupational Therapy (In Progress)     Point: ADL training (Done)     Description:   Instruct learner(s) on proper safety adaptation and remediation techniques during self care or transfers.   Instruct in proper use of assistive devices.              Learning Progress  Summary           Patient Acceptance, E, VU by  at 3/18/2022 6917    Comment: Role of OT and POC/goals. UE ROM exercises which spouse states she has been doing as she assists with LUE stretching at baseline. DC recommendations.                   Point: Home exercise program (Not Started)     Description:   Instruct learner(s) on appropriate technique for monitoring, assisting and/or progressing therapeutic exercises/activities.              Learner Progress:  Not documented in this visit.          Point: Precautions (Not Started)     Description:   Instruct learner(s) on prescribed precautions during self-care and functional transfers.              Learner Progress:  Not documented in this visit.          Point: Body mechanics (Not Started)     Description:   Instruct learner(s) on proper positioning and spine alignment during self-care, functional mobility activities and/or exercises.              Learner Progress:  Not documented in this visit.                      User Key     Initials Effective Dates Name Provider Type Discipline     04/02/20 -  Lizeth Peoples OT Occupational Therapist OT              OT Recommendation and Plan  Therapy Frequency (OT): 5 times/wk  Plan of Care Review  Plan of Care Reviewed With: patient, spouse  Progress: improving     Time Calculation:    Time Calculation- OT     Row Name 03/22/22 1633             Time Calculation- OT    OT Start Time 1550  -RB      OT Stop Time 1617  -RB      OT Time Calculation (min) 27 min  -RB      Total Timed Code Minutes- OT 27 minute(s)  -RB      OT Received On 03/22/22  -RB      OT - Next Appointment 03/23/22  -RB              Timed Charges    23700 - OT Therapeutic Exercise Minutes 10  -RB      79521 - OT Therapeutic Activity Minutes 17  -RB              Total Minutes    Timed Charges Total Minutes 27  -RB       Total Minutes 27  -RB            User Key  (r) = Recorded By, (t) = Taken By, (c) = Cosigned By    Initials Name Provider Type    RB  Aye Amaro, FREDRICK Occupational Therapist              Therapy Charges for Today     Code Description Service Date Service Provider Modifiers Qty    [id number]  OT THERAPEUTIC ACT EA 15 MIN 3/22/2022 Aye Amaro OT GO 1    [id number]  OT THER PROC EA 15 MIN 3/22/2022 Aye Amaro OT GO 1               Aye Amaro OT  3/22/2022

## 2022-03-22 NOTE — PROGRESS NOTES
[facility]     LOS: 6 days     Subjective:    CC:Fall and Neck Pain    DIET:  Diet Order   Procedures   • Diet Soft Texture; Ground; Cardiac     No cp  No soa  No n/v/d    Objective:    Vital Signs:  Temp:  [97.3 °F (36.3 °C)-98.6 °F (37 °C)] 97.3 °F (36.3 °C)  Heart Rate:  [61-67] 61  Resp:  [16-18] 18  BP: (125-170)/(61-83) 153/73    SpO2:  [93 %-96 %] 95 %  on   ;   Device (Oxygen Therapy): room air  Body mass index is 32.75 kg/m².    Physical Exam  Constitutional:       Appearance: Normal appearance.      Comments: Patient lays very still in bed does not turn his head at all and has neck brace in place.  He is able to answer questions that I ask him and awakens but then falls asleep during the rest of my conversation with the patient's wife at the bedside   HENT:      Head: Normocephalic and atraumatic.   Cardiovascular:      Rate and Rhythm: Normal rate and regular rhythm.      Heart sounds: No murmur heard.    No friction rub.   Pulmonary:      Effort: Pulmonary effort is normal.      Breath sounds: Normal breath sounds.   Abdominal:      General: Bowel sounds are normal. There is no distension.      Palpations: Abdomen is soft.      Tenderness: There is no abdominal tenderness.   Skin:     General: Skin is warm and dry.   Neurological:      Mental Status: He is alert.   Psychiatric:         Mood and Affect: Mood normal.         Behavior: Behavior normal.         Results Review:    Glucose   Date Value Ref Range Status   03/22/2022 150 (H) 65 - 99 mg/dL Final   03/21/2022 184 (H) 65 - 99 mg/dL Final   03/20/2022 126 (H) 65 - 99 mg/dL Final   03/19/2022 162 (H) 65 - 99 mg/dL Final     Results from last 7 days   Lab Units 03/22/22  0743   WBC 10*3/mm3 8.06   HEMOGLOBIN g/dL 13.3   HEMATOCRIT % 39.4   PLATELETS 10*3/mm3 187     Results from last 7 days   Lab Units 03/22/22  0743 03/17/22  0922 03/16/22  1847   SODIUM mmol/L 140   < > 142   POTASSIUM mmol/L 4.5   < > 4.5   CHLORIDE  mmol/L 109*   < > 105   CO2 mmol/L 23.6   < > 25.0   BUN mg/dL 16   < > 19   CREATININE mg/dL 1.09   < > 1.45*   CALCIUM mg/dL 8.4*   < > 9.7   BILIRUBIN mg/dL  --   --  0.5   ALK PHOS U/L  --   --  86   ALT (SGPT) U/L  --   --  26   AST (SGOT) U/L  --   --  27   GLUCOSE mg/dL 150*   < > 183*    < > = values in this interval not displayed.     Results from last 7 days   Lab Units 03/16/22  1847   INR  1.04     Results from last 7 days   Lab Units 03/22/22  0743   MAGNESIUM mg/dL 2.3         Cultures:  No results found for: BLOODCX, URINECX, WOUNDCX, MRSACX, RESPCX, STOOLCX    I have reviewed daily medications and changes in CPOE    Scheduled meds  aspirin, 81 mg, Oral, Daily  atorvastatin, 80 mg, Oral, Daily  Diclofenac Sodium, 2 g, Topical, BID  gabapentin, 600 mg, Oral, TID  insulin lispro, 0-14 Units, Subcutaneous, TID AC  metFORMIN, 500 mg, Oral, BID With Meals  methocarbamol, 1,000 mg, Oral, Q8H  metoprolol tartrate, 12.5 mg, Oral, Q12H  oxyCODONE-acetaminophen, 2 tablet, Oral, TID  polyethylene glycol, 17 g, Oral, Daily   And  senna-docusate sodium, 2 tablet, Oral, BID  sodium chloride, 3 mL, Intravenous, Q12H  vitamin B-12, 1,000 mcg, Oral, Daily        sodium chloride, 75 mL/hr, Last Rate: 75 mL/hr (03/19/22 0825)      PRN meds  •  acetaminophen **OR** acetaminophen **OR** acetaminophen  •  senna-docusate sodium **AND** polyethylene glycol **AND** bisacodyl **AND** bisacodyl  •  dextrose  •  dextrose  •  famotidine  •  glucagon (human recombinant)  •  HYDROmorphone  •  melatonin  •  naloxone  •  [DISCONTINUED] Morphine **AND** naloxone  •  nitroglycerin  •  ondansetron **OR** ondansetron  •  Insert peripheral IV **AND** sodium chloride  •  sodium chloride        Closed nondisplaced fracture of second cervical vertebra (HCC)    HTN (hypertension)    Sleep apnea    Type 2 diabetes mellitus with diabetic polyneuropathy (HCC)    Coronary artery disease    RLS (restless legs syndrome)    Neck pain     Constipation    Hyperlipemia    Fall    Chronic peripheral neuropathy    Chronic left arm weakness    Leukocytosis    Stage 3a chronic kidney disease (HCC)    Injury of left shoulder        Assessment/Plan:  81-year-old male with previous neck surgery and cervical hardware presents to the hospital after a mechanical fall and acute C2 vertebral fracture.     Vertebral fracture C2:  - continue [place] collar for at least 6 weeks, no acute surgical intervention; neurosurgery following, he is to follow-up at [facility] w/ Dr. Pickett  - Pain meds:  reduce to 2 tabs of percocet 7.5mg [id number]  -continue dilaudid 0.5mg [id number] PRN, robaxin 1g [id number]; gabapentin dose 600mg  - has prn narcan for resp depression  - d/w wife and RN, peter leave prn dilaudid if he needs it after working with PT but otherwise will try to stay off IV pain meds  -Patient worked with physical therapy today and doing much better     L shoulder pain  - orthopedic writes to follow up in 4 weeks  - L shoulder mri    Chronic neuropathic pain:  - Resume home gabapentin dose     Chronic kidney disease 3A:  - stable     Insulin-dependent diabetes:  Continue Metformin.  Monitor glucose and correction insulin as needed.  At home patient takes approximately 30 units of Lantus twice daily plan to hold as oral intake will likely be decreased and adjust as needed.    - BG moslty less than 150; continue ssi     Sleep apnea: CPAP at home.  Monitor pulse oximetry and supplemental oxygen as needed.     · lovenox for DVT prophylaxis.  · Full code.  · Discussed with patient, family and nursing staff.  · Anticipate discharge home with HH vs SNU facility in 1-2 days.     >35 minutes spent, > 1/2 time spent counseling and coordination of care on fracture and pain    Hardy Howe MD  03/22/22  10:59 EDT

## 2022-03-22 NOTE — PROGRESS NOTES
Patient: Chester Gama              YOB: 1940               Date of Admission: 3/16/2022  6:14 PM     Medical Record Number: [id number]     Attending Physician: Danuta Nair MD     Consulting Physician: Freddy Preston MD     Reason for Consult: Left shoulder pain    Diagnostic Tests:  LEFT SHOULDER MRI       HISTORY: Shoulder pain after a fall.       TECHNIQUE: Left shoulder MRI was performed without contrast and is   correlated with shoulder x-ray 03/18/2022 and the thoracic spine CT   03/16/2022 which shows the medial 7 cm of the scapula.       FINDINGS: There is an effusion in the acromioclavicular joint where   there is advanced degenerative change with mild undersurface osseous   prominence. However, there is no evidence of acromioclavicular   separation.       There is a small bursal effusion. Mild deep soft tissue edema is   observed around the shoulder, involving the deep, medial portions of the   supraspinatus, infraspinatus, subscapularis, and most diffuse in the   distal trapezius muscle. There is no evidence of fracture. Marrow signal   is normal.       There is medial subluxation of the long head biceps tendon which crosses   anterior to the distal subscapularis. The biceps tendon demonstrates   abnormal interstitial signal, representing tendinopathy with a split   tear. Interstitial fluid signal along the distal supraspinatus tendon is   also observed, representing partial thickness interstitial tear,   chronic. This involves about 30% of the tendon thickness over an area   measuring 10 mm AP width. No full-thickness rotator cuff tear is   present.       Cartilage at the glenohumeral joint is preserved. The labrum appears   grossly normal.       There is no lesion in the spinoglenoid notch, quadrilateral space, nor   in the visualized axilla.       Impression:     1. Muscular edema at the left shoulder consistent with muscle strain. No   fracture identified.        2. There is a chronic appearing partial-thickness long head biceps   tendon tear. The biceps tendon is subluxed medially, remaining   superficial to the intact subscapularis tendon. There is also a chronic   appearing, partial-thickness interstitial supraspinatus tendon tear.      Assessment: LEFT shoulder strain, chronic rotator cuff tears    Plan:  Dr. Preston has reviewed the LEFT shoulder MRI. There are no fractures.   We will continue to manage nonoperatively.  Encouraged heating pad 20 minutes 2-3 times a day over the shoulder.  Increase activity as tolerated with physical therapy.  Follow-up in 4 weeks with Dr. Prseton.

## 2022-03-22 NOTE — PROGRESS NOTES
I spoke to Dr. Motta with [facility] neurosurgery today about Mr. Gama's condition and complex history.  Dr. Motta is in agreement to follow-up with Mr. Gama in April for C2 fracture follow-up.  The patient is still undergoing evaluation for rehabilitation and if acute inpatient rehab is ultimately suggested he might be a better candidate for [facility] rehab where [facility] neurosurgery and Dr. Motta have privileges.    Continue hard cervical collar for 6 weeks.

## 2022-03-22 NOTE — PLAN OF CARE
Goal Outcome Evaluation:  Plan of Care Reviewed With: patient, spouse        Progress: improving  Outcome Evaluation: Pt making improvements w/ pt today. Pt in bed w/ spouse in room at beg of session. Pt performing L roll req mod A x 2 then sat sat up to EOB req min A x 2. Pt stood req min A x 2 and use of fww. Pt stood approx 3-4 min prior to transfer to chair and req CGA and use of fww. Pt amb approx 2.5' to chair w/ slow, tiny steps and assist for fww mgmt. Pt req CGA / min A x 2 w/ use of fww. Cueing for wt shift given. Pt may benefit from IPR. PT will prog as pt collette.    Patient was not wearing a face mask during this therapy encounter. Therapist used appropriate personal protective equipment including eye protection, mask, and gloves.  Mask used was standard procedure mask. Appropriate PPE was worn during the entire therapy session. Hand hygiene was completed before and after therapy session. Patient is not in enhanced droplet precautions.  PT tech: Mick SAVAGE

## 2022-03-22 NOTE — THERAPY TREATMENT NOTE
Patient Name: Chester Gama  : 1940    MRN: [id number]                              Today's Date: 3/22/2022       Admit Date: 3/16/2022    Visit Dx:     ICD-10-CM ICD-9-CM   1. Other closed nondisplaced fracture of second cervical vertebra, initial encounter ([facility])  S12.191A 805.02     Patient Active Problem List   Diagnosis   • HTN (hypertension)   • Sleep apnea   • Type 2 diabetes mellitus with diabetic polyneuropathy ([facility])   • Coronary artery disease   • RLS (restless legs syndrome)   • Postoperative wound infection   • Chest pain   • Neck pain   • Constipation   • Closed nondisplaced fracture of second cervical vertebra ([facility])   • Hyperlipemia   • Fall   • Chronic peripheral neuropathy   • Chronic left arm weakness   • Leukocytosis   • Stage 3a chronic kidney disease ([facility])   • Injury of left shoulder     Past Medical History:   Diagnosis Date   • Arthritis     HANDS KNEES   • Cervical pain (neck)     LEFT ARM PARESTHESIA   • Coronary artery disease    • Depression    • Diabetes mellitus ([facility])     TYPE 2   • Diverticular disease     HX, S/P RESECTION   • Hx of heart artery stent    • Hyperlipemia    • Hypertension    • PTSD (post-traumatic stress disorder)    • RLS (restless legs syndrome)    • Sleep apnea     DIDNT TOLERATE MASK, CLAUSTROPHOBIC   • Structural abnormality of kidney     SAW NEPHROLOGIST FOR SMALLER KIDNEY - 3 YEARS AGO.   • Thyroid cyst     PER WIFE     Past Surgical History:   Procedure Laterality Date   • ANTERIOR CERVICAL DISCECTOMY W/ FUSION N/A 1/3/2018    Procedure: ANTERIOR CERVICAL DECOMPRESSION & FUSION C4 6;  Surgeon: Vincenzo Soliman DO;  Location: [facility] OR;  Service:    • APPENDECTOMY     • CATARACT EXTRACTION W/ INTRAOCULAR LENS  IMPLANT, BILATERAL     • CERVICAL DISCECTOMY POSTERIOR FUSION WITH INSTRUMENTATION N/A 2019    Procedure: C4-T2 POSTERIOR DECOMPRESSION AND FUSION BONE MORPHOGENIC PROTEIN;  Surgeon: Vincenzo Soliman DO;  Location: [facility] OR;  Service:  Orthopedic Spine   • CERVICAL DISCECTOMY POSTERIOR FUSION WITH INSTRUMENTATION N/A 8/8/2019    Procedure: LEFT FORAMINOTOMY C4-T1, REFUSION OF C4-T1, REMOVAL OF C6 SCREW, REVISION OF HARDWARE;  Surgeon: Vincenzo Soliman DO;  Location: [facility] MAIN OR;  Service: Orthopedics   • COLON RESECTION      COLOSTOMY X 6 MOS THEN REVERSED.   • CORONARY ANGIOPLASTY WITH STENT PLACEMENT  02/10/2010    [email or web address]      General Information     Row Name 03/22/22 1249          Physical Therapy Time and Intention    Document Type therapy note (daily note)  -     Mode of Treatment physical therapy  -     Row Name 03/22/22 1249          General Information    Existing Precautions/Restrictions fall;brace worn when out of bed  -     Row Name 03/22/22 1249          Safety Issues, Functional Mobility    Impairments Affecting Function (Mobility) balance;pain;endurance/activity tolerance;strength;range of motion (ROM)  -     Comment, Safety Issues/Impairments (Mobility) gt belt and non skid socks for safety;  -PH           User Key  (r) = Recorded By, (t) = Taken By, (c) = Cosigned By    Initials Name Provider Type     Chanelle Henderson PTA Physical Therapist Assistant               Mobility     Row Name 03/22/22 1250          Bed Mobility    Bed Mobility supine-sit;rolling left;sidelying-sit  -     Rolling Left [place] (Bed Mobility) maximum assist (25% patient effort);verbal cues;nonverbal cues (demo/gesture);1 person assist  -     Supine-Sit [place] (Bed Mobility) moderate assist (50% patient effort);2 person assist;verbal cues;nonverbal cues (demo/gesture);minimum assist (75% patient effort)  -     Sidelying-Sit [place] (Bed Mobility) minimum assist (75% patient effort);2 person assist;verbal cues;nonverbal cues (demo/gesture)  -     Assistive Device (Bed Mobility) bed rails;head of bed elevated;draw sheet  -     Comment, (Bed Mobility) assist for log roll and moving BLE to EOB w/ pt  limited by pain. Pt actively assisting for pushing off to sit up to EOB. Aspen collar in place at beg of session.  -PH     Row Name 03/22/22 1250          Sit-Stand Transfer    Sit-Stand [place] (Transfers) minimum assist (75% patient effort);2 person assist;verbal cues;nonverbal cues (demo/gesture)  -PH     Assistive Device (Sit-Stand Transfers) walker, front-wheeled  -PH     Comment, (Sit-Stand Transfer) assist to place L hand on fww  w/ pt able to lift L UE up to level of fww. Pt able to grasp  once place. Extra time to stand 2/2 pain.  -PH     Row Name 03/22/22 1250          Gait/Stairs (Locomotion)    [place] Level (Gait) contact guard;minimum assist (75% patient effort);2 person assist;verbal cues;nonverbal cues (demo/gesture)  -PH     Assistive Device (Gait) walker, front-wheeled  -PH     Distance in Feet (Gait) 2.5' to chair w/ small steps 2/2 pain.  -PH     Comment, (Gait/Stairs) guarded w/ extra time 2/2 pain although fairly steady w/ no LOB; vc for wt shift and assist for fww mgmt.  -PH           User Key  (r) = Recorded By, (t) = Taken By, (c) = Cosigned By    Initials Name Provider Type    PH Chanelle Henderson PTA Physical Therapist Assistant               Obj/Interventions     Row Name 03/22/22 1255          Balance    Balance Assessment sitting static balance;standing static balance  -PH     Static Sitting Balance supervision  -PH     Position, Sitting Balance sitting edge of bed  -PH     Static Standing Balance contact guard;verbal cues  -PH     Position/Device Used, Standing Balance walker, front-wheeled  -PH     Comment, Balance Pt stood approx 3-4 min prior to transfer b>c and was steady w/ no LOB  -PH           User Key  (r) = Recorded By, (t) = Taken By, (c) = Cosigned By    Initials Name Provider Type    PH Chanelle Henderson PTA Physical Therapist Assistant               Goals/Plan    No documentation.                Clinical Impression     Row Name 03/22/22 1258           Pain    Pain Intervention(s) Repositioned;Ambulation/increased activity;Rest  -PH     Row Name 03/22/22 1256          Plan of Care Review    Plan of Care Reviewed With patient;spouse  -PH     Progress improving  -PH     Outcome Evaluation Pt making improvements w/ pt today. Pt in bed w/ spouse in room at beg of session. Pt performing L roll req mod A x 2 then sat sat up to EOB req min A x 2. Pt stood req min A x 2 and use of fww. Pt stood approx 3-4 min prior to transfer to chair and req CGA and use of fww. Pt amb approx 2.5' to chair w/ slow, tiny steps and assist for fww mgmt. Pt req CGA / min A x 2 w/ use of fww. Cueing for wt shift given. Pt may benefit from IPR. PT will prog as pt collette.  -PH     Row Name 03/22/22 1256          Positioning and Restraints    Pre-Treatment Position in bed  -PH     Post Treatment Position chair  -PH     In Chair reclined;call light within reach;encouraged to call for assist;exit alarm on  -PH           User Key  (r) = Recorded By, (t) = Taken By, (c) = Cosigned By    Initials Name Provider Type    PH Chanelle Henderson PTA Physical Therapist Assistant               Outcome Measures     Row Name 03/22/22 1300          How much help from another person do you currently need...    Turning from your back to your side while in flat bed without using bedrails? 2  -PH     Moving from lying on back to sitting on the side of a flat bed without bedrails? 2  -PH     Moving to and from a bed to a chair (including a wheelchair)? 2  -PH     Standing up from a chair using your arms (e.g., wheelchair, bedside chair)? 2  -PH     Climbing 3-5 steps with a railing? 1  -PH     To walk in hospital room? 1  -PH     AM-PAC 6 Clicks Score (PT) 10  -PH     Row Name 03/22/22 1300          Functional Assessment    Outcome Measure Options AM-PAC 6 Clicks Basic Mobility (PT)  -PH           User Key  (r) = Recorded By, (t) = Taken By, (c) = Cosigned By    Initials Name Provider Type    PH  Chanelle Henderson PTA Physical Therapist Assistant                             Physical Therapy Education                 Title: PT OT SLP Therapies (In Progress)     Topic: Physical Therapy (In Progress)     Point: Mobility training (Done)     Learning Progress Summary           Patient Acceptance, E,D, DU,NR by  at 3/22/2022 1300    Acceptance, E, VU,NR by  at 3/18/2022 1751                   Point: Home exercise program (Not Started)     Learner Progress:  Not documented in this visit.          Point: Body mechanics (Done)     Learning Progress Summary           Patient Acceptance, E,D, DU,NR by  at 3/22/2022 1300                   Point: Precautions (Done)     Learning Progress Summary           Patient Acceptance, E,D, DU,NR by  at 3/22/2022 1300                               User Key     Initials Effective Dates Name Provider Type Discipline     06/16/21 -  Latrice Kirk, PT Physical Therapist PT     06/16/21 -  Chanelle Henderson PTA Physical Therapist Assistant PT              PT Recommendation and Plan     Plan of Care Reviewed With: patient, spouse  Progress: improving  Outcome Evaluation: Pt making improvements w/ pt today. Pt in bed w/ spouse in room at beg of session. Pt performing L roll req mod A x 2 then sat sat up to EOB req min A x 2. Pt stood req min A x 2 and use of fww. Pt stood approx 3-4 min prior to transfer to chair and req CGA and use of fww. Pt amb approx 2.5' to chair w/ slow, tiny steps and assist for fww mgmt. Pt req CGA / min A x 2 w/ use of fww. Cueing for wt shift given. Pt may benefit from IPR. PT will prog as pt collette.     Time Calculation:    PT Charges     Row Name 03/22/22 1301             Time Calculation    Start Time 1137  -PH      Stop Time 1200  -PH      Time Calculation (min) 23 min  -PH      PT Received On 03/22/22  -PH      PT - Next Appointment 03/23/22  -              Timed Charges    33701 - PT Therapeutic Activity Minutes 23  -PH               Total Minutes    Timed Charges Total Minutes 23  -PH       Total Minutes 23  -PH            User Key  (r) = Recorded By, (t) = Taken By, (c) = Cosigned By    Initials Name Provider Type    Chanelle Nickerson PTA Physical Therapist Assistant              Therapy Charges for Today     Code Description Service Date Service Provider Modifiers Qty    76737955599  PT THERAPEUTIC ACT EA 15 MIN 3/22/2022 Chanelle Henderson PTA GP 2    [id number] HC PT THER SUPP EA 15 MIN 3/22/2022 Chanelle Henderson PTA GP 2          PT G-Codes  Outcome Measure Options: AM-PAC 6 Clicks Basic Mobility (PT)  AM-PAC 6 Clicks Score (PT): 10  AM-PAC 6 Clicks Score (OT): 8    Chanelle Henderson PTA  3/22/2022

## 2022-03-23 ENCOUNTER — APPOINTMENT (OUTPATIENT)
Dept: CT IMAGING | Facility: HOSPITAL | Age: 82
End: 2022-03-23

## 2022-03-23 VITALS
BODY MASS INDEX: 32.9 KG/M2 | TEMPERATURE: 97.1 F | OXYGEN SATURATION: 95 % | RESPIRATION RATE: 18 BRPM | DIASTOLIC BLOOD PRESSURE: 94 MMHG | WEIGHT: 248.24 LBS | HEIGHT: 73 IN | SYSTOLIC BLOOD PRESSURE: 190 MMHG | HEART RATE: 66 BPM

## 2022-03-23 PROBLEM — D72.829 LEUKOCYTOSIS: Status: RESOLVED | Noted: 2022-03-16 | Resolved: 2022-03-23

## 2022-03-23 LAB
ANION GAP SERPL CALCULATED.3IONS-SCNC: 9 MMOL/L (ref 5–15)
BASOPHILS # BLD AUTO: 0.05 10*3/MM3 (ref 0–0.2)
BASOPHILS NFR BLD AUTO: 0.6 % (ref 0–1.5)
BUN SERPL-MCNC: 16 MG/DL (ref 8–23)
BUN/CREAT SERPL: 15.7 (ref 7–25)
CALCIUM SPEC-SCNC: 8.6 MG/DL (ref 8.6–10.5)
CHLORIDE SERPL-SCNC: 106 MMOL/L (ref 98–107)
CO2 SERPL-SCNC: 26 MMOL/L (ref 22–29)
CREAT SERPL-MCNC: 1.02 MG/DL (ref 0.76–1.27)
DEPRECATED RDW RBC AUTO: 41.4 FL (ref 37–54)
EGFRCR SERPLBLD CKD-EPI 2021: 74.8 ML/MIN/1.73
EOSINOPHIL # BLD AUTO: 0.69 10*3/MM3 (ref 0–0.4)
EOSINOPHIL NFR BLD AUTO: 8.7 % (ref 0.3–6.2)
ERYTHROCYTE [DISTWIDTH] IN BLOOD BY AUTOMATED COUNT: 12.8 % (ref 12.3–15.4)
GLUCOSE BLDC GLUCOMTR-MCNC: 134 MG/DL (ref 70–130)
GLUCOSE BLDC GLUCOMTR-MCNC: 146 MG/DL (ref 70–130)
GLUCOSE SERPL-MCNC: 128 MG/DL (ref 65–99)
HCT VFR BLD AUTO: 41.2 % (ref 37.5–51)
HGB BLD-MCNC: 13.3 G/DL (ref 13–17.7)
IMM GRANULOCYTES # BLD AUTO: 0.01 10*3/MM3 (ref 0–0.05)
IMM GRANULOCYTES NFR BLD AUTO: 0.1 % (ref 0–0.5)
LYMPHOCYTES # BLD AUTO: 1.52 10*3/MM3 (ref 0.7–3.1)
LYMPHOCYTES NFR BLD AUTO: 19.3 % (ref 19.6–45.3)
MAGNESIUM SERPL-MCNC: 2.2 MG/DL (ref 1.6–2.4)
MCH RBC QN AUTO: 28.5 PG (ref 26.6–33)
MCHC RBC AUTO-ENTMCNC: 32.3 G/DL (ref 31.5–35.7)
MCV RBC AUTO: 88.4 FL (ref 79–97)
MONOCYTES # BLD AUTO: 0.67 10*3/MM3 (ref 0.1–0.9)
MONOCYTES NFR BLD AUTO: 8.5 % (ref 5–12)
NEUTROPHILS NFR BLD AUTO: 4.95 10*3/MM3 (ref 1.7–7)
NEUTROPHILS NFR BLD AUTO: 62.8 % (ref 42.7–76)
NRBC BLD AUTO-RTO: 0 /100 WBC (ref 0–0.2)
PHOSPHATE SERPL-MCNC: 2.8 MG/DL (ref 2.5–4.5)
PLATELET # BLD AUTO: 187 10*3/MM3 (ref 140–450)
PMV BLD AUTO: 9.4 FL (ref 6–12)
POTASSIUM SERPL-SCNC: 4.3 MMOL/L (ref 3.5–5.2)
RBC # BLD AUTO: 4.66 10*6/MM3 (ref 4.14–5.8)
SODIUM SERPL-SCNC: 141 MMOL/L (ref 136–145)
WBC NRBC COR # BLD: 7.89 10*3/MM3 (ref 3.4–10.8)

## 2022-03-23 PROCEDURE — 83735 ASSAY OF MAGNESIUM: CPT | Performed by: STUDENT IN AN ORGANIZED HEALTH CARE EDUCATION/TRAINING PROGRAM

## 2022-03-23 PROCEDURE — 97116 GAIT TRAINING THERAPY: CPT

## 2022-03-23 PROCEDURE — 82962 GLUCOSE BLOOD TEST: CPT

## 2022-03-23 PROCEDURE — 80048 BASIC METABOLIC PNL TOTAL CA: CPT | Performed by: STUDENT IN AN ORGANIZED HEALTH CARE EDUCATION/TRAINING PROGRAM

## 2022-03-23 PROCEDURE — 84100 ASSAY OF PHOSPHORUS: CPT | Performed by: STUDENT IN AN ORGANIZED HEALTH CARE EDUCATION/TRAINING PROGRAM

## 2022-03-23 PROCEDURE — 72125 CT NECK SPINE W/O DYE: CPT

## 2022-03-23 PROCEDURE — 97530 THERAPEUTIC ACTIVITIES: CPT

## 2022-03-23 PROCEDURE — 85025 COMPLETE CBC W/AUTO DIFF WBC: CPT | Performed by: STUDENT IN AN ORGANIZED HEALTH CARE EDUCATION/TRAINING PROGRAM

## 2022-03-23 RX ORDER — METHOCARBAMOL 500 MG/1
1000 TABLET, FILM COATED ORAL EVERY 8 HOURS SCHEDULED
Start: 2022-03-23

## 2022-03-23 RX ORDER — INSULIN LISPRO 100 [IU]/ML
0-14 INJECTION, SOLUTION INTRAVENOUS; SUBCUTANEOUS
Refills: 12
Start: 2022-03-23

## 2022-03-23 RX ORDER — OXYCODONE AND ACETAMINOPHEN 7.5; 325 MG/1; MG/1
2 TABLET ORAL 3 TIMES DAILY
Qty: 36 TABLET | Refills: 0
Start: 2022-03-23 | End: 2022-03-29

## 2022-03-23 RX ORDER — BISACODYL 5 MG/1
5 TABLET, DELAYED RELEASE ORAL DAILY PRN
Start: 2022-03-23

## 2022-03-23 RX ORDER — BISACODYL 10 MG
10 SUPPOSITORY, RECTAL RECTAL DAILY PRN
Start: 2022-03-23

## 2022-03-23 RX ORDER — POLYETHYLENE GLYCOL 3350 17 G/17G
17 POWDER, FOR SOLUTION ORAL DAILY
Start: 2022-03-24

## 2022-03-23 RX ORDER — AMOXICILLIN 250 MG
2 CAPSULE ORAL 2 TIMES DAILY
Start: 2022-03-23

## 2022-03-23 RX ORDER — LISINOPRIL 10 MG/1
10 TABLET ORAL
Status: DISCONTINUED | OUTPATIENT
Start: 2022-03-23 | End: 2022-03-23 | Stop reason: HOSPADM

## 2022-03-23 RX ADMIN — DOCUSATE SODIUM 50MG AND SENNOSIDES 8.6MG 2 TABLET: 8.6; 5 TABLET, FILM COATED ORAL at 08:05

## 2022-03-23 RX ADMIN — METFORMIN HYDROCHLORIDE 500 MG: 500 TABLET ORAL at 08:04

## 2022-03-23 RX ADMIN — ATORVASTATIN CALCIUM 80 MG: 80 TABLET, FILM COATED ORAL at 08:05

## 2022-03-23 RX ADMIN — GABAPENTIN 600 MG: 300 CAPSULE ORAL at 15:04

## 2022-03-23 RX ADMIN — ASPIRIN 81 MG: 81 TABLET, COATED ORAL at 08:05

## 2022-03-23 RX ADMIN — OXYCODONE HYDROCHLORIDE AND ACETAMINOPHEN 2 TABLET: 7.5; 325 TABLET ORAL at 15:04

## 2022-03-23 RX ADMIN — Medication 1000 MCG: at 08:05

## 2022-03-23 RX ADMIN — BISACODYL 10 MG: 10 SUPPOSITORY RECTAL at 06:32

## 2022-03-23 RX ADMIN — LISINOPRIL 10 MG: 10 TABLET ORAL at 12:17

## 2022-03-23 RX ADMIN — POLYETHYLENE GLYCOL 3350 17 G: 17 POWDER, FOR SOLUTION ORAL at 08:04

## 2022-03-23 RX ADMIN — METOPROLOL TARTRATE 12.5 MG: 25 TABLET, FILM COATED ORAL at 08:04

## 2022-03-23 RX ADMIN — METHOCARBAMOL TABLETS 1000 MG: 500 TABLET, COATED ORAL at 06:32

## 2022-03-23 RX ADMIN — METHOCARBAMOL TABLETS 1000 MG: 500 TABLET, COATED ORAL at 13:14

## 2022-03-23 RX ADMIN — GABAPENTIN 600 MG: 300 CAPSULE ORAL at 08:04

## 2022-03-23 RX ADMIN — OXYCODONE HYDROCHLORIDE AND ACETAMINOPHEN 2 TABLET: 7.5; 325 TABLET ORAL at 08:04

## 2022-03-23 NOTE — CASE MANAGEMENT/SOCIAL WORK
Continued Stay Note  [facility]     Patient Name: Chester Gama  MRN: [id number]  Today's Date: 3/23/2022    Admit Date: 3/16/2022     Discharge Plan     Row Name 03/23/22 1627       Plan    Plan Comments Pt was dc'd to an acute bed @ [facility] today// med rec faxed.  pt and family updated at bedside.   EMS to transport pt.  Tanya KENT RN/CCP    Final Discharge Disposition Code 62 - inpatient rehab facility    Final Note DC'D to an acute bed @ [facility]               Discharge Codes    No documentation.               Expected Discharge Date and Time     Expected Discharge Date Expected Discharge Time    Mar 23, 2022             Tanya Palacios RN

## 2022-03-23 NOTE — THERAPY TREATMENT NOTE
Patient Name: Chester Gama  : 1940    MRN: [id number]                              Today's Date: 3/23/2022       Admit Date: 3/16/2022    Visit Dx:     ICD-10-CM ICD-9-CM   1. Other closed nondisplaced fracture of second cervical vertebra, initial encounter (Formerly [facility])  S12.191A 805.02     Patient Active Problem List   Diagnosis   • HTN (hypertension)   • Sleep apnea   • Type 2 diabetes mellitus with diabetic polyneuropathy (Formerly [facility])   • Coronary artery disease   • RLS (restless legs syndrome)   • Postoperative wound infection   • Chest pain   • Neck pain   • Constipation   • Closed nondisplaced fracture of second cervical vertebra (Formerly [facility])   • Hyperlipemia   • Fall   • Chronic peripheral neuropathy   • Chronic left arm weakness   • Stage 3a chronic kidney disease (Formerly [facility])   • Injury of left shoulder     Past Medical History:   Diagnosis Date   • Arthritis     HANDS KNEES   • Cervical pain (neck)     LEFT ARM PARESTHESIA   • Coronary artery disease    • Depression    • Diabetes mellitus (Formerly [facility])     TYPE 2   • Diverticular disease     HX, S/P RESECTION   • Hx of heart artery stent    • Hyperlipemia    • Hypertension    • PTSD (post-traumatic stress disorder)    • RLS (restless legs syndrome)    • Sleep apnea     DIDNT TOLERATE MASK, CLAUSTROPHOBIC   • Structural abnormality of kidney     SAW NEPHROLOGIST FOR SMALLER KIDNEY - 3 YEARS AGO.   • Thyroid cyst     PER WIFE     Past Surgical History:   Procedure Laterality Date   • ANTERIOR CERVICAL DISCECTOMY W/ FUSION N/A 1/3/2018    Procedure: ANTERIOR CERVICAL DECOMPRESSION & FUSION C4 6;  Surgeon: Vincenzo Soliman DO;  Location: [facility];  Service:    • APPENDECTOMY     • CATARACT EXTRACTION W/ INTRAOCULAR LENS  IMPLANT, BILATERAL     • CERVICAL DISCECTOMY POSTERIOR FUSION WITH INSTRUMENTATION N/A 2019    Procedure: C4-T2 POSTERIOR DECOMPRESSION AND FUSION BONE MORPHOGENIC PROTEIN;  Surgeon: Vincenzo Soliman DO;  Location: [facility];  Service: Orthopedic Spine   •  CERVICAL DISCECTOMY POSTERIOR FUSION WITH INSTRUMENTATION N/A 8/8/2019    Procedure: LEFT FORAMINOTOMY C4-T1, REFUSION OF C4-T1, REMOVAL OF C6 SCREW, REVISION OF HARDWARE;  Surgeon: Vincenzo Soliman DO;  Location: [facility] OR;  Service: Orthopedics   • COLON RESECTION      COLOSTOMY X 6 MOS THEN REVERSED.   • CORONARY ANGIOPLASTY WITH STENT PLACEMENT  02/10/2010    [email or web address]      General Information     Row Name 03/23/22 0954          Physical Therapy Time and Intention    Document Type therapy note (daily note)  -     Mode of Treatment physical therapy  -     Row Name 03/23/22 0954          General Information    Existing Precautions/Restrictions fall;oxygen therapy device and L/min;brace worn when out of bed  c-collar when OOB  -     Row Name 03/23/22 0954          Cognition    Orientation Status (Cognition) oriented x 3  -SM           User Key  (r) = Recorded By, (t) = Taken By, (c) = Cosigned By    Initials Name Provider Type     Lizeth Egan PTA Physical Therapist Assistant               Mobility     Row Name 03/23/22 0954          Bed Mobility    Bed Mobility supine-sit  -     Supine-Sit Patrick (Bed Mobility) minimum assist (75% patient effort);moderate assist (50% patient effort);2 person assist  -     Assistive Device (Bed Mobility) bed rails;head of bed elevated  -     Comment, (Bed Mobility) cues for log roll  -     Row Name 03/23/22 0954          Sit-Stand Transfer    Sit-Stand Patrick (Transfers) moderate assist (50% patient effort);2 person assist  -     Assistive Device (Sit-Stand Transfers) walker, front-wheeled  -     Row Name 03/23/22 0954          Gait/Stairs (Locomotion)    Patrick Level (Gait) contact guard;2 person assist  -     Assistive Device (Gait) walker, front-wheeled  -     Distance in Feet (Gait) 30  -SM     Deviations/Abnormal Patterns (Gait) zach decreased;stride length decreased  -SM     Bilateral Gait Deviations  forward flexed posture  -     Comment, (Gait/Stairs) very decreased zach, though no overt LOBs  -           User Key  (r) = Recorded By, (t) = Taken By, (c) = Cosigned By    Initials Name Provider Type    Lizeth Garcia PTA Physical Therapist Assistant               Obj/Interventions    No documentation.                Goals/Plan    No documentation.                Clinical Impression     [facility] Name 03/23/22 1010          Pain    Pretreatment Pain Rating 3/10  -SM     Posttreatment Pain Rating 5/10  -     Pain Location - neck  -     Pain Intervention(s) Repositioned;Ambulation/increased activity;Rest  -[facility] Name 03/23/22 1010          Positioning and Restraints    Pre-Treatment Position in bed  -SM     Post Treatment Position chair  -SM     In Chair reclined;call light within reach;encouraged to call for assist;with family/caregiver  -           User Key  (r) = Recorded By, (t) = Taken By, (c) = Cosigned By    Initials Name Provider Type    Lizeth Garcia PTA Physical Therapist Assistant               Outcome Measures     [facility] Name 03/23/22 1011          How much help from another person do you currently need...    Turning from your back to your side while in flat bed without using bedrails? 2  -SM     Moving from lying on back to sitting on the side of a flat bed without bedrails? 2  -SM     Moving to and from a bed to a chair (including a wheelchair)? 3  -SM     Standing up from a chair using your arms (e.g., wheelchair, bedside chair)? 2  -SM     Climbing 3-5 steps with a railing? 2  -SM     To walk in hospital room? 3  -SM     AM-PAC 6 Clicks Score (PT) 14  -[facility] Name 03/23/22 1011          Functional Assessment    Outcome Measure Options AM-PAC 6 Clicks Basic Mobility (PT)  -           User Key  (r) = Recorded By, (t) = Taken By, (c) = Cosigned By    Initials Name Provider Type    Lizeth Garcia PTA Physical Therapist Assistant                              Physical Therapy Education                 Title: PT OT SLP Therapies (In Progress)     Topic: Physical Therapy (In Progress)     Point: Mobility training (Done)     Learning Progress Summary           Patient Acceptance, E,TB,D, VU,NR by  at 3/23/2022 1012    Acceptance, E,D, DU,NR by  at 3/22/2022 1300    Acceptance, E, VU,NR by  at 3/18/2022 1751                   Point: Home exercise program (Not Started)     Learner Progress:  Not documented in this visit.          Point: Body mechanics (Done)     Learning Progress Summary           Patient Acceptance, E,TB,D, VU,NR by  at 3/23/2022 1012    Acceptance, E,D, DU,NR by  at 3/22/2022 1300                   Point: Precautions (Done)     Learning Progress Summary           Patient Acceptance, E,TB,D, VU,NR by  at 3/23/2022 1012    Acceptance, E,D, DU,NR by  at 3/22/2022 1300                               User Key     Initials Effective Dates Name Provider Type Discipline     06/16/21 -  Latrice Kirk PT Physical Therapist PT     03/07/18 -  Lizeth Egan PTA Physical Therapist Assistant PT     06/16/21 -  Chanelle Henderson PTA Physical Therapist Assistant PT              PT Recommendation and Plan     Plan of Care Reviewed With: patient  Progress: improving  Outcome Evaluation: Pt tolerated treatment well this date. Increased gait distance to 30ft w/ Rw and CGA x2 for safety. Very decreased candence, though no overt LOBs noted. Pt initially required mod A x2 for bed mobility and to stand. Encouraged pt to get up w/ nsg more during the day.     Time Calculation:    PT Charges     Row Name 03/23/22 1014             Time Calculation    Start Time 0923  -      Stop Time 0950  -      Time Calculation (min) 27 min  -      PT Received On 03/23/22  -      PT - Next Appointment 03/24/22  -            User Key  (r) = Recorded By, (t) = Taken By, (c) = Cosigned By    Initials Name Provider Type     Lizeth Egan PTA  Physical Therapist Assistant              Therapy Charges for Today     Code Description Service Date Service Provider Modifiers Qty    [id number] HC GAIT TRAINING EA 15 MIN 3/23/2022 Lizeth Egan, PTA GP 1    [id number]  PT THER SUPP EA 15 MIN 3/23/2022 Lizeth Egan, [place] GP 2    [id number]  PT THERAPEUTIC ACT EA 15 MIN 3/23/2022 Lizeth Egan, [place] GP 1          PT G-Codes  Outcome Measure Options: AM-PAC 6 Clicks Basic Mobility (PT)  AM-PAC 6 Clicks Score (PT): 14  AM-PAC 6 Clicks Score (OT): 8    Lizeth Egan PTA  3/23/2022

## 2022-03-23 NOTE — PROGRESS NOTES
[facility]     Appears per notes, patient will be discharging to [facility].  Will sign off at this time.  Thank you kindly for the referral.    Candace Egan RN  Rehab Admission Nurse

## 2022-03-23 NOTE — PLAN OF CARE
Goal Outcome Evaluation:  Plan of Care Reviewed With: patient        Progress: improving  Outcome Evaluation: Pt tolerated treatment well this date. Increased gait distance to 30ft w/ Rw and CGA x2 for safety. Very decreased candence, though no overt LOBs noted. Pt initially required mod A x2 for bed mobility and to stand. Encouraged pt to get up w/ nsg more during the day.

## 2022-03-23 NOTE — DISCHARGE PLACEMENT REQUEST
"Chester Garcia (81 y.o. Male)             Date of Birth   1940    Social Security Number       Address   [address] [place] [address]    Home Phone   [phone]    MRN   [id number]       Confucianism   [facility]    Marital Status                               Admission Date   3/16/22    Admission Type   Emergency    Admitting Provider   Hardy Alex MD    Attending Provider   Rj Shirley MD    Department, Room/Bed   [address], S513/1       Discharge Date       Discharge Disposition   Rehab Facility or Unit (DC - External)    Discharge Destination                               Attending Provider: Rj Shirley MD    Allergies: Contrast Dye, Cyclobenzaprine    Isolation: None   Infection: None   Code Status: CPR   Advance Care Planning Activity    Ht: 185.4 cm (73\")   Wt: 113 kg (248 lb 3.8 oz)    Admission Cmt: None   Principal Problem: Closed nondisplaced fracture of second cervical vertebra (HCC) [S12.101A]                 Active Insurance as of 3/16/2022     Primary Coverage     Payor Plan Insurance Group Employer/Plan Group    MEDICARE MEDICARE A ONLY      Payor Plan Address Payor Plan Phone Number Payor Plan Fax Number Effective Dates    [address] [phone]  6/1/2007 - None Entered    [facility] [address]       Subscriber Name Subscriber Birth Date Member ID       CHESTER GARCIA 1940 [id number]           Secondary Coverage     Payor Plan Insurance Group Employer/Plan Group    [facility] DE[id number]      Payor Plan Address Payor Plan Phone Number Payor Plan Fax Number Effective Dates    [facility] OF [facility] CARE [phone]  12/1/2017 - None Entered    [address] [address]       [facility] [address]       Subscriber Name Subscriber Birth Date Member ID       CHESTER GARCIA 1940 [id number]                 Emergency Contacts      (Rel.) Home Phone Work Phone Mobile Phone    " Sadie Gama (Spouse) [phone] -- [phone]    Jeanine Mcmanus (Daughter) -- -- [phone]    Duc Gama (Son) [phone] -- [phone]                 Discharge Summary      Rj Shirley MD at 22 0904              Patient Name: Chester Gama  : 1940  MRN: [id number]    Date of Admission: 3/16/2022  Date of Discharge:  3/23/2022  Primary Care Physician: Demetrio Ko DO      Chief Complaint:   Fall and Neck Pain      Discharge Diagnoses     Active Hospital Problems    Diagnosis  POA   • **Closed nondisplaced fracture of second cervical vertebra (HCC) [S12.101A]  Yes   • Injury of left shoulder [S49.92XA]  Yes   • Fall [W19.XXXA]  Yes   • Chronic peripheral neuropathy [G62.9]  Yes   • Chronic left arm weakness [R29.898]  Yes   • Stage 3a chronic kidney disease (HCC) [N18.31]  Yes   • Hyperlipemia [E78.5]  Yes   • Constipation [K59.00]  Yes   • Neck pain [M54.2]  Yes   • Sleep apnea [G47.30]  Yes   • RLS (restless legs syndrome) [G25.81]  Yes   • HTN (hypertension) [I10]  Yes   • Type 2 diabetes mellitus with diabetic polyneuropathy (HCC) [E11.42]  Yes   • Coronary artery disease [I25.10]  Yes      Resolved Hospital Problems    Diagnosis Date Resolved POA   • Leukocytosis [D72.829] 2022 Yes        Hospital Course     Mr. Gama is a 81 y.o. male with a history of DM2, CKD 3, CAD, chronic left arm weakness and neuropathy along with prior neck surgeries who presented to [facility] initially complaining of neck pain after a fall.  Please see the admitting history and physical for further details.  He had fallen into a trench with an exposed pipe and hit the back of his head on the pipe.  Imaging revealed nondisplaced C2 fracture as discussed below.  We admitted him with neurosurgery consulting.  His injury was recommended to be treated conservatively with Aspen collar for 6 weeks and follow-up with [facility] neurosurgery.  The most significant issue  throughout the hospital stay seems to have been pain control as his medications were adjusted until finally he has been able to achieve a reasonable level of pain control over the last 2 to 3 days.  I have only seen him today on the day of discharge.  He is on a bowel regimen for constipation.  He also had an MRI of the shoulder due to some pain in this area but it did not show any acute abnormality other than a strain.  He does have a chronic rotator cuff tear noted.  He has been set up for acute rehab at [facility] and will be discharged today.      Day of Discharge     Subjective:  No new complaints    Physical Exam:  Temp:  [96.6 °F (35.9 °C)-98.7 °F (37.1 °C)] 97.1 °F (36.2 °C)  Heart Rate:  [61-66] 61  Resp:  [18-20] 18  BP: (158-179)/(80-90) 178/90  Body mass index is 32.75 kg/m².  Physical Exam    Consultants     Consult Orders (all) (From admission, onward)     Start     Ordered    03/20/22 1744  Inpatient Case Management  Consult  Once        Provider:  (Not yet assigned)    03/20/22 1745    03/18/22 1543  Inpatient Orthopedic Surgery Consult  Once        Specialty:  Orthopedic Surgery  Provider:  Jay Laureano MD    03/18/22 1544    03/18/22 1222  Inpatient Rehab Admission Consult  Once        Provider:  (Not yet assigned)    03/18/22 1222    03/16/22 2014  [facility] (on-call MD unless specified) Details  Once        Specialty:  Hospitalist  Provider:  (Not yet assigned)    03/16/22 2013 03/16/22 1957  Neurosurgery (on-call MD unless specified)  Once        Specialty:  Neurosurgery  Provider:  Allen Ramon MD    03/16/22 1956              Procedures       Imaging Results (All)     Procedure Component Value Units Date/Time    MRI Shoulder Left Without Contrast [[id number]] Collected: 03/21/22 1603     Updated: 03/21/22 1611    Narrative:      LEFT SHOULDER MRI     HISTORY: Shoulder pain after a fall.     TECHNIQUE: Left shoulder MRI was performed without contrast and is  correlated  with shoulder x-ray 03/18/2022 and the thoracic spine CT  03/16/2022 which shows the medial 7 cm of the scapula.     FINDINGS: There is an effusion in the acromioclavicular joint where  there is advanced degenerative change with mild undersurface osseous  prominence. However, there is no evidence of acromioclavicular  separation.     There is a small bursal effusion. Mild deep soft tissue edema is  observed around the shoulder, involving the deep, medial portions of the  supraspinatus, infraspinatus, subscapularis, and most diffuse in the  distal trapezius muscle. There is no evidence of fracture. Marrow signal  is normal.     There is medial subluxation of the long head biceps tendon which crosses  anterior to the distal subscapularis. The biceps tendon demonstrates  abnormal interstitial signal, representing tendinopathy with a split  tear. Interstitial fluid signal along the distal supraspinatus tendon is  also observed, representing partial thickness interstitial tear,  chronic. This involves about 30% of the tendon thickness over an area  measuring 10 mm AP width. No full-thickness rotator cuff tear is  present.     Cartilage at the glenohumeral joint is preserved. The labrum appears  grossly normal.     There is no lesion in the spinoglenoid notch, quadrilateral space, nor  in the visualized axilla.       Impression:      1. Muscular edema at the left shoulder consistent with muscle strain. No  fracture identified.     2. There is a chronic appearing partial-thickness long head biceps  tendon tear. The biceps tendon is subluxed medially, remaining  superficial to the intact subscapularis tendon. There is also a chronic  appearing, partial-thickness interstitial supraspinatus tendon tear.     This report was finalized on 3/21/2022 4:07 PM by Dr. Duarte Mai M.D.       XR Shoulder 2+ View Left [[id number]] Collected: 03/18/22 1625     Updated: 03/18/22 1625    Narrative:      LEFT SHOULDER X-RAYS      CLINICAL HISTORY: Patient fell. Left shoulder pain.     Internal and external rotation views demonstrate mild to moderate  narrowing of the glenohumeral joint space and also mild bony spurring at  the acromioclavicular joint. There is no evidence of recent or old  fracture or dislocation.     This report was finalized on 3/18/2022 4:26 PM by Dr. Jorge Mejnivar M.D.       CT Cervical Spine Without Contrast [[id number]] Collected: 03/16/22 2028     Updated: 03/16/22 2220    Narrative:      HISTORY: Fell. Neck and back pain.     CT OF THE CERVICAL SPINE WITHOUT CONTRAST 03/16/2022     TECHNIQUE: Axial images were obtained from the skull base to the upper  thoracic spine. Sagittal and coronal reconstruction images were  reviewed.     FINDINGS: There is a mildly displaced fracture through the C2 vertebra  at the junction of the posterior vertebral body and the posterior  elements. The fracture appears mildly displaced. Fracture extends into  the left transversarium foramen. There is mild canal stenosis at this  level. No fractures of the base of the odontoid process is seen. There  is degenerative disease of the C1-C2 articulation.     There is fusion of the C3-4 levels. Anterior cervical plate is seen  fusing C4-C6. Intervertebral disc spacers are seen at C4-5 and C5-6.  There is also some spondylosis at C6-7 and C7-T1. Posterior fusion is  seen from C4 through T2.     No other fractures are seen.       Impression:      1. Fracture of the C2 vertebra involving the posterior aspect of the C2  vertebral body at its junction with the posterior elements. This shows  mild displacement and mild canal stenosis. Fracture extends into the  left transversarium foramen. If further evaluation of the vertebral  arteries is clinically indicated a CT angiogram of the neck may be  helpful.  2.. No other fractures are seen.  3. Postoperative changes of the cervical spine.     CT OF THE THORACIC SPINE WITHOUT CONTRAST 03/16/2022      TECHNIQUE: Axial images were obtained through the thoracic spine.  Sagittal and coronal reconstruction images were reviewed.     FINDINGS: C4-C6 anterior fusion device is seen with lower cervical and  upper thoracic fusion to approximately the T2 level.     There is anterior osteophytosis at essentially all thoracic levels.     No thoracic compression fractures or other fractures are seen. No  subluxation is seen. No significant canal stenosis is seen.     There are some ill-defined areas of scar, atelectasis or inflammatory  infiltrates in the bilateral lower lobes.     IMPRESSION:  1. Postoperative changes of the cervicothoracic junction.  2. Thoracic osteophytosis..  3. No thoracic spine fractures are seen.              Radiation dose reduction techniques were utilized, including automated  exposure control and exposure modulation based on body size.     This report was finalized on 3/16/2022 10:17 PM by Dr. Tony Mckee M.D.       CT Thoracic Spine Without Contrast [[id number]] Collected: 03/16/22 2028     Updated: 03/16/22 2220    Narrative:      HISTORY: Fell. Neck and back pain.     CT OF THE CERVICAL SPINE WITHOUT CONTRAST 03/16/2022     TECHNIQUE: Axial images were obtained from the skull base to the upper  thoracic spine. Sagittal and coronal reconstruction images were  reviewed.     FINDINGS: There is a mildly displaced fracture through the C2 vertebra  at the junction of the posterior vertebral body and the posterior  elements. The fracture appears mildly displaced. Fracture extends into  the left transversarium foramen. There is mild canal stenosis at this  level. No fractures of the base of the odontoid process is seen. There  is degenerative disease of the C1-C2 articulation.     There is fusion of the C3-4 levels. Anterior cervical plate is seen  fusing C4-C6. Intervertebral disc spacers are seen at C4-5 and C5-6.  There is also some spondylosis at C6-7 and C7-T1. Posterior fusion is  seen  from C4 through T2.     No other fractures are seen.       Impression:      1. Fracture of the C2 vertebra involving the posterior aspect of the C2  vertebral body at its junction with the posterior elements. This shows  mild displacement and mild canal stenosis. Fracture extends into the  left transversarium foramen. If further evaluation of the vertebral  arteries is clinically indicated a CT angiogram of the neck may be  helpful.  2.. No other fractures are seen.  3. Postoperative changes of the cervical spine.     CT OF THE THORACIC SPINE WITHOUT CONTRAST 03/16/2022     TECHNIQUE: Axial images were obtained through the thoracic spine.  Sagittal and coronal reconstruction images were reviewed.     FINDINGS: C4-C6 anterior fusion device is seen with lower cervical and  upper thoracic fusion to approximately the T2 level.     There is anterior osteophytosis at essentially all thoracic levels.     No thoracic compression fractures or other fractures are seen. No  subluxation is seen. No significant canal stenosis is seen.     There are some ill-defined areas of scar, atelectasis or inflammatory  infiltrates in the bilateral lower lobes.     IMPRESSION:  1. Postoperative changes of the cervicothoracic junction.  2. Thoracic osteophytosis..  3. No thoracic spine fractures are seen.              Radiation dose reduction techniques were utilized, including automated  exposure control and exposure modulation based on body size.     This report was finalized on 3/16/2022 10:17 PM by Dr. Tony Mckee M.D.       CT Head Without Contrast [[id number]] Collected: 03/16/22 1949     Updated: 03/16/22 2216    Narrative:      CT OF THE BRAIN WITHOUT CONTRAST 03/16/2022     HISTORY: Fell, head injury.     TECHNIQUE: Axial images were obtained through the brain without  intravenous contrast.     FINDINGS: There is moderate diffuse atrophy most severe in the frontal  lobes. There is no evidence of acute infarction, hemorrhage,  midline  shift or mass effect.     There is mild mucosal thickening in the ethmoid and rightward aspect of  the frontal sinus. No bony abnormalities are seen.       Impression:      1. No acute intracranial process.  2. Mild sinusitis.     Radiation dose reduction techniques were utilized, including automated  exposure control and exposure modulation based on body size.     This report was finalized on 3/16/2022 10:13 PM by Dr. Tony Mckee M.D.               Pertinent Labs     Results from last 7 days   Lab Units 03/23/22  0349 03/22/22  0743 03/21/22  0623 03/20/22  0358   WBC 10*3/mm3 7.89 8.06 8.04 8.18   HEMOGLOBIN g/dL 13.3 13.3 14.0 14.4   PLATELETS 10*3/mm3 187 187 185 174     Results from last 7 days   Lab Units 03/23/22  0349 03/22/22  0743 03/21/22  0623 03/20/22  0358   SODIUM mmol/L 141 140 139 137   POTASSIUM mmol/L 4.3 4.5 4.6 4.5   CHLORIDE mmol/L 106 109* 107 105   CO2 mmol/L 26.0 23.6 24.8 23.0   BUN mg/dL 16 16 17 15   CREATININE mg/dL 1.02 1.09 1.19 1.12   GLUCOSE mg/dL 128* 150* 184* 126*   EGFR mL/min/1.73 74.8 69.0 62.1 66.8     Results from last 7 days   Lab Units 03/16/22  1847   ALBUMIN g/dL 4.50   BILIRUBIN mg/dL 0.5   ALK PHOS U/L 86   AST (SGOT) U/L 27   ALT (SGPT) U/L 26     Results from last 7 days   Lab Units 03/23/22  0349 03/22/22  0743 03/21/22  0623 03/20/22  0358 03/17/22  0922 03/16/22  1847   CALCIUM mg/dL 8.6 8.4* 8.5* 8.3*   < > 9.7   ALBUMIN g/dL  --   --   --   --   --  4.50   MAGNESIUM mg/dL 2.2 2.3 2.3 2.2   < >  --    PHOSPHORUS mg/dL 2.8 2.8 3.2 2.6   < >  --     < > = values in this interval not displayed.               Invalid input(s): LDLCALC      Results from last 7 days   Lab Units 03/22/22  1831   COVID19  Not Detected       Test Results Pending at Discharge       Discharge Details        Discharge Medications      New Medications      Instructions Start Date   bisacodyl 5 MG EC tablet  Commonly known as: DULCOLAX   5 mg, Oral, Daily PRN      bisacodyl 10  MG suppository  Commonly known as: DULCOLAX   10 mg, Rectal, Daily PRN      enoxaparin 40 MG/0.4ML solution syringe  Commonly known as: LOVENOX   40 mg, Subcutaneous, Every 24 Hours      insulin lispro 100 UNIT/ML injection  Commonly known as: ADMELOG   0-14 Units, Subcutaneous, 3 Times Daily Before Meals      methocarbamol 500 MG tablet  Commonly known as: ROBAXIN   1,000 mg, Oral, Every 8 Hours Scheduled      oxyCODONE-acetaminophen 7.5-325 MG per tablet  Commonly known as: PERCOCET   2 tablets, Oral, 3 Times Daily      polyethylene glycol 17 g packet  Commonly known as: MIRALAX   17 g, Oral, Daily   Start Date: March 24, 2022     sennosides-docusate 8.6-50 MG per tablet  Commonly known as: PERICOLACE   2 tablets, Oral, 2 Times Daily         Changes to Medications      Instructions Start Date   metoprolol tartrate 25 MG tablet  Commonly known as: LOPRESSOR  What changed:   · how much to take  · additional instructions   12.5 mg, Oral, Every 12 Hours Scheduled         Continue These Medications      Instructions Start Date   ARTIFICIAL TEAR OP   1 drop, Ophthalmic, As Needed, Both eyes      aspirin 81 MG EC tablet   81 mg, Oral, Daily, STOPPED 6/7/19      atorvastatin 80 MG tablet  Commonly known as: LIPITOR   80 mg, Oral, Daily      diclofenac 1 % gel gel  Commonly known as: VOLTAREN   4 g, Topical, 4 Times Daily PRN, STOPPED PREOP, LAST DOSE 10/2017       furosemide 20 MG tablet  Commonly known as: Lasix   20 mg, Oral, Daily      gabapentin 300 MG capsule  Commonly known as: NEURONTIN   600 mg, Oral, 3 Times Daily      lisinopril 10 MG tablet  Commonly known as: PRINIVIL,ZESTRIL   10 mg, Oral, Daily      metFORMIN 500 MG tablet  Commonly known as: GLUCOPHAGE   500 mg, Oral, 2 Times Daily With Meals      rOPINIRole 4 MG tablet  Commonly known as: REQUIP   4 mg, Oral, Nightly      vitamin B-12 100 MCG tablet  Commonly known as: CYANOCOBALAMIN   100 mcg, Oral, Daily         Stop These Medications    docusate sodium  250 MG capsule  Commonly known as: COLACE     insulin glargine 100 UNIT/ML injection  Commonly known as: LANTUS, SEMGLEE     pantoprazole 40 MG EC tablet  Commonly known as: PROTONIX            Allergies   Allergen Reactions   • Contrast Dye Hives     IVP DYE   • Cyclobenzaprine Other (See Comments)     Night terrors         Discharge Disposition:  Rehab Facility or Unit (DC - External)      Discharge Diet:  Diet Order   Procedures   • Diet Soft Texture; Ground; Cardiac       Discharge Activity:       CODE STATUS:    Code Status and Medical Interventions:   Ordered at: 03/16/22 2047     Level Of Support Discussed With:    Patient     Code Status (Patient has no pulse and is not breathing):    CPR (Attempt to Resuscitate)     Medical Interventions (Patient has pulse or is breathing):    Full Support       No future appointments.   Contact information for follow-up providers     Freddy Preston MD Follow up in 4 week(s).    Specialty: Orthopedic Surgery  Contact information:  [address]  [address]  [facility] [id number] [phone]             Demetrio Ko,  .    Specialties: Emergency Medicine, Urgent Care  Contact information:  [address]  [facility] [id number] [phone]                   Contact information for after-discharge care     Destination     [facility] .    Service: Inpatient Rehabilitation  Contact information:  [address]  [facility] [address] [phone]                 [facility] .    Service: Home Health Services  Contact information:  [address]  [facility] [id number] [phone]                             Time Spent on Discharge:  Greater than 30 minutes      Rj Shirley MD  [facility]  03/23/22  09:04 EDT              Electronically signed by Rj Shirley MD at 03/23/22 0987

## 2022-03-23 NOTE — DISCHARGE SUMMARY
Patient Name: Chester Gama  : 1940  MRN: [id number]    Date of Admission: 3/16/2022  Date of Discharge:  3/23/2022  Primary Care Physician: Demetrio Ko DO      Chief Complaint:   Fall and Neck Pain      Discharge Diagnoses     Active Hospital Problems    Diagnosis  POA   • **Closed nondisplaced fracture of second cervical vertebra (HCC) [S12.101A]  Yes   • Injury of left shoulder [S49.92XA]  Yes   • Fall [W19.XXXA]  Yes   • Chronic peripheral neuropathy [G62.9]  Yes   • Chronic left arm weakness [R29.898]  Yes   • Stage 3a chronic kidney disease (HCC) [N18.31]  Yes   • Hyperlipemia [E78.5]  Yes   • Constipation [K59.00]  Yes   • Neck pain [M54.2]  Yes   • Sleep apnea [G47.30]  Yes   • RLS (restless legs syndrome) [G25.81]  Yes   • HTN (hypertension) [I10]  Yes   • Type 2 diabetes mellitus with diabetic polyneuropathy (HCC) [E11.42]  Yes   • Coronary artery disease [I25.10]  Yes      Resolved Hospital Problems    Diagnosis Date Resolved POA   • Leukocytosis [D72.829] 2022 Yes        Hospital Course     Mr. Gama is a 81 y.o. male with a history of DM2, CKD 3, CAD, chronic left arm weakness and neuropathy along with prior neck surgeries who presented to [facility] initially complaining of neck pain after a fall.  Please see the admitting history and physical for further details.  He had fallen into a trench with an exposed pipe and hit the back of his head on the pipe.  Imaging revealed nondisplaced C2 fracture as discussed below.  We admitted him with neurosurgery consulting.  His injury was recommended to be treated conservatively with Aspen collar for 6 weeks and follow-up with [facility] neurosurgery.  The most significant issue throughout the hospital stay seems to have been pain control as his medications were adjusted until finally he has been able to achieve a reasonable level of pain control over the last 2 to 3 days.  I have only seen him today on the day of discharge.   He is on a bowel regimen for constipation.  He also had an MRI of the shoulder due to some pain in this area but it did not show any acute abnormality other than a strain.  He does have a chronic rotator cuff tear noted.  He has been set up for acute rehab at [facility] and will be discharged today.      Day of Discharge     Subjective:  No new complaints    Physical Exam:  Temp:  [96.6 °F (35.9 °C)-98.7 °F (37.1 °C)] 97.1 °F (36.2 °C)  Heart Rate:  [61-66] 61  Resp:  [18-20] 18  BP: (158-179)/(80-90) 178/90  Body mass index is 32.75 kg/m².  Physical Exam  Vitals and nursing note reviewed.   Constitutional:       Comments: Drowsy but arousable   Neck:      Comments: Hard collar in place  Cardiovascular:      Rate and Rhythm: Normal rate and regular rhythm.      Heart sounds: No murmur heard.  Pulmonary:      Effort: No respiratory distress.   Abdominal:      General: There is no distension.      Palpations: Abdomen is soft.      Tenderness: There is no abdominal tenderness.   Skin:     General: Skin is warm and dry.      Findings: No rash.   Neurological:      Mental Status: He is oriented to person, place, and time.         Consultants     Consult Orders (all) (From admission, onward)     Start     Ordered    03/20/22 1744  Inpatient Case Management  Consult  Once        Provider:  (Not yet assigned)    03/20/22 1745    03/18/22 1543  Inpatient Orthopedic Surgery Consult  Once        Specialty:  Orthopedic Surgery  Provider:  Jay Laureano MD    03/18/22 1544    03/18/22 1222  Inpatient Rehab Admission Consult  Once        Provider:  (Not yet assigned)    03/18/22 1222    03/16/22 2014  [facility] (on-call MD unless specified) Details  Once        Specialty:  Hospitalist  Provider:  (Not yet assigned)    03/16/22 2013 03/16/22 1957  Neurosurgery (on-call MD unless specified)  Once        Specialty:  Neurosurgery  Provider:  Allen Ramon MD    03/16/22 1956              Procedures        Imaging Results (All)     Procedure Component Value Units Date/Time    MRI Shoulder Left Without Contrast [[id number]] Collected: 03/21/22 1603     Updated: 03/21/22 1611    Narrative:      LEFT SHOULDER MRI     HISTORY: Shoulder pain after a fall.     TECHNIQUE: Left shoulder MRI was performed without contrast and is  correlated with shoulder x-ray 03/18/2022 and the thoracic spine CT  03/16/2022 which shows the medial 7 cm of the scapula.     FINDINGS: There is an effusion in the acromioclavicular joint where  there is advanced degenerative change with mild undersurface osseous  prominence. However, there is no evidence of acromioclavicular  separation.     There is a small bursal effusion. Mild deep soft tissue edema is  observed around the shoulder, involving the deep, medial portions of the  supraspinatus, infraspinatus, subscapularis, and most diffuse in the  distal trapezius muscle. There is no evidence of fracture. Marrow signal  is normal.     There is medial subluxation of the long head biceps tendon which crosses  anterior to the distal subscapularis. The biceps tendon demonstrates  abnormal interstitial signal, representing tendinopathy with a split  tear. Interstitial fluid signal along the distal supraspinatus tendon is  also observed, representing partial thickness interstitial tear,  chronic. This involves about 30% of the tendon thickness over an area  measuring 10 mm AP width. No full-thickness rotator cuff tear is  present.     Cartilage at the glenohumeral joint is preserved. The labrum appears  grossly normal.     There is no lesion in the spinoglenoid notch, quadrilateral space, nor  in the visualized axilla.       Impression:      1. Muscular edema at the left shoulder consistent with muscle strain. No  fracture identified.     2. There is a chronic appearing partial-thickness long head biceps  tendon tear. The biceps tendon is subluxed medially, remaining  superficial to the intact  subscapularis tendon. There is also a chronic  appearing, partial-thickness interstitial supraspinatus tendon tear.     This report was finalized on 3/21/2022 4:07 PM by Dr. Duarte Mai M.D.       XR Shoulder 2+ View Left [[id number]] Collected: 03/18/22 1625     Updated: 03/18/22 1629    Narrative:      LEFT SHOULDER X-RAYS     CLINICAL HISTORY: Patient fell. Left shoulder pain.     Internal and external rotation views demonstrate mild to moderate  narrowing of the glenohumeral joint space and also mild bony spurring at  the acromioclavicular joint. There is no evidence of recent or old  fracture or dislocation.     This report was finalized on 3/18/2022 4:26 PM by Dr. Jorge Menjivar M.D.       CT Cervical Spine Without Contrast [[id number]] Collected: 03/16/22 2028     Updated: 03/16/22 2220    Narrative:      HISTORY: Fell. Neck and back pain.     CT OF THE CERVICAL SPINE WITHOUT CONTRAST 03/16/2022     TECHNIQUE: Axial images were obtained from the skull base to the upper  thoracic spine. Sagittal and coronal reconstruction images were  reviewed.     FINDINGS: There is a mildly displaced fracture through the C2 vertebra  at the junction of the posterior vertebral body and the posterior  elements. The fracture appears mildly displaced. Fracture extends into  the left transversarium foramen. There is mild canal stenosis at this  level. No fractures of the base of the odontoid process is seen. There  is degenerative disease of the C1-C2 articulation.     There is fusion of the C3-4 levels. Anterior cervical plate is seen  fusing C4-C6. Intervertebral disc spacers are seen at C4-5 and C5-6.  There is also some spondylosis at C6-7 and C7-T1. Posterior fusion is  seen from C4 through T2.     No other fractures are seen.       Impression:      1. Fracture of the C2 vertebra involving the posterior aspect of the C2  vertebral body at its junction with the posterior elements. This shows  mild displacement and mild  canal stenosis. Fracture extends into the  left transversarium foramen. If further evaluation of the vertebral  arteries is clinically indicated a CT angiogram of the neck may be  helpful.  2.. No other fractures are seen.  3. Postoperative changes of the cervical spine.     CT OF THE THORACIC SPINE WITHOUT CONTRAST 03/16/2022     TECHNIQUE: Axial images were obtained through the thoracic spine.  Sagittal and coronal reconstruction images were reviewed.     FINDINGS: C4-C6 anterior fusion device is seen with lower cervical and  upper thoracic fusion to approximately the T2 level.     There is anterior osteophytosis at essentially all thoracic levels.     No thoracic compression fractures or other fractures are seen. No  subluxation is seen. No significant canal stenosis is seen.     There are some ill-defined areas of scar, atelectasis or inflammatory  infiltrates in the bilateral lower lobes.     IMPRESSION:  1. Postoperative changes of the cervicothoracic junction.  2. Thoracic osteophytosis..  3. No thoracic spine fractures are seen.              Radiation dose reduction techniques were utilized, including automated  exposure control and exposure modulation based on body size.     This report was finalized on 3/16/2022 10:17 PM by Dr. Tony Mckee M.D.       CT Thoracic Spine Without Contrast [[id number]] Collected: 03/16/22 2028     Updated: 03/16/22 2220    Narrative:      HISTORY: Fell. Neck and back pain.     CT OF THE CERVICAL SPINE WITHOUT CONTRAST 03/16/2022     TECHNIQUE: Axial images were obtained from the skull base to the upper  thoracic spine. Sagittal and coronal reconstruction images were  reviewed.     FINDINGS: There is a mildly displaced fracture through the C2 vertebra  at the junction of the posterior vertebral body and the posterior  elements. The fracture appears mildly displaced. Fracture extends into  the left transversarium foramen. There is mild canal stenosis at this  level. No  fractures of the base of the odontoid process is seen. There  is degenerative disease of the C1-C2 articulation.     There is fusion of the C3-4 levels. Anterior cervical plate is seen  fusing C4-C6. Intervertebral disc spacers are seen at C4-5 and C5-6.  There is also some spondylosis at C6-7 and C7-T1. Posterior fusion is  seen from C4 through T2.     No other fractures are seen.       Impression:      1. Fracture of the C2 vertebra involving the posterior aspect of the C2  vertebral body at its junction with the posterior elements. This shows  mild displacement and mild canal stenosis. Fracture extends into the  left transversarium foramen. If further evaluation of the vertebral  arteries is clinically indicated a CT angiogram of the neck may be  helpful.  2.. No other fractures are seen.  3. Postoperative changes of the cervical spine.     CT OF THE THORACIC SPINE WITHOUT CONTRAST 03/16/2022     TECHNIQUE: Axial images were obtained through the thoracic spine.  Sagittal and coronal reconstruction images were reviewed.     FINDINGS: C4-C6 anterior fusion device is seen with lower cervical and  upper thoracic fusion to approximately the T2 level.     There is anterior osteophytosis at essentially all thoracic levels.     No thoracic compression fractures or other fractures are seen. No  subluxation is seen. No significant canal stenosis is seen.     There are some ill-defined areas of scar, atelectasis or inflammatory  infiltrates in the bilateral lower lobes.     IMPRESSION:  1. Postoperative changes of the cervicothoracic junction.  2. Thoracic osteophytosis..  3. No thoracic spine fractures are seen.              Radiation dose reduction techniques were utilized, including automated  exposure control and exposure modulation based on body size.     This report was finalized on 3/16/2022 10:17 PM by Dr. Tony Mckee M.D.       CT Head Without Contrast [[id number]] Collected: 03/16/22 1949     Updated:  03/16/22 2216    Narrative:      CT OF THE BRAIN WITHOUT CONTRAST 03/16/2022     HISTORY: Fell, head injury.     TECHNIQUE: Axial images were obtained through the brain without  intravenous contrast.     FINDINGS: There is moderate diffuse atrophy most severe in the frontal  lobes. There is no evidence of acute infarction, hemorrhage, midline  shift or mass effect.     There is mild mucosal thickening in the ethmoid and rightward aspect of  the frontal sinus. No bony abnormalities are seen.       Impression:      1. No acute intracranial process.  2. Mild sinusitis.     Radiation dose reduction techniques were utilized, including automated  exposure control and exposure modulation based on body size.     This report was finalized on 3/16/2022 10:13 PM by Dr. Tony Mckee M.D.               Pertinent Labs     Results from last 7 days   Lab Units 03/23/22  0349 03/22/22  0743 03/21/22  0623 03/20/22  0358   WBC 10*3/mm3 7.89 8.06 8.04 8.18   HEMOGLOBIN g/dL 13.3 13.3 14.0 14.4   PLATELETS 10*3/mm3 187 187 185 174     Results from last 7 days   Lab Units 03/23/22  0349 03/22/22  0743 03/21/22  0623 03/20/22  0358   SODIUM mmol/L 141 140 139 137   POTASSIUM mmol/L 4.3 4.5 4.6 4.5   CHLORIDE mmol/L 106 109* 107 105   CO2 mmol/L 26.0 23.6 24.8 23.0   BUN mg/dL 16 16 17 15   CREATININE mg/dL 1.02 1.09 1.19 1.12   GLUCOSE mg/dL 128* 150* 184* 126*   EGFR mL/min/1.73 74.8 69.0 62.1 66.8     Results from last 7 days   Lab Units 03/16/22  1847   ALBUMIN g/dL 4.50   BILIRUBIN mg/dL 0.5   ALK PHOS U/L 86   AST (SGOT) U/L 27   ALT (SGPT) U/L 26     Results from last 7 days   Lab Units 03/23/22  0349 03/22/22  0743 03/21/22  0623 03/20/22  0358 03/17/22  0922 03/16/22  1847   CALCIUM mg/dL 8.6 8.4* 8.5* 8.3*   < > 9.7   ALBUMIN g/dL  --   --   --   --   --  4.50   MAGNESIUM mg/dL 2.2 2.3 2.3 2.2   < >  --    PHOSPHORUS mg/dL 2.8 2.8 3.2 2.6   < >  --     < > = values in this interval not displayed.               Invalid  input(s): LDLCALC      Results from last 7 days   Lab Units 03/22/22  1831   COVID19  Not Detected       Test Results Pending at Discharge       Discharge Details        Discharge Medications      New Medications      Instructions Start Date   bisacodyl 5 MG EC tablet  Commonly known as: DULCOLAX   5 mg, Oral, Daily PRN      bisacodyl 10 MG suppository  Commonly known as: DULCOLAX   10 mg, Rectal, Daily PRN      enoxaparin 40 MG/0.4ML solution syringe  Commonly known as: LOVENOX   40 mg, Subcutaneous, Every 24 Hours      insulin lispro 100 UNIT/ML injection  Commonly known as: ADMELOG   0-14 Units, Subcutaneous, 3 Times Daily Before Meals      methocarbamol 500 MG tablet  Commonly known as: ROBAXIN   1,000 mg, Oral, Every 8 Hours Scheduled      oxyCODONE-acetaminophen 7.5-325 MG per tablet  Commonly known as: PERCOCET   2 tablets, Oral, 3 Times Daily      polyethylene glycol 17 g packet  Commonly known as: MIRALAX   17 g, Oral, Daily   Start Date: March 24, 2022     sennosides-docusate 8.6-50 MG per tablet  Commonly known as: PERICOLACE   2 tablets, Oral, 2 Times Daily         Changes to Medications      Instructions Start Date   metoprolol tartrate 25 MG tablet  Commonly known as: LOPRESSOR  What changed:   · how much to take  · additional instructions   12.5 mg, Oral, Every 12 Hours Scheduled         Continue These Medications      Instructions Start Date   ARTIFICIAL TEAR OP   1 drop, Ophthalmic, As Needed, Both eyes      aspirin 81 MG EC tablet   81 mg, Oral, Daily, STOPPED 6/7/19      atorvastatin 80 MG tablet  Commonly known as: LIPITOR   80 mg, Oral, Daily      diclofenac 1 % gel gel  Commonly known as: VOLTAREN   4 g, Topical, 4 Times Daily PRN, STOPPED PREOP, LAST DOSE 10/2017       furosemide 20 MG tablet  Commonly known as: Lasix   20 mg, Oral, Daily      gabapentin 300 MG capsule  Commonly known as: NEURONTIN   600 mg, Oral, 3 Times Daily      lisinopril 10 MG tablet  Commonly known as:  PRINIVIL,ZESTRIL   10 mg, Oral, Daily      metFORMIN 500 MG tablet  Commonly known as: GLUCOPHAGE   500 mg, Oral, 2 Times Daily With Meals      rOPINIRole 4 MG tablet  Commonly known as: REQUIP   4 mg, Oral, Nightly      vitamin B-12 100 MCG tablet  Commonly known as: CYANOCOBALAMIN   100 mcg, Oral, Daily         Stop These Medications    docusate sodium 250 MG capsule  Commonly known as: COLACE     insulin glargine 100 UNIT/ML injection  Commonly known as: LANTUS, SEMGLEE     pantoprazole 40 MG EC tablet  Commonly known as: PROTONIX            Allergies   Allergen Reactions   • Contrast Dye Hives     IVP DYE   • Cyclobenzaprine Other (See Comments)     Night terrors         Discharge Disposition:  Rehab Facility or Unit (DC - External)      Discharge Diet:  Diet Order   Procedures   • Diet Soft Texture; Ground; Cardiac       Discharge Activity:       CODE STATUS:    Code Status and Medical Interventions:   Ordered at: 03/16/22 2047     Level Of Support Discussed With:    Patient     Code Status (Patient has no pulse and is not breathing):    CPR (Attempt to Resuscitate)     Medical Interventions (Patient has pulse or is breathing):    Full Support       No future appointments.   Contact information for follow-up providers     Freddy Preston MD Follow up in 4 week(s).    Specialty: Orthopedic Surgery  Contact information:  [address]  [address]  [facility] [id number] [phone]             Demetrio Ko DO .    Specialties: Emergency Medicine, Urgent Care  Contact information:  [address]  [facility] [id number] [phone]                   Contact information for after-discharge care     Destination     [facility] .    Service: Inpatient Rehabilitation  Contact information:  [address] [facility] [address] [phone]                 Home Medical Care     [facility] .    Service: Home Health Services  Contact information:  [address] [address]  [phone]                             Time Spent on Discharge:  Greater than 30 minutes      Rj Shirley MD  [facility]  03/23/22  09:04 EDT

## 2022-03-23 NOTE — PROGRESS NOTES
[facility] NEUROSURGERY    Called by FADY Goldman who stated that when assisting the patient back to bed from the [facility], the patient felt a pop in his neck followed by an increase in pain. No other neuro deficits noted. Both the patient and the patient's wife express increased concern about this. Will order c-spine CT for further eval.     ADDENDUM 1425-  Cervical spine CT showed no change from previous imaging. Advised to remain in the [place] collar until evaluated by Dr. Pickett.

## 2022-03-23 NOTE — CASE MANAGEMENT/SOCIAL WORK
"Physicians Statement of Medical Necessity for  Ambulance Transportation    GENERAL INFORMATION     Name: Chester Gama  YOB: 1940  Medicare #:  [id number]  Transport Date: 3/23/2022  (Valid for round trips this date, or for scheduled repetitive trips for 60 days from the date signed below.)  Origin: [facility]  Destination: [facility]  Is the Patient's stay covered under Medicare Part A (PPS/DRG?)Yes  Closest appropriate facility? Yes  If this a hosp-hosp transfer? No  Is this a hospice patient? No    MEDICAL NECESSITY QUESTIONAIRE    Ambulance Transportation is medically necessary only if other means of transportation are contraindicated or would be potentially harmful to the patient.  To meet this requirement, the patient must be either \"bed confined\" or suffer from a condition such that transport by means other than an ambulance is contraindicated by the patient's condition.  The following questions must be answered by the healthcare professional signing below for this form to be valid:     1) Describe the MEDICAL CONDITION (physical and/or mental) of this patient AT THE TIME OF AMBULANCE TRANSPORT that requires the patient to be transported in an ambulance, and why transport by other means is contraindicated by the patient's condition:   Past Medical History:   Diagnosis Date   • Arthritis     HANDS KNEES   • Cervical pain (neck)     LEFT ARM PARESTHESIA   • Coronary artery disease    • Depression    • Diabetes mellitus (HCC)     TYPE 2   • Diverticular disease     HX, S/P RESECTION   • Hx of heart artery stent    • Hyperlipemia    • Hypertension    • PTSD (post-traumatic stress disorder)    • RLS (restless legs syndrome)    • Sleep apnea     DIDNT TOLERATE MASK, CLAUSTROPHOBIC   • Structural abnormality of kidney     SAW NEPHROLOGIST FOR SMALLER KIDNEY - 3 YEARS AGO.   • Thyroid cyst     PER WIFE      Past Surgical History:   Procedure Laterality Date   • ANTERIOR CERVICAL DISCECTOMY W/ FUSION N/A " "1/3/2018    Procedure: ANTERIOR CERVICAL DECOMPRESSION & FUSION C4 6;  Surgeon: Vincenzo Soliman DO;  Location: [facility] OR;  Service:    • APPENDECTOMY     • CATARACT EXTRACTION W/ INTRAOCULAR LENS  IMPLANT, BILATERAL     • CERVICAL DISCECTOMY POSTERIOR FUSION WITH INSTRUMENTATION N/A 6/14/2019    Procedure: C4-T2 POSTERIOR DECOMPRESSION AND FUSION BONE MORPHOGENIC PROTEIN;  Surgeon: Vincenzo Soliman DO;  Location: [facility] OR;  Service: Orthopedic Spine   • CERVICAL DISCECTOMY POSTERIOR FUSION WITH INSTRUMENTATION N/A 8/8/2019    Procedure: LEFT FORAMINOTOMY C4-T1, REFUSION OF C4-T1, REMOVAL OF C6 SCREW, REVISION OF HARDWARE;  Surgeon: Vincenzo Soliman DO;  Location: [facility] OR;  Service: Orthopedics   • COLON RESECTION      COLOSTOMY X 6 MOS THEN REVERSED.   • CORONARY ANGIOPLASTY WITH STENT PLACEMENT  02/10/2010    [email or web address]      2) Is this patient \"bed confined\" as defined below?Yes   To be \"bed confined\" the patient must satisfy all three of the following criteria:  (1) unable to get up from bed without assistance; AND (2) unable to ambulate;  AND (3) unable to sit in a chair or wheelchair.  3) Can this patient safely be transported by car or wheelchair van (I.e., may safely sit during transport, without an attendant or monitoring?)No   4. In addition to completing questions 1-3 above, please check any of the following conditions that apply*:          *Note: supporting documentation for any boxes checked must be maintained in the patient's medical records Moderate/severe pain on movement, Medical attendant required, Requires oxygen - unable to self administer and Unable to tolerate seated position for time needed to transport      SIGNATURE OF PHYSICIAN OR OTHER AUTHORIZED HEALTHCARE PROFESSIONAL    I certify that the above information is true and correct based on my evaluation of this patient, and represent that the patient requires transport by ambulance and that other forms of transport " are contraindicated.  I understand that this information will be used by the Centers for Medicare and Medicaid Services (CMS) to support the determiniation of medical necessity for ambulance services, and I represent that I have personal knowledge of the patient's condition at the time of transport.       If this box is checked, I also certify that the patient is physically or mentally incapable of signing the ambulance service's claim form and that the institution with which I am affiliated has furnished care, services or assistance to the patient.  My signature below is made on behalf of the patient pursuant to 42 .36(b)(4). In accordance with 42 .37, the specific reason(s) that the patient is physically or mentally incapable of signing the claim for is as follows:     Signature of Physician or Healthcare Professional  Date/Time:        (For Scheduled repetitive transport, this form is not valid for transports performed more than 60 days after this date).                                                                                                                                            --------------------------------------------------------------------------------------------  Printed Name and Credentials of Physician or Authorized Healthcare Professional     *Form must be signed by patient's attending physician for scheduled, repetitive transports,.  For non-repetitive ambulance transports, if unable to obtain the signature of the attending physician, any of the following may sign (please select below):     Physician  Clinical Nurse Specialist  Registered Nurse     Physician Assistant  Discharge Planner  Licensed Practical Nurse     Nurse Practitioner

## 2022-05-16 ENCOUNTER — TRANSCRIBE ORDERS (OUTPATIENT)
Dept: ADMINISTRATIVE | Facility: HOSPITAL | Age: 82
End: 2022-05-16

## 2022-05-16 DIAGNOSIS — S12.9XXA CERVICAL COMPRESSION FRACTURE, INITIAL ENCOUNTER: Primary | ICD-10-CM

## 2022-05-17 ENCOUNTER — APPOINTMENT (OUTPATIENT)
Dept: CT IMAGING | Facility: HOSPITAL | Age: 82
End: 2022-05-17

## 2022-05-17 ENCOUNTER — TRANSCRIBE ORDERS (OUTPATIENT)
Dept: LAB | Facility: HOSPITAL | Age: 82
End: 2022-05-17

## 2022-05-17 ENCOUNTER — LAB (OUTPATIENT)
Dept: LAB | Facility: HOSPITAL | Age: 82
End: 2022-05-17

## 2022-05-17 ENCOUNTER — HOSPITAL ENCOUNTER (OUTPATIENT)
Dept: CT IMAGING | Facility: HOSPITAL | Age: 82
Discharge: HOME | End: 2022-05-17

## 2022-05-17 DIAGNOSIS — S12.9XXA CERVICAL COMPRESSION FRACTURE, INITIAL ENCOUNTER: ICD-10-CM

## 2022-05-17 DIAGNOSIS — S12.100A: Primary | ICD-10-CM

## 2022-05-17 DIAGNOSIS — S12.100A: ICD-10-CM

## 2022-05-17 LAB
ANION GAP SERPL CALCULATED.3IONS-SCNC: 12.9 MMOL/L (ref 5–15)
APTT PPP: 34.6 SECONDS (ref 24.2–34.2)
BASOPHILS # BLD AUTO: 0.01 10*3/MM3 (ref 0–0.2)
BASOPHILS NFR BLD AUTO: 0.1 % (ref 0–1.5)
BILIRUB UR QL STRIP: NEGATIVE
BUN SERPL-MCNC: 21 MG/DL (ref 8–23)
BUN/CREAT SERPL: 17.6 (ref 7–25)
CALCIUM SPEC-SCNC: 9.9 MG/DL (ref 8.6–10.5)
CHLORIDE SERPL-SCNC: 99 MMOL/L (ref 98–107)
CLARITY UR: CLEAR
CO2 SERPL-SCNC: 24.1 MMOL/L (ref 22–29)
COLOR UR: YELLOW
CREAT BLDA-MCNC: 1.1 MG/DL
CREAT SERPL-MCNC: 1.19 MG/DL (ref 0.76–1.27)
DEPRECATED RDW RBC AUTO: 43.2 FL (ref 37–54)
EGFRCR SERPLBLD CKD-EPI 2021: 62.1 ML/MIN/1.73
EGFRCR SERPLBLD CKD-EPI 2021: 68.3 ML/MIN/1.73
EOSINOPHIL # BLD AUTO: 0 10*3/MM3 (ref 0–0.4)
EOSINOPHIL NFR BLD AUTO: 0 % (ref 0.3–6.2)
ERYTHROCYTE [DISTWIDTH] IN BLOOD BY AUTOMATED COUNT: 13 % (ref 12.3–15.4)
GLUCOSE SERPL-MCNC: 311 MG/DL (ref 65–99)
GLUCOSE UR STRIP-MCNC: ABNORMAL MG/DL
HCT VFR BLD AUTO: 44.5 % (ref 37.5–51)
HGB BLD-MCNC: 14.4 G/DL (ref 13–17.7)
HGB UR QL STRIP.AUTO: NEGATIVE
IMM GRANULOCYTES # BLD AUTO: 0.04 10*3/MM3 (ref 0–0.05)
IMM GRANULOCYTES NFR BLD AUTO: 0.3 % (ref 0–0.5)
INR PPP: 1.07 (ref 0.86–1.15)
KETONES UR QL STRIP: NEGATIVE
LEUKOCYTE ESTERASE UR QL STRIP.AUTO: NEGATIVE
LYMPHOCYTES # BLD AUTO: 0.82 10*3/MM3 (ref 0.7–3.1)
LYMPHOCYTES NFR BLD AUTO: 6.4 % (ref 19.6–45.3)
MCH RBC QN AUTO: 29.4 PG (ref 26.6–33)
MCHC RBC AUTO-ENTMCNC: 32.4 G/DL (ref 31.5–35.7)
MCV RBC AUTO: 90.8 FL (ref 79–97)
MONOCYTES # BLD AUTO: 0.1 10*3/MM3 (ref 0.1–0.9)
MONOCYTES NFR BLD AUTO: 0.8 % (ref 5–12)
MRSA DNA SPEC QL NAA+PROBE: NORMAL
NEUTROPHILS NFR BLD AUTO: 11.91 10*3/MM3 (ref 1.7–7)
NEUTROPHILS NFR BLD AUTO: 92.4 % (ref 42.7–76)
NITRITE UR QL STRIP: NEGATIVE
NRBC BLD AUTO-RTO: 0 /100 WBC (ref 0–0.2)
PH UR STRIP.AUTO: 6.5 [PH] (ref 5–8)
PLATELET # BLD AUTO: 210 10*3/MM3 (ref 140–450)
PMV BLD AUTO: 10 FL (ref 6–12)
POTASSIUM SERPL-SCNC: 4.4 MMOL/L (ref 3.5–5.2)
PROT UR QL STRIP: ABNORMAL
PROTHROMBIN TIME: 14 SECONDS (ref 11.8–14.9)
RBC # BLD AUTO: 4.9 10*6/MM3 (ref 4.14–5.8)
SODIUM SERPL-SCNC: 136 MMOL/L (ref 136–145)
SP GR UR STRIP: >1.03 (ref 1–1.03)
UROBILINOGEN UR QL STRIP: ABNORMAL
WBC NRBC COR # BLD: 12.88 10*3/MM3 (ref 3.4–10.8)

## 2022-05-17 PROCEDURE — 36415 COLL VENOUS BLD VENIPUNCTURE: CPT

## 2022-05-17 PROCEDURE — 81003 URINALYSIS AUTO W/O SCOPE: CPT

## 2022-05-17 PROCEDURE — 0 IOPAMIDOL PER 1 ML: Performed by: NEUROLOGICAL SURGERY

## 2022-05-17 PROCEDURE — 85025 COMPLETE CBC W/AUTO DIFF WBC: CPT

## 2022-05-17 PROCEDURE — 87641 MR-STAPH DNA AMP PROBE: CPT

## 2022-05-17 PROCEDURE — 80048 BASIC METABOLIC PNL TOTAL CA: CPT

## 2022-05-17 PROCEDURE — 85610 PROTHROMBIN TIME: CPT

## 2022-05-17 PROCEDURE — 85730 THROMBOPLASTIN TIME PARTIAL: CPT

## 2022-05-17 PROCEDURE — 82565 ASSAY OF CREATININE: CPT

## 2022-05-17 PROCEDURE — 70498 CT ANGIOGRAPHY NECK: CPT

## 2022-05-17 PROCEDURE — 87086 URINE CULTURE/COLONY COUNT: CPT

## 2022-05-17 RX ADMIN — IOPAMIDOL 100 ML: 755 INJECTION, SOLUTION INTRAVENOUS at 15:16

## 2022-05-18 LAB — BACTERIA SPEC AEROBE CULT: NO GROWTH

## 2025-07-07 ENCOUNTER — LAB (OUTPATIENT)
Facility: HOSPITAL | Age: 85
End: 2025-07-07
Payer: OTHER GOVERNMENT

## 2025-07-07 ENCOUNTER — TRANSCRIBE ORDERS (OUTPATIENT)
Dept: ADMINISTRATIVE | Facility: HOSPITAL | Age: 85
End: 2025-07-07
Payer: OTHER GOVERNMENT

## 2025-07-07 DIAGNOSIS — I10 ESSENTIAL (PRIMARY) HYPERTENSION: ICD-10-CM

## 2025-07-07 DIAGNOSIS — E55.9 VITAMIN D DEFICIENCY, UNSPECIFIED: ICD-10-CM

## 2025-07-07 DIAGNOSIS — N17.9 ACUTE RENAL FAILURE, UNSPECIFIED ACUTE RENAL FAILURE TYPE: ICD-10-CM

## 2025-07-07 DIAGNOSIS — N17.9 ACUTE RENAL FAILURE, UNSPECIFIED ACUTE RENAL FAILURE TYPE: Primary | ICD-10-CM

## 2025-07-07 LAB
25(OH)D3 SERPL-MCNC: 37.6 NG/ML (ref 30–100)
ALBUMIN SERPL-MCNC: 4.2 G/DL (ref 3.5–5.2)
ALBUMIN/GLOB SERPL: 1.3 G/DL
ALP SERPL-CCNC: 85 U/L (ref 39–117)
ALT SERPL W P-5'-P-CCNC: 21 U/L (ref 1–41)
ANION GAP SERPL CALCULATED.3IONS-SCNC: 11 MMOL/L (ref 5–15)
AST SERPL-CCNC: 22 U/L (ref 1–40)
BACTERIA UR QL AUTO: NORMAL /HPF
BASOPHILS # BLD AUTO: 0.06 10*3/MM3 (ref 0–0.2)
BASOPHILS NFR BLD AUTO: 1 % (ref 0–1.5)
BILIRUB SERPL-MCNC: 0.3 MG/DL (ref 0–1.2)
BILIRUB UR QL STRIP: NEGATIVE
BUN SERPL-MCNC: 22 MG/DL (ref 8–23)
BUN/CREAT SERPL: 14.3 (ref 7–25)
CALCIUM SPEC-SCNC: 9.2 MG/DL (ref 8.6–10.5)
CHLORIDE SERPL-SCNC: 106 MMOL/L (ref 98–107)
CLARITY UR: CLEAR
CO2 SERPL-SCNC: 26 MMOL/L (ref 22–29)
COLOR UR: YELLOW
CREAT SERPL-MCNC: 1.54 MG/DL (ref 0.76–1.27)
CREAT UR-MCNC: 96.5 MG/DL
DEPRECATED RDW RBC AUTO: 43.4 FL (ref 37–54)
EGFRCR SERPLBLD CKD-EPI 2021: 44.5 ML/MIN/1.73
EOSINOPHIL # BLD AUTO: 0.34 10*3/MM3 (ref 0–0.4)
EOSINOPHIL NFR BLD AUTO: 5.8 % (ref 0.3–6.2)
ERYTHROCYTE [DISTWIDTH] IN BLOOD BY AUTOMATED COUNT: 12.8 % (ref 12.3–15.4)
GLOBULIN UR ELPH-MCNC: 3.3 GM/DL
GLUCOSE SERPL-MCNC: 188 MG/DL (ref 65–99)
GLUCOSE UR STRIP-MCNC: ABNORMAL MG/DL
HBA1C MFR BLD: 7.6 % (ref 4.8–5.6)
HCT VFR BLD AUTO: 46.9 % (ref 37.5–51)
HGB BLD-MCNC: 15.2 G/DL (ref 13–17.7)
HGB UR QL STRIP.AUTO: NEGATIVE
HYALINE CASTS UR QL AUTO: NORMAL /LPF
IMM GRANULOCYTES # BLD AUTO: 0.01 10*3/MM3 (ref 0–0.05)
IMM GRANULOCYTES NFR BLD AUTO: 0.2 % (ref 0–0.5)
KETONES UR QL STRIP: NEGATIVE
LEUKOCYTE ESTERASE UR QL STRIP.AUTO: NEGATIVE
LYMPHOCYTES # BLD AUTO: 1.46 10*3/MM3 (ref 0.7–3.1)
LYMPHOCYTES NFR BLD AUTO: 24.8 % (ref 19.6–45.3)
MAGNESIUM SERPL-MCNC: 2.4 MG/DL (ref 1.6–2.4)
MCH RBC QN AUTO: 29.8 PG (ref 26.6–33)
MCHC RBC AUTO-ENTMCNC: 32.4 G/DL (ref 31.5–35.7)
MCV RBC AUTO: 92 FL (ref 79–97)
MONOCYTES # BLD AUTO: 0.52 10*3/MM3 (ref 0.1–0.9)
MONOCYTES NFR BLD AUTO: 8.8 % (ref 5–12)
NEUTROPHILS NFR BLD AUTO: 3.5 10*3/MM3 (ref 1.7–7)
NEUTROPHILS NFR BLD AUTO: 59.4 % (ref 42.7–76)
NITRITE UR QL STRIP: NEGATIVE
NRBC BLD AUTO-RTO: 0 /100 WBC (ref 0–0.2)
PH UR STRIP.AUTO: 5.5 [PH] (ref 5–8)
PLATELET # BLD AUTO: 183 10*3/MM3 (ref 140–450)
PMV BLD AUTO: 9.6 FL (ref 6–12)
POTASSIUM SERPL-SCNC: 5.1 MMOL/L (ref 3.5–5.2)
PROT ?TM UR-MCNC: 39.3 MG/DL
PROT SERPL-MCNC: 7.5 G/DL (ref 6–8.5)
PROT UR QL STRIP: ABNORMAL
PROT/CREAT UR: 0.41 MG/G{CREAT}
RBC # BLD AUTO: 5.1 10*6/MM3 (ref 4.14–5.8)
RBC # UR STRIP: NORMAL /HPF
REF LAB TEST METHOD: NORMAL
SODIUM SERPL-SCNC: 143 MMOL/L (ref 136–145)
SP GR UR STRIP: 1.02 (ref 1–1.03)
SQUAMOUS #/AREA URNS HPF: NORMAL /HPF
UROBILINOGEN UR QL STRIP: ABNORMAL
WBC # UR STRIP: NORMAL /HPF
WBC NRBC COR # BLD AUTO: 5.89 10*3/MM3 (ref 3.4–10.8)

## 2025-07-07 PROCEDURE — 85025 COMPLETE CBC W/AUTO DIFF WBC: CPT

## 2025-07-07 PROCEDURE — 84156 ASSAY OF PROTEIN URINE: CPT

## 2025-07-07 PROCEDURE — 81001 URINALYSIS AUTO W/SCOPE: CPT

## 2025-07-07 PROCEDURE — 82306 VITAMIN D 25 HYDROXY: CPT

## 2025-07-07 PROCEDURE — 83036 HEMOGLOBIN GLYCOSYLATED A1C: CPT

## 2025-07-07 PROCEDURE — 82570 ASSAY OF URINE CREATININE: CPT

## 2025-07-07 PROCEDURE — 36415 COLL VENOUS BLD VENIPUNCTURE: CPT

## 2025-07-07 PROCEDURE — 80053 COMPREHEN METABOLIC PANEL: CPT

## 2025-07-07 PROCEDURE — 83735 ASSAY OF MAGNESIUM: CPT

## (undated) DEVICE — GLV SURG TRIUMPH CLASSIC PF LTX 8 STRL

## (undated) DEVICE — 1010 S-DRAPE TOWEL DRAPE 10/BX: Brand: STERI-DRAPE™

## (undated) DEVICE — GLV SURG SENSICARE PI LF PF 8 GRN STRL

## (undated) DEVICE — COVER,MAYO STAND,STERILE: Brand: MEDLINE

## (undated) DEVICE — CONN TBG Y 5 IN 1 LF STRL

## (undated) DEVICE — PK NEURO SPINE 40

## (undated) DEVICE — SUT MNCRYL 3/0 PS2 18IN MCP497G

## (undated) DEVICE — DRP MICROSCP LEICA W/GLASS LENS

## (undated) DEVICE — DRSNG SURESITE WNDW 4X4.5

## (undated) DEVICE — APPL DURAPREP IODOPHOR APL 26ML

## (undated) DEVICE — BIPOLAR SEALER 23-112-1 AQM 6.0: Brand: AQUAMANTYS™

## (undated) DEVICE — LIMB HOLDER, WRIST/ANKLE: Brand: DEROYAL

## (undated) DEVICE — 3.0MM NEURO (MATCH HEAD) LESS AGGRESSIVE

## (undated) DEVICE — OPTIFOAM GENTLE SA, POSTOP, 4X8: Brand: MEDLINE

## (undated) DEVICE — ANTIBACTERIAL UNDYED BRAIDED (POLYGLACTIN 910), SYNTHETIC ABSORBABLE SUTURE: Brand: COATED VICRYL

## (undated) DEVICE — GLV SURG SENSICARE MICRO PF LF 8 STRL

## (undated) DEVICE — YANKAUER: Brand: DEROYAL

## (undated) DEVICE — SYR CONTRL LUERLOK 10CC

## (undated) DEVICE — DRN WND JP RND W TROC SIL 10F 1/8IN

## (undated) DEVICE — PK ATS CUST W CARDIOTOMY RESEVOIR

## (undated) DEVICE — SUT SILK 2/0 TIES 18IN A185H

## (undated) DEVICE — STPLR SKIN VISISTAT WD 35CT

## (undated) DEVICE — SMOKE EVACUATION TUBING WITH 7/8 IN TO 1/4 IN REDUCER: Brand: BUFFALO FILTER

## (undated) DEVICE — GLV SURG BIOGEL LTX PF 8

## (undated) DEVICE — 2.0MM DIAMOND NEURO (MATCH HEAD)

## (undated) DEVICE — DRP MICROSCOPE 4 BINOCULAR CV 54X150IN

## (undated) DEVICE — SUT VICRYL 1 CT1 27IN  JJ40G

## (undated) DEVICE — DRP C/ARMOR

## (undated) DEVICE — TEMPORARY FIXATION PIN: Brand: TRESTLE LUXE

## (undated) DEVICE — DISPOSABLE BIPOLAR CABLE 12FT. (3.6M): Brand: KIRWAN

## (undated) DEVICE — ELECTRD BLD EDGE/INSUL1P 2.4X5.1MM STRL

## (undated) DEVICE — FIXED 2.3MM DRILL BIT, 12MM: Brand: TRESTLE LUXE

## (undated) DEVICE — 3.0MM PRECISION NEURO (MATCH HEAD)

## (undated) DEVICE — COATED BRAIDED POLYESTER: Brand: TI-CRON

## (undated) DEVICE — SUT SILK 2/0 FS BLK 18IN 685G

## (undated) DEVICE — NDL HYPO ECLPS SFTY 22G 1 1/2IN

## (undated) DEVICE — DRSNG TELFA PAD NONADH STR 1S 3X4IN

## (undated) DEVICE — BLOOD TRANSFUSION FILTER: Brand: HAEMONETICS

## (undated) DEVICE — Device

## (undated) DEVICE — JACKSON-PRATT 100CC BULB RESERVOIR: Brand: CARDINAL HEALTH

## (undated) DEVICE — ADHS SKIN DERMABOND TOP ADVANCED

## (undated) DEVICE — TOTAL TRAY, 16FR 10ML SIL FOLEY, URN: Brand: MEDLINE

## (undated) DEVICE — KT DRN EVAC WND PVC PCH WTROC RND 10F400

## (undated) DEVICE — SPNG DISECTOR KTNER XRAY COTN 1/4X9/16IN PK/5

## (undated) DEVICE — GAUZE,SPONGE,4"X4",16PLY,XRAY,STRL,LF: Brand: MEDLINE

## (undated) DEVICE — CODMAN® SURGICAL PATTIES 3/4" X 3/4" (1.91CM X 1.91CM): Brand: CODMAN®

## (undated) DEVICE — SUT MNCRYL PLS ANTIB UD 4/0 PS2 18IN

## (undated) DEVICE — MAGNETIC DRAPE: Brand: DEVON